# Patient Record
Sex: FEMALE | Employment: OTHER | URBAN - METROPOLITAN AREA
[De-identification: names, ages, dates, MRNs, and addresses within clinical notes are randomized per-mention and may not be internally consistent; named-entity substitution may affect disease eponyms.]

---

## 2017-05-01 ENCOUNTER — OFFICE VISIT (OUTPATIENT)
Dept: UROLOGY | Age: 82
End: 2017-05-01
Payer: MEDICARE

## 2017-05-01 VITALS
TEMPERATURE: 98.1 F | SYSTOLIC BLOOD PRESSURE: 140 MMHG | HEART RATE: 67 BPM | BODY MASS INDEX: 23.54 KG/M2 | HEIGHT: 67 IN | DIASTOLIC BLOOD PRESSURE: 82 MMHG | WEIGHT: 150 LBS

## 2017-05-01 DIAGNOSIS — R31.9 HEMATURIA: Primary | ICD-10-CM

## 2017-05-01 DIAGNOSIS — R35.1 NOCTURIA: ICD-10-CM

## 2017-05-01 PROCEDURE — 1123F ACP DISCUSS/DSCN MKR DOCD: CPT | Performed by: UROLOGY

## 2017-05-01 PROCEDURE — 1090F PRES/ABSN URINE INCON ASSESS: CPT | Performed by: UROLOGY

## 2017-05-01 PROCEDURE — G8420 CALC BMI NORM PARAMETERS: HCPCS | Performed by: UROLOGY

## 2017-05-01 PROCEDURE — G8427 DOCREV CUR MEDS BY ELIG CLIN: HCPCS | Performed by: UROLOGY

## 2017-05-01 PROCEDURE — 1036F TOBACCO NON-USER: CPT | Performed by: UROLOGY

## 2017-05-01 PROCEDURE — G8400 PT W/DXA NO RESULTS DOC: HCPCS | Performed by: UROLOGY

## 2017-05-01 PROCEDURE — 4040F PNEUMOC VAC/ADMIN/RCVD: CPT | Performed by: UROLOGY

## 2017-05-01 PROCEDURE — 99214 OFFICE O/P EST MOD 30 MIN: CPT | Performed by: UROLOGY

## 2017-05-01 ASSESSMENT — ENCOUNTER SYMPTOMS
EYE REDNESS: 0
WHEEZING: 0
COUGH: 0
SHORTNESS OF BREATH: 0
COLOR CHANGE: 0
EYE PAIN: 0
BACK PAIN: 0

## 2017-05-10 ENCOUNTER — PROCEDURE VISIT (OUTPATIENT)
Dept: UROLOGY | Age: 82
End: 2017-05-10
Payer: MEDICARE

## 2017-05-10 VITALS
SYSTOLIC BLOOD PRESSURE: 140 MMHG | HEART RATE: 73 BPM | BODY MASS INDEX: 23.53 KG/M2 | WEIGHT: 149.91 LBS | HEIGHT: 67 IN | RESPIRATION RATE: 16 BRPM | DIASTOLIC BLOOD PRESSURE: 85 MMHG

## 2017-05-10 DIAGNOSIS — R31.9 HEMATURIA: Primary | ICD-10-CM

## 2017-05-10 PROCEDURE — 1036F TOBACCO NON-USER: CPT | Performed by: UROLOGY

## 2017-05-10 PROCEDURE — 52000 CYSTOURETHROSCOPY: CPT | Performed by: UROLOGY

## 2018-02-07 ENCOUNTER — HOSPITAL ENCOUNTER (OUTPATIENT)
Age: 83
Setting detail: SPECIMEN
Discharge: HOME | End: 2018-02-07
Payer: MEDICARE

## 2018-02-07 ENCOUNTER — OFFICE VISIT (OUTPATIENT)
Dept: FAMILY MEDICINE CLINIC | Age: 83
End: 2018-02-07
Payer: MEDICARE

## 2018-02-07 VITALS
DIASTOLIC BLOOD PRESSURE: 80 MMHG | BODY MASS INDEX: 21.69 KG/M2 | TEMPERATURE: 98 F | WEIGHT: 138.2 LBS | SYSTOLIC BLOOD PRESSURE: 122 MMHG | HEIGHT: 67 IN | HEART RATE: 76 BPM | OXYGEN SATURATION: 98 %

## 2018-02-07 DIAGNOSIS — I10 HYPERTENSION, UNSPECIFIED TYPE: Primary | ICD-10-CM

## 2018-02-07 DIAGNOSIS — E03.9 HYPOTHYROIDISM, UNSPECIFIED TYPE: ICD-10-CM

## 2018-02-07 DIAGNOSIS — Z90.710 H/O TOTAL HYSTERECTOMY: ICD-10-CM

## 2018-02-07 DIAGNOSIS — Z13.820 SCREENING FOR OSTEOPOROSIS: ICD-10-CM

## 2018-02-07 DIAGNOSIS — Z91.89 AT RISK FOR OSTEOPOROSIS: ICD-10-CM

## 2018-02-07 DIAGNOSIS — I10 HYPERTENSION, UNSPECIFIED TYPE: ICD-10-CM

## 2018-02-07 DIAGNOSIS — E78.5 HYPERLIPIDEMIA, UNSPECIFIED HYPERLIPIDEMIA TYPE: ICD-10-CM

## 2018-02-07 DIAGNOSIS — Z91.81 RISK FOR FALLS: ICD-10-CM

## 2018-02-07 LAB
ALBUMIN SERPL-MCNC: 4 G/DL (ref 3.5–5.2)
ALBUMIN/GLOBULIN RATIO: 1.4 (ref 1–2.5)
ALP BLD-CCNC: 85 U/L (ref 35–104)
ALT SERPL-CCNC: 7 U/L (ref 5–33)
ANION GAP SERPL CALCULATED.3IONS-SCNC: 12 MMOL/L (ref 9–17)
AST SERPL-CCNC: 13 U/L
BILIRUB SERPL-MCNC: 0.75 MG/DL (ref 0.3–1.2)
BUN BLDV-MCNC: 14 MG/DL (ref 8–23)
BUN/CREAT BLD: ABNORMAL (ref 9–20)
CALCIUM SERPL-MCNC: 9 MG/DL (ref 8.6–10.4)
CHLORIDE BLD-SCNC: 99 MMOL/L (ref 98–107)
CHOLESTEROL/HDL RATIO: 2.5
CHOLESTEROL: 133 MG/DL
CO2: 28 MMOL/L (ref 20–31)
CREAT SERPL-MCNC: 0.96 MG/DL (ref 0.5–0.9)
GFR AFRICAN AMERICAN: >60 ML/MIN
GFR NON-AFRICAN AMERICAN: 56 ML/MIN
GFR SERPL CREATININE-BSD FRML MDRD: ABNORMAL ML/MIN/{1.73_M2}
GFR SERPL CREATININE-BSD FRML MDRD: ABNORMAL ML/MIN/{1.73_M2}
GLUCOSE BLD-MCNC: 115 MG/DL (ref 70–99)
HCT VFR BLD CALC: 40.5 % (ref 36.3–47.1)
HDLC SERPL-MCNC: 53 MG/DL
HEMOGLOBIN: 13 G/DL (ref 11.9–15.1)
LDL CHOLESTEROL: 58 MG/DL (ref 0–130)
MCH RBC QN AUTO: 29.3 PG (ref 25.2–33.5)
MCHC RBC AUTO-ENTMCNC: 32.1 G/DL (ref 28.4–34.8)
MCV RBC AUTO: 91.4 FL (ref 82.6–102.9)
NRBC AUTOMATED: 0 PER 100 WBC
PDW BLD-RTO: 13 % (ref 11.8–14.4)
PLATELET # BLD: 153 K/UL (ref 138–453)
PMV BLD AUTO: 11 FL (ref 8.1–13.5)
POTASSIUM SERPL-SCNC: 4 MMOL/L (ref 3.7–5.3)
RBC # BLD: 4.43 M/UL (ref 3.95–5.11)
SODIUM BLD-SCNC: 139 MMOL/L (ref 135–144)
TOTAL PROTEIN: 6.8 G/DL (ref 6.4–8.3)
TRIGL SERPL-MCNC: 112 MG/DL
TSH SERPL DL<=0.05 MIU/L-ACNC: 3.23 MIU/L (ref 0.3–5)
VLDLC SERPL CALC-MCNC: NORMAL MG/DL (ref 1–30)
WBC # BLD: 7.5 K/UL (ref 3.5–11.3)

## 2018-02-07 PROCEDURE — G8420 CALC BMI NORM PARAMETERS: HCPCS | Performed by: NURSE PRACTITIONER

## 2018-02-07 PROCEDURE — G8400 PT W/DXA NO RESULTS DOC: HCPCS | Performed by: NURSE PRACTITIONER

## 2018-02-07 PROCEDURE — 4040F PNEUMOC VAC/ADMIN/RCVD: CPT | Performed by: NURSE PRACTITIONER

## 2018-02-07 PROCEDURE — 1036F TOBACCO NON-USER: CPT | Performed by: NURSE PRACTITIONER

## 2018-02-07 PROCEDURE — 1090F PRES/ABSN URINE INCON ASSESS: CPT | Performed by: NURSE PRACTITIONER

## 2018-02-07 PROCEDURE — 99203 OFFICE O/P NEW LOW 30 MIN: CPT | Performed by: NURSE PRACTITIONER

## 2018-02-07 PROCEDURE — 1123F ACP DISCUSS/DSCN MKR DOCD: CPT | Performed by: NURSE PRACTITIONER

## 2018-02-07 PROCEDURE — G8427 DOCREV CUR MEDS BY ELIG CLIN: HCPCS | Performed by: NURSE PRACTITIONER

## 2018-02-07 PROCEDURE — G8484 FLU IMMUNIZE NO ADMIN: HCPCS | Performed by: NURSE PRACTITIONER

## 2018-02-07 ASSESSMENT — ENCOUNTER SYMPTOMS
COUGH: 0
RESPIRATORY NEGATIVE: 1
EYES NEGATIVE: 1
WHEEZING: 0
ABDOMINAL PAIN: 0
ALLERGIC/IMMUNOLOGIC NEGATIVE: 1
SHORTNESS OF BREATH: 0
CONSTIPATION: 1
DIARRHEA: 0

## 2018-02-07 ASSESSMENT — PATIENT HEALTH QUESTIONNAIRE - PHQ9
2. FEELING DOWN, DEPRESSED OR HOPELESS: 0
SUM OF ALL RESPONSES TO PHQ QUESTIONS 1-9: 0
1. LITTLE INTEREST OR PLEASURE IN DOING THINGS: 0
SUM OF ALL RESPONSES TO PHQ9 QUESTIONS 1 & 2: 0

## 2018-02-07 NOTE — PROGRESS NOTES
Cardiovascular: Negative. Negative for chest pain and palpitations. Gastrointestinal: Positive for constipation. Negative for abdominal pain and diarrhea. Endocrine: Negative. Negative for cold intolerance and heat intolerance. Genitourinary: Negative. Negative for difficulty urinating, frequency and urgency. Musculoskeletal: Positive for gait problem. Negative for arthralgias. Skin: Negative. Negative for pallor and rash. Allergic/Immunologic: Negative. Neurological: Negative for weakness, numbness and headaches. Hematological: Negative. Psychiatric/Behavioral: Negative. Negative for sleep disturbance. The patient is not nervous/anxious. Objective:     /80 (Site: Left Arm, Position: Sitting, Cuff Size: Small Adult)   Pulse 76   Temp 98 °F (36.7 °C) (Oral)   Ht 5' 7\" (1.702 m)   Wt 138 lb 3.2 oz (62.7 kg)   SpO2 98%   BMI 21.65 kg/m²   Physical Exam   Constitutional: She is oriented to person, place, and time. Vital signs are normal. She appears well-developed and well-nourished. HENT:   Head: Normocephalic and atraumatic. One lower tooth noted to lower left   Eyes: Conjunctivae are normal. Right eye exhibits no discharge. Left eye exhibits no discharge. Neck: Normal range of motion. Neck supple. No thyromegaly present. Cardiovascular: Normal rate, regular rhythm and normal heart sounds. Exam reveals no gallop and no friction rub. No murmur heard. Pulmonary/Chest: Effort normal and breath sounds normal. No respiratory distress. She has no wheezes. She has no rales. Abdominal: Soft. Bowel sounds are normal. She exhibits no distension. There is no tenderness. Musculoskeletal: Normal range of motion. She exhibits no edema. She has difficulty getting up from seated position. Walks with slow gait, stops to rest.   Neurological: She is alert and oriented to person, place, and time. Coordination normal.   Skin: Skin is warm and dry. No rash noted.  No

## 2018-02-08 PROBLEM — N18.30 CKD (CHRONIC KIDNEY DISEASE) STAGE 3, GFR 30-59 ML/MIN (HCC): Status: ACTIVE | Noted: 2018-02-08

## 2018-02-08 PROBLEM — Z91.81 RISK FOR FALLS: Status: ACTIVE | Noted: 2018-02-08

## 2018-03-06 NOTE — TELEPHONE ENCOUNTER
NEW PATIENT with us 02/07/2018      Next Visit Date:  Future Appointments  Date Time Provider Roberto Drummond   5/7/2018 1:00 PM Prince Víctor  [address] Maintenance   Topic Date Due    Shingles Vaccine (1 of 2 - 2 Dose Series) 07/03/1986    DEXA (modify frequency per FRAX score)  02/07/2019 (Originally 7/3/2001)    DTaP/Tdap/Td vaccine (1 - Tdap) 02/07/2019 (Originally 7/3/1954)    Flu vaccine (1) 02/07/2019 (Originally 9/1/2017)    Pneumococcal low/med risk (1 of 2 - PCV13) 02/07/2019 (Originally 7/3/2001)    TSH testing  02/07/2019    Potassium monitoring  02/07/2019    Creatinine monitoring  02/07/2019       No results found for: LABA1C          ( goal A1C is < 7)   No results found for: LABMICR  LDL Cholesterol (mg/dL)   Date Value   02/07/2018 58       (goal LDL is <100)   AST (U/L)   Date Value   02/07/2018 13     ALT (U/L)   Date Value   02/07/2018 7     BUN (mg/dL)   Date Value   02/07/2018 14     BP Readings from Last 3 Encounters:   02/07/18 122/80   08/31/17 (!) 148/92   05/10/17 (!) 140/85          (goal 120/80)    All Future Testing planned in [facility]  Lab Frequency Next Occurrence   Basic Metabolic Panel Once 93/90/6861   CBC Auto Differential Once 04/06/2017   Magnesium Once 04/06/2017   Phosphorus Once 04/06/2017   PROTEIN,TOT,RAND UR WITH TP/CRE RATIO Once 69/81/3599   Basic Metabolic Panel Once 15/46/2039   Magnesium Once 08/31/2017   Phosphorus Once 08/31/2017   CBC Auto Differential Once 08/31/2017   Protein,Total,Rand Ur with TP/Cre Ratio Once 08/31/2017   DEXA Bone Density Axial Skeleton Once 03/09/2018               Patient Active Problem List:     HTN (hypertension)     Dependent edema     SOB (shortness of breath)     Hypertension     Kidney stone     Cancer (HCC)     Hypothyroidism     Depression     Hyperlipidemia     Basal cell carcinoma     Melanoma ([facility] [address]     Anxiety     Hypokalemia     Gout     H/O total hysterectomy     CKD (chronic kidney

## 2018-03-08 RX ORDER — LEVOTHYROXINE SODIUM 0.1 MG/1
100 TABLET ORAL DAILY
Qty: 30 TABLET | Refills: 3 | Status: SHIPPED | OUTPATIENT
Start: 2018-03-08 | End: 2018-07-26 | Stop reason: SDUPTHER

## 2018-03-08 RX ORDER — BUSPIRONE HYDROCHLORIDE 15 MG/1
15 TABLET ORAL DAILY
Qty: 30 TABLET | Refills: 3 | Status: SHIPPED | OUTPATIENT
Start: 2018-03-08 | End: 2018-07-09 | Stop reason: SDUPTHER

## 2018-04-16 RX ORDER — LISINOPRIL 20 MG/1
20 TABLET ORAL DAILY
Qty: 90 TABLET | Refills: 1 | Status: SHIPPED | OUTPATIENT
Start: 2018-04-16 | End: 2018-10-13 | Stop reason: SDUPTHER

## 2018-04-20 RX ORDER — PAROXETINE HYDROCHLORIDE 40 MG/1
40 TABLET, FILM COATED ORAL EVERY MORNING
Qty: 90 TABLET | Refills: 1 | Status: SHIPPED | OUTPATIENT
Start: 2018-04-20 | End: 2018-10-15 | Stop reason: SDUPTHER

## 2018-04-20 RX ORDER — CARVEDILOL 12.5 MG/1
12.5 TABLET ORAL 2 TIMES DAILY
Qty: 180 TABLET | Refills: 1 | Status: SHIPPED | OUTPATIENT
Start: 2018-04-20 | End: 2018-10-15 | Stop reason: SDUPTHER

## 2018-05-07 ENCOUNTER — OFFICE VISIT (OUTPATIENT)
Dept: FAMILY MEDICINE CLINIC | Age: 83
End: 2018-05-07
Payer: MEDICARE

## 2018-05-07 VITALS
OXYGEN SATURATION: 98 % | WEIGHT: 129 LBS | BODY MASS INDEX: 20.2 KG/M2 | HEART RATE: 79 BPM | DIASTOLIC BLOOD PRESSURE: 84 MMHG | TEMPERATURE: 97.7 F | SYSTOLIC BLOOD PRESSURE: 128 MMHG

## 2018-05-07 DIAGNOSIS — E03.9 HYPOTHYROIDISM, UNSPECIFIED TYPE: ICD-10-CM

## 2018-05-07 DIAGNOSIS — I10 ESSENTIAL HYPERTENSION: Primary | ICD-10-CM

## 2018-05-07 PROCEDURE — 1090F PRES/ABSN URINE INCON ASSESS: CPT | Performed by: NURSE PRACTITIONER

## 2018-05-07 PROCEDURE — G8420 CALC BMI NORM PARAMETERS: HCPCS | Performed by: NURSE PRACTITIONER

## 2018-05-07 PROCEDURE — 4040F PNEUMOC VAC/ADMIN/RCVD: CPT | Performed by: NURSE PRACTITIONER

## 2018-05-07 PROCEDURE — 99213 OFFICE O/P EST LOW 20 MIN: CPT | Performed by: NURSE PRACTITIONER

## 2018-05-07 PROCEDURE — G8427 DOCREV CUR MEDS BY ELIG CLIN: HCPCS | Performed by: NURSE PRACTITIONER

## 2018-05-07 PROCEDURE — 1123F ACP DISCUSS/DSCN MKR DOCD: CPT | Performed by: NURSE PRACTITIONER

## 2018-05-07 PROCEDURE — 1036F TOBACCO NON-USER: CPT | Performed by: NURSE PRACTITIONER

## 2018-05-07 PROCEDURE — G8400 PT W/DXA NO RESULTS DOC: HCPCS | Performed by: NURSE PRACTITIONER

## 2018-05-07 ASSESSMENT — ENCOUNTER SYMPTOMS
EYES NEGATIVE: 1
WHEEZING: 0
ABDOMINAL PAIN: 0
COUGH: 0
ALLERGIC/IMMUNOLOGIC NEGATIVE: 1
SHORTNESS OF BREATH: 0
RESPIRATORY NEGATIVE: 1

## 2018-07-09 RX ORDER — BUSPIRONE HYDROCHLORIDE 15 MG/1
TABLET ORAL
Qty: 30 TABLET | Refills: 5 | Status: SHIPPED | OUTPATIENT
Start: 2018-07-09 | End: 2018-10-23 | Stop reason: SDUPTHER

## 2018-07-26 RX ORDER — LEVOTHYROXINE SODIUM 0.1 MG/1
TABLET ORAL
Qty: 90 TABLET | Refills: 1 | Status: SHIPPED | OUTPATIENT
Start: 2018-07-26 | End: 2019-01-21 | Stop reason: SDUPTHER

## 2018-07-26 NOTE — TELEPHONE ENCOUNTER
Last seen: 5/7/18  Next appt: 11/7/18 2/7/2018  3:56 PM - Lucas, Abimael Incoming Lab Results From [facility]     Component Results     Component Value Ref Range & Units Status Collected Lab   TSH 3.23  0.30 - 5.00 mIU/L Final 02/07/2018 11:16  Pierce Sun         Next Visit Date:  Future Appointments  Date Time Provider Roberto Hoda   11/7/2018 9:30 AM [facility]okul [facility]       Health Maintenance   Topic Date Due    DEXA (modify frequency per FRAX score)  02/07/2019 (Originally 7/3/2001)    DTaP/Tdap/Td vaccine (1 - Tdap) 02/07/2019 (Originally 7/3/1954)    Flu vaccine (1) 02/07/2019 (Originally 9/1/2018)    Pneumococcal low/med risk (1 of 2 - PCV13) 02/07/2019 (Originally 7/3/2001)    Shingles Vaccine (1 of 2 - 2 Dose Series) 05/07/2019 (Originally 7/3/1986)    TSH testing  02/07/2019    Potassium monitoring  02/07/2019    Creatinine monitoring  02/07/2019       No results found for: LABA1C          ( goal A1C is < 7)   No results found for: LABMICR  LDL Cholesterol (mg/dL)   Date Value   02/07/2018 58       (goal LDL is <100)   AST (U/L)   Date Value   02/07/2018 13     ALT (U/L)   Date Value   02/07/2018 7     BUN (mg/dL)   Date Value   02/07/2018 14     BP Readings from Last 3 Encounters:   05/07/18 128/84   02/07/18 122/80   08/31/17 (!) 148/92          (goal 120/80)    All Future Testing planned in [facility]  Lab Frequency Next Occurrence   Basic Metabolic Panel Once 95/19/1473   Magnesium Once 08/31/2017   Phosphorus Once 08/31/2017   CBC Auto Differential Once 08/31/2017   Protein,Total,Rand Ur with TP/Cre Ratio Once 08/31/2017   DEXA Bone Density Axial Skeleton Once 07/29/2018               Patient Active Problem List:     HTN (hypertension)     Dependent edema     SOB (shortness of breath)     Hypertension     Kidney stone     Cancer (HCC)     Hypothyroidism     Depression     Hyperlipidemia     Basal cell carcinoma     Melanoma ([facility] [address]     Anxiety Hypokalemia     Gout     H/O total hysterectomy     CKD (chronic kidney disease) stage 3, GFR 30-59 ml/min     Risk for falls

## 2018-10-15 RX ORDER — CARVEDILOL 12.5 MG/1
12.5 TABLET ORAL 2 TIMES DAILY
Qty: 180 TABLET | Refills: 1 | Status: SHIPPED | OUTPATIENT
Start: 2018-10-15 | End: 2019-03-24 | Stop reason: SDUPTHER

## 2018-10-15 RX ORDER — PAROXETINE HYDROCHLORIDE 40 MG/1
40 TABLET, FILM COATED ORAL EVERY MORNING
Qty: 90 TABLET | Refills: 1 | Status: SHIPPED | OUTPATIENT
Start: 2018-10-15 | End: 2019-03-29 | Stop reason: SDUPTHER

## 2018-10-15 RX ORDER — LISINOPRIL 20 MG/1
TABLET ORAL
Qty: 90 TABLET | Refills: 1 | Status: SHIPPED | OUTPATIENT
Start: 2018-10-15 | End: 2019-05-20 | Stop reason: SDUPTHER

## 2018-10-15 NOTE — TELEPHONE ENCOUNTER
Filled 4/16/18 #90 with 1 rF    Next Visit Date:  Future Appointments  Date Time Provider Roberto Hoda   11/7/2018 9:30 AM [facility]       [facility] Maintenance   Topic Date Due    DEXA (modify frequency per FRAX score)  02/07/2019 (Originally 7/3/2001)    DTaP/Tdap/Td vaccine (1 - Tdap) 02/07/2019 (Originally 7/3/1954)    Flu vaccine (1) 02/07/2019 (Originally 9/1/2018)    Pneumococcal low/med risk (1 of 2 - PCV13) 02/07/2019 (Originally 7/3/2001)    Shingles Vaccine (1 of 2 - 2 Dose Series) 05/07/2019 (Originally 7/3/1986)    TSH testing  02/07/2019    Potassium monitoring  02/07/2019    Creatinine monitoring  02/07/2019       No results found for: LABA1C          ( goal A1C is < 7)   No results found for: LABMICR  LDL Cholesterol (mg/dL)   Date Value   02/07/2018 58       (goal LDL is <100)   AST (U/L)   Date Value   02/07/2018 13     ALT (U/L)   Date Value   02/07/2018 7     BUN (mg/dL)   Date Value   02/07/2018 14     BP Readings from Last 3 Encounters:   05/07/18 128/84   02/07/18 122/80   08/31/17 (!) 148/92          (goal 120/80)    All Future Testing planned in [facility]  Lab Frequency Next Occurrence               Patient Active Problem List:     HTN (hypertension)     Dependent edema     SOB (shortness of breath)     Hypertension     Kidney stone     Cancer (HCC)     Hypothyroidism     Depression     Hyperlipidemia     Basal cell carcinoma     Melanoma (HCC)     Anxiety     Hypokalemia     Gout     H/O total hysterectomy     CKD (chronic kidney disease) stage 3, GFR 30-59 ml/min (HCC)     Risk for falls

## 2018-10-23 RX ORDER — BUSPIRONE HYDROCHLORIDE 15 MG/1
15 TABLET ORAL DAILY
Qty: 90 TABLET | Refills: 1 | Status: SHIPPED | OUTPATIENT
Start: 2018-10-23 | End: 2019-01-24 | Stop reason: SDUPTHER

## 2018-10-23 NOTE — TELEPHONE ENCOUNTER
Filled 7/9/18 #30 with 5 RF to local pharmacy. Rec'd request from [facility] for 90 days.     Next Visit Date:  Future Appointments  Date Time Provider Roberto Drummond   11/7/2018 9:30 AM NORMA Reed - CNP [facility]       Health Maintenance   Topic Date Due    DEXA (modify frequency per FRAX score)  02/07/2019 (Originally 7/3/2001)    DTaP/Tdap/Td vaccine (1 - Tdap) 02/07/2019 (Originally 7/3/1954)    Flu vaccine (1) 02/07/2019 (Originally 9/1/2018)    Pneumococcal low/med risk (1 of 2 - PCV13) 02/07/2019 (Originally 7/3/2001)    Shingles Vaccine (1 of 2 - 2 Dose Series) 05/07/2019 (Originally 7/3/1986)    TSH testing  02/07/2019    Potassium monitoring  02/07/2019    Creatinine monitoring  02/07/2019       No results found for: LABA1C          ( goal A1C is < 7)   No results found for: LABMICR  LDL Cholesterol (mg/dL)   Date Value   02/07/2018 58       (goal LDL is <100)   AST (U/L)   Date Value   02/07/2018 13     ALT (U/L)   Date Value   02/07/2018 7     BUN (mg/dL)   Date Value   02/07/2018 14     BP Readings from Last 3 Encounters:   05/07/18 128/84   02/07/18 122/80   08/31/17 (!) 148/92          (goal 120/80)    All Future Testing planned in [facility]  Lab Frequency Next Occurrence               Patient Active Problem List:     HTN (hypertension)     Dependent edema     SOB (shortness of breath)     Hypertension     Kidney stone     Cancer (HCC)     Hypothyroidism     Depression     Hyperlipidemia     Basal cell carcinoma     Melanoma (HCC)     Anxiety     Hypokalemia     Gout     H/O total hysterectomy     CKD (chronic kidney disease) stage 3, GFR 30-59 ml/min (HCC)     Risk for falls

## 2019-01-23 ENCOUNTER — TELEPHONE (OUTPATIENT)
Dept: FAMILY MEDICINE CLINIC | Age: 84
End: 2019-01-23

## 2019-01-24 RX ORDER — BUSPIRONE HYDROCHLORIDE 15 MG/1
15 TABLET ORAL DAILY
Qty: 90 TABLET | Refills: 1 | Status: SHIPPED | OUTPATIENT
Start: 2019-01-24 | End: 2019-06-26 | Stop reason: SDUPTHER

## 2019-02-04 RX ORDER — ATORVASTATIN CALCIUM 10 MG/1
10 TABLET, FILM COATED ORAL DAILY
Qty: 90 TABLET | Refills: 1 | Status: SHIPPED | OUTPATIENT
Start: 2019-02-04 | End: 2019-06-26 | Stop reason: SDUPTHER

## 2019-03-25 RX ORDER — CARVEDILOL 12.5 MG/1
TABLET ORAL
Qty: 60 TABLET | Refills: 0 | Status: SHIPPED | OUTPATIENT
Start: 2019-03-25 | End: 2019-05-28 | Stop reason: SDUPTHER

## 2019-03-29 RX ORDER — PAROXETINE HYDROCHLORIDE 40 MG/1
TABLET, FILM COATED ORAL
Qty: 30 TABLET | Refills: 0 | Status: SHIPPED | OUTPATIENT
Start: 2019-03-29 | End: 2019-05-28 | Stop reason: SDUPTHER

## 2019-05-20 ENCOUNTER — TELEPHONE (OUTPATIENT)
Dept: FAMILY MEDICINE CLINIC | Age: 84
End: 2019-05-20

## 2019-05-20 RX ORDER — LISINOPRIL 20 MG/1
TABLET ORAL
Qty: 14 TABLET | Refills: 0 | Status: SHIPPED | OUTPATIENT
Start: 2019-05-20 | End: 2019-05-21 | Stop reason: SDUPTHER

## 2019-05-21 ENCOUNTER — TELEPHONE (OUTPATIENT)
Dept: FAMILY MEDICINE CLINIC | Age: 84
End: 2019-05-21

## 2019-05-21 DIAGNOSIS — I10 ESSENTIAL HYPERTENSION: Primary | ICD-10-CM

## 2019-05-21 RX ORDER — LISINOPRIL 20 MG/1
TABLET ORAL
Qty: 90 TABLET | Refills: 1 | Status: SHIPPED | OUTPATIENT
Start: 2019-05-21 | End: 2019-11-17 | Stop reason: SDUPTHER

## 2019-05-28 DIAGNOSIS — I10 ESSENTIAL HYPERTENSION: ICD-10-CM

## 2019-05-28 DIAGNOSIS — F32.A DEPRESSION, UNSPECIFIED DEPRESSION TYPE: Primary | ICD-10-CM

## 2019-05-28 RX ORDER — PAROXETINE HYDROCHLORIDE 40 MG/1
TABLET, FILM COATED ORAL
Qty: 30 TABLET | Refills: 0 | Status: SHIPPED | OUTPATIENT
Start: 2019-05-28 | End: 2019-05-31 | Stop reason: SDUPTHER

## 2019-05-28 RX ORDER — CARVEDILOL 12.5 MG/1
TABLET ORAL
Qty: 60 TABLET | Refills: 0 | Status: SHIPPED | OUTPATIENT
Start: 2019-05-28 | End: 2019-05-31 | Stop reason: SDUPTHER

## 2019-05-31 DIAGNOSIS — F32.A DEPRESSION, UNSPECIFIED DEPRESSION TYPE: ICD-10-CM

## 2019-05-31 DIAGNOSIS — I10 ESSENTIAL HYPERTENSION: ICD-10-CM

## 2019-05-31 NOTE — TELEPHONE ENCOUNTER
Med sent to wrong pharmacy. Pt agrees to keep express script order. However she needs a temp supply until it arrives.      Last visit 5/7/18  Next Visit Date:  Future Appointments   Date Time Provider Roberto Hoda   6/26/2019 11:10 AM NORMA Sofia - CNP [facility] [address] Maintenance   Topic Date Due    DTaP/Tdap/Td vaccine (1 - Tdap) 07/03/1955    Shingles Vaccine (1 of 2) 07/03/1986    DEXA (modify frequency per FRAX score)  07/03/2001    Pneumococcal 65+ years Vaccine (1 of 2 - PCV13) 07/03/2001    TSH testing  02/07/2019    Potassium monitoring  02/07/2019    Creatinine monitoring  02/07/2019    Flu vaccine (Season Ended) 09/01/2019       No results found for: LABA1C          ( goal A1C is < 7)   No results found for: LABMICR  LDL Cholesterol (mg/dL)   Date Value   02/07/2018 58       (goal LDL is <100)   AST (U/L)   Date Value   02/07/2018 13     ALT (U/L)   Date Value   02/07/2018 7     BUN (mg/dL)   Date Value   02/07/2018 14     BP Readings from Last 3 Encounters:   05/07/18 128/84   02/07/18 122/80   08/31/17 (!) 148/92          (goal 120/80)    All Future Testing planned in [facility]  Lab Frequency Next Occurrence               Patient Active Problem List:     HTN (hypertension)     Dependent edema     SOB (shortness of breath)     Hypertension     Kidney stone     Cancer (HCC)     Hypothyroidism     Depression     Hyperlipidemia     Basal cell carcinoma     Melanoma (HCC)     Anxiety     Hypokalemia     Gout     H/O total hysterectomy     CKD (chronic kidney disease) stage 3, GFR 30-59 ml/min (HCC)     Risk for falls

## 2019-06-03 RX ORDER — CARVEDILOL 12.5 MG/1
TABLET ORAL
Qty: 28 TABLET | Refills: 0 | Status: SHIPPED | OUTPATIENT
Start: 2019-06-03 | End: 2019-06-14 | Stop reason: SDUPTHER

## 2019-06-03 RX ORDER — PAROXETINE HYDROCHLORIDE 40 MG/1
TABLET, FILM COATED ORAL
Qty: 14 TABLET | Refills: 0 | Status: SHIPPED | OUTPATIENT
Start: 2019-06-03 | End: 2019-06-17

## 2019-06-14 DIAGNOSIS — I10 ESSENTIAL HYPERTENSION: ICD-10-CM

## 2019-06-14 DIAGNOSIS — F32.A DEPRESSION, UNSPECIFIED DEPRESSION TYPE: ICD-10-CM

## 2019-06-14 NOTE — TELEPHONE ENCOUNTER
Last visit: 5/7/18  Last Med refill: 6/3/19 14 day  Does patient have enough medication for 72 hours: Yes    Next Visit Date:  Future Appointments   Date Time Provider Roberto Drummond   6/26/2019 11:10 AM Louanne Mcardle, APRN - CNP [facility] [address] Maintenance   Topic Date Due    DTaP/Tdap/Td vaccine (1 - Tdap) 07/03/1955    Shingles Vaccine (1 of 2) 07/03/1986    DEXA (modify frequency per FRAX score)  07/03/2001    Pneumococcal 65+ years Vaccine (1 of 2 - PCV13) 07/03/2001    TSH testing  02/07/2019    Potassium monitoring  02/07/2019    Creatinine monitoring  02/07/2019    Flu vaccine (Season Ended) 09/01/2019       No results found for: LABA1C          ( goal A1C is < 7)   No results found for: LABMICR  LDL Cholesterol (mg/dL)   Date Value   02/07/2018 58       (goal LDL is <100)   AST (U/L)   Date Value   02/07/2018 13     ALT (U/L)   Date Value   02/07/2018 7     BUN (mg/dL)   Date Value   02/07/2018 14     BP Readings from Last 3 Encounters:   05/07/18 128/84   02/07/18 122/80   08/31/17 (!) 148/92          (goal 120/80)    All Future Testing planned in [facility]  Lab Frequency Next Occurrence               Patient Active Problem List:     HTN (hypertension)     Dependent edema     SOB (shortness of breath)     Hypertension     Kidney stone     Cancer (HCC)     Hypothyroidism     Depression     Hyperlipidemia     Basal cell carcinoma     Melanoma (HCC)     Anxiety     Hypokalemia     Gout     H/O total hysterectomy     CKD (chronic kidney disease) stage 3, GFR 30-59 ml/min (HCC)     Risk for falls

## 2019-06-17 RX ORDER — CARVEDILOL 12.5 MG/1
TABLET ORAL
Qty: 60 TABLET | Refills: 0 | Status: SHIPPED | OUTPATIENT
Start: 2019-06-17 | End: 2019-06-26 | Stop reason: SDUPTHER

## 2019-06-17 RX ORDER — PAROXETINE HYDROCHLORIDE 40 MG/1
TABLET, FILM COATED ORAL
Qty: 30 TABLET | Refills: 0 | Status: SHIPPED | OUTPATIENT
Start: 2019-06-17 | End: 2019-06-26 | Stop reason: SDUPTHER

## 2019-06-26 ENCOUNTER — OFFICE VISIT (OUTPATIENT)
Dept: FAMILY MEDICINE CLINIC | Age: 84
End: 2019-06-26
Payer: MEDICARE

## 2019-06-26 VITALS
OXYGEN SATURATION: 98 % | WEIGHT: 125 LBS | SYSTOLIC BLOOD PRESSURE: 120 MMHG | HEIGHT: 66 IN | BODY MASS INDEX: 20.09 KG/M2 | HEART RATE: 74 BPM | DIASTOLIC BLOOD PRESSURE: 66 MMHG

## 2019-06-26 DIAGNOSIS — F32.A DEPRESSION, UNSPECIFIED DEPRESSION TYPE: ICD-10-CM

## 2019-06-26 DIAGNOSIS — M21.619 BUNION OF GREAT TOE: ICD-10-CM

## 2019-06-26 DIAGNOSIS — E78.5 HYPERLIPIDEMIA, UNSPECIFIED HYPERLIPIDEMIA TYPE: ICD-10-CM

## 2019-06-26 DIAGNOSIS — M54.31 SCIATICA OF RIGHT SIDE: ICD-10-CM

## 2019-06-26 DIAGNOSIS — I10 ESSENTIAL HYPERTENSION: Primary | ICD-10-CM

## 2019-06-26 DIAGNOSIS — Q84.6 NAIL ANOMALY: ICD-10-CM

## 2019-06-26 DIAGNOSIS — Z91.81 AT HIGH RISK FOR FALLS: ICD-10-CM

## 2019-06-26 DIAGNOSIS — R60.9 DEPENDENT EDEMA: ICD-10-CM

## 2019-06-26 DIAGNOSIS — Z13.0 SCREENING FOR DEFICIENCY ANEMIA: ICD-10-CM

## 2019-06-26 DIAGNOSIS — E03.9 HYPOTHYROIDISM, UNSPECIFIED TYPE: ICD-10-CM

## 2019-06-26 PROCEDURE — 4040F PNEUMOC VAC/ADMIN/RCVD: CPT | Performed by: NURSE PRACTITIONER

## 2019-06-26 PROCEDURE — 1036F TOBACCO NON-USER: CPT | Performed by: NURSE PRACTITIONER

## 2019-06-26 PROCEDURE — G8400 PT W/DXA NO RESULTS DOC: HCPCS | Performed by: NURSE PRACTITIONER

## 2019-06-26 PROCEDURE — 99215 OFFICE O/P EST HI 40 MIN: CPT | Performed by: NURSE PRACTITIONER

## 2019-06-26 PROCEDURE — 1090F PRES/ABSN URINE INCON ASSESS: CPT | Performed by: NURSE PRACTITIONER

## 2019-06-26 PROCEDURE — 1123F ACP DISCUSS/DSCN MKR DOCD: CPT | Performed by: NURSE PRACTITIONER

## 2019-06-26 PROCEDURE — G8427 DOCREV CUR MEDS BY ELIG CLIN: HCPCS | Performed by: NURSE PRACTITIONER

## 2019-06-26 PROCEDURE — G8420 CALC BMI NORM PARAMETERS: HCPCS | Performed by: NURSE PRACTITIONER

## 2019-06-26 RX ORDER — ATORVASTATIN CALCIUM 10 MG/1
10 TABLET, FILM COATED ORAL DAILY
Qty: 90 TABLET | Refills: 1 | Status: SHIPPED | OUTPATIENT
Start: 2019-06-26 | End: 2019-12-23 | Stop reason: SDUPTHER

## 2019-06-26 RX ORDER — BUSPIRONE HYDROCHLORIDE 15 MG/1
15 TABLET ORAL DAILY
Qty: 90 TABLET | Refills: 1 | Status: SHIPPED | OUTPATIENT
Start: 2019-06-26 | End: 2019-12-23

## 2019-06-26 RX ORDER — CARVEDILOL 12.5 MG/1
12.5 TABLET ORAL 2 TIMES DAILY
Qty: 180 TABLET | Refills: 1 | Status: SHIPPED | OUTPATIENT
Start: 2019-06-26 | End: 2019-12-23

## 2019-06-26 RX ORDER — PAROXETINE HYDROCHLORIDE 40 MG/1
40 TABLET, FILM COATED ORAL EVERY MORNING
Qty: 90 TABLET | Refills: 1 | Status: SHIPPED | OUTPATIENT
Start: 2019-06-26 | End: 2019-12-23

## 2019-06-26 RX ORDER — LEVOTHYROXINE SODIUM 0.1 MG/1
100 TABLET ORAL DAILY
Qty: 90 TABLET | Refills: 1 | Status: SHIPPED | OUTPATIENT
Start: 2019-06-26 | End: 2019-12-23

## 2019-06-26 RX ORDER — METHYLPREDNISOLONE 4 MG/1
TABLET ORAL
Qty: 1 KIT | Refills: 0 | Status: SHIPPED | OUTPATIENT
Start: 2019-06-26 | End: 2019-07-02

## 2019-06-26 RX ORDER — FUROSEMIDE 20 MG/1
10 TABLET ORAL DAILY
Qty: 15 TABLET | Refills: 0 | Status: SHIPPED | OUTPATIENT
Start: 2019-06-26 | End: 2020-03-03 | Stop reason: SDUPTHER

## 2019-06-26 ASSESSMENT — ENCOUNTER SYMPTOMS
COUGH: 0
SHORTNESS OF BREATH: 0
ALLERGIC/IMMUNOLOGIC NEGATIVE: 1
BACK PAIN: 1
EYES NEGATIVE: 1
WHEEZING: 0
RESPIRATORY NEGATIVE: 1
ABDOMINAL PAIN: 0

## 2019-06-26 NOTE — PROGRESS NOTES
[address] [id number]  Dept: [phone]  Dept Fax: [phone]      Oh Jasso is a 81 y.o. female who presents today for hermedical conditions/complaints as noted below. Oh Jasso is c/o of Medication Refill            HPI:      HPI  Adalgisa Pineda is here today for medication refills. She is doing fair. She is getting around. She is having pain in her left buttock that is radiating down the left leg. This is creating some issue with moving and getting up and down. She is using a walker. She has not had any falls. She is not able to cook like she once did due to standing for a length of time is hard for her. HTN- BP is well controlled today. Compliant with taking medications. Denies chest pain, dyspnea,  or HA. She does have some swelling in her lower legs. HLD-she is taking her statin. No issue. Hypothyroid-feels that she has adequate energy. Taking her medication every am.     Toenails are overgrown and thick. She is not able to cut them. She is having trouble getting her feet into shoes. She would like to get some help with this.      No results found for: LABA1C          ( goal A1Cis < 7)   No results found for: LABMICR  LDL Cholesterol (mg/dL)   Date Value   02/07/2018 58       (goal LDL is <100)   AST (U/L)   Date Value   02/07/2018 13     ALT (U/L)   Date Value   02/07/2018 7     BUN (mg/dL)   Date Value   02/07/2018 14     BP Readings from Last 3 Encounters:   06/26/19 120/66   05/07/18 128/84   02/07/18 122/80          (goal 120/80)    Past Medical History:   Diagnosis Date    Anxiety     Arthritis     Basal cell carcinoma     Cancer (HCC)     skin    CHF (congestive heart failure) (HCC)     Depression     Gout     Hyperlipidemia     Hypertension     Hypokalemia     Hypothyroidism     Kidney stone     Melanoma [facility])       Past Surgical History:   Procedure Laterality Date    HYSTERECTOMY      breath and wheezing. Cardiovascular: Positive for leg swelling. Negative for chest pain and palpitations. Gastrointestinal: Negative for abdominal pain. Endocrine: Negative. Negative for cold intolerance and heat intolerance. Genitourinary: Negative. Negative for difficulty urinating, frequency and urgency. Musculoskeletal: Positive for arthralgias, back pain and gait problem (Using walker). Skin: Negative. Negative for pallor and rash. Allergic/Immunologic: Negative. Neurological: Negative for weakness, numbness and headaches. Hematological: Negative. Psychiatric/Behavioral: Negative. Negative for sleep disturbance. The patient is not nervous/anxious. Objective:     /66 (Site: Left Upper Arm, Position: Sitting, Cuff Size: Medium Adult)   Pulse 74   Ht 5' 6\" (1.676 m)   Wt 125 lb (56.7 kg)   SpO2 98%   BMI 20.18 kg/m²   Physical Exam   Constitutional: She is oriented to person, place, and time. Vital signs are normal. She appears well-developed and well-nourished. HENT:   Head: Normocephalic and atraumatic. One lower tooth noted to lower left   Eyes: Conjunctivae are normal. Right eye exhibits no discharge. Left eye exhibits no discharge. Neck: Normal range of motion. Neck supple. No thyromegaly present. Cardiovascular: Normal rate, regular rhythm and normal heart sounds. No murmur heard. Pulmonary/Chest: Effort normal and breath sounds normal. No respiratory distress. She has no rales. Abdominal: Soft. Bowel sounds are normal. She exhibits no distension. There is no tenderness. Musculoskeletal: Normal range of motion. She exhibits edema (1+ to lower legs). Neurological: She is alert and oriented to person, place, and time. Coordination normal.   Skin: Skin is warm and dry. No rash noted. No erythema. Scattered less than 1 cm erythematic crusted lesions noted, scattered over arms. Gr toe nails are overgrown, curling and thickened.     Psychiatric: She has a normal mood and affect. Her behavior is normal. Judgment and thought content normal.   Vitals reviewed. Assessment:      1. Essential hypertension    2. Hypothyroidism, unspecified type    3. Dependent edema    4. Screening for deficiency anemia    5. Bunion of great toe    6. Nail anomaly    7. Depression, unspecified depression type    8. Hyperlipidemia, unspecified hyperlipidemia type    9. At high risk for falls                     Plan:      Orders Placed This Encounter   Procedures    TSH     Standing Status:   Future     Standing Expiration Date:   6/26/2020    Comprehensive Metabolic Panel     Standing Status:   Future     Standing Expiration Date:   6/26/2020    CBC Auto Differential     Standing Status:   Future     Standing Expiration Date:   6/25/2020    Lipid Panel     Standing Status:   Future     Standing Expiration Date:   6/26/2020     Order Specific Question:   Is Patient Fasting?/# of Hours     Answer:   Chaz Brady DPM, Podiatry, [place]     Referral Priority:   Routine     Referral Type:   Eval and Treat     Referral Reason:   Specialty Services Required     Referred to Provider:   Kinjal Cuadra DPM     Requested Specialty:   Podiatry     Number of Visits Requested:   1     1. Essential hypertension  well controlled, continue plan of care    - Comprehensive Metabolic Panel; Future  - carvedilol (COREG) 12.5 MG tablet; Take 1 tablet by mouth 2 times daily  Dispense: 180 tablet; Refill: 1    2. Hypothyroidism, unspecified type    - TSH; Future  - levothyroxine (SYNTHROID) 100 MCG tablet; Take 1 tablet by mouth Daily  Dispense: 90 tablet; Refill: 1    3. Dependent edema  Advised to take for several days, re evaluate swelling and if good, set to the side to use on another occasion.   - furosemide (LASIX) 20 MG tablet; Take 0.5 tablets by mouth daily  Dispense: 15 tablet; Refill: 0    4.  Screening for deficiency anemia    - CBC Auto Differential; Future    5. Bunion of great toe  6. Nail anomaly    - AFL - Chaz El DPM, Podiatry, [place]    7. Depression, unspecified depression type    - PARoxetine (PAXIL) 40 MG tablet; Take 1 tablet by mouth every morning  Dispense: 90 tablet; Refill: 1    8. Hyperlipidemia, unspecified hyperlipidemia type    - atorvastatin (LIPITOR) 10 MG tablet; Take 1 tablet by mouth daily  Dispense: 90 tablet; Refill: 1  - Lipid Panel; Future    9. At high risk for falls      10. Sciatica of right side    - methylPREDNISolone (MEDROL, EDWARD,) 4 MG tablet; Take as directed  Dispense: 1 kit; Refill: 0      No follow-ups on file. Patient given educational materials - see patientinstructions. Discussed use, benefit, and side effects of prescribed medications. All patient questions answered. Pt voiced understanding. Reviewed health maintenance. Instructed to continue current medications, diet and exercise. Patient agreedwith treatment plan. Follow up as directed. Electronically signed by [address] [place] [address] NORMA Glynn CNP on 6/26/2019  On the basis of positive falls risk screening, assessment and plan is as follows: home safety tips provided and continued use of walker.

## 2019-06-26 NOTE — PROGRESS NOTES
Pt comes in for med refill. States that she has been having pain in right upper leg for a few months. Visit Information    Have you changed or started any medications since your last visit including any over-the-counter medicines, vitamins, or herbal medicines? no   Have you stopped taking any of your medications? Is so, why? -  no  Are you having any side effects from any of your medications? - no    Have you seen any other physician or provider since your last visit?  no   Have you had any other diagnostic tests since your last visit?  no   Have you been seen in the emergency room and/or had an admission in a hospital since we last saw you?  no   Have you had your routine dental cleaning in the past 6 months?  no     Do you have an active MyChart account? If no, what is the barrier?   Yes    Patient Care Team:  NORMA Buchanan CNP as PCP - General (Certified Nurse Practitioner)  NORMA Buchanan CNP as PCP - [facility] Provider  Carmen Sams MD as Consulting Physician (Nephrology)  Maria C Hanna MD as Consulting Physician (Dermatology)  Elina Mazariegos MD as Surgeon (Plastic Surgery)    Medical History Review  Past Medical, Family, and Social History reviewed and does contribute to the patient presenting condition    Health Maintenance   Topic Date Due    DTaP/Tdap/Td vaccine (1 - Tdap) 07[birth year]03/1955    Shingles Vaccine (1 of 2) 07/03/1986    DEXA (modify frequency per FRAX score)  07/03/2001    Pneumococcal 65+ years Vaccine (1 of 2 - PCV13) 07/03/2001    Annual Wellness Visit (AWV)  12/29/2016    TSH testing  02/07/2019    Potassium monitoring  02/07/2019    Creatinine monitoring  02/07/2019    Flu vaccine (Season Ended) 09/01/2019

## 2019-07-02 ENCOUNTER — HOSPITAL ENCOUNTER (OUTPATIENT)
Age: 84
Setting detail: SPECIMEN
Discharge: HOME | End: 2019-07-02
Payer: MEDICARE

## 2019-07-02 DIAGNOSIS — Z13.0 SCREENING FOR DEFICIENCY ANEMIA: ICD-10-CM

## 2019-07-02 DIAGNOSIS — E03.9 HYPOTHYROIDISM, UNSPECIFIED TYPE: ICD-10-CM

## 2019-07-02 DIAGNOSIS — E78.5 HYPERLIPIDEMIA, UNSPECIFIED HYPERLIPIDEMIA TYPE: ICD-10-CM

## 2019-07-02 DIAGNOSIS — I10 ESSENTIAL HYPERTENSION: ICD-10-CM

## 2019-07-02 LAB
ABSOLUTE EOS #: 0.09 K/UL (ref 0–0.44)
ABSOLUTE IMMATURE GRANULOCYTE: 0.05 K/UL (ref 0–0.3)
ABSOLUTE LYMPH #: 1.75 K/UL (ref 1.1–3.7)
ABSOLUTE MONO #: 0.89 K/UL (ref 0.1–1.2)
ALBUMIN SERPL-MCNC: 4.4 G/DL (ref 3.5–5.2)
ALBUMIN/GLOBULIN RATIO: 1.5 (ref 1–2.5)
ALP BLD-CCNC: 76 U/L (ref 35–104)
ALT SERPL-CCNC: 11 U/L (ref 5–33)
ANION GAP SERPL CALCULATED.3IONS-SCNC: 21 MMOL/L (ref 9–17)
AST SERPL-CCNC: 14 U/L
BASOPHILS # BLD: 1 % (ref 0–2)
BASOPHILS ABSOLUTE: 0.08 K/UL (ref 0–0.2)
BILIRUB SERPL-MCNC: 0.72 MG/DL (ref 0.3–1.2)
BUN BLDV-MCNC: 17 MG/DL (ref 8–23)
BUN/CREAT BLD: ABNORMAL (ref 9–20)
CALCIUM SERPL-MCNC: 9.5 MG/DL (ref 8.6–10.4)
CHLORIDE BLD-SCNC: 104 MMOL/L (ref 98–107)
CHOLESTEROL/HDL RATIO: 2.2
CHOLESTEROL: 141 MG/DL
CO2: 25 MMOL/L (ref 20–31)
CREAT SERPL-MCNC: 0.78 MG/DL (ref 0.5–0.9)
DIFFERENTIAL TYPE: ABNORMAL
EOSINOPHILS RELATIVE PERCENT: 1 % (ref 1–4)
GFR AFRICAN AMERICAN: >60 ML/MIN
GFR NON-AFRICAN AMERICAN: >60 ML/MIN
GFR SERPL CREATININE-BSD FRML MDRD: ABNORMAL ML/MIN/{1.73_M2}
GFR SERPL CREATININE-BSD FRML MDRD: ABNORMAL ML/MIN/{1.73_M2}
GLUCOSE BLD-MCNC: 89 MG/DL (ref 70–99)
HCT VFR BLD CALC: 43.4 % (ref 36.3–47.1)
HDLC SERPL-MCNC: 64 MG/DL
HEMOGLOBIN: 13.4 G/DL (ref 11.9–15.1)
IMMATURE GRANULOCYTES: 1 %
LDL CHOLESTEROL: 58 MG/DL (ref 0–130)
LYMPHOCYTES # BLD: 19 % (ref 24–43)
MCH RBC QN AUTO: 30 PG (ref 25.2–33.5)
MCHC RBC AUTO-ENTMCNC: 30.9 G/DL (ref 28.4–34.8)
MCV RBC AUTO: 97.1 FL (ref 82.6–102.9)
MONOCYTES # BLD: 9 % (ref 3–12)
NRBC AUTOMATED: 0 PER 100 WBC
PDW BLD-RTO: 13.2 % (ref 11.8–14.4)
PLATELET # BLD: 236 K/UL (ref 138–453)
PLATELET ESTIMATE: ABNORMAL
PMV BLD AUTO: 11.2 FL (ref 8.1–13.5)
POTASSIUM SERPL-SCNC: 4 MMOL/L (ref 3.7–5.3)
RBC # BLD: 4.47 M/UL (ref 3.95–5.11)
RBC # BLD: ABNORMAL 10*6/UL
SEG NEUTROPHILS: 69 % (ref 36–65)
SEGMENTED NEUTROPHILS ABSOLUTE COUNT: 6.6 K/UL (ref 1.5–8.1)
SODIUM BLD-SCNC: 150 MMOL/L (ref 135–144)
TOTAL PROTEIN: 7.4 G/DL (ref 6.4–8.3)
TRIGL SERPL-MCNC: 96 MG/DL
TSH SERPL DL<=0.05 MIU/L-ACNC: 1.2 MIU/L (ref 0.3–5)
VLDLC SERPL CALC-MCNC: NORMAL MG/DL (ref 1–30)
WBC # BLD: 9.5 K/UL (ref 3.5–11.3)
WBC # BLD: ABNORMAL 10*3/UL

## 2019-09-25 ENCOUNTER — OFFICE VISIT (OUTPATIENT)
Dept: FAMILY MEDICINE CLINIC | Age: 84
End: 2019-09-25
Payer: MEDICARE

## 2019-09-25 VITALS
DIASTOLIC BLOOD PRESSURE: 86 MMHG | BODY MASS INDEX: 20.72 KG/M2 | OXYGEN SATURATION: 98 % | HEIGHT: 67 IN | WEIGHT: 132 LBS | SYSTOLIC BLOOD PRESSURE: 136 MMHG | HEART RATE: 95 BPM

## 2019-09-25 DIAGNOSIS — Z28.21 INFLUENZA VACCINATION DECLINED: ICD-10-CM

## 2019-09-25 DIAGNOSIS — M54.31 SCIATIC PAIN, RIGHT: ICD-10-CM

## 2019-09-25 DIAGNOSIS — Z00.00 ROUTINE GENERAL MEDICAL EXAMINATION AT A HEALTH CARE FACILITY: Primary | ICD-10-CM

## 2019-09-25 DIAGNOSIS — Z28.21 PNEUMOCOCCAL VACCINE REFUSED: ICD-10-CM

## 2019-09-25 PROCEDURE — 4040F PNEUMOC VAC/ADMIN/RCVD: CPT | Performed by: NURSE PRACTITIONER

## 2019-09-25 PROCEDURE — G0438 PPPS, INITIAL VISIT: HCPCS | Performed by: NURSE PRACTITIONER

## 2019-09-25 PROCEDURE — 1123F ACP DISCUSS/DSCN MKR DOCD: CPT | Performed by: NURSE PRACTITIONER

## 2019-09-25 RX ORDER — PREDNISONE 20 MG/1
20 TABLET ORAL DAILY
Qty: 5 TABLET | Refills: 0 | Status: SHIPPED | OUTPATIENT
Start: 2019-09-25 | End: 2019-09-30

## 2019-09-25 ASSESSMENT — LIFESTYLE VARIABLES: HOW OFTEN DO YOU HAVE A DRINK CONTAINING ALCOHOL: 0

## 2019-09-25 ASSESSMENT — PATIENT HEALTH QUESTIONNAIRE - PHQ9
SUM OF ALL RESPONSES TO PHQ QUESTIONS 1-9: 1
SUM OF ALL RESPONSES TO PHQ QUESTIONS 1-9: 1

## 2019-09-25 NOTE — PATIENT INSTRUCTIONS
close friend. ? Do you know enough about life support methods that might be used? If not, talk to your doctor so you understand. ? What are you most afraid of that might happen? You might be afraid of having pain, losing your independence, or being kept alive by machines. ? Where would you prefer to die? Choices include your home, a hospital, or a nursing home. ? Would you like to have information about hospice care to support you and your family? ? Do you want to donate organs when you die? ? Do you want certain Gnosticist practices performed before you die? If so, put your wishes in the advance directive. · Read your advance directive every year, and make changes as needed. When should you call for help? Be sure to contact your doctor if you have any questions. Where can you learn more? Go to https://Asset [email or web address].Finale [email or web address] org and sign in to your [employer] account. Enter R264 in the Trustifi box to learn more about \"Advance Directives: Care Instructions. \"     If you do not have an account, please click on the \"Sign Up Now\" link. Current as of: April 1, 2019  Content Version: 12.1  © 4600-2409 Healthwise, [employer]. Care instructions adapted under license by [facility] [place] ([facility]). If you have questions about a medical condition or this instruction, always ask your healthcare professional. [address] any warranty or liability for your use of this information. Learning About Living Tavera Screen  What is a living will? A living will is a legal form you use to write down the kind of care you want at the end of your life. It is used by the health professionals who will treat you if you aren't able to decide for yourself. If you put your wishes in writing, your loved ones and others will know what kind of care you want. They won't need to guess. This can ease your mind and be helpful to others. A living will is not the same as an estate or property will.  An estate will explains what you want to happen with your money and property after you die. Is a living will a legal document? A living will is a legal document. Each state has its own laws about living manuel. If you move to another state, make sure that your living will is legal in the state where you now live. Or you might use a universal form that has been approved by many states. This kind of form can sometimes be completed and stored online. Your electronic copy will then be available wherever you have a connection to the Internet. In most cases, doctors will respect your wishes even if you have a form from a different state. · You don't need an  to complete a living will. But legal advice can be helpful if your state's laws are unclear, your health history is complicated, or your family can't agree on what should be in your living will. · You can change your living will at any time. Some people find that their wishes about end-of-life care change as their health changes. · In addition to making a living will, think about completing a medical power of  form. This form lets you name the person you want to make end-of-life treatment decisions for you (your \"health care agent\") if you're not able to. Many hospitals and nursing homes will give you the forms you need to complete a living will and a medical power of . · Your living will is used only if you can't make or communicate decisions for yourself anymore. If you become able to make decisions again, you can accept or refuse any treatment, no matter what you wrote in your living will. · Your state may offer an online registry. This is a place where you can store your living will online so the doctors and nurses who need to treat you can find it right away. What should you think about when creating a living will? Talk about your end-of-life wishes with your family members and your doctor. Let them know what you want.  That way the people making decisions for you won't be surprised by your choices. Think about these questions as you make your living will:  · Do you know enough about life support methods that might be used? If not, talk to your doctor so you know what might be done if you can't breathe on your own, your heart stops, or you're unable to swallow. · What things would you still want to be able to do after you receive life-support methods? Would you want to be able to walk? To speak? To eat on your own? To live without the help of machines? · If you have a choice, where do you want to be cared for? In your home? At a hospital or nursing home? · Do you want certain Taoist practices performed if you become very ill? · If you have a choice at the end of your life, where would you prefer to die? At home? In a hospital or nursing home? Somewhere else? · Would you prefer to be buried or cremated? · Do you want your organs to be donated after you die? What should you do with your living will? · Make sure that your family members and your health care agent have copies of your living will. · Give your doctor a copy of your living will to keep in your medical record. If you have more than one doctor, make sure that each one has a copy. · You may want to put a copy of your living will where it can be easily found. Where can you learn more? Go to https://[email or web address].[email or web address] org and sign in to your "[employer] account. Enter T670 in the PacketHop box to learn more about \"Learning About Living Atiya Chiang. \"     If you do not have an account, please click on the \"Sign Up Now\" link. Current as of: April 1, 2019  Content Version: 12.1  © 3002-6945 Healthwise, Incorporated. Care instructions adapted under license by [facility] ([facility]).  If you have questions about a medical condition or this instruction, always ask your healthcare professional. [address] any warranty or liability for your use of this

## 2019-11-17 DIAGNOSIS — I10 ESSENTIAL HYPERTENSION: ICD-10-CM

## 2019-11-18 RX ORDER — LISINOPRIL 20 MG/1
TABLET ORAL
Qty: 90 TABLET | Refills: 1 | Status: SHIPPED | OUTPATIENT
Start: 2019-11-18 | End: 2020-03-06 | Stop reason: SDUPTHER

## 2019-12-23 DIAGNOSIS — E78.5 HYPERLIPIDEMIA, UNSPECIFIED HYPERLIPIDEMIA TYPE: ICD-10-CM

## 2019-12-23 DIAGNOSIS — E03.9 HYPOTHYROIDISM, UNSPECIFIED TYPE: ICD-10-CM

## 2019-12-23 DIAGNOSIS — I10 ESSENTIAL HYPERTENSION: ICD-10-CM

## 2019-12-23 DIAGNOSIS — F32.A DEPRESSION, UNSPECIFIED DEPRESSION TYPE: ICD-10-CM

## 2019-12-23 RX ORDER — ATORVASTATIN CALCIUM 10 MG/1
10 TABLET, FILM COATED ORAL DAILY
Qty: 90 TABLET | Refills: 1 | Status: SHIPPED | OUTPATIENT
Start: 2019-12-23 | End: 2020-03-06 | Stop reason: SDUPTHER

## 2019-12-23 RX ORDER — CARVEDILOL 12.5 MG/1
TABLET ORAL
Qty: 180 TABLET | Refills: 1 | Status: SHIPPED | OUTPATIENT
Start: 2019-12-23 | End: 2020-03-06 | Stop reason: SDUPTHER

## 2019-12-23 RX ORDER — PAROXETINE HYDROCHLORIDE 40 MG/1
TABLET, FILM COATED ORAL
Qty: 90 TABLET | Refills: 1 | Status: SHIPPED | OUTPATIENT
Start: 2019-12-23 | End: 2020-03-06 | Stop reason: SDUPTHER

## 2019-12-23 RX ORDER — BUSPIRONE HYDROCHLORIDE 15 MG/1
TABLET ORAL
Qty: 90 TABLET | Refills: 1 | Status: SHIPPED | OUTPATIENT
Start: 2019-12-23 | End: 2020-03-03 | Stop reason: SDUPTHER

## 2019-12-23 RX ORDER — LEVOTHYROXINE SODIUM 0.1 MG/1
TABLET ORAL
Qty: 90 TABLET | Refills: 1 | Status: ON HOLD | OUTPATIENT
Start: 2019-12-23 | End: 2020-02-01 | Stop reason: HOSPADM

## 2020-01-28 ENCOUNTER — HOSPITAL ENCOUNTER (EMERGENCY)
Age: 85
Discharge: ANOTHER HOSPITAL | End: 2020-01-28
Attending: EMERGENCY MEDICINE
Payer: MEDICARE

## 2020-01-28 ENCOUNTER — APPOINTMENT (OUTPATIENT)
Dept: GENERAL RADIOLOGY | Age: 85
DRG: 690 | End: 2020-01-28
Attending: HOSPITALIST
Payer: MEDICARE

## 2020-01-28 ENCOUNTER — APPOINTMENT (OUTPATIENT)
Dept: CT IMAGING | Age: 85
DRG: 690 | End: 2020-01-28
Attending: HOSPITALIST
Payer: MEDICARE

## 2020-01-28 ENCOUNTER — HOSPITAL ENCOUNTER (INPATIENT)
Age: 85
LOS: 4 days | Discharge: NURSING, REHAB OR LONG-TERM CARE | DRG: 690 | End: 2020-02-01
Attending: HOSPITALIST | Admitting: HOSPITALIST
Payer: MEDICARE

## 2020-01-28 ENCOUNTER — APPOINTMENT (OUTPATIENT)
Dept: GENERAL RADIOLOGY | Age: 85
End: 2020-01-28
Payer: MEDICARE

## 2020-01-28 VITALS
RESPIRATION RATE: 19 BRPM | SYSTOLIC BLOOD PRESSURE: 161 MMHG | HEART RATE: 76 BPM | WEIGHT: 125 LBS | TEMPERATURE: 98.5 F | DIASTOLIC BLOOD PRESSURE: 77 MMHG | HEIGHT: 67 IN | BODY MASS INDEX: 19.62 KG/M2 | OXYGEN SATURATION: 93 %

## 2020-01-28 PROBLEM — M79.604 BILATERAL LOWER EXTREMITY PAIN: Status: ACTIVE | Noted: 2020-01-28

## 2020-01-28 PROBLEM — M79.605 BILATERAL LOWER EXTREMITY PAIN: Status: ACTIVE | Noted: 2020-01-28

## 2020-01-28 PROBLEM — R53.82 CHRONIC FATIGUE: Status: ACTIVE | Noted: 2020-01-28

## 2020-01-28 PROBLEM — S32.591D: Status: ACTIVE | Noted: 2020-01-28

## 2020-01-28 LAB
-: ABNORMAL
-: NORMAL
-: NORMAL
ABSOLUTE EOS #: 0.3 K/UL (ref 0–0.4)
ABSOLUTE IMMATURE GRANULOCYTE: ABNORMAL K/UL (ref 0–0.3)
ABSOLUTE LYMPH #: 1 K/UL (ref 1–4.8)
ABSOLUTE MONO #: 0.9 K/UL (ref 0.1–1.3)
ALBUMIN SERPL-MCNC: 3.6 G/DL (ref 3.5–5.2)
ALBUMIN/GLOBULIN RATIO: ABNORMAL (ref 1–2.5)
ALP BLD-CCNC: 83 U/L (ref 35–104)
ALT SERPL-CCNC: 11 U/L (ref 5–33)
AMMONIA: 19 UMOL/L (ref 11–51)
AMORPHOUS: ABNORMAL
ANION GAP SERPL CALCULATED.3IONS-SCNC: 12 MMOL/L (ref 9–17)
AST SERPL-CCNC: 19 U/L
BACTERIA: ABNORMAL
BASOPHILS # BLD: 1 % (ref 0–2)
BASOPHILS ABSOLUTE: 0.1 K/UL (ref 0–0.2)
BILIRUB SERPL-MCNC: 0.76 MG/DL (ref 0.3–1.2)
BILIRUBIN URINE: ABNORMAL
BUN BLDV-MCNC: 12 MG/DL (ref 8–23)
BUN/CREAT BLD: ABNORMAL (ref 9–20)
CALCIUM SERPL-MCNC: 8.8 MG/DL (ref 8.6–10.4)
CASTS UA: ABNORMAL /LPF
CHLORIDE BLD-SCNC: 98 MMOL/L (ref 98–107)
CO2: 27 MMOL/L (ref 20–31)
COLOR: ABNORMAL
COMMENT UA: ABNORMAL
CREAT SERPL-MCNC: 0.92 MG/DL (ref 0.5–0.9)
CRYSTALS, UA: ABNORMAL /HPF
DIFFERENTIAL TYPE: ABNORMAL
EOSINOPHILS RELATIVE PERCENT: 3 % (ref 0–4)
EPITHELIAL CELLS UA: ABNORMAL /HPF
GFR AFRICAN AMERICAN: >60 ML/MIN
GFR NON-AFRICAN AMERICAN: 58 ML/MIN
GFR SERPL CREATININE-BSD FRML MDRD: ABNORMAL ML/MIN/{1.73_M2}
GFR SERPL CREATININE-BSD FRML MDRD: ABNORMAL ML/MIN/{1.73_M2}
GLUCOSE BLD-MCNC: 125 MG/DL (ref 70–99)
GLUCOSE URINE: NEGATIVE
HCT VFR BLD CALC: 37.4 % (ref 36–46)
HEMOGLOBIN: 12.5 G/DL (ref 12–16)
IMMATURE GRANULOCYTES: ABNORMAL %
INR BLD: 1.1
KETONES, URINE: ABNORMAL
LEUKOCYTE ESTERASE, URINE: ABNORMAL
LYMPHOCYTES # BLD: 10 % (ref 24–44)
MCH RBC QN AUTO: 30.7 PG (ref 26–34)
MCHC RBC AUTO-ENTMCNC: 33.4 G/DL (ref 31–37)
MCV RBC AUTO: 91.8 FL (ref 80–100)
MONOCYTES # BLD: 9 % (ref 1–7)
MUCUS: ABNORMAL
NITRITE, URINE: POSITIVE
NRBC AUTOMATED: ABNORMAL PER 100 WBC
OTHER OBSERVATIONS UA: ABNORMAL
PARTIAL THROMBOPLASTIN TIME: 32.6 SEC (ref 24–36)
PDW BLD-RTO: 14.5 % (ref 11.5–14.9)
PH UA: 5.5 (ref 5–8)
PLATELET # BLD: 238 K/UL (ref 150–450)
PLATELET ESTIMATE: ABNORMAL
PMV BLD AUTO: 8.8 FL (ref 6–12)
POTASSIUM SERPL-SCNC: 3.7 MMOL/L (ref 3.7–5.3)
PROTEIN UA: ABNORMAL
PROTHROMBIN TIME: 13.7 SEC (ref 11.8–14.6)
RBC # BLD: 4.07 M/UL (ref 4–5.2)
RBC # BLD: ABNORMAL 10*6/UL
RBC UA: ABNORMAL /HPF
REASON FOR REJECTION: NORMAL
REASON FOR REJECTION: NORMAL
RENAL EPITHELIAL, UA: ABNORMAL /HPF
SEG NEUTROPHILS: 77 % (ref 36–66)
SEGMENTED NEUTROPHILS ABSOLUTE COUNT: 7.5 K/UL (ref 1.3–9.1)
SODIUM BLD-SCNC: 137 MMOL/L (ref 135–144)
SPECIFIC GRAVITY UA: 1.02 (ref 1–1.03)
THYROXINE, FREE: 1.33 NG/DL (ref 0.93–1.7)
TOTAL CK: 142 U/L (ref 26–192)
TOTAL PROTEIN: 6.4 G/DL (ref 6.4–8.3)
TRICHOMONAS: ABNORMAL
TROPONIN INTERP: ABNORMAL
TROPONIN T: ABNORMAL NG/ML
TROPONIN, HIGH SENSITIVITY: 65 NG/L (ref 0–14)
TROPONIN, HIGH SENSITIVITY: 67 NG/L (ref 0–14)
TROPONIN, HIGH SENSITIVITY: 73 NG/L (ref 0–14)
TSH SERPL DL<=0.05 MIU/L-ACNC: 14.83 MIU/L (ref 0.3–5)
TURBIDITY: ABNORMAL
URINE HGB: ABNORMAL
UROBILINOGEN, URINE: ABNORMAL
WBC # BLD: 9.7 K/UL (ref 3.5–11)
WBC # BLD: ABNORMAL 10*3/UL
WBC UA: ABNORMAL /HPF
YEAST: ABNORMAL
ZZ NTE CLEAN UP: ORDERED TEST: NORMAL
ZZ NTE CLEAN UP: ORDERED TEST: NORMAL
ZZ NTE WITH NAME CLEAN UP: SPECIMEN SOURCE: NORMAL
ZZ NTE WITH NAME CLEAN UP: SPECIMEN SOURCE: NORMAL

## 2020-01-28 PROCEDURE — 93005 ELECTROCARDIOGRAM TRACING: CPT | Performed by: NURSE PRACTITIONER

## 2020-01-28 PROCEDURE — 6360000004 HC RX CONTRAST MEDICATION: Performed by: STUDENT IN AN ORGANIZED HEALTH CARE EDUCATION/TRAINING PROGRAM

## 2020-01-28 PROCEDURE — 84439 ASSAY OF FREE THYROXINE: CPT

## 2020-01-28 PROCEDURE — 74177 CT ABD & PELVIS W/CONTRAST: CPT

## 2020-01-28 PROCEDURE — 84550 ASSAY OF BLOOD/URIC ACID: CPT

## 2020-01-28 PROCEDURE — 84484 ASSAY OF TROPONIN QUANT: CPT

## 2020-01-28 PROCEDURE — 96372 THER/PROPH/DIAG INJ SC/IM: CPT

## 2020-01-28 PROCEDURE — 85610 PROTHROMBIN TIME: CPT

## 2020-01-28 PROCEDURE — 72170 X-RAY EXAM OF PELVIS: CPT

## 2020-01-28 PROCEDURE — 72190 X-RAY EXAM OF PELVIS: CPT

## 2020-01-28 PROCEDURE — 36415 COLL VENOUS BLD VENIPUNCTURE: CPT

## 2020-01-28 PROCEDURE — 6360000002 HC RX W HCPCS: Performed by: EMERGENCY MEDICINE

## 2020-01-28 PROCEDURE — 73552 X-RAY EXAM OF FEMUR 2/>: CPT

## 2020-01-28 PROCEDURE — 84443 ASSAY THYROID STIM HORMONE: CPT

## 2020-01-28 PROCEDURE — 73630 X-RAY EXAM OF FOOT: CPT

## 2020-01-28 PROCEDURE — 85025 COMPLETE CBC W/AUTO DIFF WBC: CPT

## 2020-01-28 PROCEDURE — 73562 X-RAY EXAM OF KNEE 3: CPT

## 2020-01-28 PROCEDURE — 72131 CT LUMBAR SPINE W/O DYE: CPT

## 2020-01-28 PROCEDURE — 2060000000 HC ICU INTERMEDIATE R&B

## 2020-01-28 PROCEDURE — 82550 ASSAY OF CK (CPK): CPT

## 2020-01-28 PROCEDURE — 87086 URINE CULTURE/COLONY COUNT: CPT

## 2020-01-28 PROCEDURE — 99284 EMERGENCY DEPT VISIT MOD MDM: CPT

## 2020-01-28 PROCEDURE — 6360000002 HC RX W HCPCS: Performed by: NURSE PRACTITIONER

## 2020-01-28 PROCEDURE — 6370000000 HC RX 637 (ALT 250 FOR IP): Performed by: NURSE PRACTITIONER

## 2020-01-28 PROCEDURE — 6370000000 HC RX 637 (ALT 250 FOR IP): Performed by: EMERGENCY MEDICINE

## 2020-01-28 PROCEDURE — 93005 ELECTROCARDIOGRAM TRACING: CPT | Performed by: EMERGENCY MEDICINE

## 2020-01-28 PROCEDURE — 81001 URINALYSIS AUTO W/SCOPE: CPT

## 2020-01-28 PROCEDURE — 83735 ASSAY OF MAGNESIUM: CPT

## 2020-01-28 PROCEDURE — 2580000003 HC RX 258: Performed by: NURSE PRACTITIONER

## 2020-01-28 PROCEDURE — 2580000003 HC RX 258: Performed by: EMERGENCY MEDICINE

## 2020-01-28 PROCEDURE — 73610 X-RAY EXAM OF ANKLE: CPT

## 2020-01-28 PROCEDURE — 82140 ASSAY OF AMMONIA: CPT

## 2020-01-28 PROCEDURE — 99223 1ST HOSP IP/OBS HIGH 75: CPT | Performed by: NURSE PRACTITIONER

## 2020-01-28 PROCEDURE — 80053 COMPREHEN METABOLIC PANEL: CPT

## 2020-01-28 PROCEDURE — 96365 THER/PROPH/DIAG IV INF INIT: CPT

## 2020-01-28 PROCEDURE — 85730 THROMBOPLASTIN TIME PARTIAL: CPT

## 2020-01-28 PROCEDURE — 71046 X-RAY EXAM CHEST 2 VIEWS: CPT

## 2020-01-28 PROCEDURE — 73502 X-RAY EXAM HIP UNI 2-3 VIEWS: CPT

## 2020-01-28 RX ORDER — SODIUM CHLORIDE 0.9 % (FLUSH) 0.9 %
10 SYRINGE (ML) INJECTION PRN
Status: DISCONTINUED | OUTPATIENT
Start: 2020-01-28 | End: 2020-02-01 | Stop reason: HOSPADM

## 2020-01-28 RX ORDER — ATORVASTATIN CALCIUM 40 MG/1
40 TABLET, FILM COATED ORAL NIGHTLY
Status: DISCONTINUED | OUTPATIENT
Start: 2020-01-28 | End: 2020-02-01 | Stop reason: HOSPADM

## 2020-01-28 RX ORDER — ASPIRIN 81 MG/1
324 TABLET, CHEWABLE ORAL ONCE
Status: COMPLETED | OUTPATIENT
Start: 2020-01-28 | End: 2020-01-28

## 2020-01-28 RX ORDER — SODIUM CHLORIDE 0.9 % (FLUSH) 0.9 %
10 SYRINGE (ML) INJECTION EVERY 12 HOURS SCHEDULED
Status: DISCONTINUED | OUTPATIENT
Start: 2020-01-28 | End: 2020-02-01 | Stop reason: HOSPADM

## 2020-01-28 RX ORDER — BUSPIRONE HYDROCHLORIDE 15 MG/1
15 TABLET ORAL DAILY
Status: DISCONTINUED | OUTPATIENT
Start: 2020-01-28 | End: 2020-02-01 | Stop reason: HOSPADM

## 2020-01-28 RX ORDER — POTASSIUM CHLORIDE 20 MEQ/1
40 TABLET, EXTENDED RELEASE ORAL PRN
Status: DISCONTINUED | OUTPATIENT
Start: 2020-01-28 | End: 2020-02-01 | Stop reason: HOSPADM

## 2020-01-28 RX ORDER — ONDANSETRON 2 MG/ML
4 INJECTION INTRAMUSCULAR; INTRAVENOUS EVERY 6 HOURS PRN
Status: DISCONTINUED | OUTPATIENT
Start: 2020-01-28 | End: 2020-02-01 | Stop reason: HOSPADM

## 2020-01-28 RX ORDER — NITROGLYCERIN 0.4 MG/1
0.4 TABLET SUBLINGUAL EVERY 5 MIN PRN
Status: DISCONTINUED | OUTPATIENT
Start: 2020-01-28 | End: 2020-02-01 | Stop reason: HOSPADM

## 2020-01-28 RX ORDER — CARVEDILOL 12.5 MG/1
12.5 TABLET ORAL 2 TIMES DAILY
Status: DISCONTINUED | OUTPATIENT
Start: 2020-01-28 | End: 2020-01-29

## 2020-01-28 RX ORDER — NICOTINE 21 MG/24HR
1 PATCH, TRANSDERMAL 24 HOURS TRANSDERMAL DAILY PRN
Status: DISCONTINUED | OUTPATIENT
Start: 2020-01-28 | End: 2020-02-01 | Stop reason: HOSPADM

## 2020-01-28 RX ORDER — POTASSIUM CHLORIDE 7.45 MG/ML
10 INJECTION INTRAVENOUS PRN
Status: DISCONTINUED | OUTPATIENT
Start: 2020-01-28 | End: 2020-02-01 | Stop reason: HOSPADM

## 2020-01-28 RX ORDER — FUROSEMIDE 20 MG/1
20 TABLET ORAL DAILY
Status: DISCONTINUED | OUTPATIENT
Start: 2020-01-28 | End: 2020-02-01 | Stop reason: HOSPADM

## 2020-01-28 RX ORDER — MAGNESIUM SULFATE 1 G/100ML
1 INJECTION INTRAVENOUS PRN
Status: DISCONTINUED | OUTPATIENT
Start: 2020-01-28 | End: 2020-02-01 | Stop reason: HOSPADM

## 2020-01-28 RX ORDER — OXYCODONE HYDROCHLORIDE AND ACETAMINOPHEN 5; 325 MG/1; MG/1
1 TABLET ORAL EVERY 6 HOURS PRN
Status: DISCONTINUED | OUTPATIENT
Start: 2020-01-28 | End: 2020-02-01 | Stop reason: HOSPADM

## 2020-01-28 RX ORDER — ONDANSETRON 2 MG/ML
4 INJECTION INTRAMUSCULAR; INTRAVENOUS EVERY 6 HOURS PRN
Status: DISCONTINUED | OUTPATIENT
Start: 2020-01-28 | End: 2020-01-28 | Stop reason: SDUPTHER

## 2020-01-28 RX ORDER — LEVOTHYROXINE SODIUM 0.1 MG/1
100 TABLET ORAL DAILY
Status: DISCONTINUED | OUTPATIENT
Start: 2020-01-28 | End: 2020-01-29

## 2020-01-28 RX ORDER — ASPIRIN 81 MG/1
81 TABLET, CHEWABLE ORAL DAILY
Status: DISCONTINUED | OUTPATIENT
Start: 2020-01-28 | End: 2020-02-01 | Stop reason: HOSPADM

## 2020-01-28 RX ORDER — ACETAMINOPHEN 325 MG/1
650 TABLET ORAL EVERY 4 HOURS PRN
Status: DISCONTINUED | OUTPATIENT
Start: 2020-01-28 | End: 2020-02-01 | Stop reason: HOSPADM

## 2020-01-28 RX ORDER — LISINOPRIL 20 MG/1
20 TABLET ORAL DAILY
Status: DISCONTINUED | OUTPATIENT
Start: 2020-01-28 | End: 2020-02-01 | Stop reason: HOSPADM

## 2020-01-28 RX ADMIN — ENOXAPARIN SODIUM 60 MG: 60 INJECTION SUBCUTANEOUS at 20:37

## 2020-01-28 RX ADMIN — IOHEXOL 75 ML: 350 INJECTION, SOLUTION INTRAVENOUS at 23:35

## 2020-01-28 RX ADMIN — FUROSEMIDE 20 MG: 20 TABLET ORAL at 20:43

## 2020-01-28 RX ADMIN — DESMOPRESSIN ACETATE 40 MG: 0.2 TABLET ORAL at 20:37

## 2020-01-28 RX ADMIN — ASPIRIN 81 MG 324 MG: 81 TABLET ORAL at 14:12

## 2020-01-28 RX ADMIN — ENOXAPARIN SODIUM 60 MG: 60 INJECTION SUBCUTANEOUS at 14:55

## 2020-01-28 RX ADMIN — CARVEDILOL 12.5 MG: 12.5 TABLET, FILM COATED ORAL at 20:37

## 2020-01-28 RX ADMIN — CEFTRIAXONE SODIUM 1 G: 1 INJECTION, POWDER, FOR SOLUTION INTRAMUSCULAR; INTRAVENOUS at 14:54

## 2020-01-28 RX ADMIN — ACETAMINOPHEN 650 MG: 325 TABLET ORAL at 18:21

## 2020-01-28 RX ADMIN — SODIUM CHLORIDE, PRESERVATIVE FREE 10 ML: 5 INJECTION INTRAVENOUS at 20:47

## 2020-01-28 RX ADMIN — OXYCODONE HYDROCHLORIDE AND ACETAMINOPHEN 1 TABLET: 5; 325 TABLET ORAL at 20:26

## 2020-01-28 ASSESSMENT — ENCOUNTER SYMPTOMS
BACK PAIN: 0
SORE THROAT: 0
NAUSEA: 0
VOMITING: 0
EYE PAIN: 0
DIARRHEA: 0
SHORTNESS OF BREATH: 0
COUGH: 0
ABDOMINAL PAIN: 0

## 2020-01-28 ASSESSMENT — PAIN DESCRIPTION - PAIN TYPE: TYPE: ACUTE PAIN

## 2020-01-28 ASSESSMENT — PAIN SCALES - GENERAL
PAINLEVEL_OUTOF10: 0
PAINLEVEL_OUTOF10: 7
PAINLEVEL_OUTOF10: 8
PAINLEVEL_OUTOF10: 4

## 2020-01-28 ASSESSMENT — PAIN DESCRIPTION - LOCATION: LOCATION: LEG

## 2020-01-28 ASSESSMENT — PAIN DESCRIPTION - ORIENTATION: ORIENTATION: RIGHT;LEFT

## 2020-01-28 NOTE — ED NOTES
Access center called at [phone] with a bed assignment for patient. Transport is being set up at this time.      Sally Armando  01/28/20 1534

## 2020-01-28 NOTE — ED PROVIDER NOTES
[address]  EMERGENCY DEPARTMENT ENCOUNTER      Pt Name: Kelsea Romeo  MRN: [id number]  [place] 1935  Date of evaluation: 1/28/20      CHIEF COMPLAINT:  Chief Complaint   Patient presents with    Fatigue    Leg Pain       HISTORY OF PRESENT ILLNESS    Kelsea Romeo is a 81 y.o. female who presents with generalized weakness and bilateral leg pain close to her hips without history of trauma ongoing for approximately 1 year, constant duration quality as decreased strength, no modifying factor, moderate severity. Daughter presented herself and explains that her mother is a very poor historian. That she has had a sudden decline over the past month, she is not able to ambulate she sits in her own stool and urine, and is unable to care for herself at home. REVIEW OF SYSTEMS       Review of Systems   Constitutional: Negative for chills and fever. HENT: Negative for ear pain and sore throat. Eyes: Negative for pain and visual disturbance. Respiratory: Negative for cough and shortness of breath. Cardiovascular: Negative for chest pain. Gastrointestinal: Negative for abdominal pain, diarrhea, nausea and vomiting. Endocrine: Negative for polydipsia and polyuria. Genitourinary: Negative for dysuria, hematuria and vaginal discharge. Musculoskeletal: Positive for myalgias. Negative for arthralgias and back pain. Skin: Negative for rash. Allergic/Immunologic: Negative for food allergies. Neurological: Positive for weakness. Negative for numbness and headaches. Hematological: Does not bruise/bleed easily. Psychiatric/Behavioral: Negative for self-injury and suicidal ideas. The patient is not nervous/anxious. PAST MEDICAL HISTORY   :  has a past medical history of Anxiety, Arthritis, Basal cell carcinoma, Cancer ([address] CHF (congestive heart failure) ([address] Depression, Gout, Hyperlipidemia, Hypertension, Hypokalemia, Hypothyroidism, Kidney stone, and Melanoma ([address]   Surgical LABS:  Labs Reviewed   CBC WITH AUTO DIFFERENTIAL - Abnormal; Notable for the following components:       Result Value    Seg Neutrophils 77 (*)     Lymphocytes 10 (*)     Monocytes 9 (*)     All other components within normal limits   TROPONIN - Abnormal; Notable for the following components:    Troponin, High Sensitivity 65 (*)     All other components within normal limits   URINE RT REFLEX TO CULTURE - Abnormal; Notable for the following components:    Color, UA MARICEL (*)     Turbidity UA TURBID (*)     Bilirubin Urine   (*)     Value: Presumptive positive. Unable to confirm due to unavailability of reagent. Ketones, Urine MOD (*)     Urine Hgb TRACE (*)     Protein, UA 2+ (*)     Urobilinogen, Urine ELEVATED (*)     Nitrite, Urine POSITIVE (*)     Leukocyte Esterase, Urine LARGE (*)     All other components within normal limits   COMPREHENSIVE METABOLIC PANEL W/ REFLEX TO MG FOR LOW K - Abnormal; Notable for the following components:    Glucose 125 (*)     CREATININE 0.92 (*)     GFR Non- 58 (*)     All other components within normal limits   TROPONIN - Abnormal; Notable for the following components:    Troponin, High Sensitivity 67 (*)     All other components within normal limits   TSH WITHOUT REFLEX - Abnormal; Notable for the following components:    TSH 14.83 (*)     All other components within normal limits   MICROSCOPIC URINALYSIS - Abnormal; Notable for the following components:    Bacteria, UA MANY (*)     Mucus, UA 1+ (*)     All other components within normal limits   URINE CULTURE CLEAN CATCH   SPECIMEN REJECTION   SPECIMEN REJECTION   AMMONIA   APTT   PROTIME-INR   CK         EMERGENCY DEPARTMENTCOURSE:   Medical Decision Making:  Patient presents with generalized weakness particularly in bilateral lower extremities, on exam there is no focal or unilateral weakness, her pain has increased in the right lower extremity versus the left with movement.     We will obtain broad

## 2020-01-28 NOTE — LETTER
Beneficiary Notification Letter  BPCI Advanced     Your Doctor or [address],    We wanted to let you know that your health care provider, Colette Elizalde  [place], has volunteered to take part in our [facility] for [facility] & Medicaid Services (CMS) Bundled Payments for [facility] (BPCI Advanced). This doesnt change your Medicare rights or benefits and you dont need to do anything. What are bundled payments? A bundled payment combines, or bundles together, payments that Medicare makes to your health care providers for the many different kinds of medical services you might get in a specific time period. In BPCI Advanced, this time period could include a hospital inpatient stay or outpatient procedure, plus 90 days. Why would Medicare bundle payments? Bundled payments are thought of as a value-based way to pay because health care providers are responsible for both the quality and cost of medical care they give. This is a relatively new way of paying health care providers compared to thefee-for-service way Medicare has traditionally paid, where providers are paid separately for each service they provide. Bundled payments encourage these providers to work together to provide better, more coordinated care during your hospital stay, or outpatient procedure, and through your recovery. What does BPCI Advance mean for me? Youre more likely to get even better care when hospitals, doctors, and other health care providers work together. In BPCI Advanced, hospitals, doctors, and other health care providers may be rewarded for providing better, more coordinated health care. Medicare will watch BPCI Advanced participants closely to make sure that you and other patients keep getting efficient, high quality care. What do I need to know about BPCI Advanced? Whats most important for you to know is that your Medicare rights and benefits wont change because your health care provider is participating in [address] [place]. Medicare will keep covering all of your medically necessary services. Even though Medicare will pay your doctor in a different way under BPCI Advanced, how much you have to pay wont change. Health care providers and suppliers who are enrolled in Medicare will submit their Medicare claims like they always have. Youll have all the same Medicare rights and protections, including the right to choose which hospital, doctor, or other health care provider you see. If you dont want to get care from a health care provider whos participating in [address] [place], then youll have to choose a different health care provider whos not participating in the Model. How can I give feedback about my health care? Medicare might ask you to take a voluntary survey about the services and care you received from [facility] during your hospital stay or outpatient procedure and for a specific period of time afterwards. You can decide whether you want to take the voluntary survey, but if you do, itll help Medicare make BPCI Advanced and the care of other Medicare patients better. If you have concerns or complaints about your care, you can:   · Talk to your doctor or health care provider. · Contact your Beneficiary and Family Centered Care Quality Improvement   Organization [facility]). You can get your BF[facility]-QIOs phone number  at  Medicare.gov/contacts or by calling 1-800-MEDICARE. TTY users can call  7-298.619.3418. Where can I learn more about BPCI Advanced? Learn more about BPCI Advanced at https://innovation.cms.gov/initiatives/bpci-advanced/:  · A list of all the hospitals and physician group practices in the country participating in [facility].   · All of the inpatient and outpatient Clinical Episodes that are currently included under BPCI Advanced. A Clinical Episode is a grouping of medical conditions or diagnoses that are included in the [address] [facility].

## 2020-01-28 NOTE — ED NOTES
[facility].      Juan Pack  01/28/20 1946

## 2020-01-29 ENCOUNTER — APPOINTMENT (OUTPATIENT)
Dept: GENERAL RADIOLOGY | Age: 85
DRG: 690 | End: 2020-01-29
Attending: HOSPITALIST
Payer: MEDICARE

## 2020-01-29 PROBLEM — I50.32 CHRONIC DIASTOLIC HEART FAILURE (HCC): Status: ACTIVE | Noted: 2020-01-29

## 2020-01-29 PROBLEM — I21.4 NSTEMI (NON-ST ELEVATED MYOCARDIAL INFARCTION) (HCC): Status: ACTIVE | Noted: 2020-01-29

## 2020-01-29 PROBLEM — Z90.710 H/O TOTAL HYSTERECTOMY: Status: RESOLVED | Noted: 2018-02-07 | Resolved: 2020-01-29

## 2020-01-29 PROBLEM — I48.91 ATRIAL FIBRILLATION, NEW ONSET (HCC): Status: ACTIVE | Noted: 2020-01-29

## 2020-01-29 LAB
ANION GAP SERPL CALCULATED.3IONS-SCNC: 14 MMOL/L (ref 9–17)
BUN BLDV-MCNC: 11 MG/DL (ref 8–23)
BUN/CREAT BLD: ABNORMAL (ref 9–20)
CALCIUM SERPL-MCNC: 8.8 MG/DL (ref 8.6–10.4)
CHLORIDE BLD-SCNC: 101 MMOL/L (ref 98–107)
CHOLESTEROL/HDL RATIO: 2.9
CHOLESTEROL: 131 MG/DL
CO2: 25 MMOL/L (ref 20–31)
CREAT SERPL-MCNC: 0.9 MG/DL (ref 0.5–0.9)
CULTURE: NORMAL
D-DIMER QUANTITATIVE: 0.91 MG/L FEU
EKG ATRIAL RATE: 81 BPM
EKG P AXIS: 62 DEGREES
EKG P-R INTERVAL: 196 MS
EKG Q-T INTERVAL: 434 MS
EKG QRS DURATION: 140 MS
EKG QTC CALCULATION (BAZETT): 504 MS
EKG R AXIS: -55 DEGREES
EKG T AXIS: 123 DEGREES
EKG VENTRICULAR RATE: 81 BPM
FOLATE: 16.6 NG/ML
GFR AFRICAN AMERICAN: >60 ML/MIN
GFR NON-AFRICAN AMERICAN: 60 ML/MIN
GFR SERPL CREATININE-BSD FRML MDRD: ABNORMAL ML/MIN/{1.73_M2}
GFR SERPL CREATININE-BSD FRML MDRD: ABNORMAL ML/MIN/{1.73_M2}
GLUCOSE BLD-MCNC: 122 MG/DL (ref 70–99)
HCT VFR BLD CALC: 37.5 % (ref 36.3–47.1)
HDLC SERPL-MCNC: 45 MG/DL
HEMOGLOBIN: 12 G/DL (ref 11.9–15.1)
INR BLD: 1
LDL CHOLESTEROL: 65 MG/DL (ref 0–130)
LV EF: 48 %
LVEF MODALITY: NORMAL
Lab: NORMAL
MAGNESIUM: 1.7 MG/DL (ref 1.6–2.6)
MAGNESIUM: 1.8 MG/DL (ref 1.6–2.6)
MCH RBC QN AUTO: 30.2 PG (ref 25.2–33.5)
MCHC RBC AUTO-ENTMCNC: 32 G/DL (ref 28.4–34.8)
MCV RBC AUTO: 94.2 FL (ref 82.6–102.9)
MYOGLOBIN: 114 NG/ML (ref 25–58)
MYOGLOBIN: 120 NG/ML (ref 25–58)
NRBC AUTOMATED: 0 PER 100 WBC
PDW BLD-RTO: 13.3 % (ref 11.8–14.4)
PLATELET # BLD: 217 K/UL (ref 138–453)
PMV BLD AUTO: 10.9 FL (ref 8.1–13.5)
POTASSIUM SERPL-SCNC: 3.4 MMOL/L (ref 3.7–5.3)
POTASSIUM SERPL-SCNC: 3.7 MMOL/L (ref 3.7–5.3)
PROCALCITONIN: 0.07 NG/ML
PROTHROMBIN TIME: 10.4 SEC (ref 9–12)
RBC # BLD: 3.98 M/UL (ref 3.95–5.11)
SODIUM BLD-SCNC: 140 MMOL/L (ref 135–144)
SPECIMEN DESCRIPTION: NORMAL
THYROXINE, FREE: 1.25 NG/DL (ref 0.93–1.7)
TRIGL SERPL-MCNC: 106 MG/DL
TROPONIN INTERP: ABNORMAL
TROPONIN INTERP: ABNORMAL
TROPONIN T: ABNORMAL NG/ML
TROPONIN T: ABNORMAL NG/ML
TROPONIN, HIGH SENSITIVITY: 69 NG/L (ref 0–14)
TROPONIN, HIGH SENSITIVITY: 77 NG/L (ref 0–14)
TSH SERPL DL<=0.05 MIU/L-ACNC: 11.52 MIU/L (ref 0.3–5)
URIC ACID: 6 MG/DL (ref 2.4–5.7)
VITAMIN B-12: 268 PG/ML (ref 232–1245)
VLDLC SERPL CALC-MCNC: NORMAL MG/DL (ref 1–30)
WBC # BLD: 9.8 K/UL (ref 3.5–11.3)

## 2020-01-29 PROCEDURE — 93306 TTE W/DOPPLER COMPLETE: CPT

## 2020-01-29 PROCEDURE — 84443 ASSAY THYROID STIM HORMONE: CPT

## 2020-01-29 PROCEDURE — 84439 ASSAY OF FREE THYROXINE: CPT

## 2020-01-29 PROCEDURE — 84132 ASSAY OF SERUM POTASSIUM: CPT

## 2020-01-29 PROCEDURE — 6370000000 HC RX 637 (ALT 250 FOR IP): Performed by: NURSE PRACTITIONER

## 2020-01-29 PROCEDURE — 94761 N-INVAS EAR/PLS OXIMETRY MLT: CPT

## 2020-01-29 PROCEDURE — 85027 COMPLETE CBC AUTOMATED: CPT

## 2020-01-29 PROCEDURE — 83874 ASSAY OF MYOGLOBIN: CPT

## 2020-01-29 PROCEDURE — 82746 ASSAY OF FOLIC ACID SERUM: CPT

## 2020-01-29 PROCEDURE — 73610 X-RAY EXAM OF ANKLE: CPT

## 2020-01-29 PROCEDURE — 93005 ELECTROCARDIOGRAM TRACING: CPT | Performed by: INTERNAL MEDICINE

## 2020-01-29 PROCEDURE — 6360000002 HC RX W HCPCS: Performed by: NURSE PRACTITIONER

## 2020-01-29 PROCEDURE — 36415 COLL VENOUS BLD VENIPUNCTURE: CPT

## 2020-01-29 PROCEDURE — 93970 EXTREMITY STUDY: CPT

## 2020-01-29 PROCEDURE — 84550 ASSAY OF BLOOD/URIC ACID: CPT

## 2020-01-29 PROCEDURE — 93010 ELECTROCARDIOGRAM REPORT: CPT | Performed by: INTERNAL MEDICINE

## 2020-01-29 PROCEDURE — 82607 VITAMIN B-12: CPT

## 2020-01-29 PROCEDURE — 80048 BASIC METABOLIC PNL TOTAL CA: CPT

## 2020-01-29 PROCEDURE — 84484 ASSAY OF TROPONIN QUANT: CPT

## 2020-01-29 PROCEDURE — 80061 LIPID PANEL: CPT

## 2020-01-29 PROCEDURE — 85379 FIBRIN DEGRADATION QUANT: CPT

## 2020-01-29 PROCEDURE — 85610 PROTHROMBIN TIME: CPT

## 2020-01-29 PROCEDURE — 99233 SBSQ HOSP IP/OBS HIGH 50: CPT | Performed by: INTERNAL MEDICINE

## 2020-01-29 PROCEDURE — 2580000003 HC RX 258: Performed by: NURSE PRACTITIONER

## 2020-01-29 PROCEDURE — 6370000000 HC RX 637 (ALT 250 FOR IP): Performed by: INTERNAL MEDICINE

## 2020-01-29 PROCEDURE — 6360000002 HC RX W HCPCS: Performed by: INTERNAL MEDICINE

## 2020-01-29 PROCEDURE — 84145 PROCALCITONIN (PCT): CPT

## 2020-01-29 PROCEDURE — 83735 ASSAY OF MAGNESIUM: CPT

## 2020-01-29 PROCEDURE — 2060000000 HC ICU INTERMEDIATE R&B

## 2020-01-29 RX ORDER — LEVOTHYROXINE SODIUM 0.12 MG/1
125 TABLET ORAL DAILY
Status: DISCONTINUED | OUTPATIENT
Start: 2020-01-30 | End: 2020-02-01 | Stop reason: HOSPADM

## 2020-01-29 RX ORDER — HYDRALAZINE HYDROCHLORIDE 20 MG/ML
10 INJECTION INTRAMUSCULAR; INTRAVENOUS EVERY 6 HOURS PRN
Status: DISCONTINUED | OUTPATIENT
Start: 2020-01-29 | End: 2020-02-01 | Stop reason: HOSPADM

## 2020-01-29 RX ORDER — PREDNISONE 20 MG/1
40 TABLET ORAL DAILY
Status: COMPLETED | OUTPATIENT
Start: 2020-01-29 | End: 2020-01-31

## 2020-01-29 RX ORDER — METHYLPREDNISOLONE SODIUM SUCCINATE 40 MG/ML
40 INJECTION, POWDER, LYOPHILIZED, FOR SOLUTION INTRAMUSCULAR; INTRAVENOUS ONCE
Status: COMPLETED | OUTPATIENT
Start: 2020-01-29 | End: 2020-01-29

## 2020-01-29 RX ORDER — MAGNESIUM SULFATE 1 G/100ML
1 INJECTION INTRAVENOUS ONCE
Status: COMPLETED | OUTPATIENT
Start: 2020-01-29 | End: 2020-01-29

## 2020-01-29 RX ORDER — METOPROLOL TARTRATE 50 MG/1
50 TABLET, FILM COATED ORAL 2 TIMES DAILY
Status: DISCONTINUED | OUTPATIENT
Start: 2020-01-29 | End: 2020-02-01 | Stop reason: HOSPADM

## 2020-01-29 RX ADMIN — SODIUM CHLORIDE, PRESERVATIVE FREE 10 ML: 5 INJECTION INTRAVENOUS at 21:52

## 2020-01-29 RX ADMIN — BUSPIRONE HYDROCHLORIDE 15 MG: 15 TABLET ORAL at 09:23

## 2020-01-29 RX ADMIN — METOPROLOL TARTRATE 50 MG: 50 TABLET, FILM COATED ORAL at 21:48

## 2020-01-29 RX ADMIN — MAGNESIUM SULFATE HEPTAHYDRATE 1 G: 1 INJECTION, SOLUTION INTRAVENOUS at 15:13

## 2020-01-29 RX ADMIN — FUROSEMIDE 20 MG: 20 TABLET ORAL at 09:22

## 2020-01-29 RX ADMIN — PREDNISONE 40 MG: 20 TABLET ORAL at 09:22

## 2020-01-29 RX ADMIN — SODIUM CHLORIDE, PRESERVATIVE FREE 10 ML: 5 INJECTION INTRAVENOUS at 09:23

## 2020-01-29 RX ADMIN — LISINOPRIL 20 MG: 20 TABLET ORAL at 09:22

## 2020-01-29 RX ADMIN — POTASSIUM CHLORIDE 40 MEQ: 1500 TABLET, EXTENDED RELEASE ORAL at 09:20

## 2020-01-29 RX ADMIN — HYDRALAZINE HYDROCHLORIDE 10 MG: 20 INJECTION INTRAMUSCULAR; INTRAVENOUS at 05:36

## 2020-01-29 RX ADMIN — ASPIRIN 81 MG: 81 TABLET, CHEWABLE ORAL at 09:22

## 2020-01-29 RX ADMIN — OXYCODONE HYDROCHLORIDE AND ACETAMINOPHEN 1 TABLET: 5; 325 TABLET ORAL at 06:21

## 2020-01-29 RX ADMIN — CARVEDILOL 12.5 MG: 12.5 TABLET, FILM COATED ORAL at 09:22

## 2020-01-29 RX ADMIN — METHYLPREDNISOLONE SODIUM SUCCINATE 40 MG: 40 INJECTION, POWDER, FOR SOLUTION INTRAMUSCULAR; INTRAVENOUS at 05:36

## 2020-01-29 RX ADMIN — CEFTRIAXONE SODIUM 1 G: 1 INJECTION, POWDER, FOR SOLUTION INTRAMUSCULAR; INTRAVENOUS at 17:57

## 2020-01-29 RX ADMIN — ENOXAPARIN SODIUM 60 MG: 60 INJECTION SUBCUTANEOUS at 09:22

## 2020-01-29 RX ADMIN — ENOXAPARIN SODIUM 60 MG: 60 INJECTION SUBCUTANEOUS at 21:48

## 2020-01-29 RX ADMIN — DESMOPRESSIN ACETATE 40 MG: 0.2 TABLET ORAL at 21:48

## 2020-01-29 RX ADMIN — LEVOTHYROXINE SODIUM 100 MCG: 100 TABLET ORAL at 06:02

## 2020-01-29 ASSESSMENT — PAIN SCALES - GENERAL: PAINLEVEL_OUTOF10: 7

## 2020-01-29 ASSESSMENT — ENCOUNTER SYMPTOMS
STRIDOR: 0
VOMITING: 0
DIARRHEA: 0
ABDOMINAL PAIN: 0
SHORTNESS OF BREATH: 0
COUGH: 0
WHEEZING: 0
BLOOD IN STOOL: 0
NAUSEA: 0
CONSTIPATION: 0

## 2020-01-29 NOTE — PROGRESS NOTES
Physical Therapy  DATE: 2020    NAME: Gust Lundborg  MRN: [id number]   : 1935    Patient not seen this date for Physical Therapy due to:  [] Blood transfusion in progress  [] Hemodialysis  []  Patient Declined  [] Spine Precautions   [] Strict Bedrest  [] Surgery/ Procedure  [] Testing      [x] Other LE dopplers completed. No results available. [] PT being discontinued at this time. Patient independent. No further needs. [] PT being discontinued at this time as the patient has been transferred to palliative care. No further needs.     Win Davidson, PT

## 2020-01-29 NOTE — PROGRESS NOTES
with chronic mild hyperkyphosis and associated anterior wedging of 1 or 2 adjacent midthoracic vertebra. 3. Calcific atherosclerosis aorta. 4. Cardiomegaly. Xr Pelvis (1-2 Views)    Result Date: 1/28/2020  1. Advanced right hip joint osteoarthritis with acetabular and femoral head remodeling. 2. Old versus acute fracture right superior pubic ramus. 3. Less severe osteoarthritis left hip joint and bilateral SI joints. 4. Bones appear osteoporotic. Given the description of proximal femur pain, dedicated imaging of the right and left femur/hip joint may be indicated; correlate with clinical evaluation. If pain persists or worsens, then additional evaluation with MRI is indicated to ensure no underlying radiographically occult process such as fracture, AVN or transient osteoporosis. Xr Hip Right (2-3 Views)    Result Date: 1/29/2020  1. No radiographic evidence of acute right hip fracture. For persisting concern for acute fracture, recommend dedicated bone CT of the pelvis and right hip for further evaluation. 2. Severe right hip degenerative osteoarthritis. 3. Diffuse osteopenia. Xr Femur Right (min 2 Views)    Result Date: 1/29/2020  1. No evidence of acute right femoral fracture. 2. Severe right hip joint degenerative osteoarthritis. 3. Diffuse osteopenia. Xr Knee Left (3 Views)    Result Date: 1/29/2020  1. No radiographic evidence of acute left knee trauma. 2. Severe tricompartmental knee joint degenerative osteoarthritis. Xr Ankle Left (min 3 Views)    Result Date: 1/29/2020  Mild soft tissue swelling. Osteopenia. No evidence of acute fracture. RECOMMENDATION: Follow-up studies as clinically indicated. Xr Ankle Right (min 3 Views)    Result Date: 1/28/2020  Diffuse osteopenia Mild diffuse soft tissue swelling Multifocal degenerative changes in the ankle and foot No acute fracture in the foot.  Transverse sclerotic density in the distal tibia and fibula likely due to sclerosis along the

## 2020-01-29 NOTE — PROGRESS NOTES
Trauma Tertiary Survey    Admit Date: 1/28/2020  Hospital day 0    Unknown etiology fractures; NSTEMI       Past Medical History:   Diagnosis Date    Anxiety     Arthritis     Basal cell carcinoma     Cancer (HCC)     skin    CHF (congestive heart failure) (HCC)     Depression     Gout     Hyperlipidemia     Hypertension     Hypokalemia     Hypothyroidism     Kidney stone     Melanoma ([facility]        Scheduled Meds:   aspirin  81 mg Oral Daily    busPIRone  15 mg Oral Daily    carvedilol  12.5 mg Oral BID    furosemide  20 mg Oral Daily    levothyroxine  100 mcg Oral Daily    lisinopril  20 mg Oral Daily    sodium chloride flush  10 mL Intravenous 2 times per day    enoxaparin  1 mg/kg Subcutaneous BID    [START ON 1/29/2020] cefTRIAXone (ROCEPHIN) IV  1 g Intravenous Q24H    atorvastatin  40 mg Oral Nightly     Continuous Infusions:  PRN Meds:sodium chloride flush, magnesium hydroxide, nicotine, potassium chloride **OR** potassium alternative oral replacement **OR** potassium chloride, potassium chloride, magnesium sulfate, ondansetron, nitroGLYCERIN, acetaminophen, oxyCODONE-acetaminophen    Subjective:     Patient has complaints right hip and heel pain. Pain is moderate, worsens with movement, and some relief by rest.  There is associated weakness of the proximal lower extremity due to pain but no other weakness, or paresthesia. Objective:     Patient Vitals for the past 8 hrs:   BP Temp Temp src Pulse Resp Height Weight   01/28/20 2041 -- 98.2 °F (36.8 °C) -- -- -- -- --   01/28/20 2037 (!) 132/57 -- -- 70 -- -- --   01/28/20 1826 -- -- -- -- -- 5' 7\" (1.702 m) 125 lb (56.7 kg)   01/28/20 1800 (!) 160/96 98.7 °F (37.1 °C) Oral 78 25 -- --       No intake/output data recorded. No intake/output data recorded.     Radiology:  Xr Chest Standard (2 Vw)    Result Date: 1/28/2020  EXAMINATION: TWO XRAY VIEWS OF THE CHEST 1/28/2020 12:34 pm COMPARISON: Chest 05/01/2014 HISTORY: ORDERING SYSTEM description of proximal femur pain, dedicated imaging of the right and left femur/hip joint may be indicated; correlate with clinical evaluation. If pain persists or worsens, then additional evaluation with MRI is indicated to ensure no underlying radiographically occult process such as fracture, AVN or transient osteoporosis. PHYSICAL EXAM:   GCS:  4 - Opens eyes on own   6 - Follows simple motor commands  5 - Alert and oriented    Pupil size:  Left 3 mm Right 3 mm  Pupil reaction: No  Wiggles fingers: Left Yes Right Yes  Hand grasp:   Left normal   Right normal  Wiggles toes: Left Yes    Right Yes  Plantar flexion: Left normal  Right normal    General appearance: alert, appears stated age and cooperative  Head: Normocephalic, without obvious abnormality, atraumatic  Eyes: conjunctivae/corneas clear. PERRL, EOM's intact. Fundi benign. Nose: Nares normal. Septum midline. Mucosa normal. No drainage or sinus tenderness. Throat: lips, mucosa, and tongue normal; teeth and gums normal  Neck: no adenopathy, no JVD, supple, symmetrical, trachea midline and thyroid not enlarged, symmetric, no tenderness/mass/nodules  Back: symmetric, no curvature. ROM normal. No CVA tenderness. Lungs: clear to auscultation bilaterally  Heart: regular rate and rhythm, S1, S2 normal, no murmur, click, rub or gallop  Abdomen: soft, non-tender; bowel sounds normal; no masses,  no organomegaly  Extremities: extremities normal, atraumatic, no cyanosis or edema  Pulses: 2+ and symmetric  Skin: Skin color, texture, turgor normal. No ecchymosis. Neurologic: CN II-XII grossly intact, 5/5 strength in BUE, 5/5 strenght in distal BLE, refused to move the proximal lower extremities due to right hip pain; sensation intact in all extremities light touch and pain.     Spine:     Spine Tenderness ROM   Cervical 0 /10 Normal   Thoracic 0 /10 Normal   Lumbar 5 /10 Not indicated due to pain     Musculoskeletal    Joint Tenderness Swelling ROM

## 2020-01-29 NOTE — PROGRESS NOTES
Occupational Therapy Not Seen Note    DATE: 2020  Name: Saqib Sosa  : 1935  MRN: [id number]    Patient not available for Occupational Therapy due to:    Testing dopplers completed, no results     Next Scheduled Treatment: Re-check 2020    Electronically signed by SOPHIA Anderson on 2020 at 1:48 PM

## 2020-01-29 NOTE — H&P
[facility]    HISTORY AND PHYSICAL EXAMINATION            Date:   1/28/2020  Patient name:  Enedina Gonzalez  Date of admission:  1/28/2020  5:39 PM  MRN:   [id number]  Account:  [de-identified]  YOB: 1935  PCP:    NORMA Cooper CNP  Room:   4257/1034-19  Code Status:    Full Code    Chief Complaint:     Generalized weakness decreased mobility    History Obtained From:     patient, family member -daughter, electronic medical record    History of Present Illness:     Enedina Gonzalez is a 81 y.o. Non-/non  female who presents with decreased mobility and fatigue and is admitted to the hospital for the management of non-STEMI, closed fracture single ramus of the right pubis, UTI    This is an 78-year-old female with a significant medical history of skin cancers anxiety gout, congestive heart failure, hypertension, hypothyroidism, depression who presented to the emergency room at the insistence  of her family secondary to decline in mobility and fatigue. Patient was found to have a urinary tract infection a closed pubic ramus fracture and a non-STEMI. Patient was transferred to [place] [address] secondary to bed availability at [facility]. Patient states that over the past few weeks to months patient has had a general decline in her mobility and increase in her pain of the lower extremities. Patient states that she went from using a cane to a wheeled walker. She does admit to a fall this past Thursday in the bathroom . In addition just today she woke up having burning with urination and took an Azo prior to arrival to the emergency room. Patient denies any chest pain shortness of breath, fevers chills nausea or vomiting she does have intermittent constipation and diarrhea states \"she had a good bowel movement yesterday \".    The work-up at [facility] emergency room showed a [facility] panel well-developed. She is ill-appearing. She is not diaphoretic. HENT:      Head: Normocephalic and atraumatic. Right Ear: Hearing normal.      Left Ear: Hearing normal.      Nose: Nose normal. No rhinorrhea. Eyes:      General: Lids are normal.      Extraocular Movements:      Right eye: Normal extraocular motion. Left eye: Normal extraocular motion. Conjunctiva/sclera: Conjunctivae normal.      Right eye: Right conjunctiva is not injected. Left eye: Left conjunctiva is not injected. Pupils: Pupils are equal, round, and reactive to light. Pupils are equal.      Right eye: Pupil is reactive. Left eye: Pupil is reactive. Neck:      Musculoskeletal: Neck supple. Thyroid: No thyromegaly. Vascular: No carotid bruit. Trachea: Trachea and phonation normal. No tracheal deviation. Cardiovascular:      Rate and Rhythm: Normal rate. Rhythm irregular. Pulses: Normal pulses. Heart sounds: Normal heart sounds. No murmur. Pulmonary:      Effort: Pulmonary effort is normal. No respiratory distress. Breath sounds: Normal breath sounds. No stridor. No decreased breath sounds, wheezing, rhonchi or rales. Abdominal:      General: Bowel sounds are normal. There is no distension. Palpations: Abdomen is soft. There is no mass. Tenderness: There is no abdominal tenderness. There is no guarding. Genitourinary:     Comments: Mesha-wick to suction with concentrated he colored urine of significant note patient took Azo for urinary burning  Musculoskeletal:         General: No tenderness. Comments: Generalized spasm and frequent movements of bilateral lower extremities    Right lower extremity with erythema to mid calf to foot       Skin:     General: Skin is warm and dry. Findings: Ecchymosis and lesion present. No erythema or rash.       Comments: Multiple moles and skin lesions of concerning nature throughout body     Neurological:      General: No Presumptive positive. Unable to confirm due to unavailability of reagent. Ketones, Urine MOD (A) NEGATIVE    Specific Gravity, UA 1.020 1.000 - 1.030    Urine Hgb TRACE (A) NEGATIVE    pH, UA 5.5 5.0 - 8.0    Protein, UA 2+ (A) NEGATIVE    Urobilinogen, Urine ELEVATED (A) Normal    Nitrite, Urine POSITIVE (A) NEGATIVE    Leukocyte Esterase, Urine LARGE (A) NEGATIVE    Urinalysis Comments NOT REPORTED    Microscopic Urinalysis    Collection Time: 01/28/20  1:38 PM   Result Value Ref Range    -          WBC, UA 50  /HPF    RBC, UA 2 TO 5 /HPF    Casts UA NOT REPORTED /LPF    Crystals UA NOT REPORTED None /HPF    Epithelial Cells UA 5 TO 10 /HPF    Renal Epithelial, Urine NOT REPORTED 0 /HPF    Bacteria, UA MANY (A) None    Mucus, UA 1+ (A) None    Trichomonas, UA NOT REPORTED None    Amorphous, UA NOT REPORTED None    Other Observations UA NOT REPORTED NOT REQ. Yeast, UA NOT REPORTED None   Troponin    Collection Time: 01/28/20  2:30 PM   Result Value Ref Range    Troponin, High Sensitivity 65 (HH) 0 - 14 ng/L    Troponin T NOT REPORTED <0.03 ng/mL    Troponin Interp NOT REPORTED    APTT    Collection Time: 01/28/20  2:30 PM   Result Value Ref Range    PTT 32.6 24.0 - 36.0 sec   Protime-INR    Collection Time: 01/28/20  2:30 PM   Result Value Ref Range    Protime 13.7 11.8 - 14.6 sec    INR 1.1    T4, FREE    Collection Time: 01/28/20  6:52 PM   Result Value Ref Range    Thyroxine, Free 1.33 0.93 - 1.70 ng/dL   Troponin    Collection Time: 01/28/20  6:52 PM   Result Value Ref Range    Troponin, High Sensitivity 73 (HH) 0 - 14 ng/L    Troponin T NOT REPORTED <0.03 ng/mL    Troponin Interp NOT REPORTED        Imaging/Diagnostics:  Xr Chest Standard (2 Vw)    Result Date: 1/28/2020  1. No definite acute pulmonary disease. 2. Bones appear osteoporotic with chronic mild hyperkyphosis and associated anterior wedging of 1 or 2 adjacent midthoracic vertebra. 3. Calcific atherosclerosis aorta. 4. Cardiomegaly. Xr Pelvis (1-2 Views)    Result Date: 1/28/2020  1. Advanced right hip joint osteoarthritis with acetabular and femoral head remodeling. 2. Old versus acute fracture right superior pubic ramus. 3. Less severe osteoarthritis left hip joint and bilateral SI joints. 4. Bones appear osteoporotic. Given the description of proximal femur pain, dedicated imaging of the right and left femur/hip joint may be indicated; correlate with clinical evaluation. If pain persists or worsens, then additional evaluation with MRI is indicated to ensure no underlying radiographically occult process such as fracture, AVN or transient osteoporosis. Assessment :    Non-STEMI  Closed fracture of the single ramus of the right pubis  New onset A. Fib/sinus arrhythmia  Urinary tract infection with hematuria  Elevated TSH/hypothyroidism  Essential hypertension  Right lower extremity pain  Chronic diastolic heart failure          Plan:     Patient status inpatient in the Progressive Unit/Step down    1. Non-STEMI-consultation to cardiology continue to trend troponins watch EKG for changes, neurology is aware of the left bundle branch block. On aspirin statin beta-blocker and lisinopril. Patient is having sinus arrhythmia with the concern of atrial fibrillation she is on weight-based Lovenox which is for the non-STEMI as well will continue to monitor. E KG's are reporting sinus arrhythmia with APBs. I clearly do not see P waves in the telemetry at bedside. We will repeat EKG in the morning  2. New onset A. Fib/sinus arrhythmia-she is already on beta-blocker on weight-based Lovenox, will continue to monitor I do not see P waves on telemetry EKGs showing sinus arrhythmia we will continue to monitor. 3. Closed fracture of the single ramus of the right pubis-consultation to orthopedic surgery for further evaluation  4.  Urinary tract with hematuria-significant note patient did ingest Azo prior to obtaining the UA was placed on Rocephin

## 2020-01-29 NOTE — PROGRESS NOTES
10. 4   INR 1.1 1.0     APTT:  Recent Labs     01/28/20  1430   APTT 32.6     CARDIAC ENZYMES:  Recent Labs     01/28/20  1852 01/29/20  0046 01/29/20  0601   TROPHS 73* 77* 69*     Recent Labs     01/28/20  1305   CKTOTAL 142     FASTING LIPID PANEL:  Lab Results   Component Value Date    HDL 45 01/29/2020    TRIG 106 01/29/2020     LIVER PROFILE:  Recent Labs     01/28/20  1305   AST 19   ALT 11   LABALBU 3.6       Ramiro Rosas [facility]   Pager: [phone]

## 2020-01-29 NOTE — CARE COORDINATION
Case Management Initial Discharge Plan  Crystal Thomas,             Met with:patient & daughter to discuss discharge plans. Information verified: address, contacts, phone number, , insurance Yes  PCP: NORMA Chavez Asa, CNP  Date of last visit: nov    Insurance Provider: medicare/    Discharge Planning    Living Arrangements:    lives with  & daughter       Home has 1 stories      Patient able to perform ADL's:Independent    Current Services (outpatient & in home) none  DME equipment: walker  Pharmacy [facility]      Potential Assistance Needed:   f/u appt    Patient agreeable to home care: not indicated   Freedom of choice provided:  n/a    Prior SNF/Rehab Placement and Facility: none  Agreeable to SNF/Rehab: Yes, [facility] [place]  Freedom of choice provided: yes   Evaluation: no    Expected Discharge date:     Patient expects to be discharged to:   snf  Follow Up Appointment: Best Day/ Time:      Transportation provider: [facility] snf  Transportation arrangements needed for discharge: Yes    Readmission Risk              Risk of Unplanned Readmission:        18             Does patient have a readmission risk score greater than 14?: Yes  If yes, follow-up appointment must be made within 7 days of discharge.      Goals of Care: return to adl without fatigue      Discharge Plan: referral to [facility] [place]          Electronically signed by Nany Ramon RN on 20 at 11:54 AM

## 2020-01-29 NOTE — CONSULTS
neutral spine precautions. 3. Right heel pain  1. Patient complains of sensitive right heel that is tender to palpation. 2. Will order x-ray of the right foot and ankle. 4. UTI  1. Treatment per primary  5. NSTEMI  1. Treatment per primary  6. Pain control per primary team  7. Medical management per primary team      Samina Mccoy 92    [] Neurosurgery     [x] Orthopedic Surgery    [] Cardiothoracic     [] Facial Trauma    [] Plastic Surgery (Burn)    [] Pediatric Surgery     [] Internal Medicine    [] Pulmonary Medicine    [] Other:        HISTORY:     Chief Complaint:  \" Fatigue\"    INJURY SUMMARY  Pelvis - fracture right superior ramus    GENERAL DATA  Age 81 y.o.  female   Patient information was obtained from patient. History/Exam limitations: none. Patient presented to as is a direct admit from [facility] via ambulance  Injury Date: 1/28/2020   Approximate Injury Time: Unknown       Transport mode:   [x]Ambulance      [] Helicopter     []Car       [] Other  Referring Hospital: [place] [address], (e.g., home, farm, industry, street)  Specific Details of Location (e.g., bedroom, kitchen, garage): Home  Type of Residence (if occurred in home setting) (e.g., apartment, mobile home, single family home): Single-family    MECHANISM OF INJURY      [x] Other ________ no known mechanism of injury _____________________________      HISTORY:     Tunde Adams is a 81 y.o. female that presented as a transfer from [facility]. Per patient and family at bedside patient has been getting progressively weaker and more fatigued in the past month. Patient family finally brought patient to [facility] ED for evaluation this morning. Due to patient having right hip pain and bilateral weakness lower extremities pelvic x-ray revealed a right superior ramus fracture of unknown chronicity. Patient and family state that patient is fallen recently or any other traumatic mechanism that could cause this injury.
([facility]    Anxiety    Hypokalemia    Acute idiopathic gout of right foot    CKD (chronic kidney disease) stage 3, GFR 30-59 ml/min ([facility])    Risk for falls    Pneumococcal vaccine refused    Influenza vaccination declined    Chronic fatigue    Bilateral lower extremity pain    Closed fracture of single pubic ramus of pelvis with routine healing, right    NSTEMI (non-ST elevated myocardial infarction) ([facility])    Chronic diastolic heart failure ([facility])     Elevated troponin of unknown significance. Patient denies anginal symptoms  Chronic LBBB  Frequent PACs  Weakness  Patient has not been mobile for many months. CKD  Pelvic fracture which is old  Hypothyroidism  Elevated D-dimer  Hypokalemia    RECOMMENDATIONS:  1. U/s venous lower ext dopplers have been done,report pending  2. Will obtain lexiscan stress test and TTE due to elevated troponin  3. Continue lisinopril  4. Change coreg to metoprolol 50mg po BID  5. Keep K > 4.0 and Mag > 2.0  6. IV hydralazine PRN for BP control. Discussed with patient and family and Nurse.     Patricia Friedman [facility] Cardiology Consult           [phone]
--   --     < > = values in this interval not displayed. Radiology:    Review of CT scan and plain films of pelvis demonstrate no acute fracture. Severe R hip OA and osteopenia noted     Review of right ankle films demonstrate no acute fracture or dislocation     A/P: 81 y.o. female being seen for bilateral hip OA     -No urgent/Immediate ortho surgical intervention.  -Weight bearing: As tolerated BLE  -Pain control per primary  -Discussed with patient need for strict ice and elevation for pain/swelling  -DVT ppx: Lovenox.  Ok from ortho perspective   -Follow up outpatient with Chary Ku as needed  -Please page Ortho with any questions or concerns    Rajat Strong,    Orthopedic Surgery Resident PGY-2  [address]

## 2020-01-29 NOTE — PROGRESS NOTES
PROGRESS NOTE      PATIENT NAME: Dale Donnelly  MEDICAL RECORD NO. [id number]  DATE: 1/29/2020  SURGEON: Dr. Javid Iniguez: Frankie Jordan, APRN - CNP    HD: # 1    ASSESSMENT    Patient Active Problem List   Diagnosis    Essential hypertension    Cancer ([facility]    Hypothyroidism    Depression    Hyperlipidemia    Basal cell carcinoma    Melanoma ([facility]    Anxiety    Hypokalemia    Acute idiopathic gout of right foot    CKD (chronic kidney disease) stage 3, GFR 30-59 ml/min ([facility])    Risk for falls    Pneumococcal vaccine refused    Influenza vaccination declined    Chronic fatigue    Bilateral lower extremity pain    Closed fracture of single pubic ramus of pelvis with routine healing, right    NSTEMI (non-ST elevated myocardial infarction) ([facility]    Chronic diastolic heart failure ([facility])       MEDICAL DECISION MAKING AND PLAN    1. Possible pelvic fractures of unknown mechanism  1. Ortho recs -weightbearing as tolerated bilateral lower extremities. No surgical intervention at this time  2. Left ankle pain  1. We will obtain x-ray to rule out fracture  3. NSTEMI  1. Medical management and pain management per primary team and cardiology   4. Based on no known mechanism of traumatic injury and no obvious acute traumatic abnormalities, trauma surgery will sign off. Thank you for this interesting evaluation. Please page trauma surgery with any other questions or concerns you may have. Agbabs Malik is is unchanged since yesterday. Patient had complaints of bilateral leg pain. This morning she states that she is having worsening of pain in her left ankle and has noticed some swelling this morning. Patient denies any fall or other traumatic mechanism of injury.      Denies any vision changes, headache, numbness, weakness, tingling, loss of sensation, bowel or bladder incontinence, chest pain, shortness of breath, abdominal pain, nausea, vomiting, diarrhea, fever or and left femur/hip joint may be indicated; correlate with clinical evaluation. If pain persists or worsens, then additional evaluation with MRI is indicated to ensure no underlying radiographically occult process such as fracture, AVN or transient osteoporosis. Xr Hip Right (2-3 Views)    Result Date: 1/29/2020  EXAMINATION: TWO XRAY VIEWS OF THE RIGHT HIP 1/29/2020 12:14 am COMPARISON: Pelvis 1-2 views January 28, 2020. HISTORY: ORDERING SYSTEM PROVIDED HISTORY: Concern for fracture TECHNOLOGIST PROVIDED HISTORY: AP and cross-table lateral of the hip please, thank you Concern for fracture FINDINGS: Severe right hip degenerative changes are seen with marked joint space narrowing, articular sclerosis and irregularity and severe osteophyte formation. No evidence of acute fracture or dislocation is identified. Diffuse osteopenia is seen. Overlying soft tissues are unremarkable. 1. No radiographic evidence of acute right hip fracture. For persisting concern for acute fracture, recommend dedicated bone CT of the pelvis and right hip for further evaluation. 2. Severe right hip degenerative osteoarthritis. 3. Diffuse osteopenia. Xr Femur Right (min 2 Views)    Result Date: 1/29/2020  EXAMINATION: 4 XRAY VIEWS OF THE RIGHT FEMUR 1/29/2020 12:14 am COMPARISON: None. HISTORY: ORDERING SYSTEM PROVIDED HISTORY: Concern for fracture TECHNOLOGIST PROVIDED HISTORY: Concern for fracture FINDINGS: Severe right hip joint degenerative changes are seen with articular irregularity and sclerosis with moderate osteophytosis present. Diffuse osteopenia is seen. No evidence of acute fracture, dislocation is noted. Surrounding soft tissues are unremarkable. 1. No evidence of acute right femoral fracture. 2. Severe right hip joint degenerative osteoarthritis. 3. Diffuse osteopenia. Xr Knee Left (3 Views)    Result Date: 1/29/2020  EXAMINATION: THREE XRAY VIEWS OF THE LEFT KNEE 1/28/2020 11:42 pm COMPARISON: None. fibula likely due to sclerosis along the old physis or sequela of healed fracture. Sequela of stress fracture less likely. However, on the AP foot image, there is a tiny hairline lucency projecting over the distal tibia not definitively seen on the ankle images. A subtle hairline nondisplaced fracture would be difficult to exclude. If pain persists, consider MRI. Xr Foot Right (min 3 Views)    Result Date: 1/28/2020  EXAMINATION: THREE XRAY VIEWS OF THE RIGHT FOOT; THREE XRAY VIEWS OF THE RIGHT ANKLE 1/28/2020 8:19 pm COMPARISON: None. HISTORY: ORDERING SYSTEM PROVIDED HISTORY: calcaneous ttp TECHNOLOGIST PROVIDED HISTORY: calcaneous ttp Reason for Exam: c/o pain and swelling to rt ankle and foot; ORDERING SYSTEM PROVIDED HISTORY: mild ttp during PROM of ankle and mildy tender to palpation TECHNOLOGIST PROVIDED HISTORY: mild ttp during PROM of ankle and mildy tender to palpation Reason for Exam: c/o pain and swelling to rt ankle and foot FINDINGS: Right ankle: 3 images were provided. Osteopenia is noted. Transverse sclerotic density in the distal tibia is noted. Similar findings to a lesser degree are noted in the distal fibula, seen best on the oblique image. Degenerative changes in the hindfoot are noted. Calcaneal spurring is noted. There is no acute displaced fracture. Vascular calcifications are noted. Right foot: Osteopenia is noted. There is no gross malalignment. Multifocal degenerative changes are noted in the tarsal bones. Multifocal interphalangeal joint degenerative changes are noted. There is no acute fracture. Calcaneal spurring is noted     Diffuse osteopenia Mild diffuse soft tissue swelling Multifocal degenerative changes in the ankle and foot No acute fracture in the foot. Transverse sclerotic density in the distal tibia and fibula likely due to sclerosis along the old physis or sequela of healed fracture. Sequela of stress fracture less likely.   However, on the AP foot image, association with posterior disc osteophyte complex which indents the ventral thecal sac narrowing the midline AP thecal sac diameter to 9 mm consistent with mild central canal stenosis. Disc osteophyte complex encroaches upon and causes severe right and mild left neural foraminal stenosis. Moderate bilateral facet hypertrophic degenerative changes are present. L5/S1: Severe disc height loss is noted at this level in association with posterior disc osteophyte complex which indents the ventral thecal sac narrowing the midline AP thecal sac diameter to 10 mm consistent with borderline central canal stenosis. Disc osteophyte complex encroaches upon and causes moderate left and mild right neural foraminal stenosis. Moderate bilateral facet hypertrophic degenerative changes are present. She is rate SOFT TISSUES/RETROPERITONEUM: No paraspinal mass is seen. 1. No evidence of acute lumbar spine trauma. 2. L3/L4 borderline, L4/L5 mild, L5/S1 borderline central canal stenosis secondary to encroachment by posterior disc osteophyte complex. 3. L1/L2 and L2/L3 mild spondylosis without associated central canal compromise/stenosis. 4. L3/L4 moderate right and mild left, L4/L5 severe right and mild left, L5/S1 moderate left and mild right neural foraminal stenosis secondary to encroachment by disc osteophyte complex. 5. Multilevel mild-to-moderate facet degenerative arthropathy, at levels as discussed above. 6. L1 on L2 retrolisthesis measuring 3 mm. Ct Abdomen Pelvis W Iv Contrast    Result Date: 1/28/2020  EXAMINATION: CT OF THE ABDOMEN AND PELVIS WITH CONTRAST 1/28/2020 11:21 pm TECHNIQUE: CT of the abdomen and pelvis was performed with the administration of intravenous contrast. Multiplanar reformatted images are provided for review. Dose modulation, iterative reconstruction, and/or weight based adjustment of the mA/kV was utilized to reduce the radiation dose to as low as reasonably achievable. COMPARISON: None. narrowing and osteophytosis is noted with articular sclerosis and irregularity present. Bilateral sacroiliac joint mild diffuse sclerosis is present. Inferior lumbar spine multilevel moderate to severe spondylosis is seen. Diffuse osteopenia is noted. No evidence of fracture or dislocation is identified. Surrounding soft tissues are unremarkable. Stable AP pelvis in comparison with yesterday's examination.          Jefferson Garcia  1/29/20, 7:07 AM

## 2020-01-30 ENCOUNTER — APPOINTMENT (OUTPATIENT)
Dept: NUCLEAR MEDICINE | Age: 85
DRG: 690 | End: 2020-01-30
Attending: HOSPITALIST
Payer: MEDICARE

## 2020-01-30 LAB
ABSOLUTE EOS #: <0.03 K/UL (ref 0–0.44)
ABSOLUTE IMMATURE GRANULOCYTE: 0.05 K/UL (ref 0–0.3)
ABSOLUTE LYMPH #: 1.01 K/UL (ref 1.1–3.7)
ABSOLUTE MONO #: 1.12 K/UL (ref 0.1–1.2)
ANION GAP SERPL CALCULATED.3IONS-SCNC: 15 MMOL/L (ref 9–17)
BASOPHILS # BLD: 0 % (ref 0–2)
BASOPHILS ABSOLUTE: 0.03 K/UL (ref 0–0.2)
BUN BLDV-MCNC: 17 MG/DL (ref 8–23)
BUN/CREAT BLD: ABNORMAL (ref 9–20)
CALCIUM SERPL-MCNC: 9.3 MG/DL (ref 8.6–10.4)
CHLORIDE BLD-SCNC: 101 MMOL/L (ref 98–107)
CO2: 25 MMOL/L (ref 20–31)
CREAT SERPL-MCNC: 1.03 MG/DL (ref 0.5–0.9)
DIFFERENTIAL TYPE: ABNORMAL
EKG ATRIAL RATE: 82 BPM
EKG ATRIAL RATE: 99 BPM
EKG P AXIS: 66 DEGREES
EKG P-R INTERVAL: 196 MS
EKG P-R INTERVAL: 248 MS
EKG Q-T INTERVAL: 420 MS
EKG Q-T INTERVAL: 436 MS
EKG QRS DURATION: 140 MS
EKG QRS DURATION: 142 MS
EKG QTC CALCULATION (BAZETT): 509 MS
EKG QTC CALCULATION (BAZETT): 539 MS
EKG R AXIS: -45 DEGREES
EKG R AXIS: -65 DEGREES
EKG T AXIS: 114 DEGREES
EKG T AXIS: 115 DEGREES
EKG VENTRICULAR RATE: 82 BPM
EKG VENTRICULAR RATE: 99 BPM
EOSINOPHILS RELATIVE PERCENT: 0 % (ref 1–4)
GFR AFRICAN AMERICAN: >60 ML/MIN
GFR NON-AFRICAN AMERICAN: 51 ML/MIN
GFR SERPL CREATININE-BSD FRML MDRD: ABNORMAL ML/MIN/{1.73_M2}
GFR SERPL CREATININE-BSD FRML MDRD: ABNORMAL ML/MIN/{1.73_M2}
GLUCOSE BLD-MCNC: 125 MG/DL (ref 70–99)
HCT VFR BLD CALC: 35.8 % (ref 36.3–47.1)
HEMOGLOBIN: 11.5 G/DL (ref 11.9–15.1)
IMMATURE GRANULOCYTES: 0 %
LV EF: 40 %
LVEF MODALITY: NORMAL
LYMPHOCYTES # BLD: 9 % (ref 24–43)
MAGNESIUM: 2.1 MG/DL (ref 1.6–2.6)
MCH RBC QN AUTO: 30.2 PG (ref 25.2–33.5)
MCHC RBC AUTO-ENTMCNC: 32.1 G/DL (ref 28.4–34.8)
MCV RBC AUTO: 94 FL (ref 82.6–102.9)
MONOCYTES # BLD: 10 % (ref 3–12)
NRBC AUTOMATED: 0 PER 100 WBC
PDW BLD-RTO: 13.8 % (ref 11.8–14.4)
PLATELET # BLD: 221 K/UL (ref 138–453)
PLATELET ESTIMATE: ABNORMAL
PMV BLD AUTO: 11.3 FL (ref 8.1–13.5)
POTASSIUM SERPL-SCNC: 3.7 MMOL/L (ref 3.7–5.3)
RBC # BLD: 3.81 M/UL (ref 3.95–5.11)
RBC # BLD: ABNORMAL 10*6/UL
SEG NEUTROPHILS: 81 % (ref 36–65)
SEGMENTED NEUTROPHILS ABSOLUTE COUNT: 9.2 K/UL (ref 1.5–8.1)
SODIUM BLD-SCNC: 141 MMOL/L (ref 135–144)
WBC # BLD: 11.4 K/UL (ref 3.5–11.3)
WBC # BLD: ABNORMAL 10*3/UL

## 2020-01-30 PROCEDURE — 36415 COLL VENOUS BLD VENIPUNCTURE: CPT

## 2020-01-30 PROCEDURE — 78452 HT MUSCLE IMAGE SPECT MULT: CPT

## 2020-01-30 PROCEDURE — 85025 COMPLETE CBC W/AUTO DIFF WBC: CPT

## 2020-01-30 PROCEDURE — 2580000003 HC RX 258: Performed by: INTERNAL MEDICINE

## 2020-01-30 PROCEDURE — 6360000002 HC RX W HCPCS: Performed by: INTERNAL MEDICINE

## 2020-01-30 PROCEDURE — 2580000003 HC RX 258: Performed by: NURSE PRACTITIONER

## 2020-01-30 PROCEDURE — 2060000000 HC ICU INTERMEDIATE R&B

## 2020-01-30 PROCEDURE — 6370000000 HC RX 637 (ALT 250 FOR IP): Performed by: NURSE PRACTITIONER

## 2020-01-30 PROCEDURE — 93017 CV STRESS TEST TRACING ONLY: CPT

## 2020-01-30 PROCEDURE — 83735 ASSAY OF MAGNESIUM: CPT

## 2020-01-30 PROCEDURE — 6360000002 HC RX W HCPCS: Performed by: NURSE PRACTITIONER

## 2020-01-30 PROCEDURE — 6370000000 HC RX 637 (ALT 250 FOR IP): Performed by: INTERNAL MEDICINE

## 2020-01-30 PROCEDURE — A9500 TC99M SESTAMIBI: HCPCS | Performed by: INTERNAL MEDICINE

## 2020-01-30 PROCEDURE — 93010 ELECTROCARDIOGRAM REPORT: CPT | Performed by: INTERNAL MEDICINE

## 2020-01-30 PROCEDURE — 80048 BASIC METABOLIC PNL TOTAL CA: CPT

## 2020-01-30 PROCEDURE — 6370000000 HC RX 637 (ALT 250 FOR IP): Performed by: PHYSICIAN ASSISTANT

## 2020-01-30 PROCEDURE — 3430000000 HC RX DIAGNOSTIC RADIOPHARMACEUTICAL: Performed by: INTERNAL MEDICINE

## 2020-01-30 PROCEDURE — 99232 SBSQ HOSP IP/OBS MODERATE 35: CPT | Performed by: PHYSICIAN ASSISTANT

## 2020-01-30 RX ORDER — SODIUM CHLORIDE 9 MG/ML
500 INJECTION, SOLUTION INTRAVENOUS CONTINUOUS PRN
Status: DISCONTINUED | OUTPATIENT
Start: 2020-01-30 | End: 2020-01-30 | Stop reason: ALTCHOICE

## 2020-01-30 RX ORDER — ALBUTEROL SULFATE 90 UG/1
2 AEROSOL, METERED RESPIRATORY (INHALATION) PRN
Status: DISCONTINUED | OUTPATIENT
Start: 2020-01-30 | End: 2020-01-30 | Stop reason: ALTCHOICE

## 2020-01-30 RX ORDER — SODIUM CHLORIDE 0.9 % (FLUSH) 0.9 %
10 SYRINGE (ML) INJECTION PRN
Status: DISCONTINUED | OUTPATIENT
Start: 2020-01-30 | End: 2020-02-01 | Stop reason: HOSPADM

## 2020-01-30 RX ORDER — ATROPINE SULFATE 0.1 MG/ML
0.5 INJECTION INTRAVENOUS EVERY 5 MIN PRN
Status: DISCONTINUED | OUTPATIENT
Start: 2020-01-30 | End: 2020-01-30 | Stop reason: ALTCHOICE

## 2020-01-30 RX ORDER — METOPROLOL TARTRATE 5 MG/5ML
5 INJECTION INTRAVENOUS EVERY 5 MIN PRN
Status: DISCONTINUED | OUTPATIENT
Start: 2020-01-30 | End: 2020-01-30 | Stop reason: ALTCHOICE

## 2020-01-30 RX ORDER — NITROGLYCERIN 0.4 MG/1
0.4 TABLET SUBLINGUAL EVERY 5 MIN PRN
Status: DISCONTINUED | OUTPATIENT
Start: 2020-01-30 | End: 2020-01-30 | Stop reason: ALTCHOICE

## 2020-01-30 RX ORDER — SODIUM CHLORIDE 0.9 % (FLUSH) 0.9 %
10 SYRINGE (ML) INJECTION PRN
Status: DISCONTINUED | OUTPATIENT
Start: 2020-01-30 | End: 2020-01-30 | Stop reason: ALTCHOICE

## 2020-01-30 RX ORDER — AMINOPHYLLINE DIHYDRATE 25 MG/ML
50 INJECTION, SOLUTION INTRAVENOUS PRN
Status: DISCONTINUED | OUTPATIENT
Start: 2020-01-30 | End: 2020-01-30 | Stop reason: ALTCHOICE

## 2020-01-30 RX ORDER — LANOLIN ALCOHOL/MO/W.PET/CERES
CREAM (GRAM) TOPICAL 2 TIMES DAILY
Status: DISCONTINUED | OUTPATIENT
Start: 2020-01-30 | End: 2020-02-01 | Stop reason: HOSPADM

## 2020-01-30 RX ADMIN — REGADENOSON 0.4 MG: 0.08 INJECTION, SOLUTION INTRAVENOUS at 10:30

## 2020-01-30 RX ADMIN — CEFTRIAXONE SODIUM 1 G: 1 INJECTION, POWDER, FOR SOLUTION INTRAMUSCULAR; INTRAVENOUS at 15:49

## 2020-01-30 RX ADMIN — ENOXAPARIN SODIUM 60 MG: 60 INJECTION SUBCUTANEOUS at 08:42

## 2020-01-30 RX ADMIN — BUSPIRONE HYDROCHLORIDE 15 MG: 15 TABLET ORAL at 12:21

## 2020-01-30 RX ADMIN — FUROSEMIDE 20 MG: 20 TABLET ORAL at 12:21

## 2020-01-30 RX ADMIN — Medication 10 ML: at 10:23

## 2020-01-30 RX ADMIN — ASPIRIN 81 MG: 81 TABLET, CHEWABLE ORAL at 12:21

## 2020-01-30 RX ADMIN — TETRAKIS(2-METHOXYISOBUTYLISOCYANIDE)COPPER(I) TETRAFLUOROBORATE 39 MILLICURIE: 1 INJECTION, POWDER, LYOPHILIZED, FOR SOLUTION INTRAVENOUS at 10:30

## 2020-01-30 RX ADMIN — PREDNISONE 40 MG: 20 TABLET ORAL at 12:21

## 2020-01-30 RX ADMIN — LISINOPRIL 20 MG: 20 TABLET ORAL at 12:21

## 2020-01-30 RX ADMIN — DESMOPRESSIN ACETATE 40 MG: 0.2 TABLET ORAL at 21:38

## 2020-01-30 RX ADMIN — OXYCODONE HYDROCHLORIDE AND ACETAMINOPHEN 1 TABLET: 5; 325 TABLET ORAL at 08:42

## 2020-01-30 RX ADMIN — SODIUM CHLORIDE, PRESERVATIVE FREE 10 ML: 5 INJECTION INTRAVENOUS at 08:41

## 2020-01-30 RX ADMIN — Medication: at 17:48

## 2020-01-30 RX ADMIN — TETRAKIS(2-METHOXYISOBUTYLISOCYANIDE)COPPER(I) TETRAFLUOROBORATE 14.9 MILLICURIE: 1 INJECTION, POWDER, LYOPHILIZED, FOR SOLUTION INTRAVENOUS at 08:03

## 2020-01-30 RX ADMIN — Medication: at 21:38

## 2020-01-30 RX ADMIN — METOPROLOL TARTRATE 50 MG: 50 TABLET, FILM COATED ORAL at 21:38

## 2020-01-30 RX ADMIN — SODIUM CHLORIDE, PRESERVATIVE FREE 10 ML: 5 INJECTION INTRAVENOUS at 21:39

## 2020-01-30 RX ADMIN — SODIUM CHLORIDE, PRESERVATIVE FREE 10 ML: 5 INJECTION INTRAVENOUS at 10:30

## 2020-01-30 RX ADMIN — SODIUM CHLORIDE, PRESERVATIVE FREE 10 ML: 5 INJECTION INTRAVENOUS at 08:03

## 2020-01-30 RX ADMIN — METOPROLOL TARTRATE 50 MG: 50 TABLET, FILM COATED ORAL at 12:21

## 2020-01-30 RX ADMIN — LEVOTHYROXINE SODIUM 125 MCG: 125 TABLET ORAL at 12:22

## 2020-01-30 RX ADMIN — ENOXAPARIN SODIUM 60 MG: 60 INJECTION SUBCUTANEOUS at 21:38

## 2020-01-30 ASSESSMENT — PAIN SCALES - GENERAL
PAINLEVEL_OUTOF10: 6
PAINLEVEL_OUTOF10: 0

## 2020-01-30 NOTE — FLOWSHEET NOTE
Assessment: Patient is an 81 y.o. female who arrived to the hospital due to \"fatigue and weakness. \" Patient's daughter was present in room, when  visited. Patient shared that she is beginning to feel better today. Patient spoke about the good family support she has. Patient's spouse and her eight children remain in contact with patient and have been present to her throughout her hospitalization. Patient expressed feelings of hopefulness over discharge tomorrow. Intervention:  visited patient per initial rounding visits.  introduced herself to patient and family in room.  provided an active listening presence as patient shared about her symptoms and her family support. Per patient, she used to attend [facility], however, she no longer attends. Outcome: Patient and family were receptive of 's visit and support. Plan: Chaplains can make follow-up visit, per request. Aniket Vance can be reached 24/7 via [employer]. Earlyne November 01/29/20 5186   Encounter Summary   Services provided to: Patient and family together   Referral/Consult From: [place]; [facility]   Place of [address] [place] [address]  (None)   Contact [employer] No   Continue Visiting   (1/29/2020)   Complexity of Encounter Low   Length of Encounter 15 minutes   Spiritual Assessment Completed Yes   Routine   Type Initial   Spiritual/Pentecostalism   Type Spiritual support   Assessment Calm; Approachable; Hopeful;Coping   Intervention Active listening;Explored feelings, thoughts, concerns;Explored coping resources;Nurtured hope;Sustaining presence/ Ministry of presence; Discussed belief system/[employer] practices/[place];Discussed illness/injury and it's impact   Outcome Comfort;Expressed gratitude;Receptive

## 2020-01-30 NOTE — PROGRESS NOTES
Physical Therapy  DATE: 2020    NAME: Radha Samuels  MRN: [id number]   : 1935    Patient not seen this date for Physical Therapy due to:  [] Blood transfusion in progress  [] Hemodialysis  []  Patient Declined  [] Spine Precautions   [] Strict Bedrest  [] Surgery/ Procedure  [x] Testing: Stress test. PT will check back this PM as time allows or 20. [] Other        [] PT being discontinued at this time. Patient independent. No further needs. [] PT being discontinued at this time as the patient has been transferred to palliative care. No further needs.     Marilyn Kerns, PT

## 2020-01-30 NOTE — PROGRESS NOTES
mcg Oral Daily    metoprolol tartrate  50 mg Oral BID    aspirin  81 mg Oral Daily    busPIRone  15 mg Oral Daily    furosemide  20 mg Oral Daily    lisinopril  20 mg Oral Daily    sodium chloride flush  10 mL Intravenous 2 times per day    enoxaparin  1 mg/kg Subcutaneous BID    cefTRIAXone (ROCEPHIN) IV  1 g Intravenous Q24H    atorvastatin  40 mg Oral Nightly     Continuous Infusions:   PRN Meds: sodium chloride flush, sodium chloride flush, hydrALAZINE, sodium chloride flush, magnesium hydroxide, nicotine, potassium chloride **OR** potassium alternative oral replacement **OR** potassium chloride, potassium chloride, magnesium sulfate, ondansetron, nitroGLYCERIN, acetaminophen, oxyCODONE-acetaminophen    Data:     Past Medical History:   has a past medical history of Anxiety, Arthritis, Basal cell carcinoma, Cancer ([facility] [address] CHF (congestive heart failure) ([facility] Depression, Gout, H/O total hysterectomy, Hyperlipidemia, Hypertension, Hypokalemia, Hypothyroidism, Kidney stone, Kidney stone, and Melanoma ([facility] Social History:   reports that she quit smoking about 59 years ago. She has never used smokeless tobacco. She reports that she does not drink alcohol or use drugs. Family History:   Family History   Problem Relation Age of Onset    Heart Disease Father     Other Mother          in her sleep unknown cause       Vitals:  BP (!) 152/81   Pulse 75   Temp 98.2 °F (36.8 °C) (Oral)   Resp 19   Ht 5' 7\" (1.702 m)   Wt 131 lb 3.2 oz (59.5 kg)   SpO2 93%   BMI 20.55 kg/m²   Temp (24hrs), Av.2 °F (36.8 °C), Min:98.1 °F (36.7 °C), Max:98.2 °F (36.8 °C)    No results for input(s): POCGLU in the last 72 hours. I/O (24Hr):     Intake/Output Summary (Last 24 hours) at 2020 1324  Last data filed at 2020 0742  Gross per 24 hour   Intake 300 ml   Output --   Net 300 ml       Labs:  Hematology:  Recent Labs     20  1224 20  1430 20  0046 20  0601 20  0559 WBC 9.7  --   --  9.8 11.4*   RBC 4.07  --   --  3.98 3.81*   HGB 12.5  --   --  12.0 11.5*   HCT 37.4  --   --  37.5 35.8*   MCV 91.8  --   --  94.2 94.0   MCH 30.7  --   --  30.2 30.2   MCHC 33.4  --   --  32.0 32.1   RDW 14.5  --   --  13.3 13.8     --   --  217 221   MPV 8.8  --   --  10.9 11.3   INR  --  1.1  --  1.0  --    DDIMER  --   --  0.91  --   --      Chemistry:  Recent Labs     01/28/20  1305  01/28/20  1852 01/29/20  0046 01/29/20  0601 01/30/20  0559     --   --   --  140 141   K 3.7  --   --  3.7 3.4* 3.7   CL 98  --   --   --  101 101   CO2 27  --   --   --  25 25   GLUCOSE 125*  --   --   --  122* 125*   BUN 12  --   --   --  11 17   CREATININE 0.92*  --   --   --  0.90 1.03*   MG  --   --   --  1.8 1.7 2.1   ANIONGAP 12  --   --   --  14 15   LABGLOM 58*  --   --   --  60* 51*   GFRAA >60  --   --   --  >60 >60   CALCIUM 8.8  --   --   --  8.8 9.3   TROPHS 67*   < > 73* 77* 69*  --    CKTOTAL 142  --   --   --   --   --    MYOGLOBIN  --   --   --  114* 120*  --     < > = values in this interval not displayed. Recent Labs     01/28/20  1305 01/29/20  0046 01/29/20  0601   PROT 6.4  --   --    LABALBU 3.6  --   --    TSH 14.83*  --  11.52*   AST 19  --   --    ALT 11  --   --    ALKPHOS 83  --   --    BILITOT 0.76  --   --    AMMONIA 19  --   --    CHOL  --   --  131   HDL  --   --  45   LDLCHOLESTEROL  --   --  65   CHOLHDLRATIO  --   --  2.9   TRIG  --   --  106   VLDL  --   --  NOT REPORTED   URICACID  --  6.0*  --      ABG:No results found for: POCPH, PHART, PH, POCPCO2, DYS1FRW, PCO2, POCPO2, PO2ART, PO2, POCHCO3, IVU7EKC, HCO3, NBEA, PBEA, BEART, BE, THGBART, THB, LPT6FWZ, QLFL3KTY, Q9ONYWEV, O2SAT, FIO2  Lab Results   Component Value Date/Time    SPECIAL NOT REPORTED 01/28/2020 02:04 PM     Lab Results   Component Value Date/Time    CULTURE NO SIGNIFICANT GROWTH 01/28/2020 02:04 PM       Radiology:  Xr Chest Standard (2 Vw)    Result Date: 1/28/2020  1.  No definite acute pulmonary disease. 2. Bones appear osteoporotic with chronic mild hyperkyphosis and associated anterior wedging of 1 or 2 adjacent midthoracic vertebra. 3. Calcific atherosclerosis aorta. 4. Cardiomegaly. Xr Pelvis (1-2 Views)    Result Date: 1/28/2020  1. Advanced right hip joint osteoarthritis with acetabular and femoral head remodeling. 2. Old versus acute fracture right superior pubic ramus. 3. Less severe osteoarthritis left hip joint and bilateral SI joints. 4. Bones appear osteoporotic. Given the description of proximal femur pain, dedicated imaging of the right and left femur/hip joint may be indicated; correlate with clinical evaluation. If pain persists or worsens, then additional evaluation with MRI is indicated to ensure no underlying radiographically occult process such as fracture, AVN or transient osteoporosis. Xr Hip Right (2-3 Views)    Result Date: 1/29/2020  1. No radiographic evidence of acute right hip fracture. For persisting concern for acute fracture, recommend dedicated bone CT of the pelvis and right hip for further evaluation. 2. Severe right hip degenerative osteoarthritis. 3. Diffuse osteopenia. Xr Femur Right (min 2 Views)    Result Date: 1/29/2020  1. No evidence of acute right femoral fracture. 2. Severe right hip joint degenerative osteoarthritis. 3. Diffuse osteopenia. Xr Knee Left (3 Views)    Result Date: 1/29/2020  1. No radiographic evidence of acute left knee trauma. 2. Severe tricompartmental knee joint degenerative osteoarthritis. Xr Ankle Left (min 3 Views)    Result Date: 1/29/2020  Mild soft tissue swelling. Osteopenia. No evidence of acute fracture. RECOMMENDATION: Follow-up studies as clinically indicated. Xr Ankle Right (min 3 Views)    Result Date: 1/28/2020  Diffuse osteopenia Mild diffuse soft tissue swelling Multifocal degenerative changes in the ankle and foot No acute fracture in the foot.  Transverse sclerotic density in the distal tibia and fibula likely due to sclerosis along the old physis or sequela of healed fracture. Sequela of stress fracture less likely. However, on the AP foot image, there is a tiny hairline lucency projecting over the distal tibia not definitively seen on the ankle images. A subtle hairline nondisplaced fracture would be difficult to exclude. If pain persists, consider MRI. Xr Foot Right (min 3 Views)    Result Date: 1/28/2020  Diffuse osteopenia Mild diffuse soft tissue swelling Multifocal degenerative changes in the ankle and foot No acute fracture in the foot. Transverse sclerotic density in the distal tibia and fibula likely due to sclerosis along the old physis or sequela of healed fracture. Sequela of stress fracture less likely. However, on the AP foot image, there is a tiny hairline lucency projecting over the distal tibia not definitively seen on the ankle images. A subtle hairline nondisplaced fracture would be difficult to exclude. If pain persists, consider MRI. Ct Lumbar Spine Wo Contrast    Result Date: 1/29/2020  1. No evidence of acute lumbar spine trauma. 2. L3/L4 borderline, L4/L5 mild, L5/S1 borderline central canal stenosis secondary to encroachment by posterior disc osteophyte complex. 3. L1/L2 and L2/L3 mild spondylosis without associated central canal compromise/stenosis. 4. L3/L4 moderate right and mild left, L4/L5 severe right and mild left, L5/S1 moderate left and mild right neural foraminal stenosis secondary to encroachment by disc osteophyte complex. 5. Multilevel mild-to-moderate facet degenerative arthropathy, at levels as discussed above. 6. L1 on L2 retrolisthesis measuring 3 mm. Ct Abdomen Pelvis W Iv Contrast    Result Date: 1/28/2020  Severe right hip and moderate left hip degenerative osteoarthritis. No evidence of an acute or chronic pubic ramus fracture. Irregular thickening of the wall of the urinary bladder. Correlate for signs of acute cystitis.  There is presacral edema more prominent on the left of uncertain etiology. The adjacent sacrum/coccyx appears intact. Clinical correlation and follow-up recommended. Xr Pelvis (min 3 Views)    Result Date: 1/29/2020  Stable AP pelvis in comparison with yesterday's examination. Physical Examination:        General appearance:  alert, cooperative and mild distress secondary to pain  Mental Status:  oriented to person, place and time and normal affect  Lungs:  clear to auscultation bilaterally, normal effort  Heart:  regular rate and rhythm, 2/6 systolic ejection murmur  Abdomen:  soft, nontender, nondistended, normal bowel sounds, no masses, hepatomegaly, splenomegaly  Extremities: Lower extremity erythema bilaterally. Skin:  Extensive excoriations overlying patient's face with overlying scabbing. There is a obed sized, raised, pearly lesion consistent with basal cell carcinoma on the patients scalp    Assessment:        Hospital Problems           Last Modified POA    * (Principal) NSTEMI (non-ST elevated myocardial infarction) ([facility] [address] 1/29/2020 Yes    Essential hypertension 1/29/2020 Yes    Hypothyroidism 1/29/2020 Yes    Acute idiopathic gout of right foot 1/29/2020 Yes    CKD (chronic kidney disease) stage 3, GFR 30-59 ml/min ([address] 1/29/2020 Yes    Bilateral lower extremity pain 1/29/2020 Yes    Closed fracture of single pubic ramus of pelvis with routine healing, right 1/29/2020 Yes    Chronic diastolic heart failure ([address] 1/29/2020 Yes                Plan:        1. NSTEMI. Cardiology has been consulted, stress test done today and results are pending. Continue to monitor on telemetry. PRN nitro for chest pain. 2. Concern for new onset atrial fibrillation. Continue beta-blocker. Currently on weight-based Lovenox. Cardiology on board. Keep potassium more than 4 and magnesium more than 2.  3. Close fracture of the single ramus of the right pubis. Orthopedic surgery on board. Evaluated the patient.   No

## 2020-01-30 NOTE — PROGRESS NOTES
moderate MR, mild to moderate TR, RVSP is 48 mmHg, trivial PCE.     ECHO 1/30/20  Summary  Technically difficult study. Left ventricle is normal in size with systolic function is difficult to  evaluate due to irregular heart beat. Abnormal septal wall motion, ? due to Bundle [employer]. Calculated ejection fraction by 3D is 48%. Thickening of mitral valve leaflets without stenosis. Mild mitral regurgitation. Mild tricuspid regurgitation. Estimated right ventricular systolic pressure is 39 mmHg. Objective:   Vitals: BP (!) 152/81   Pulse 75   Temp 98.2 °F (36.8 °C) (Oral)   Resp 19   Ht 5' 7\" (1.702 m)   Wt 131 lb 3.2 oz (59.5 kg)   SpO2 93%   BMI 20.55 kg/m²   General appearance: alert and cooperative with exam  HEENT: Head: Normocephalic, no lesions, without obvious abnormality. Neck: no JVD, trachea midline, no adenopathy  Lungs: Clear to auscultation  Heart: Regular rate and rhythm, s1/s2 auscultated, no murmurs, PACs   Abdomen: soft, non-tender, bowel sounds active  Extremities: no edema  Neurologic: not done        Assessment / Acute Cardiac Problems:   Elevated troponin of unknown significance. Patient denies anginal symptoms  Chronic LBBB  Frequent PACs  Weakness  Patient has not been mobile for many months.   CKD  Pelvic fracture which is old  Hypothyroidism  Elevated D-dimer  Hypokalemia    Patient Active Problem List:     Essential hypertension     Cancer ([facility] [address]     Hypothyroidism     Depression     Hyperlipidemia     Basal cell carcinoma     Melanoma ([facility] [address]     Anxiety     Hypokalemia     Acute idiopathic gout of right foot     CKD (chronic kidney disease) stage 3, GFR 30-59 ml/min ([facility])     Risk for falls     Pneumococcal vaccine refused     Influenza vaccination declined     Chronic fatigue     Bilateral lower extremity pain     Closed fracture of single pubic ramus of pelvis with routine healing, right     NSTEMI (non-ST elevated myocardial infarction) ([facility])     Chronic diastolic heart failure ([facility] [address]      Plan of Treatment:   1. Minimally elevated, flat  trops. ECHO reviewed. Awaiting stress test results. Continue ASA, statin, BB and ACE. 2. PACS. Continue lopressor 50mg BID   3. HTN. Improved yesterday.   Continue ACE/BB  4. Keep K> 4 and Mag > 2     Electronically signed by NORMA Wang CNP on 1/30/2020 at 10:08 AM  [id number]  [phone]

## 2020-01-31 ENCOUNTER — APPOINTMENT (OUTPATIENT)
Dept: CARDIAC CATH/INVASIVE PROCEDURES | Age: 85
DRG: 690 | End: 2020-01-31
Attending: HOSPITALIST
Payer: MEDICARE

## 2020-01-31 LAB
ABSOLUTE EOS #: <0.03 K/UL (ref 0–0.44)
ABSOLUTE IMMATURE GRANULOCYTE: 0.18 K/UL (ref 0–0.3)
ABSOLUTE LYMPH #: 1.93 K/UL (ref 1.1–3.7)
ABSOLUTE MONO #: 0.96 K/UL (ref 0.1–1.2)
ANION GAP SERPL CALCULATED.3IONS-SCNC: 15 MMOL/L (ref 9–17)
BASOPHILS # BLD: 0 % (ref 0–2)
BASOPHILS ABSOLUTE: 0.05 K/UL (ref 0–0.2)
BUN BLDV-MCNC: 18 MG/DL (ref 8–23)
BUN/CREAT BLD: ABNORMAL (ref 9–20)
CALCIUM SERPL-MCNC: 9.5 MG/DL (ref 8.6–10.4)
CHLORIDE BLD-SCNC: 100 MMOL/L (ref 98–107)
CO2: 25 MMOL/L (ref 20–31)
CREAT SERPL-MCNC: 0.84 MG/DL (ref 0.5–0.9)
DIFFERENTIAL TYPE: ABNORMAL
EOSINOPHILS RELATIVE PERCENT: 0 % (ref 1–4)
GFR AFRICAN AMERICAN: >60 ML/MIN
GFR NON-AFRICAN AMERICAN: >60 ML/MIN
GFR SERPL CREATININE-BSD FRML MDRD: ABNORMAL ML/MIN/{1.73_M2}
GFR SERPL CREATININE-BSD FRML MDRD: ABNORMAL ML/MIN/{1.73_M2}
GLUCOSE BLD-MCNC: 104 MG/DL (ref 70–99)
HCT VFR BLD CALC: 40.3 % (ref 36.3–47.1)
HEMOGLOBIN: 12.9 G/DL (ref 11.9–15.1)
IMMATURE GRANULOCYTES: 2 %
LYMPHOCYTES # BLD: 16 % (ref 24–43)
MAGNESIUM: 2 MG/DL (ref 1.6–2.6)
MCH RBC QN AUTO: 30 PG (ref 25.2–33.5)
MCHC RBC AUTO-ENTMCNC: 32 G/DL (ref 28.4–34.8)
MCV RBC AUTO: 93.7 FL (ref 82.6–102.9)
MONOCYTES # BLD: 8 % (ref 3–12)
NRBC AUTOMATED: 0 PER 100 WBC
PDW BLD-RTO: 13.5 % (ref 11.8–14.4)
PLATELET # BLD: 248 K/UL (ref 138–453)
PLATELET ESTIMATE: ABNORMAL
PMV BLD AUTO: 10.9 FL (ref 8.1–13.5)
POTASSIUM SERPL-SCNC: 4 MMOL/L (ref 3.7–5.3)
RBC # BLD: 4.3 M/UL (ref 3.95–5.11)
RBC # BLD: ABNORMAL 10*6/UL
SEG NEUTROPHILS: 74 % (ref 36–65)
SEGMENTED NEUTROPHILS ABSOLUTE COUNT: 9.09 K/UL (ref 1.5–8.1)
SODIUM BLD-SCNC: 140 MMOL/L (ref 135–144)
WBC # BLD: 12.2 K/UL (ref 3.5–11.3)
WBC # BLD: ABNORMAL 10*3/UL

## 2020-01-31 PROCEDURE — 97530 THERAPEUTIC ACTIVITIES: CPT

## 2020-01-31 PROCEDURE — 97162 PT EVAL MOD COMPLEX 30 MIN: CPT

## 2020-01-31 PROCEDURE — 97166 OT EVAL MOD COMPLEX 45 MIN: CPT

## 2020-01-31 PROCEDURE — C1894 INTRO/SHEATH, NON-LASER: HCPCS

## 2020-01-31 PROCEDURE — 2580000003 HC RX 258: Performed by: NURSE PRACTITIONER

## 2020-01-31 PROCEDURE — 6370000000 HC RX 637 (ALT 250 FOR IP): Performed by: STUDENT IN AN ORGANIZED HEALTH CARE EDUCATION/TRAINING PROGRAM

## 2020-01-31 PROCEDURE — C1725 CATH, TRANSLUMIN NON-LASER: HCPCS

## 2020-01-31 PROCEDURE — B2151ZZ FLUOROSCOPY OF LEFT HEART USING LOW OSMOLAR CONTRAST: ICD-10-PCS | Performed by: INTERNAL MEDICINE

## 2020-01-31 PROCEDURE — 6370000000 HC RX 637 (ALT 250 FOR IP): Performed by: INTERNAL MEDICINE

## 2020-01-31 PROCEDURE — 2060000000 HC ICU INTERMEDIATE R&B

## 2020-01-31 PROCEDURE — 6360000002 HC RX W HCPCS: Performed by: STUDENT IN AN ORGANIZED HEALTH CARE EDUCATION/TRAINING PROGRAM

## 2020-01-31 PROCEDURE — 2500000003 HC RX 250 WO HCPCS

## 2020-01-31 PROCEDURE — 4A023N7 MEASUREMENT OF CARDIAC SAMPLING AND PRESSURE, LEFT HEART, PERCUTANEOUS APPROACH: ICD-10-PCS | Performed by: INTERNAL MEDICINE

## 2020-01-31 PROCEDURE — 93458 L HRT ARTERY/VENTRICLE ANGIO: CPT | Performed by: INTERNAL MEDICINE

## 2020-01-31 PROCEDURE — 6360000002 HC RX W HCPCS: Performed by: NURSE PRACTITIONER

## 2020-01-31 PROCEDURE — B2111ZZ FLUOROSCOPY OF MULTIPLE CORONARY ARTERIES USING LOW OSMOLAR CONTRAST: ICD-10-PCS | Performed by: INTERNAL MEDICINE

## 2020-01-31 PROCEDURE — 6360000002 HC RX W HCPCS

## 2020-01-31 PROCEDURE — 6360000004 HC RX CONTRAST MEDICATION

## 2020-01-31 PROCEDURE — 83735 ASSAY OF MAGNESIUM: CPT

## 2020-01-31 PROCEDURE — C1769 GUIDE WIRE: HCPCS

## 2020-01-31 PROCEDURE — 6370000000 HC RX 637 (ALT 250 FOR IP): Performed by: NURSE PRACTITIONER

## 2020-01-31 PROCEDURE — 97535 SELF CARE MNGMENT TRAINING: CPT

## 2020-01-31 PROCEDURE — 36415 COLL VENOUS BLD VENIPUNCTURE: CPT

## 2020-01-31 PROCEDURE — 80048 BASIC METABOLIC PNL TOTAL CA: CPT

## 2020-01-31 PROCEDURE — 99232 SBSQ HOSP IP/OBS MODERATE 35: CPT | Performed by: HOSPITALIST

## 2020-01-31 PROCEDURE — 85025 COMPLETE CBC W/AUTO DIFF WBC: CPT

## 2020-01-31 PROCEDURE — C1887 CATHETER, GUIDING: HCPCS

## 2020-01-31 PROCEDURE — 2709999900 HC NON-CHARGEABLE SUPPLY

## 2020-01-31 RX ORDER — ACETAMINOPHEN 325 MG/1
650 TABLET ORAL EVERY 4 HOURS PRN
Status: DISCONTINUED | OUTPATIENT
Start: 2020-01-31 | End: 2020-02-01 | Stop reason: HOSPADM

## 2020-01-31 RX ORDER — SODIUM CHLORIDE 0.9 % (FLUSH) 0.9 %
10 SYRINGE (ML) INJECTION EVERY 12 HOURS SCHEDULED
Status: DISCONTINUED | OUTPATIENT
Start: 2020-01-31 | End: 2020-02-01 | Stop reason: HOSPADM

## 2020-01-31 RX ORDER — SODIUM CHLORIDE 0.9 % (FLUSH) 0.9 %
10 SYRINGE (ML) INJECTION PRN
Status: DISCONTINUED | OUTPATIENT
Start: 2020-01-31 | End: 2020-02-01 | Stop reason: HOSPADM

## 2020-01-31 RX ADMIN — BUSPIRONE HYDROCHLORIDE 15 MG: 15 TABLET ORAL at 08:16

## 2020-01-31 RX ADMIN — Medication: at 08:20

## 2020-01-31 RX ADMIN — FUROSEMIDE 20 MG: 20 TABLET ORAL at 08:16

## 2020-01-31 RX ADMIN — OXYCODONE HYDROCHLORIDE AND ACETAMINOPHEN 1 TABLET: 5; 325 TABLET ORAL at 23:31

## 2020-01-31 RX ADMIN — LEVOTHYROXINE SODIUM 125 MCG: 125 TABLET ORAL at 05:01

## 2020-01-31 RX ADMIN — CEFTRIAXONE SODIUM 1 G: 1 INJECTION, POWDER, FOR SOLUTION INTRAMUSCULAR; INTRAVENOUS at 14:54

## 2020-01-31 RX ADMIN — PREDNISONE 40 MG: 20 TABLET ORAL at 08:15

## 2020-01-31 RX ADMIN — OXYCODONE HYDROCHLORIDE AND ACETAMINOPHEN 1 TABLET: 5; 325 TABLET ORAL at 08:16

## 2020-01-31 RX ADMIN — LISINOPRIL 20 MG: 20 TABLET ORAL at 08:16

## 2020-01-31 RX ADMIN — ACETAMINOPHEN 650 MG: 325 TABLET ORAL at 05:09

## 2020-01-31 RX ADMIN — ASPIRIN 81 MG: 81 TABLET, CHEWABLE ORAL at 08:16

## 2020-01-31 RX ADMIN — ENOXAPARIN SODIUM 60 MG: 60 INJECTION SUBCUTANEOUS at 08:16

## 2020-01-31 RX ADMIN — SODIUM CHLORIDE, PRESERVATIVE FREE 10 ML: 5 INJECTION INTRAVENOUS at 08:15

## 2020-01-31 RX ADMIN — ENOXAPARIN SODIUM 60 MG: 60 INJECTION SUBCUTANEOUS at 21:33

## 2020-01-31 RX ADMIN — HYDRALAZINE HYDROCHLORIDE 10 MG: 20 INJECTION INTRAMUSCULAR; INTRAVENOUS at 23:03

## 2020-01-31 RX ADMIN — METOPROLOL TARTRATE 50 MG: 50 TABLET, FILM COATED ORAL at 21:32

## 2020-01-31 RX ADMIN — HYDRALAZINE HYDROCHLORIDE 10 MG: 20 INJECTION INTRAMUSCULAR; INTRAVENOUS at 04:33

## 2020-01-31 RX ADMIN — Medication: at 21:33

## 2020-01-31 RX ADMIN — DESMOPRESSIN ACETATE 40 MG: 0.2 TABLET ORAL at 21:33

## 2020-01-31 RX ADMIN — METOPROLOL TARTRATE 50 MG: 50 TABLET, FILM COATED ORAL at 08:15

## 2020-01-31 ASSESSMENT — PAIN DESCRIPTION - PROGRESSION
CLINICAL_PROGRESSION: GRADUALLY IMPROVING
CLINICAL_PROGRESSION: GRADUALLY WORSENING
CLINICAL_PROGRESSION: GRADUALLY IMPROVING
CLINICAL_PROGRESSION: GRADUALLY WORSENING
CLINICAL_PROGRESSION: GRADUALLY IMPROVING

## 2020-01-31 ASSESSMENT — PAIN DESCRIPTION - DESCRIPTORS
DESCRIPTORS: ACHING;CONSTANT
DESCRIPTORS: CRAMPING;CONSTANT

## 2020-01-31 ASSESSMENT — PAIN SCALES - GENERAL
PAINLEVEL_OUTOF10: 6
PAINLEVEL_OUTOF10: 0
PAINLEVEL_OUTOF10: 0
PAINLEVEL_OUTOF10: 6
PAINLEVEL_OUTOF10: 2
PAINLEVEL_OUTOF10: 8
PAINLEVEL_OUTOF10: 0
PAINLEVEL_OUTOF10: 4
PAINLEVEL_OUTOF10: 2

## 2020-01-31 ASSESSMENT — PAIN DESCRIPTION - FREQUENCY
FREQUENCY: CONTINUOUS
FREQUENCY: CONTINUOUS

## 2020-01-31 ASSESSMENT — PAIN DESCRIPTION - PAIN TYPE
TYPE: ACUTE PAIN
TYPE: CHRONIC PAIN

## 2020-01-31 ASSESSMENT — PAIN DESCRIPTION - LOCATION
LOCATION: HIP;LEG
LOCATION: HIP

## 2020-01-31 ASSESSMENT — PAIN DESCRIPTION - ORIENTATION
ORIENTATION: RIGHT
ORIENTATION: RIGHT;LEFT
ORIENTATION: RIGHT
ORIENTATION: RIGHT

## 2020-01-31 ASSESSMENT — PAIN - FUNCTIONAL ASSESSMENT: PAIN_FUNCTIONAL_ASSESSMENT: PREVENTS OR INTERFERES SOME ACTIVE ACTIVITIES AND ADLS

## 2020-01-31 ASSESSMENT — PAIN DESCRIPTION - ONSET
ONSET: ON-GOING
ONSET: PROGRESSIVE

## 2020-01-31 NOTE — PROGRESS NOTES
Occupational Therapy   Occupational Therapy Initial Assessment  Date: 2020   Patient Name: Sandra Munoz  MRN: [id number]     : 1935    Copied H&P:  This is an 81-year-old female with a significant medical history of skin cancers anxiety gout, congestive heart failure, hypertension, hypothyroidism, depression who presented to the emergency room at the insistence  of her family secondary to decline in mobility and fatigue. Patient was found to have a urinary tract infection a closed pubic ramus fracture and a non-STEMI. Patient was transferred to [place] [address] secondary to bed availability at [facility].       Patient states that over the past few weeks to months patient has had a general decline in her mobility and increase in her pain of the lower extremities. Patient states that she went from using a cane to a wheeled walker. She does admit to a fall this past Thursday in the bathroom . Date of Service: 2020    Discharge Recommendations:    Further therapy recommended at discharge. OT Equipment Recommendations  Other: CTA pending progress     Assessment   Performance deficits / Impairments: Decreased functional mobility ; Decreased ADL status; Decreased endurance;Decreased safe awareness  Assessment: Pt would benefit from continued acute care and post acute care OT to address maximal deficits in ADL/ functional activities, endurance and functional transfers/ mobility following admission. Pt required 2 person assistance for safety during session. Prognosis: Good  Decision Making: Medium Complexity  OT Education: OT Role;Plan of Care;Transfer Training  REQUIRES OT FOLLOW UP: Yes  Activity Tolerance  Activity Tolerance: Patient Tolerated treatment well  Safety Devices  Safety Devices in place: Yes  Type of devices: Left in chair;Call light within reach;Nurse notified;Gait belt;Patient at risk for falls; Chair alarm in place  Restraints  Initially in place: No Patient Diagnosis(es): There were no encounter diagnoses. has a past medical history of Anxiety, Arthritis, Basal cell carcinoma, Cancer ([facility] [address] CHF (congestive heart failure) ([facility] [address] Depression, Gout, H/O total hysterectomy, Hyperlipidemia, Hypertension, Hypokalemia, Hypothyroidism, Kidney stone, Kidney stone, and Melanoma ([facility] [address] has a past surgical history that includes Tubal ligation;  Hysterectomy; and Hysterectomy, vaginal.      Restrictions  Restrictions/Precautions  Restrictions/Precautions: Weight Bearing, Up as Tolerated, Fall Risk, Cardiac  Required Braces or Orthoses?: No  Lower Extremity Weight Bearing Restrictions  Right Lower Extremity Weight Bearing: Weight Bearing As Tolerated  Left Lower Extremity Weight Bearing: Weight Bearing As Tolerated    Subjective   General  Patient assessed for rehabilitation services?: Yes  Family / Caregiver Present: Yes(daughters )  Patient Currently in Pain: Yes  Pain Assessment  Pain Assessment: 0-10  Pain Level: 2  Pain Type: Acute pain  Pain Location: Hip  Pain Orientation: Right  Vital Signs  Patient Currently in Pain: Yes     Social/Functional History  Social/Functional History  Lives With: Spouse(Daughter Katiuska Cerda)  Type of Home: House  Home Layout: One level  Home Access: Ramped entrance  Bathroom Shower/Tub: Walk-in shower  Bathroom Toilet: Standard  Home Equipment: 4 wheeled walker, Rolling walker, [address] Help From: Family  ADL Assistance: Needs assistance  Homemaking Assistance: Needs assistance  Ambulation Assistance: Needs assistance  Transfer Assistance: Needs assistance  Active : No  Patient's  Info: Family  Occupation: Retired    Objective   Vision: Within Functional Limits  Hearing: Within functional limits    Orientation  Overall Orientation Status: Within Functional Limits  Observation/Palpation  Posture: Fair  Observation: kyphotic   Balance  Sitting Balance: Contact guard assistance  Standing Balance: Minimal assistance  Standing

## 2020-01-31 NOTE — OP NOTE
[facility]        Date:   1/31/2020  Patient name:  Padilla Santos  Date of admission:  1/28/2020  5:39 PM  MRN:   [id number]  YOB: 1935    CARDIAC CATHETERIZATION    Operators:  Primary:   Dr Raimundo Gaviria    Procedure performed:       [x] Left Heart Catheterization. [] Graft Angiography. [x] Left Ventriculography. [] Right Heart Catheterization. [x] Coronary Angiography. [] Aortic Valve Studies. [] PCI:      [] Other:       Pre Procedure Conscious Sedation Data:    ASA Class:    [] I [x] II [] III [] IV    Mallampati Class:  [] I [x] II [] III [] IV      Indication:  [] STEMI      [x] + Stress test  [] ACS      [] + EKG Changes  [] Non Q MI       [] Significant Risk Factors  [] Recurrent Angina             [] Diabetes Mellitus    [] New LBBB      [] Uncontrolled HTN. [] CHF / Low EF changes     [] Abnormal CTA / Ca Score  [] Other:     Procedure:  Access:  [] Femoral artery  [x] Radial  artery       [x] Right   [] Left    Procedure: After informed consent was obtained with explanation of the risks and benefits, patient was brought to the cath lab. The access area was prepped and draped in sterile fashion. 1% lidocaine was used for local block. The artery was cannulated with 6  Fr sheath with brisk arterial blood return. The side port was frequently flushed and aspirated with normal saline. Findings:     LMCA: Normal 0% stenosis. LAD: Normal 0% stenosis. LCx: Normal 0% stenosis. RCA: Normal 0% stenosis.     Coronary Tree      Dominance: Right           The LV gram was performed in the SANTOS 30 position. LVEF:  55%. LV Wall Motion: Normal          Conclusions:     Normal coronary arteries   Normal LV systolic function    Recommendations:   Medical therapy as needed.   Risk factor modification.       History and Risk Factors    [x] Hypertension     [] Family history of CAD  [] Hyperlipidemia     [] Cerebrovascular Disease   [] Prior MI       [] Peripheral Vascular disease

## 2020-01-31 NOTE — PROGRESS NOTES
or vomiting she does have intermittent constipation and diarrhea states \"she had a good bowel movement yesterday \".    The work-up at [facility] emergency room showed a Bolick panel which was essentially normal outside of hyperglycemia of 125 , however cardiac enzymes with a high sensitive troponin of 65 with a repeat of 73.  Of significance her TSH was noted to be 14.83, with a free T3 of 1.33.  Hemogram without significant leukocytosis or anemias.  Her urinalysis showed positive nitrates large leukoesterase elevated uro-bilirubin and raquel turbid color but she did take Azo prior to arrival.  WBCs ,000, many bacteria.  The EKG showed a sinus arrhythmia with APBs and an complete left bundle branch block.    Imaging showed there was a possible pubic rami fracture with an unclear chronic logical timeframe. \"    Review of Systems:     Constitutional: Positive for generalized weakness and fatigue. Denies fever chills. Respiratory:  negative for cough, dyspnea on exertion, hemoptysis, shortness of breath, wheezing  Cardiovascular:  negative for chest pain, chest pressure/discomfort, lower extremity edema, palpitations  Gastrointestinal:  negative for abdominal pain, constipation, diarrhea, nausea, vomiting  Neurological:  negative for dizziness, headache    Medications: Allergies:     Allergies   Allergen Reactions    Bactrim [Sulfamethoxazole-Trimethoprim]      DIZZINESS       Current Meds:   Scheduled Meds:    Hydrocerin   Topical BID    levothyroxine  125 mcg Oral Daily    metoprolol tartrate  50 mg Oral BID    aspirin  81 mg Oral Daily    busPIRone  15 mg Oral Daily    furosemide  20 mg Oral Daily    lisinopril  20 mg Oral Daily    sodium chloride flush  10 mL Intravenous 2 times per day    enoxaparin  1 mg/kg Subcutaneous BID    cefTRIAXone (ROCEPHIN) IV  1 g Intravenous Q24H    atorvastatin  40 mg Oral Nightly     Continuous Infusions:   PRN Meds: sodium chloride flush, sodium 01/29/20  0601 01/30/20  0559 01/31/20  0607   NA  --   --   --  140 141 140   K  --    < > 3.7 3.4* 3.7 4.0   CL  --   --   --  101 101 100   CO2  --   --   --  25 25 25   GLUCOSE  --   --   --  122* 125* 104*   BUN  --   --   --  11 17 18   CREATININE  --   --   --  0.90 1.03* 0.84   MG  --    < > 1.8 1.7 2.1 2.0   ANIONGAP  --   --   --  14 15 15   LABGLOM  --   --   --  60* 51* >60   GFRAA  --   --   --  >60 >60 >60   CALCIUM  --   --   --  8.8 9.3 9.5   TROPHS 73*  --  77* 69*  --   --    MYOGLOBIN  --   --  114* 120*  --   --     < > = values in this interval not displayed. Recent Labs     01/29/20  0046 01/29/20 0601   TSH  --  11.52*   CHOL  --  131   HDL  --  45   LDLCHOLESTEROL  --  65   CHOLHDLRATIO  --  2.9   TRIG  --  106   VLDL  --  NOT REPORTED   URICACID 6.0*  --      ABG:No results found for: POCPH, PHART, PH, POCPCO2, YAD9TCU, PCO2, POCPO2, PO2ART, PO2, POCHCO3, COA7XYH, HCO3, NBEA, PBEA, BEART, BE, THGBART, THB, KHM6KPA, ZECS3ZML, X6AXWLOR, O2SAT, FIO2  Lab Results   Component Value Date/Time    SPECIAL NOT REPORTED 01/28/2020 02:04 PM     Lab Results   Component Value Date/Time    CULTURE NO SIGNIFICANT GROWTH 01/28/2020 02:04 PM       Radiology:  Xr Chest Standard (2 Vw)    Result Date: 1/28/2020  1. No definite acute pulmonary disease. 2. Bones appear osteoporotic with chronic mild hyperkyphosis and associated anterior wedging of 1 or 2 adjacent midthoracic vertebra. 3. Calcific atherosclerosis aorta. 4. Cardiomegaly. Xr Pelvis (1-2 Views)    Result Date: 1/28/2020  1. Advanced right hip joint osteoarthritis with acetabular and femoral head remodeling. 2. Old versus acute fracture right superior pubic ramus. 3. Less severe osteoarthritis left hip joint and bilateral SI joints. 4. Bones appear osteoporotic. Given the description of proximal femur pain, dedicated imaging of the right and left femur/hip joint may be indicated; correlate with clinical evaluation.   If pain persists or worsens, then additional evaluation with MRI is indicated to ensure no underlying radiographically occult process such as fracture, AVN or transient osteoporosis. Xr Hip Right (2-3 Views)    Result Date: 1/29/2020  1. No radiographic evidence of acute right hip fracture. For persisting concern for acute fracture, recommend dedicated bone CT of the pelvis and right hip for further evaluation. 2. Severe right hip degenerative osteoarthritis. 3. Diffuse osteopenia. Xr Femur Right (min 2 Views)    Result Date: 1/29/2020  1. No evidence of acute right femoral fracture. 2. Severe right hip joint degenerative osteoarthritis. 3. Diffuse osteopenia. Xr Knee Left (3 Views)    Result Date: 1/29/2020  1. No radiographic evidence of acute left knee trauma. 2. Severe tricompartmental knee joint degenerative osteoarthritis. Xr Ankle Left (min 3 Views)    Result Date: 1/29/2020  Mild soft tissue swelling. Osteopenia. No evidence of acute fracture. RECOMMENDATION: Follow-up studies as clinically indicated. Xr Ankle Right (min 3 Views)    Result Date: 1/28/2020  Diffuse osteopenia Mild diffuse soft tissue swelling Multifocal degenerative changes in the ankle and foot No acute fracture in the foot. Transverse sclerotic density in the distal tibia and fibula likely due to sclerosis along the old physis or sequela of healed fracture. Sequela of stress fracture less likely. However, on the AP foot image, there is a tiny hairline lucency projecting over the distal tibia not definitively seen on the ankle images. A subtle hairline nondisplaced fracture would be difficult to exclude. If pain persists, consider MRI. Xr Foot Right (min 3 Views)    Result Date: 1/28/2020  Diffuse osteopenia Mild diffuse soft tissue swelling Multifocal degenerative changes in the ankle and foot No acute fracture in the foot.  Transverse sclerotic density in the distal tibia and fibula likely due to sclerosis along the old physis or Monitor blood pressure and adjust medications as needed. Blood pressure control is improved. 7. Bilateral lower extremity pain as well as erythema. DVU scan unrevealing. 8. Chronic diastolic heart failure. Continue Lasix. 9. GI prophylaxis  10. DVT prophylaxis  11. Physical therapy and Occupational Therapy  12. Eventual likely discharge to acute rehab versus skilled nursing facility. 13. Will need expedited dermatology referral at the time of discharge for further evaluation of facial lesions that appear to be actinic keratoses with possible transition to squamous cell carcinoma. Scalp lesion appears to be consistent with basal cell carcinoma.      Jolie Dawn DO  1/31/2020  1:27 PM

## 2020-01-31 NOTE — PROGRESS NOTES
Devices  Type of devices: Left in chair, Call light within reach, Chair alarm in place, Nurse notified, Gait belt, Patient at risk for falls    AM-PAC Score     AM-PAC Inpatient Mobility without Stair Climbing Raw Score : 8 (01/31/20 1052)  AM-PAC Inpatient without Stair Climbing T-Scale Score : 30.65 (01/31/20 1052)  Mobility Inpatient CMS 0-100% Score: 80.91 (01/31/20 1052)  Mobility Inpatient without Stair CMS G-Code Modifier : CM (01/31/20 1052)       Goals  Short term goals  Time Frame for Short term goals: 14 visits  Short term goal 1: Pt to perform bed mobility SBA  Short term goal 2: Pt to transfer SBA  Short term goal 3: Pt to ambulate with rolling walker 100 ft CGA  Short term goal 4: Pt to tolerate 30 minutes of activity to improve strength and endurance. Patient Goals   Patient goals : Get stronger and walk again.        Therapy Time   Individual Concurrent Group Co-treatment   Time In 3865         Time Out 1028         Minutes 29         Timed Code Treatment Minutes: [address]  [place], [place]

## 2020-01-31 NOTE — PROGRESS NOTES
Patient admitted, consent signed and questions answered. Patient ready for procedure. Call light to reach with side rails up 2 of 2. Family at bedside with patient. History and physical completed.

## 2020-01-31 NOTE — PROGRESS NOTES
[facility]   Progress Note                   Date:   1/31/2020  Patient name: Marissa Ospina  Date of admission:  1/28/2020  5:39 PM  MRN:   [id number]  YOB: 1935  PCP: NORMA Martinez CNP    Reason for Admission: NSTEMI (non-ST elevated myocardial infarction) ([facility] [address] [I21.4]    Subjective:       Clinical Changes / Abnormalities: Patient seen and examined in bed in room with daughter present. Patient denies chest pain or shortness of breath and is able to lay flat in bed. Positive stress test was discussed with patient and daughter with option of heart cath discussed. Patient and daughter are agreeable to the cath and they have had discussion as a family to proceed with heart cath if was needed. SB 58 on monitor      Medications:   Scheduled Meds:   Hydrocerin   Topical BID    levothyroxine  125 mcg Oral Daily    metoprolol tartrate  50 mg Oral BID    aspirin  81 mg Oral Daily    busPIRone  15 mg Oral Daily    furosemide  20 mg Oral Daily    lisinopril  20 mg Oral Daily    sodium chloride flush  10 mL Intravenous 2 times per day    enoxaparin  1 mg/kg Subcutaneous BID    cefTRIAXone (ROCEPHIN) IV  1 g Intravenous Q24H    atorvastatin  40 mg Oral Nightly     Continuous Infusions:    CBC:   Recent Labs     01/29/20  0601 01/30/20  0559 01/31/20  0607   WBC 9.8 11.4* 12.2*   HGB 12.0 11.5* 12.9    221 248     BMP:    Recent Labs     01/29/20  0601 01/30/20  0559 01/31/20  0607    141 140   K 3.4* 3.7 4.0    101 100   CO2 25 25 25   BUN 11 17 18   CREATININE 0.90 1.03* 0.84   GLUCOSE 122* 125* 104*     Hepatic:   Recent Labs     01/28/20  1305   AST 19   ALT 11   BILITOT 0.76   ALKPHOS 83     Troponin:   Recent Labs     01/28/20  1852 01/29/20  0046 01/29/20  0601   TROPHS 73* 77* 69*     BNP: No results for input(s): BNP in the last 72 hours.   Lipids:   Recent Labs     01/29/20  0601   CHOL 131   HDL 45     INR:   Recent Labs     01/28/20  1430 Hypokalemia     Acute idiopathic gout of right foot     CKD (chronic kidney disease) stage 3, GFR 30-59 ml/min ([facility])     Risk for falls     Pneumococcal vaccine refused     Influenza vaccination declined     Chronic fatigue     Bilateral lower extremity pain     Closed fracture of single pubic ramus of pelvis with routine healing, right     NSTEMI (non-ST elevated myocardial infarction) ([facility])     Chronic diastolic heart failure ([facility] [address]      Plan of Treatment:   1. Cardiac catheterization option reviewed with patient and family due to minimally elevated, flat  trops and positive stress test results. I have discussed risks (including but not limited to vascular injury, infection, hematoma, contrast induced kidney dysfunction, CVA and MI), benefits, alternatives in detail. All questions answered. Patient and daughter states the patient and family are agreeable to proceed with heart cath. NPO since 8 am.  Had a few bites of egg prior to that time.     Electronically signed by NORMA Evangelista NP on 1/31/2020 at 9:23 [facility].  [phone]

## 2020-02-01 VITALS
RESPIRATION RATE: 22 BRPM | BODY MASS INDEX: 19.07 KG/M2 | SYSTOLIC BLOOD PRESSURE: 106 MMHG | WEIGHT: 121.47 LBS | DIASTOLIC BLOOD PRESSURE: 62 MMHG | HEIGHT: 67 IN | HEART RATE: 69 BPM | OXYGEN SATURATION: 97 % | TEMPERATURE: 98 F

## 2020-02-01 PROBLEM — N30.00 ACUTE CYSTITIS WITHOUT HEMATURIA: Status: ACTIVE | Noted: 2020-02-01

## 2020-02-01 PROBLEM — R79.89 ELEVATED TROPONIN: Status: ACTIVE | Noted: 2020-02-01

## 2020-02-01 PROBLEM — R77.8 ELEVATED TROPONIN: Status: ACTIVE | Noted: 2020-02-01

## 2020-02-01 LAB
ABSOLUTE EOS #: 0.03 K/UL (ref 0–0.44)
ABSOLUTE IMMATURE GRANULOCYTE: 0.14 K/UL (ref 0–0.3)
ABSOLUTE LYMPH #: 2.28 K/UL (ref 1.1–3.7)
ABSOLUTE MONO #: 1.46 K/UL (ref 0.1–1.2)
ANION GAP SERPL CALCULATED.3IONS-SCNC: 14 MMOL/L (ref 9–17)
BASOPHILS # BLD: 1 % (ref 0–2)
BASOPHILS ABSOLUTE: 0.08 K/UL (ref 0–0.2)
BUN BLDV-MCNC: 18 MG/DL (ref 8–23)
BUN/CREAT BLD: ABNORMAL (ref 9–20)
CALCIUM SERPL-MCNC: 8.8 MG/DL (ref 8.6–10.4)
CHLORIDE BLD-SCNC: 98 MMOL/L (ref 98–107)
CO2: 23 MMOL/L (ref 20–31)
CREAT SERPL-MCNC: 0.84 MG/DL (ref 0.5–0.9)
DIFFERENTIAL TYPE: ABNORMAL
EOSINOPHILS RELATIVE PERCENT: 0 % (ref 1–4)
GFR AFRICAN AMERICAN: >60 ML/MIN
GFR NON-AFRICAN AMERICAN: >60 ML/MIN
GFR SERPL CREATININE-BSD FRML MDRD: ABNORMAL ML/MIN/{1.73_M2}
GFR SERPL CREATININE-BSD FRML MDRD: ABNORMAL ML/MIN/{1.73_M2}
GLUCOSE BLD-MCNC: 161 MG/DL (ref 70–99)
HCT VFR BLD CALC: 35.6 % (ref 36.3–47.1)
HEMOGLOBIN: 11.1 G/DL (ref 11.9–15.1)
IMMATURE GRANULOCYTES: 1 %
LYMPHOCYTES # BLD: 14 % (ref 24–43)
MAGNESIUM: 2 MG/DL (ref 1.6–2.6)
MCH RBC QN AUTO: 30.3 PG (ref 25.2–33.5)
MCHC RBC AUTO-ENTMCNC: 31.2 G/DL (ref 28.4–34.8)
MCV RBC AUTO: 97.3 FL (ref 82.6–102.9)
MONOCYTES # BLD: 9 % (ref 3–12)
NRBC AUTOMATED: 0 PER 100 WBC
PDW BLD-RTO: 13.5 % (ref 11.8–14.4)
PLATELET # BLD: 264 K/UL (ref 138–453)
PLATELET ESTIMATE: ABNORMAL
PMV BLD AUTO: 10.8 FL (ref 8.1–13.5)
POTASSIUM SERPL-SCNC: 4 MMOL/L (ref 3.7–5.3)
RBC # BLD: 3.66 M/UL (ref 3.95–5.11)
RBC # BLD: ABNORMAL 10*6/UL
SEG NEUTROPHILS: 75 % (ref 36–65)
SEGMENTED NEUTROPHILS ABSOLUTE COUNT: 12.35 K/UL (ref 1.5–8.1)
SODIUM BLD-SCNC: 135 MMOL/L (ref 135–144)
WBC # BLD: 16.3 K/UL (ref 3.5–11.3)
WBC # BLD: ABNORMAL 10*3/UL

## 2020-02-01 PROCEDURE — 83735 ASSAY OF MAGNESIUM: CPT

## 2020-02-01 PROCEDURE — 99239 HOSP IP/OBS DSCHRG MGMT >30: CPT | Performed by: HOSPITALIST

## 2020-02-01 PROCEDURE — 80048 BASIC METABOLIC PNL TOTAL CA: CPT

## 2020-02-01 PROCEDURE — 6360000002 HC RX W HCPCS: Performed by: NURSE PRACTITIONER

## 2020-02-01 PROCEDURE — 6370000000 HC RX 637 (ALT 250 FOR IP): Performed by: STUDENT IN AN ORGANIZED HEALTH CARE EDUCATION/TRAINING PROGRAM

## 2020-02-01 PROCEDURE — 36415 COLL VENOUS BLD VENIPUNCTURE: CPT

## 2020-02-01 PROCEDURE — 2580000003 HC RX 258: Performed by: STUDENT IN AN ORGANIZED HEALTH CARE EDUCATION/TRAINING PROGRAM

## 2020-02-01 PROCEDURE — 6370000000 HC RX 637 (ALT 250 FOR IP): Performed by: HOSPITALIST

## 2020-02-01 PROCEDURE — 6360000002 HC RX W HCPCS: Performed by: STUDENT IN AN ORGANIZED HEALTH CARE EDUCATION/TRAINING PROGRAM

## 2020-02-01 PROCEDURE — 85025 COMPLETE CBC W/AUTO DIFF WBC: CPT

## 2020-02-01 RX ORDER — CEPHALEXIN 500 MG/1
500 CAPSULE ORAL 4 TIMES DAILY
Qty: 8 CAPSULE | Refills: 0 | Status: SHIPPED | OUTPATIENT
Start: 2020-02-01 | End: 2020-02-03

## 2020-02-01 RX ORDER — MORPHINE SULFATE 2 MG/ML
2 INJECTION, SOLUTION INTRAMUSCULAR; INTRAVENOUS ONCE
Status: COMPLETED | OUTPATIENT
Start: 2020-02-01 | End: 2020-02-01

## 2020-02-01 RX ORDER — NAPROXEN 375 MG/1
375 TABLET ORAL 2 TIMES DAILY WITH MEALS
Status: DISCONTINUED | OUTPATIENT
Start: 2020-02-01 | End: 2020-02-01 | Stop reason: HOSPADM

## 2020-02-01 RX ORDER — NAPROXEN 375 MG/1
375 TABLET ORAL 2 TIMES DAILY WITH MEALS
Qty: 60 TABLET | Refills: 3 | Status: SHIPPED | OUTPATIENT
Start: 2020-02-01 | End: 2020-03-06

## 2020-02-01 RX ORDER — LEVOTHYROXINE SODIUM 0.12 MG/1
125 TABLET ORAL DAILY
Qty: 30 TABLET | Refills: 3 | Status: SHIPPED | OUTPATIENT
Start: 2020-02-02 | End: 2020-03-12

## 2020-02-01 RX ADMIN — FUROSEMIDE 20 MG: 20 TABLET ORAL at 08:36

## 2020-02-01 RX ADMIN — METOPROLOL TARTRATE 50 MG: 50 TABLET, FILM COATED ORAL at 08:36

## 2020-02-01 RX ADMIN — Medication: at 08:36

## 2020-02-01 RX ADMIN — MORPHINE SULFATE 2 MG: 2 INJECTION, SOLUTION INTRAMUSCULAR; INTRAVENOUS at 05:02

## 2020-02-01 RX ADMIN — OXYCODONE HYDROCHLORIDE AND ACETAMINOPHEN 1 TABLET: 5; 325 TABLET ORAL at 06:24

## 2020-02-01 RX ADMIN — LEVOTHYROXINE SODIUM 125 MCG: 125 TABLET ORAL at 06:25

## 2020-02-01 RX ADMIN — ENOXAPARIN SODIUM 60 MG: 60 INJECTION SUBCUTANEOUS at 08:36

## 2020-02-01 RX ADMIN — Medication 10 ML: at 08:37

## 2020-02-01 RX ADMIN — BUSPIRONE HYDROCHLORIDE 15 MG: 15 TABLET ORAL at 08:36

## 2020-02-01 RX ADMIN — ASPIRIN 81 MG: 81 TABLET, CHEWABLE ORAL at 08:37

## 2020-02-01 RX ADMIN — OXYCODONE HYDROCHLORIDE AND ACETAMINOPHEN 1 TABLET: 5; 325 TABLET ORAL at 13:00

## 2020-02-01 RX ADMIN — NAPROXEN 375 MG: 375 TABLET ORAL at 16:47

## 2020-02-01 RX ADMIN — LISINOPRIL 20 MG: 20 TABLET ORAL at 08:37

## 2020-02-01 ASSESSMENT — PAIN DESCRIPTION - ORIENTATION
ORIENTATION: RIGHT;LEFT
ORIENTATION: RIGHT;LEFT

## 2020-02-01 ASSESSMENT — PAIN DESCRIPTION - ONSET
ONSET: PROGRESSIVE
ONSET: PROGRESSIVE

## 2020-02-01 ASSESSMENT — PAIN SCALES - GENERAL
PAINLEVEL_OUTOF10: 10
PAINLEVEL_OUTOF10: 7
PAINLEVEL_OUTOF10: 8
PAINLEVEL_OUTOF10: 7
PAINLEVEL_OUTOF10: 0

## 2020-02-01 ASSESSMENT — PAIN DESCRIPTION - FREQUENCY
FREQUENCY: CONTINUOUS
FREQUENCY: CONTINUOUS

## 2020-02-01 ASSESSMENT — PAIN DESCRIPTION - DESCRIPTORS
DESCRIPTORS: CRAMPING;CONSTANT
DESCRIPTORS: CRAMPING;CONSTANT

## 2020-02-01 ASSESSMENT — PAIN DESCRIPTION - PAIN TYPE
TYPE: CHRONIC PAIN
TYPE: CHRONIC PAIN

## 2020-02-01 ASSESSMENT — PAIN DESCRIPTION - PROGRESSION: CLINICAL_PROGRESSION: GRADUALLY WORSENING

## 2020-02-01 ASSESSMENT — PAIN DESCRIPTION - LOCATION
LOCATION: HIP
LOCATION: HIP

## 2020-02-01 NOTE — DISCHARGE INSTR - COC
Continuity of Care Form    Patient Name: Kelly Peña   :  1935  MRN:  [id number]    Admit date:  2020  Discharge date:  2020    Code Status Order: Full Code   Advance Directives:   Advance Care Flowsheet Documentation     Date/Time Healthcare Directive Type of Healthcare Directive Copy in [address] [facility] [address] Agent's Name Healthcare Agent's Phone Number    20 1800  No, patient does not have an advance directive for healthcare treatment  --  --  --  --  --          Admitting Physician:  Paul Gregorio DO  PCP: NORMA Carlos - CNP    Discharging Nurse: Sudhakar Singer Unit/Room#: 3022/3022-01  Discharging Unit Phone Number: [phone]    Emergency Contact:   Extended Emergency Contact Information  Primary Emergency Contact: Jian Stokes( [address] Phone: [phone]  Relation: Child   needed? No  Secondary Emergency Contact: [address] [place] Phone: [phone]  Relation: Child    Past Surgical History:  Past Surgical History:   Procedure Laterality Date    HYSTERECTOMY      HYSTERECTOMY, VAGINAL      TUBAL LIGATION         Immunization History: There is no immunization history on file for this patient.     Active Problems:  Patient Active Problem List   Diagnosis Code    Essential hypertension I10    Cancer ([facility] [address] C80.1    Hypothyroidism E03.9    Depression F32.9    Hyperlipidemia E78.5    Basal cell carcinoma C44.91    Melanoma ([facility] [address] C43.9    Anxiety F41.9    Hypokalemia E87.6    Acute idiopathic gout of right foot M10.071    CKD (chronic kidney disease) stage 3, GFR 30-59 ml/min ([facility] N18.3    Risk for falls Z91.81    Pneumococcal vaccine refused Z28.21    Influenza vaccination declined Z28.21    Chronic fatigue R53.82    Bilateral lower extremity pain M79.604, M79.605    Closed fracture of single pubic ramus of pelvis with routine healing, right S32.591D    NSTEMI (non-ST elevated myocardial a DME order):  wheelchair and walker  Other Treatments: None    Patient's personal belongings (please select all that are sent with patient):  Glasses    RN SIGNATURE:  Electronically signed by Aurelia Anderson RN on 2/1/20 at 2:58 PM    CASE MANAGEMENT/SOCIAL WORK SECTION    Inpatient Status Date: ***    Readmission Risk Assessment Score:  Readmission Risk              Risk of Unplanned Readmission:        20           Discharging to Facility/ Agency   · Name: [facility]  Address:  [address] [place] [address]         Phone: [phone]       Fax: [phone]        ·   · Phone:  · Fax:    Dialysis Facility (if applicable)   · Name:  · Address:  · Dialysis Schedule:  · Phone:  · Fax:    / signature: Electronically signed by Anthony Varela RN on 2/1/20 at 2:51 PM    PHYSICIAN SECTION    Prognosis: Fair    Condition at Discharge: Stable    Rehab Potential (if transferring to Rehab): Fair    Recommended Labs or Other Treatments After Discharge: Follow up with dermatology for possible scalp basal cell carcinoma. Physician Certification: I certify the above information and transfer of Jesse Gardner  is necessary for the continuing treatment of the diagnosis listed and that she requires [facility] for greater 30 days.      Update Admission H&P: No change in H&P    PHYSICIAN SIGNATURE:  Electronically signed by Tylor Galindo DO on 2/1/20 at 3:17 PM

## 2020-02-01 NOTE — DISCHARGE SUMMARY
[facility]    Discharge Summary     Patient ID: Pawel Feliciano  :  1935   MRN: [id number]     ACCOUNT:  [de-identified]   Patient's PCP: NORMA Temple CNP  Admit Date: 2020   Discharge Date: 2020     Length of Stay: 4  Code Status:  Full Code  Admitting Physician: Tejinder Staley DO  Discharge Physician: Tejinder Staley DO     Active Discharge Diagnoses:     Hospital Problem Lists:  Principal Problem:    Acute cystitis without hematuria  Active Problems:    Essential hypertension    Hypothyroidism    Basal cell carcinoma    Acute idiopathic gout of right foot    CKD (chronic kidney disease) stage 3, GFR 30-59 ml/min (HCC)    Bilateral lower extremity pain    Closed fracture of single pubic ramus of pelvis with routine healing, right    Chronic diastolic heart failure (HCC)    Elevated troponin  Resolved Problems:    * No resolved hospital problems. *      Admission Condition:  poor     Discharged Condition: fair    Hospital Stay:     Hospital Course:  Pawel Feliciano is a 81 y.o. female who was admitted for the management of   Acute cystitis without hematuria , presented to ER with No chief complaint on file. Patient initially came to emergency room for generalized weakness. Patient was found to have urinary tract infection and mildly elevated troponin. Patient had full cardiac work-up for elevated troponin and patient had negative cath during this admission. Patient was continue with Rocephin. On EKG patient did have sinus rhythm with frequent PACs. Patient medications were adjusted by cardiology. There was a question of pelvic fracture but patient was able to bear weight and tolerate physical therapy. Hypothyroidism was also noted and patient's Synthroid dose is increased. On admission patient was found to have suspicious lesion on scalp for basal cell carcinoma.   Patient will need dermatology follow-up as soon as possible as outpatient. Patient remained stable throughout the hospitalization. No complications noted. Significant therapeutic interventions: Cardiac cath -0% stenosis in all coronary arteries.     Significant Diagnostic Studies:   Labs / Micro:  CBC:   Lab Results   Component Value Date    WBC 16.3 02/01/2020    RBC 3.66 02/01/2020    HGB 11.1 02/01/2020    HCT 35.6 02/01/2020    MCV 97.3 02/01/2020    MCH 30.3 02/01/2020    MCHC 31.2 02/01/2020    RDW 13.5 02/01/2020     02/01/2020     BMP:    Lab Results   Component Value Date    GLUCOSE 161 02/01/2020     02/01/2020    K 4.0 02/01/2020    CL 98 02/01/2020    CO2 23 02/01/2020    ANIONGAP 14 02/01/2020    BUN 18 02/01/2020    CREATININE 0.84 02/01/2020    BUNCRER NOT REPORTED 02/01/2020    CALCIUM 8.8 02/01/2020    LABGLOM >60 02/01/2020    GFRAA >60 02/01/2020    GFR      02/01/2020    GFR NOT REPORTED 02/01/2020     HFP:    Lab Results   Component Value Date    PROT 6.4 01/28/2020     CMP:    Lab Results   Component Value Date    GLUCOSE 161 02/01/2020     02/01/2020    K 4.0 02/01/2020    CL 98 02/01/2020    CO2 23 02/01/2020    BUN 18 02/01/2020    CREATININE 0.84 02/01/2020    ANIONGAP 14 02/01/2020    ALKPHOS 83 01/28/2020    ALT 11 01/28/2020    AST 19 01/28/2020    BILITOT 0.76 01/28/2020    LABALBU 3.6 01/28/2020    ALBUMIN NOT REPORTED 01/28/2020    LABGLOM >60 02/01/2020    GFRAA >60 02/01/2020    GFR      02/01/2020    GFR NOT REPORTED 02/01/2020    PROT 6.4 01/28/2020    CALCIUM 8.8 02/01/2020     PT/INR:    Lab Results   Component Value Date    PROTIME 10.4 01/29/2020    INR 1.0 01/29/2020     PTT:   Lab Results   Component Value Date    APTT 32.6 01/28/2020     FLP:    Lab Results   Component Value Date    CHOL 131 01/29/2020    TRIG 106 01/29/2020    HDL 45 01/29/2020     U/A:    Lab Results   Component Value Date    COLORU MARICEL 01/28/2020    TURBIDITY TURBID 01/28/2020    SPECGRAV 1.020 01/28/2020    HGBUR TRACE 01/28/2020 PHUR 5.5 01/28/2020    PROTEINU 2+ 01/28/2020    GLUCOSEU NEGATIVE 01/28/2020    KETUA MOD 01/28/2020    BILIRUBINUR  01/28/2020     Presumptive positive. Unable to confirm due to unavailability of reagent. UROBILINOGEN ELEVATED 01/28/2020    NITRU POSITIVE 01/28/2020    LEUKOCYTESUR LARGE 01/28/2020     TSH:    Lab Results   Component Value Date    TSH 11.52 01/29/2020        Radiology:  Xr Chest Standard (2 Vw)    Result Date: 1/28/2020  1. No definite acute pulmonary disease. 2. Bones appear osteoporotic with chronic mild hyperkyphosis and associated anterior wedging of 1 or 2 adjacent midthoracic vertebra. 3. Calcific atherosclerosis aorta. 4. Cardiomegaly. Xr Pelvis (1-2 Views)    Result Date: 1/28/2020  1. Advanced right hip joint osteoarthritis with acetabular and femoral head remodeling. 2. Old versus acute fracture right superior pubic ramus. 3. Less severe osteoarthritis left hip joint and bilateral SI joints. 4. Bones appear osteoporotic. Given the description of proximal femur pain, dedicated imaging of the right and left femur/hip joint may be indicated; correlate with clinical evaluation. If pain persists or worsens, then additional evaluation with MRI is indicated to ensure no underlying radiographically occult process such as fracture, AVN or transient osteoporosis. Xr Hip Right (2-3 Views)    Result Date: 1/29/2020  1. No radiographic evidence of acute right hip fracture. For persisting concern for acute fracture, recommend dedicated bone CT of the pelvis and right hip for further evaluation. 2. Severe right hip degenerative osteoarthritis. 3. Diffuse osteopenia. Xr Femur Right (min 2 Views)    Result Date: 1/29/2020  1. No evidence of acute right femoral fracture. 2. Severe right hip joint degenerative osteoarthritis. 3. Diffuse osteopenia. Xr Knee Left (3 Views)    Result Date: 1/29/2020  1. No radiographic evidence of acute left knee trauma.  2. Severe tricompartmental knee encroachment by disc osteophyte complex. 5. Multilevel mild-to-moderate facet degenerative arthropathy, at levels as discussed above. 6. L1 on L2 retrolisthesis measuring 3 mm. Ct Abdomen Pelvis W Iv Contrast    Result Date: 1/28/2020  Severe right hip and moderate left hip degenerative osteoarthritis. No evidence of an acute or chronic pubic ramus fracture. Irregular thickening of the wall of the urinary bladder. Correlate for signs of acute cystitis. There is presacral edema more prominent on the left of uncertain etiology. The adjacent sacrum/coccyx appears intact. Clinical correlation and follow-up recommended. Xr Pelvis (min 3 Views)    Result Date: 1/29/2020  Stable AP pelvis in comparison with yesterday's examination. Nm Cardiac Stress Test Nuclear Imaging    Result Date: 1/30/2020  Apical anterior wall ischemia and apical infarct. Global hypokinesis. LVEF 40%. Risk stratification: Intermediate risk. The findings were sent to the Radiology Results [address] at 2:49 pm on 1/30/2020to be communicated to a licensed caregiver. Consultations:    Consults:     Final Specialist Recommendations/Findings:   IP CONSULT TO TRAUMA SURGERY  IP CONSULT TO CARDIOLOGY  IP CONSULT TO ORTHOPEDIC SURGERY      The patient was seen and examined on day of discharge and this discharge summary is in conjunction with any daily progress note from day of discharge. Discharge plan:     Disposition: Skilled nursing facility    Physician Follow Up:     Princess Severino MD  [address] [place] [address]  [phone]    Call  Call to schedule an appointment for follow up within a week. NORMA Heck - MARSHA  [address] [place] [address]  [phone]    Call  Call to be seen for a follow up appointment in 1 week. Requiring Further Evaluation/Follow Up POST HOSPITALIZATION/Incidental Findings: Follow-up with dermatology for possible basal cell carcinoma on scalp.   Follow-up with

## 2020-02-01 NOTE — PROGRESS NOTES
Pt. Assisted back to bed with another nurse. Pt. Having increased amounts of pain to her bilateral hips. Percocet not due at this time yet. Writer messaged and spoke with [employer], NP and received a one time dose of 2mg IV Morphine.

## 2020-02-01 NOTE — PROGRESS NOTES
Pt. Having lots of pain in her bilateral hips and upper thighs described as a constant stiffness and cramping sensation. Pt. Was medicated for pain previously with little relief. Pt. Requesting to sit more upright so writer and another helper assisted pt. To a standing position and pivoted her into the chair. Pt. Tolerated very well. She stated she felt much better after that. Chair alarm activated, call light within reach, no further needs requested at this time. Will continue to monitor.

## 2020-02-01 NOTE — PROGRESS NOTES
Patient being transferred to [address] [place] [address] this evening. Report call to [facility] [phone]. All questions answered, all personal belongings packed up and given to family.  and daughter waiting for her transport.

## 2020-02-08 ENCOUNTER — HOSPITAL ENCOUNTER (OUTPATIENT)
Age: 85
Setting detail: SPECIMEN
Discharge: HOME | End: 2020-02-08
Payer: MEDICARE

## 2020-02-08 LAB
-: ABNORMAL
AMORPHOUS: ABNORMAL
BACTERIA: ABNORMAL
BILIRUBIN URINE: NEGATIVE
CASTS UA: ABNORMAL /LPF (ref 0–2)
CASTS UA: ABNORMAL /LPF (ref 0–2)
COLOR: YELLOW
COMMENT UA: ABNORMAL
CRYSTALS, UA: ABNORMAL /HPF
CRYSTALS, UA: ABNORMAL /HPF
EPITHELIAL CELLS UA: ABNORMAL /HPF (ref 0–5)
GLUCOSE URINE: NEGATIVE
KETONES, URINE: NEGATIVE
LEUKOCYTE ESTERASE, URINE: ABNORMAL
MUCUS: ABNORMAL
NITRITE, URINE: NEGATIVE
OTHER OBSERVATIONS UA: ABNORMAL
PH UA: 5 (ref 5–8)
PROTEIN UA: NEGATIVE
RBC UA: ABNORMAL /HPF (ref 0–2)
RENAL EPITHELIAL, UA: ABNORMAL /HPF
SPECIFIC GRAVITY UA: 1.02 (ref 1–1.03)
TRICHOMONAS: ABNORMAL
TURBIDITY: ABNORMAL
URINE HGB: ABNORMAL
UROBILINOGEN, URINE: NORMAL
WBC UA: ABNORMAL /HPF (ref 0–5)
YEAST: ABNORMAL

## 2020-02-08 PROCEDURE — 81001 URINALYSIS AUTO W/SCOPE: CPT

## 2020-02-08 PROCEDURE — 87086 URINE CULTURE/COLONY COUNT: CPT

## 2020-02-09 LAB
CULTURE: NORMAL
Lab: NORMAL
SPECIMEN DESCRIPTION: NORMAL

## 2020-02-12 ENCOUNTER — HOSPITAL ENCOUNTER (OUTPATIENT)
Age: 85
Setting detail: SPECIMEN
Discharge: HOME | End: 2020-02-12
Payer: MEDICARE

## 2020-02-12 LAB
ANION GAP SERPL CALCULATED.3IONS-SCNC: 17 MMOL/L (ref 9–17)
BUN BLDV-MCNC: 23 MG/DL (ref 8–23)
BUN/CREAT BLD: ABNORMAL (ref 9–20)
CALCIUM SERPL-MCNC: 9 MG/DL (ref 8.6–10.4)
CHLORIDE BLD-SCNC: 100 MMOL/L (ref 98–107)
CO2: 25 MMOL/L (ref 20–31)
CREAT SERPL-MCNC: 1.12 MG/DL (ref 0.5–0.9)
GFR AFRICAN AMERICAN: 56 ML/MIN
GFR NON-AFRICAN AMERICAN: 46 ML/MIN
GFR SERPL CREATININE-BSD FRML MDRD: ABNORMAL ML/MIN/{1.73_M2}
GFR SERPL CREATININE-BSD FRML MDRD: ABNORMAL ML/MIN/{1.73_M2}
GLUCOSE BLD-MCNC: 131 MG/DL (ref 70–99)
POTASSIUM SERPL-SCNC: 3.5 MMOL/L (ref 3.7–5.3)
SODIUM BLD-SCNC: 142 MMOL/L (ref 135–144)

## 2020-02-12 PROCEDURE — P9603 ONE-WAY ALLOW PRORATED MILES: HCPCS

## 2020-02-12 PROCEDURE — 80048 BASIC METABOLIC PNL TOTAL CA: CPT

## 2020-02-12 PROCEDURE — 36415 COLL VENOUS BLD VENIPUNCTURE: CPT

## 2020-02-25 ENCOUNTER — TELEPHONE (OUTPATIENT)
Dept: FAMILY MEDICINE CLINIC | Age: 85
End: 2020-02-25

## 2020-02-25 NOTE — TELEPHONE ENCOUNTER
Raymon Colmenares with [facility] called to inform patient has been discharged from [facility]. They will be starting patient with PT/OT/Social work. Pt refused nursing. Advised nurse that patient needs an appt for homecare, last seen 9/2019.  Pt is scheduled

## 2020-02-27 ENCOUNTER — TELEPHONE (OUTPATIENT)
Dept: FAMILY MEDICINE CLINIC | Age: 85
End: 2020-02-27

## 2020-02-28 ENCOUNTER — TELEPHONE (OUTPATIENT)
Dept: FAMILY MEDICINE CLINIC | Age: 85
End: 2020-02-28

## 2020-03-02 ENCOUNTER — TELEPHONE (OUTPATIENT)
Dept: FAMILY MEDICINE CLINIC | Age: 85
End: 2020-03-02

## 2020-03-02 PROBLEM — R79.89 ELEVATED TROPONIN: Status: RESOLVED | Noted: 2020-02-01 | Resolved: 2020-03-02

## 2020-03-02 PROBLEM — R77.8 ELEVATED TROPONIN: Status: RESOLVED | Noted: 2020-02-01 | Resolved: 2020-03-02

## 2020-03-02 NOTE — TELEPHONE ENCOUNTER
Kristen from [facility] called requesting refill of lasix. States that she is taking 20 mg daily since she has been in nursing home.  Would like to have it sent in to [facility], appt on 3/6/20

## 2020-03-03 RX ORDER — FUROSEMIDE 20 MG/1
20 TABLET ORAL DAILY
Qty: 30 TABLET | Refills: 2 | Status: SHIPPED | OUTPATIENT
Start: 2020-03-03 | End: 2020-06-09 | Stop reason: SDUPTHER

## 2020-03-03 RX ORDER — FUROSEMIDE 20 MG/1
20 TABLET ORAL DAILY
Qty: 45 TABLET | Refills: 1 | Status: CANCELLED | OUTPATIENT
Start: 2020-03-03

## 2020-03-03 RX ORDER — BUSPIRONE HYDROCHLORIDE 15 MG/1
TABLET ORAL
Qty: 90 TABLET | Refills: 1 | Status: SHIPPED | OUTPATIENT
Start: 2020-03-03 | End: 2020-08-07

## 2020-03-06 ENCOUNTER — HOSPITAL ENCOUNTER (OUTPATIENT)
Age: 85
Setting detail: SPECIMEN
Discharge: HOME | End: 2020-03-06
Payer: MEDICARE

## 2020-03-06 ENCOUNTER — OFFICE VISIT (OUTPATIENT)
Dept: FAMILY MEDICINE CLINIC | Age: 85
End: 2020-03-06
Payer: MEDICARE

## 2020-03-06 VITALS
OXYGEN SATURATION: 100 % | HEIGHT: 67 IN | WEIGHT: 102 LBS | HEART RATE: 52 BPM | DIASTOLIC BLOOD PRESSURE: 75 MMHG | SYSTOLIC BLOOD PRESSURE: 115 MMHG | BODY MASS INDEX: 16.01 KG/M2

## 2020-03-06 PROBLEM — I50.22 CHRONIC SYSTOLIC HEART FAILURE (HCC): Status: ACTIVE | Noted: 2020-03-06

## 2020-03-06 LAB
ABSOLUTE EOS #: 0.25 K/UL (ref 0–0.44)
ABSOLUTE IMMATURE GRANULOCYTE: 0.05 K/UL (ref 0–0.3)
ABSOLUTE LYMPH #: 1.11 K/UL (ref 1.1–3.7)
ABSOLUTE MONO #: 0.78 K/UL (ref 0.1–1.2)
ALBUMIN SERPL-MCNC: 3.1 G/DL (ref 3.5–5.2)
ALBUMIN/GLOBULIN RATIO: 1 (ref 1–2.5)
ALP BLD-CCNC: 105 U/L (ref 35–104)
ALT SERPL-CCNC: 5 U/L (ref 5–33)
ANION GAP SERPL CALCULATED.3IONS-SCNC: 14 MMOL/L (ref 9–17)
AST SERPL-CCNC: 9 U/L
BASOPHILS # BLD: 1 % (ref 0–2)
BASOPHILS ABSOLUTE: 0.11 K/UL (ref 0–0.2)
BILIRUB SERPL-MCNC: 0.4 MG/DL (ref 0.3–1.2)
BUN BLDV-MCNC: 13 MG/DL (ref 8–23)
BUN/CREAT BLD: ABNORMAL (ref 9–20)
CALCIUM SERPL-MCNC: 8.9 MG/DL (ref 8.6–10.4)
CHLORIDE BLD-SCNC: 104 MMOL/L (ref 98–107)
CO2: 24 MMOL/L (ref 20–31)
CREAT SERPL-MCNC: 1.08 MG/DL (ref 0.5–0.9)
DIFFERENTIAL TYPE: ABNORMAL
EOSINOPHILS RELATIVE PERCENT: 3 % (ref 1–4)
FERRITIN: 129 UG/L (ref 13–150)
FOLATE: 12.1 NG/ML
GFR AFRICAN AMERICAN: 59 ML/MIN
GFR NON-AFRICAN AMERICAN: 48 ML/MIN
GFR SERPL CREATININE-BSD FRML MDRD: ABNORMAL ML/MIN/{1.73_M2}
GFR SERPL CREATININE-BSD FRML MDRD: ABNORMAL ML/MIN/{1.73_M2}
GLUCOSE BLD-MCNC: 113 MG/DL (ref 70–99)
HCT VFR BLD CALC: 31 % (ref 36.3–47.1)
HEMOGLOBIN: 9.7 G/DL (ref 11.9–15.1)
IMMATURE GRANULOCYTES: 1 %
IRON SATURATION: 24 % (ref 20–55)
IRON: 41 UG/DL (ref 37–145)
LYMPHOCYTES # BLD: 12 % (ref 24–43)
MCH RBC QN AUTO: 30.3 PG (ref 25.2–33.5)
MCHC RBC AUTO-ENTMCNC: 31.3 G/DL (ref 28.4–34.8)
MCV RBC AUTO: 96.9 FL (ref 82.6–102.9)
MONOCYTES # BLD: 8 % (ref 3–12)
NRBC AUTOMATED: 0 PER 100 WBC
PDW BLD-RTO: 15.1 % (ref 11.8–14.4)
PLATELET # BLD: 366 K/UL (ref 138–453)
PLATELET ESTIMATE: ABNORMAL
PMV BLD AUTO: 10.2 FL (ref 8.1–13.5)
POTASSIUM SERPL-SCNC: 4 MMOL/L (ref 3.7–5.3)
PREALBUMIN: 9.9 MG/DL (ref 20–40)
RBC # BLD: 3.2 M/UL (ref 3.95–5.11)
RBC # BLD: ABNORMAL 10*6/UL
SEG NEUTROPHILS: 75 % (ref 36–65)
SEGMENTED NEUTROPHILS ABSOLUTE COUNT: 7.13 K/UL (ref 1.5–8.1)
SODIUM BLD-SCNC: 142 MMOL/L (ref 135–144)
THYROXINE, FREE: 2.34 NG/DL (ref 0.93–1.7)
TOTAL IRON BINDING CAPACITY: 169 UG/DL (ref 250–450)
TOTAL PROTEIN: 6.1 G/DL (ref 6.4–8.3)
TSH SERPL DL<=0.05 MIU/L-ACNC: 0.08 MIU/L (ref 0.3–5)
UNSATURATED IRON BINDING CAPACITY: 128 UG/DL (ref 112–347)
URIC ACID: 9.1 MG/DL (ref 2.4–5.7)
VITAMIN B-12: 376 PG/ML (ref 232–1245)
WBC # BLD: 9.4 K/UL (ref 3.5–11.3)
WBC # BLD: ABNORMAL 10*3/UL

## 2020-03-06 PROCEDURE — 1036F TOBACCO NON-USER: CPT | Performed by: INTERNAL MEDICINE

## 2020-03-06 PROCEDURE — 1090F PRES/ABSN URINE INCON ASSESS: CPT | Performed by: INTERNAL MEDICINE

## 2020-03-06 PROCEDURE — 4040F PNEUMOC VAC/ADMIN/RCVD: CPT | Performed by: INTERNAL MEDICINE

## 2020-03-06 PROCEDURE — 1111F DSCHRG MED/CURRENT MED MERGE: CPT | Performed by: INTERNAL MEDICINE

## 2020-03-06 PROCEDURE — G8400 PT W/DXA NO RESULTS DOC: HCPCS | Performed by: INTERNAL MEDICINE

## 2020-03-06 PROCEDURE — G8428 CUR MEDS NOT DOCUMENT: HCPCS | Performed by: INTERNAL MEDICINE

## 2020-03-06 PROCEDURE — 1123F ACP DISCUSS/DSCN MKR DOCD: CPT | Performed by: INTERNAL MEDICINE

## 2020-03-06 PROCEDURE — G8419 CALC BMI OUT NRM PARAM NOF/U: HCPCS | Performed by: INTERNAL MEDICINE

## 2020-03-06 PROCEDURE — 99214 OFFICE O/P EST MOD 30 MIN: CPT | Performed by: INTERNAL MEDICINE

## 2020-03-06 PROCEDURE — G8484 FLU IMMUNIZE NO ADMIN: HCPCS | Performed by: INTERNAL MEDICINE

## 2020-03-06 RX ORDER — ATORVASTATIN CALCIUM 10 MG/1
10 TABLET, FILM COATED ORAL DAILY
Qty: 90 TABLET | Refills: 1 | Status: SHIPPED | OUTPATIENT
Start: 2020-03-06 | End: 2020-09-02

## 2020-03-06 RX ORDER — LISINOPRIL 20 MG/1
20 TABLET ORAL DAILY
Qty: 90 TABLET | Refills: 1 | Status: SHIPPED | OUTPATIENT
Start: 2020-03-06 | End: 2020-09-28

## 2020-03-06 RX ORDER — CARVEDILOL 12.5 MG/1
12.5 TABLET ORAL 2 TIMES DAILY WITH MEALS
Qty: 180 TABLET | Refills: 1 | Status: SHIPPED | OUTPATIENT
Start: 2020-03-06 | End: 2020-09-02

## 2020-03-06 RX ORDER — PAROXETINE HYDROCHLORIDE 40 MG/1
40 TABLET, FILM COATED ORAL EVERY MORNING
Qty: 90 TABLET | Refills: 1 | Status: SHIPPED | OUTPATIENT
Start: 2020-03-06 | End: 2020-09-02

## 2020-03-06 NOTE — PROGRESS NOTES
Patient presents for a hospital follow up sore on butt that his been there for 1 month.  Is unable to move her right leg x 3 months     Pharmacy verified

## 2020-03-06 NOTE — PROGRESS NOTES
tablet  Take 1 tablet by mouth daily             levothyroxine (SYNTHROID) 125 MCG tablet  Take 1 tablet by mouth Daily             lisinopril (PRINIVIL;ZESTRIL) 20 MG tablet  TAKE 1 TABLET DAILY             naproxen (NAPROSYN) 375 MG tablet  Take 1 tablet by mouth 2 times daily (with meals)             PARoxetine (PAXIL) 40 MG tablet  TAKE 1 TABLET EVERY MORNING                   Medications marked \"taking\" at this time  Outpatient Medications Marked as Taking for the 3/6/20 encounter (Office Visit) with Celina Ruiz MD   Medication Sig Dispense Refill    busPIRone (BUSPAR) 15 MG tablet TAKE 1 TABLET DAILY 90 tablet 1    furosemide (LASIX) 20 MG tablet Take 1 tablet by mouth daily 30 tablet 2    levothyroxine (SYNTHROID) 125 MCG tablet Take 1 tablet by mouth Daily 30 tablet 3    carvedilol (COREG) 12.5 MG tablet TAKE 1 TABLET TWICE A  tablet 1    atorvastatin (LIPITOR) 10 MG tablet Take 1 tablet by mouth daily 90 tablet 1    lisinopril (PRINIVIL;ZESTRIL) 20 MG tablet TAKE 1 TABLET DAILY 90 tablet 1    aspirin 81 MG tablet Take 81 mg by mouth daily          Medications patient taking as of now reconciled against medications ordered at time of hospital discharge: Yes    Chief Complaint   Patient presents with    Follow-Up from Hospital       History of Present illness - Follow up of Hospital diagnosis(es): per discharge summary -   \"Patient initially came to emergency room for generalized weakness. Patient was found to have urinary tract infection and mildly elevated troponin. Patient had full cardiac work-up for elevated troponin and patient had negative cath during this admission. Patient was continue with Rocephin. On EKG patient did have sinus rhythm with frequent PACs. Patient medications were adjusted by cardiology. There was a question of pelvic fracture but patient was able to bear weight and tolerate physical therapy.   Hypothyroidism was also noted and patient's Synthroid dose is increased. On admission patient was found to have suspicious lesion on scalp for basal cell carcinoma. Patient will need dermatology follow-up as soon as possible as outpatient. Patient remained stable throughout the hospitalization. No complications noted. \"  She was discharged to a SNF for further care. Inpatient course: Discharge summary reviewed- see chart. Interval history/Current status: came home from [facility] a week ago. She has a bedsore that family states that it was there during her hospital stay and has not been treated since then. She has a lot of pain with sitting down. The family has multiple questions about her medications. She currently complains of fatigue as well. A comprehensive review of systems was negative except for what was noted in the HPI. Vitals:    03/06/20 1115   BP: 115/75   Site: Left Upper Arm   Position: Sitting   Cuff Size: Medium Adult   Pulse: 52   SpO2: 100%   Weight: 102 lb (46.3 kg)   Height: 5' 7.01\" (1.702 m)     Body mass index is 15.97 kg/m². Wt Readings from Last 3 Encounters:   03/06/20 102 lb (46.3 kg)   02/01/20 121 lb 7.6 oz (55.1 kg)   01/28/20 125 lb (56.7 kg)     BP Readings from Last 3 Encounters:   03/06/20 115/75   02/01/20 106/62   01/28/20 (!) 161/77        Physical Exam:  General Appearance: alert and oriented to person, place and time, well developed and well- nourished, in no acute distress. Using rollator walker. Skin: warm and dry.  4.5x3cm stage II pressure ulcer at top of gluteal cleft   Head: normocephalic and atraumatic  Eyes: pupils equal, round, and reactive to light, extraocular eye movements intact, conjunctivae normal  ENT: tympanic membrane, external ear and ear canal normal bilaterally, nose without deformity, nasal mucosa and turbinates normal without polyps  Neck: supple and non-tender without mass, no thyromegaly or thyroid nodules, no cervical lymphadenopathy  Pulmonary/Chest: clear to auscultation bilaterally- no wheezes,

## 2020-03-08 LAB
ESTIMATED AVERAGE GLUCOSE: 97 MG/DL
HBA1C MFR BLD: 5 % (ref 4–6)

## 2020-03-09 ENCOUNTER — TELEPHONE (OUTPATIENT)
Dept: FAMILY MEDICINE CLINIC | Age: 85
End: 2020-03-09

## 2020-03-09 NOTE — TELEPHONE ENCOUNTER
Bob Colmenares from [address] [place] [address] called stating that they are discharging her due to compliance that she is not doing any of the exercises or walking with out them there and has no motivation to do this on her own. They will not be able to reach the goal that was set with her. Home care will be continued.

## 2020-03-10 ENCOUNTER — HOSPITAL ENCOUNTER (OUTPATIENT)
Dept: WOUND CARE | Age: 85
Discharge: HOME | End: 2020-03-10
Payer: MEDICARE

## 2020-03-10 VITALS
TEMPERATURE: 98.9 F | RESPIRATION RATE: 16 BRPM | HEIGHT: 67 IN | WEIGHT: 102 LBS | BODY MASS INDEX: 16.01 KG/M2 | HEART RATE: 66 BPM | DIASTOLIC BLOOD PRESSURE: 60 MMHG | SYSTOLIC BLOOD PRESSURE: 110 MMHG

## 2020-03-10 PROBLEM — R26.89 DECREASED MOBILITY: Status: ACTIVE | Noted: 2020-03-10

## 2020-03-10 PROBLEM — L89.152 PRESSURE INJURY OF COCCYGEAL REGION, STAGE 2 (HCC): Status: ACTIVE | Noted: 2020-03-10

## 2020-03-10 PROCEDURE — 99214 OFFICE O/P EST MOD 30 MIN: CPT

## 2020-03-10 PROCEDURE — 99213 OFFICE O/P EST LOW 20 MIN: CPT | Performed by: NURSE PRACTITIONER

## 2020-03-10 PROCEDURE — 11042 DBRDMT SUBQ TIS 1ST 20SQCM/<: CPT | Performed by: NURSE PRACTITIONER

## 2020-03-10 PROCEDURE — 11042 DBRDMT SUBQ TIS 1ST 20SQCM/<: CPT

## 2020-03-10 RX ORDER — LIDOCAINE HYDROCHLORIDE 40 MG/ML
SOLUTION TOPICAL ONCE
Status: DISCONTINUED | OUTPATIENT
Start: 2020-03-10 | End: 2020-03-11 | Stop reason: HOSPADM

## 2020-03-10 ASSESSMENT — PAIN DESCRIPTION - DESCRIPTORS: DESCRIPTORS: BURNING

## 2020-03-10 ASSESSMENT — PAIN DESCRIPTION - LOCATION: LOCATION: COCCYX

## 2020-03-10 ASSESSMENT — PAIN SCALES - GENERAL: PAINLEVEL_OUTOF10: 2

## 2020-03-10 ASSESSMENT — PAIN DESCRIPTION - FREQUENCY: FREQUENCY: INTERMITTENT

## 2020-03-10 NOTE — PROGRESS NOTES
standard drinks    Drug use: No       ALLERGIES    Allergies   Allergen Reactions    Bactrim [Sulfamethoxazole-Trimethoprim]      DIZZINESS       MEDICATIONS    Current Outpatient Medications on File Prior to Encounter   Medication Sig Dispense Refill    carvedilol (COREG) 12.5 MG tablet Take 1 tablet by mouth 2 times daily (with meals) 180 tablet 1    PARoxetine (PAXIL) 40 MG tablet Take 1 tablet by mouth every morning 90 tablet 1    atorvastatin (LIPITOR) 10 MG tablet Take 1 tablet by mouth daily 90 tablet 1    lisinopril (PRINIVIL;ZESTRIL) 20 MG tablet Take 1 tablet by mouth daily 90 tablet 1    busPIRone (BUSPAR) 15 MG tablet TAKE 1 TABLET DAILY 90 tablet 1    furosemide (LASIX) 20 MG tablet Take 1 tablet by mouth daily 30 tablet 2    levothyroxine (SYNTHROID) 125 MCG tablet Take 1 tablet by mouth Daily 30 tablet 3    aspirin 81 MG tablet Take 81 mg by mouth daily       No current facility-administered medications on file prior to encounter. REVIEW OF SYSTEMS    Pertinent items are noted in HPI.     Objective:      /60   Pulse 66   Temp 98.9 °F (37.2 °C) (Tympanic)   Resp 16   Ht 5' 7\" (1.702 m)   Wt 102 lb (46.3 kg)   BMI 15.98 kg/m²     Wt Readings from Last 3 Encounters:   03/10/20 102 lb (46.3 kg)   03/06/20 102 lb (46.3 kg)   02/01/20 121 lb 7.6 oz (55.1 kg)       PHYSICAL EXAM    General Appearance: alert and oriented to person, place and time, well developed and well- nourished, in no acute distress  Skin: warm and dry, no rash or erythema, stage 2 pressure ulcer   Head: normocephalic and atraumatic  Eyes: pupils equal, round, and conjunctivae normal  Pulmonary/Chest: clear to auscultation bilaterally- no wheezes, rales or rhonchi, normal air movement, no respiratory distress  Cardiovascular: normal rate, regular rhythm, normal S1 and S2, no murmurs  Abdomen: soft, non-tender, non-distended, normal bowel sounds  Extremities: no cyanosis, clubbing or edema  Musculoskeletal: no Margins Attached edges 3/10/2020  1:56 PM   Mesha-wound Assessment Clean;Red 3/10/2020  1:56 PM   Red%Wound Bed 100 3/10/2020  1:56 PM   Number of days: 0          Percent of Wound(s)/Ulcer(s) Debrided: 100%    Total Surface Area Debrided:  15 sq cm       Diabetic/Pressure/Non Pressure Ulcers only:  Ulcer: Pressure ulcer, Stage 2      Estimated Blood Loss:  Minimal    Hemostasis Achieved:  by pressure    Procedural Pain:  0  / 10     Post Procedural Pain:  0 / 10     Response to treatment:  Well tolerated by patient. Plan:     Treatment Note please see attached Discharge Instructions    Written patient dismissal instructions given to patient and signed by patient or POA. Discharge Instructions         [id number]      Visit  Discharge Instructions / Physician Orders  DATE: 03/10/2020     Home Care: [facility] (established patient)     SUPPLIES ORDERED THRU: Home care (please order supplies)     Wound Location:  Coccyx     Cleanse with: Liquid antibacterial soap and water, rinse well      Dressing Orders:  Primary: Paty to wound, Secondary: cover with mepilex border or secure with ABD and mefix tape     Frequency:  Change every 3 days (per physician order to promote wound healing)     Additional Orders: Increase protein to diet (meat, cheese, eggs, fish, peanut butter, nuts and beans)  Multivitamin daily     HYPERBARIC TREATMENT-                TREATMENT #     [facility]                                                                                                   ROOM TYPE   [] CHAIR     [] BED   [] EITHER        TIME [] 45 MIN     [] 60 MIN     (Please note your next appointment above and if you are unable to keep, kindly give a 24 hour notice.  Thank you.)     If you experience any of the following, please call the [facility] during business hours:  [phone]     * Increase in Pain  * Temperature over 101  * Increase in drainage from your wound  * Drainage with a foul odor  * Bleeding  * Increase in swelling  * Need for compression bandage changes due to slippage, breakthrough drainage. If you need medical attention outside of the business hours of the [address] [place] [address] Road please contact your PCP or go to the nearest emergency room. The information contained in the After Visit Summary has been reviewed with me, the patient and/or responsible adult, by my health care provider(s). I had the opportunity to ask questions regarding this information.  I have elected to receive;      []After Visit Summary  [x]Comprehensive Discharge Instruction      Patient signature______________________________________Date:________  Electronically signed by Sonido Solis RN on 3/10/2020 at 2:23 PM  Electronically signed by NORMA De La Fuente CNP on 3/10/2020 at 2:28 PM              Electronically signed by NORMA De La Fuente CNP on 3/10/2020 at 2:29 PM

## 2020-03-10 NOTE — PLAN OF CARE
Problem: Pain:  Goal: Pain level will decrease  Description: Pain level will decrease  Outcome: Ongoing  Goal: Control of acute pain  Description: Control of acute pain  Outcome: Ongoing  Goal: Control of chronic pain  Description: Control of chronic pain  Outcome: Ongoing     Problem: Wound:  Goal: Will show signs of wound healing; wound closure and no evidence of infection  Description: Will show signs of wound healing; wound closure and no evidence of infection  Outcome: Ongoing     Problem: Pressure Ulcer:  Goal: Signs of wound healing will improve  Description: Signs of wound healing will improve  Outcome: Ongoing  Goal: Absence of new pressure ulcer  Description: Absence of new pressure ulcer  Outcome: Ongoing  Goal: Will show no infection signs and symptoms  Description: Will show no infection signs and symptoms  Outcome: Ongoing     Problem: Falls - Risk of:  Goal: Will remain free from falls  Description: Will remain free from falls  Outcome: Ongoing

## 2020-03-12 RX ORDER — LEVOTHYROXINE SODIUM 0.1 MG/1
100 TABLET ORAL DAILY
Qty: 30 TABLET | Refills: 1 | Status: SHIPPED | OUTPATIENT
Start: 2020-03-12 | End: 2020-06-22

## 2020-03-17 ENCOUNTER — HOSPITAL ENCOUNTER (OUTPATIENT)
Dept: WOUND CARE | Age: 85
Discharge: HOME | End: 2020-03-17
Payer: MEDICARE

## 2020-03-17 VITALS
RESPIRATION RATE: 18 BRPM | WEIGHT: 102 LBS | TEMPERATURE: 98.3 F | HEART RATE: 60 BPM | DIASTOLIC BLOOD PRESSURE: 70 MMHG | BODY MASS INDEX: 16.01 KG/M2 | SYSTOLIC BLOOD PRESSURE: 110 MMHG | HEIGHT: 67 IN

## 2020-03-17 PROCEDURE — 6370000000 HC RX 637 (ALT 250 FOR IP): Performed by: NURSE PRACTITIONER

## 2020-03-17 PROCEDURE — 11042 DBRDMT SUBQ TIS 1ST 20SQCM/<: CPT | Performed by: NURSE PRACTITIONER

## 2020-03-17 PROCEDURE — 11042 DBRDMT SUBQ TIS 1ST 20SQCM/<: CPT

## 2020-03-17 RX ORDER — LIDOCAINE HYDROCHLORIDE 40 MG/ML
SOLUTION TOPICAL ONCE
Status: COMPLETED | OUTPATIENT
Start: 2020-03-17 | End: 2020-03-17

## 2020-03-17 RX ADMIN — LIDOCAINE HYDROCHLORIDE: 40 SOLUTION TOPICAL at 14:45

## 2020-03-17 ASSESSMENT — PAIN DESCRIPTION - FREQUENCY: FREQUENCY: INTERMITTENT

## 2020-03-17 ASSESSMENT — PAIN DESCRIPTION - DESCRIPTORS: DESCRIPTORS: ACHING

## 2020-03-17 ASSESSMENT — PAIN DESCRIPTION - ONSET: ONSET: ON-GOING

## 2020-03-17 ASSESSMENT — PAIN SCALES - GENERAL: PAINLEVEL_OUTOF10: 2

## 2020-03-17 NOTE — PROGRESS NOTES
Encounter   Medication Sig Dispense Refill    levothyroxine (SYNTHROID) 100 MCG tablet Take 1 tablet by mouth Daily 30 tablet 1    carvedilol (COREG) 12.5 MG tablet Take 1 tablet by mouth 2 times daily (with meals) 180 tablet 1    PARoxetine (PAXIL) 40 MG tablet Take 1 tablet by mouth every morning 90 tablet 1    atorvastatin (LIPITOR) 10 MG tablet Take 1 tablet by mouth daily 90 tablet 1    lisinopril (PRINIVIL;ZESTRIL) 20 MG tablet Take 1 tablet by mouth daily 90 tablet 1    busPIRone (BUSPAR) 15 MG tablet TAKE 1 TABLET DAILY 90 tablet 1    furosemide (LASIX) 20 MG tablet Take 1 tablet by mouth daily 30 tablet 2    aspirin 81 MG tablet Take 81 mg by mouth daily       No current facility-administered medications on file prior to encounter. REVIEW OF SYSTEMS    Pertinent items are noted in HPI.     Objective:      /70   Pulse 60   Temp 98.3 °F (36.8 °C) (Tympanic)   Resp 18   Ht 5' 7\" (1.702 m)   Wt 102 lb (46.3 kg)   BMI 15.98 kg/m²     Wt Readings from Last 3 Encounters:   03/17/20 102 lb (46.3 kg)   03/10/20 102 lb (46.3 kg)   03/06/20 102 lb (46.3 kg)       PHYSICAL EXAM    General Appearance: alert and oriented to person, place and time, well-developed and well-nourished, in no acute distress  Skin: warm and dry, no rash or erythema, stage 2 coccyx pressure ulcer   Head: normocephalic and atraumatic  Eyes: pupils equal, round, and conjunctivae normal  Pulmonary/Chest: normal air movement, no respiratory distress  Extremities: no cyanosis and no clubbing  Musculoskeletal: no joint swelling, deformity or tenderness  Neurologic: coordination normal and speech normal      Assessment:     Problem List Items Addressed This Visit     Pressure injury of coccygeal region, stage 2 ([address] - Primary    Decreased mobility           Procedure Note  Indications:  Based on my examination of this patient's wound(s)/ulcer(s) today, debridement is required to promote healing and evaluate the wound base.    Performed by: NORMA Mckeon CNP    Consent obtained:  Yes    Time out taken:  Yes    Pain Control: Anesthetic  Anesthetic: 4% Lidocaine Liquid Topical       Debridement:Excisional Debridement    Using curette the wound(s)/ulcer(s) was/were sharply debrided down through and including the removal of subcutaneous tissue.         Devitalized Tissue Debrided:  fibrin, biofilm and slough    Pre Debridement Measurements:  Are located in the Wound/Ulcer Documentation Flow Sheet    Wound/Ulcer #: 1    Post Debridement Measurements:  Wound/Ulcer Descriptions are Pre Debridement except measurements:    Wound 03/10/20 Coccyx (Active)   Wound Image   3/10/2020  1:56 PM   Wound Pressure Stage  1 3/17/2020  2:39 PM   Dressing Changed Changed/New 3/17/2020  2:39 PM   Wound Cleansed Rinsed/Irrigated with saline 3/17/2020  2:39 PM   Wound Length (cm) 4 cm 3/17/2020  2:39 PM   Wound Width (cm) 1 cm 3/17/2020  2:39 PM   Wound Depth (cm) 0.1 cm 3/17/2020  2:39 PM   Wound Surface Area (cm^2) 4 cm^2 3/17/2020  2:39 PM   Change in Wound Size % (l*w) 73.33 3/17/2020  2:39 PM   Wound Volume (cm^3) 0.4 cm^3 3/17/2020  2:39 PM   Wound Healing % 73 3/17/2020  2:39 PM   Post-Procedure Length (cm) 5 cm 3/10/2020  1:56 PM   Post-Procedure Width (cm) 3 cm 3/10/2020  1:56 PM   Post-Procedure Depth (cm) 0.1 cm 3/10/2020  1:56 PM   Post-Procedure Surface Area (cm^2) 15 cm^2 3/10/2020  1:56 PM   Post-Procedure Volume (cm^3) 1.5 cm^3 3/10/2020  1:56 PM   Wound Assessment Pink;Red 3/17/2020  2:39 PM   Drainage Amount Moderate 3/17/2020  2:39 PM   Drainage Description Serosanguinous 3/17/2020  2:39 PM   Odor None 3/10/2020  1:56 PM   Margins Attached edges 3/10/2020  1:56 PM   Mesha-wound Assessment Pink;Red 3/17/2020  2:39 PM   Sartell%Wound Bed 50 3/17/2020  2:39 PM   Red%Wound Bed 100 3/10/2020  1:56 PM   Yellow%Wound Bed 50 3/17/2020  2:39 PM   Number of days: 7          Percent of Wound(s)/Ulcer(s) Debrided: 100%    Total Surface Area W[facility] Centers please contact your PCP or go to the nearest emergency room. The information contained in the After Visit Summary has been reviewed with me, the patient and/or responsible adult, by my health care provider(s). I had the opportunity to ask questions regarding this information. I have elected to receive;      []? After Visit Summary  [x]? Comprehensive Discharge Instruction        Patient signature______________________________________Date:________  Electronically signed by Leena Singh RN on 3/17/2020 at 3:24 PM  Electronically signed by NORMA Lang CNP on 3/17/2020 at 3:27 PM          Electronically signed by NORMA Lang CNP on 3/17/2020 at 3:32 PM

## 2020-03-25 ENCOUNTER — TELEPHONE (OUTPATIENT)
Dept: ORTHOPEDIC SURGERY | Age: 85
End: 2020-03-25

## 2020-03-26 ENCOUNTER — TELEPHONE (OUTPATIENT)
Dept: WOUND CARE | Age: 85
End: 2020-03-26

## 2020-03-26 NOTE — TELEPHONE ENCOUNTER
Patient's [facility] wound care appointment for 3-31-20 canceled and rescheduled for 4-6-20 due to the covid19 emergency situation.

## 2020-04-03 ENCOUNTER — TELEPHONE (OUTPATIENT)
Dept: WOUND CARE | Age: 85
End: 2020-04-03

## 2020-04-03 NOTE — TELEPHONE ENCOUNTER
Patient's [facility] wound care canceled for 4-6-20 due to the covid19 emergency. Patient will be monitored from week to week with a phone call to assess their wound and patient can come in for an appointment or do a virtual visit if needed.

## 2020-04-06 ENCOUNTER — HOSPITAL ENCOUNTER (OUTPATIENT)
Dept: WOUND CARE | Age: 85
Discharge: HOME | End: 2020-04-06

## 2020-05-28 ENCOUNTER — FOLLOWUP TELEPHONE ENCOUNTER (OUTPATIENT)
Dept: WOUND CARE | Age: 85
End: 2020-05-28

## 2020-05-28 ENCOUNTER — TELEPHONE (OUTPATIENT)
Dept: WOUND CARE | Age: 85
End: 2020-05-28

## 2020-06-08 NOTE — TELEPHONE ENCOUNTER
Last visit: 03/06/2020   With Dr Kaylie Hdz Later ON 05/05/2020  Last Med refill: 05/06/2020  Does patient have enough medication for 72 hours: Yes    Next Visit Date:  Future Appointments   Date Time Provider Roberto Drummond   6/10/2020 11:00 AM Basil Serrano MD [id number]   9/30/2020 12:30 PM NORMA Garcia - CNP [facility] [address] Maintenance   Topic Date Due    DEXA (modify frequency per FRAX score)  07/03/1991    DTaP/Tdap/Td vaccine (1 - Tdap) 09/25/2020 (Originally 7/3/1954)    Flu vaccine (Season Ended) 09/25/2020 (Originally 9/1/2020)    Shingles Vaccine (1 of 2) 09/25/2020 (Originally 7/3/1986)    Pneumococcal 65+ years Vaccine (1 of 1 - PPSV23) 09/25/2020 (Originally 7/3/2001)    Annual Wellness Visit (AWV)  09/25/2020    Lipid screen  01/29/2021    TSH testing  03/06/2021    Potassium monitoring  03/06/2021    Creatinine monitoring  03/06/2021    Hepatitis A vaccine  Aged Out    Hepatitis B vaccine  Aged Out    Hib vaccine  Aged Out    Meningococcal (ACWY) vaccine  Aged Out       Hemoglobin A1C (%)   Date Value   03/06/2020 5.0             ( goal A1C is < 7)   No results found for: LABMICR  LDL Cholesterol (mg/dL)   Date Value   01/29/2020 65   07/02/2019 58       (goal LDL is <100)   AST (U/L)   Date Value   03/06/2020 9     ALT (U/L)   Date Value   03/06/2020 5     BUN (mg/dL)   Date Value   03/06/2020 13     BP Readings from Last 3 Encounters:   03/17/20 110/70   03/10/20 110/60   03/06/20 115/75          (goal 120/80)    All Future Testing planned in [facility]  Lab Frequency Next Occurrence   TSH With Reflex Ft4 Once 06/20/2020               Patient Active Problem List:     Essential hypertension     Cancer ([facility] [address]     Hypothyroidism     Depression     Hyperlipidemia     Basal cell carcinoma     Melanoma (HCC)     Anxiety     Hypokalemia     Acute idiopathic gout of right foot     CKD (chronic kidney disease) stage 3, GFR 30-59 ml/min (HCC)     Risk for

## 2020-06-09 RX ORDER — FUROSEMIDE 20 MG/1
TABLET ORAL
Qty: 90 TABLET | Refills: 1 | Status: SHIPPED | OUTPATIENT
Start: 2020-06-09 | End: 2020-11-18

## 2020-06-10 ENCOUNTER — OFFICE VISIT (OUTPATIENT)
Dept: ORTHOPEDIC SURGERY | Age: 85
End: 2020-06-10
Payer: MEDICARE

## 2020-06-10 PROCEDURE — G8419 CALC BMI OUT NRM PARAM NOF/U: HCPCS | Performed by: ORTHOPAEDIC SURGERY

## 2020-06-10 PROCEDURE — 4040F PNEUMOC VAC/ADMIN/RCVD: CPT | Performed by: ORTHOPAEDIC SURGERY

## 2020-06-10 PROCEDURE — 1036F TOBACCO NON-USER: CPT | Performed by: ORTHOPAEDIC SURGERY

## 2020-06-10 PROCEDURE — 1090F PRES/ABSN URINE INCON ASSESS: CPT | Performed by: ORTHOPAEDIC SURGERY

## 2020-06-10 PROCEDURE — 99203 OFFICE O/P NEW LOW 30 MIN: CPT | Performed by: ORTHOPAEDIC SURGERY

## 2020-06-10 PROCEDURE — 1123F ACP DISCUSS/DSCN MKR DOCD: CPT | Performed by: ORTHOPAEDIC SURGERY

## 2020-06-10 PROCEDURE — G8427 DOCREV CUR MEDS BY ELIG CLIN: HCPCS | Performed by: ORTHOPAEDIC SURGERY

## 2020-06-10 PROCEDURE — G8400 PT W/DXA NO RESULTS DOC: HCPCS | Performed by: ORTHOPAEDIC SURGERY

## 2020-06-10 NOTE — PROGRESS NOTES
oriented to person, place, and time. Normal strenght. No sensory deficit. Skin: Skin is warm and dry  Psychiatric: Behavior is normal. Thought content normal.  Nursing note and vitals reviewed. Labs and Imaging:     XR taken today:  No results found. Orders Placed This Encounter   Procedures    IR ARTHR/ASP/INJ MAJOR JT/BURSA RIGHT WO US     Standing Status:   Future     Standing Expiration Date:   6/10/2021     Scheduling Instructions:      INTRA ARTICULAR INJECTION PREFERRED PROTOCOL           FOR  __right hip________   INJECTION:                  4 cc Xylocaine, 1% plain      4 cc Marcaine, 0.5%      1 cc Depomedrol 80 mgm/cc OR              Celestone 6 mgm/cc       Assessment and Plan:  1. Right hip pain    2. Arthritis of right hip        This is a 81 y.o. female who presents to the clinic today for evaluation of right hip pain. Discussed that we set her up for a IR injection of the right hip as she has severe arthritis however does not want to have surgery at this time. Follow up PRN.     Past History:    Current Outpatient Medications:     furosemide (LASIX) 20 MG tablet, take 1 tablet by mouth once daily, Disp: 90 tablet, Rfl: 1    levothyroxine (SYNTHROID) 100 MCG tablet, Take 1 tablet by mouth Daily, Disp: 30 tablet, Rfl: 1    carvedilol (COREG) 12.5 MG tablet, Take 1 tablet by mouth 2 times daily (with meals), Disp: 180 tablet, Rfl: 1    PARoxetine (PAXIL) 40 MG tablet, Take 1 tablet by mouth every morning, Disp: 90 tablet, Rfl: 1    atorvastatin (LIPITOR) 10 MG tablet, Take 1 tablet by mouth daily, Disp: 90 tablet, Rfl: 1    lisinopril (PRINIVIL;ZESTRIL) 20 MG tablet, Take 1 tablet by mouth daily, Disp: 90 tablet, Rfl: 1    busPIRone (BUSPAR) 15 MG tablet, TAKE 1 TABLET DAILY, Disp: 90 tablet, Rfl: 1    aspirin 81 MG tablet, Take 81 mg by mouth daily, Disp: , Rfl:   Allergies   Allergen Reactions    Bactrim [Sulfamethoxazole-Trimethoprim]      DIZZINESS     Social History

## 2020-06-15 ENCOUNTER — TELEPHONE (OUTPATIENT)
Dept: INTERVENTIONAL RADIOLOGY/VASCULAR | Age: 85
End: 2020-06-15

## 2020-06-22 RX ORDER — LEVOTHYROXINE SODIUM 0.1 MG/1
TABLET ORAL
Qty: 90 TABLET | Refills: 1 | Status: SHIPPED | OUTPATIENT
Start: 2020-06-22 | End: 2021-03-01 | Stop reason: SDUPTHER

## 2020-06-24 RX ORDER — BUPIVACAINE HYDROCHLORIDE 5 MG/ML
4 INJECTION, SOLUTION EPIDURAL; INTRACAUDAL ONCE
Status: CANCELLED | OUTPATIENT
Start: 2020-06-24 | End: 2020-06-24

## 2020-06-24 RX ORDER — METHYLPREDNISOLONE ACETATE 80 MG/ML
80 INJECTION, SUSPENSION INTRA-ARTICULAR; INTRALESIONAL; INTRAMUSCULAR; SOFT TISSUE ONCE
Status: CANCELLED | OUTPATIENT
Start: 2020-06-24 | End: 2020-06-24

## 2020-06-24 RX ORDER — LIDOCAINE HYDROCHLORIDE 10 MG/ML
4 INJECTION, SOLUTION EPIDURAL; INFILTRATION; INTRACAUDAL; PERINEURAL ONCE
Status: CANCELLED | OUTPATIENT
Start: 2020-06-24 | End: 2020-06-24

## 2020-06-25 ENCOUNTER — HOSPITAL ENCOUNTER (OUTPATIENT)
Dept: PREADMISSION TESTING | Age: 85
Setting detail: SPECIMEN
Discharge: HOME | End: 2020-06-29
Payer: MEDICARE

## 2020-06-25 PROCEDURE — U0004 COV-19 TEST NON-CDC HGH THRU: HCPCS

## 2020-06-26 LAB
SARS-COV-2, PCR: NOT DETECTED
SARS-COV-2, RAPID: NORMAL
SARS-COV-2: NORMAL
SOURCE: NORMAL

## 2020-06-27 ENCOUNTER — TELEPHONE (OUTPATIENT)
Dept: PRIMARY CARE CLINIC | Age: 85
End: 2020-06-27

## 2020-06-29 ENCOUNTER — HOSPITAL ENCOUNTER (OUTPATIENT)
Dept: INTERVENTIONAL RADIOLOGY/VASCULAR | Age: 85
Discharge: HOME | End: 2020-07-01
Payer: MEDICARE

## 2020-06-29 PROCEDURE — 77002 NEEDLE LOCALIZATION BY XRAY: CPT | Performed by: RADIOLOGY

## 2020-06-29 PROCEDURE — 6360000002 HC RX W HCPCS: Performed by: ORTHOPAEDIC SURGERY

## 2020-06-29 PROCEDURE — 2500000003 HC RX 250 WO HCPCS: Performed by: ORTHOPAEDIC SURGERY

## 2020-06-29 PROCEDURE — 20610 DRAIN/INJ JOINT/BURSA W/O US: CPT | Performed by: RADIOLOGY

## 2020-06-29 PROCEDURE — 2709999900 IR ARTHR/ASP/INJ MAJOR JT/BURSA RIGHT WO US

## 2020-06-29 PROCEDURE — 6360000004 HC RX CONTRAST MEDICATION: Performed by: ORTHOPAEDIC SURGERY

## 2020-06-29 RX ORDER — BUPIVACAINE HYDROCHLORIDE 5 MG/ML
4 INJECTION, SOLUTION EPIDURAL; INTRACAUDAL ONCE
Status: COMPLETED | OUTPATIENT
Start: 2020-06-29 | End: 2020-06-29

## 2020-06-29 RX ORDER — LIDOCAINE HYDROCHLORIDE 10 MG/ML
4 INJECTION, SOLUTION EPIDURAL; INFILTRATION; INTRACAUDAL; PERINEURAL ONCE
Status: COMPLETED | OUTPATIENT
Start: 2020-06-29 | End: 2020-06-29

## 2020-06-29 RX ORDER — ACETAMINOPHEN 325 MG/1
650 TABLET ORAL EVERY 4 HOURS PRN
Status: DISCONTINUED | OUTPATIENT
Start: 2020-06-29 | End: 2020-07-02 | Stop reason: HOSPADM

## 2020-06-29 RX ORDER — METHYLPREDNISOLONE ACETATE 80 MG/ML
80 INJECTION, SUSPENSION INTRA-ARTICULAR; INTRALESIONAL; INTRAMUSCULAR; SOFT TISSUE ONCE
Status: COMPLETED | OUTPATIENT
Start: 2020-06-29 | End: 2020-06-29

## 2020-06-29 RX ADMIN — METHYLPREDNISOLONE ACETATE 80 MG: 80 INJECTION, SUSPENSION INTRA-ARTICULAR; INTRALESIONAL; INTRAMUSCULAR; SOFT TISSUE at 08:56

## 2020-06-29 RX ADMIN — IOHEXOL 50 ML: 240 INJECTION, SOLUTION INTRATHECAL; INTRAVASCULAR; INTRAVENOUS; ORAL at 08:43

## 2020-06-29 RX ADMIN — BUPIVACAINE HYDROCHLORIDE 20 MG: 5 INJECTION, SOLUTION EPIDURAL; INTRACAUDAL; PERINEURAL at 08:56

## 2020-06-29 RX ADMIN — LIDOCAINE HYDROCHLORIDE 4 ML: 10 INJECTION, SOLUTION EPIDURAL; INFILTRATION; INTRACAUDAL; PERINEURAL at 08:55

## 2020-06-29 ASSESSMENT — PAIN SCALES - GENERAL: PAINLEVEL_OUTOF10: 5

## 2020-06-29 NOTE — PROGRESS NOTES
Arrives per wheelchair. Alert and oriented. Supine on table. Rt hip prepped draped and numbed. Injected as per MAR. Needle removed. Band aid applied. Tolerated well Discharged to home.  Verbalized understanding of discharge instructions

## 2020-06-29 NOTE — BRIEF OP NOTE
Brief Postoperative Note    Zaki Quiñones  YOB: 1935  [id number]    Pre-operative Diagnosis: Chronic rt hip pain    Post-operative Diagnosis: Same    Procedure: Inj under fluoro for pain    Medications Given: none    Anesthesia: Local    Surgeons/Assistants: Maurisio Khan MD    Estimated Blood Loss: minimal    Complications: none    Specimens: were not obtained    Findings: Combo short/long acting anesthetics and steroids inj into rt hip joint under fluoro w/out incident. Pt tolerated well.       Electronically signed by Maurisio Khan on 6/29/2020 at 9:13 AM

## 2020-08-01 ENCOUNTER — HOSPITAL ENCOUNTER (EMERGENCY)
Age: 85
Discharge: HOME | End: 2020-08-01
Attending: EMERGENCY MEDICINE
Payer: MEDICARE

## 2020-08-01 ENCOUNTER — APPOINTMENT (OUTPATIENT)
Dept: GENERAL RADIOLOGY | Age: 85
End: 2020-08-01
Payer: MEDICARE

## 2020-08-01 VITALS
TEMPERATURE: 97 F | HEART RATE: 95 BPM | HEIGHT: 67 IN | SYSTOLIC BLOOD PRESSURE: 121 MMHG | BODY MASS INDEX: 16.01 KG/M2 | OXYGEN SATURATION: 96 % | WEIGHT: 102 LBS | DIASTOLIC BLOOD PRESSURE: 66 MMHG | RESPIRATION RATE: 16 BRPM

## 2020-08-01 PROCEDURE — 93971 EXTREMITY STUDY: CPT

## 2020-08-01 PROCEDURE — 99284 EMERGENCY DEPT VISIT MOD MDM: CPT

## 2020-08-01 PROCEDURE — 73562 X-RAY EXAM OF KNEE 3: CPT

## 2020-08-01 PROCEDURE — 6370000000 HC RX 637 (ALT 250 FOR IP): Performed by: EMERGENCY MEDICINE

## 2020-08-01 RX ORDER — ACETAMINOPHEN 500 MG
1000 TABLET ORAL ONCE
Status: COMPLETED | OUTPATIENT
Start: 2020-08-01 | End: 2020-08-01

## 2020-08-01 RX ADMIN — ACETAMINOPHEN 1000 MG: 500 TABLET, FILM COATED ORAL at 10:21

## 2020-08-01 ASSESSMENT — ENCOUNTER SYMPTOMS
DIARRHEA: 0
ABDOMINAL PAIN: 0
TROUBLE SWALLOWING: 0
COLOR CHANGE: 0
CONSTIPATION: 0
BACK PAIN: 0
VOMITING: 0
COUGH: 0
BLOOD IN STOOL: 0
NAUSEA: 0
SORE THROAT: 0
SHORTNESS OF BREATH: 0

## 2020-08-01 ASSESSMENT — PAIN SCALES - GENERAL
PAINLEVEL_OUTOF10: 10
PAINLEVEL_OUTOF10: 10

## 2020-08-01 NOTE — ED PROVIDER NOTES
[address]  eMERGENCY dEPARTMENT eNCOUnter    Pt Name: Javi Candelaria  MRN: [id number]  YOB: 1935  Date of evaluation:8/1/20  PCP: NORMA Ramirez [id number]       Chief Complaint   Patient presents with    Leg Pain    Arm Pain       HISTORY OF PRESENT ILLNESS    Javi Candelaria is a 81 y.o. female who presents with a chief complaint of right leg pain. Patient states she has had pain in her right leg for the past several months. There is nothing new about it today. She states she came in because she \"could not take the pain anymore. \"  No falls or trauma. She has seen orthopedic surgery about this. Pain radiates from her thigh down to her calf. She is also experiencing pain specifically in the right knee with decreased range of motion. No fevers, chills other illnesses. No chest pain or difficulty breathing. Symptoms are chronic. Symptoms are moderate. Nothing else make symptoms better or worse. Patient has no other complaints at this time. REVIEW OF SYSTEMS       Review of Systems   Constitutional: Negative for chills, fatigue and fever. HENT: Negative for congestion, ear pain, sore throat and trouble swallowing. Eyes: Negative for visual disturbance. Respiratory: Negative for cough and shortness of breath. Cardiovascular: Negative for chest pain, palpitations and leg swelling. Gastrointestinal: Negative for abdominal pain, blood in stool, constipation, diarrhea, nausea and vomiting. Genitourinary: Negative for dysuria and flank pain. Musculoskeletal: Positive for arthralgias and myalgias. Negative for back pain and neck pain. Skin: Negative for color change, rash and wound. Neurological: Negative for dizziness, weakness, light-headedness, numbness and headaches. Psychiatric/Behavioral: Negative for confusion. All other systems reviewed and are negative. Negativein 10 essential Systems except as mentioned above and in the HPI.         PAST MEDICAL HISTORY     Past Medical History:   Diagnosis Date    Anxiety     Arthritis     Basal cell carcinoma     Cancer (HCC)     skin    CHF (congestive heart failure) (HCC)     Depression     Gout     H/O total hysterectomy 2018    Hyperlipidemia     Hypertension     Hypokalemia     Hypothyroidism     Kidney stone     Kidney stone     Melanoma [facility])          SURGICAL HISTORY      has a past surgical history that includes Tubal ligation; Hysterectomy; and Hysterectomy, vaginal.      CURRENT MEDICATIONS       Current Discharge Medication List      CONTINUE these medications which have NOT CHANGED    Details   levothyroxine (SYNTHROID) 100 MCG tablet TAKE 1 TABLET DAILY  Qty: 90 tablet, Refills: 1      furosemide (LASIX) 20 MG tablet take 1 tablet by mouth once daily  Qty: 90 tablet, Refills: 1    Associated Diagnoses: Dependent edema      carvedilol (COREG) 12.5 MG tablet Take 1 tablet by mouth 2 times daily (with meals)  Qty: 180 tablet, Refills: 1    Associated Diagnoses: Essential hypertension      PARoxetine (PAXIL) 40 MG tablet Take 1 tablet by mouth every morning  Qty: 90 tablet, Refills: 1    Associated Diagnoses: Depression, unspecified depression type      atorvastatin (LIPITOR) 10 MG tablet Take 1 tablet by mouth daily  Qty: 90 tablet, Refills: 1    Associated Diagnoses: Hyperlipidemia, unspecified hyperlipidemia type      lisinopril (PRINIVIL;ZESTRIL) 20 MG tablet Take 1 tablet by mouth daily  Qty: 90 tablet, Refills: 1    Associated Diagnoses: Essential hypertension      busPIRone (BUSPAR) 15 MG tablet TAKE 1 TABLET DAILY  Qty: 90 tablet, Refills: 1      aspirin 81 MG tablet Take 81 mg by mouth daily             ALLERGIES     is allergic to bactrim [sulfamethoxazole-trimethoprim]. FAMILY HISTORY     She indicated that her mother is . She indicated that her father is . family history includes Heart Disease in her father; Other in her mother.     SOCIAL HISTORY reports that she quit smoking about 60 years ago. She has never used smokeless tobacco. She reports that she does not drink alcohol or use drugs. PHYSICAL EXAM     INITIAL VITALS:  height is 5' 7\" (1.702 m) and weight is 102 lb (46.3 kg). Her temporal temperature is 97 °F (36.1 °C). Her blood pressure is 121/66 and her pulse is 95. Her respiration is 16 and oxygen saturation is 96%. Physical Exam  Vitals signs and nursing note reviewed. Constitutional:       General: She is not in acute distress. HENT:      Head: Normocephalic and atraumatic. Eyes:      Conjunctiva/sclera: Conjunctivae normal.      Pupils: Pupils are equal, round, and reactive to light. Neck:      Musculoskeletal: Neck supple. Cardiovascular:      Rate and Rhythm: Normal rate and regular rhythm. Heart sounds: Normal heart sounds. No murmur. No gallop. Pulmonary:      Effort: Pulmonary effort is normal. No respiratory distress. Breath sounds: Normal breath sounds. Abdominal:      General: Bowel sounds are normal. There is no distension. Palpations: Abdomen is soft. Tenderness: There is no abdominal tenderness. Musculoskeletal:      Right knee: She exhibits decreased range of motion and bony tenderness. Tenderness found. Right upper leg: She exhibits tenderness. Right lower leg: She exhibits tenderness. Lymphadenopathy:      Cervical: No cervical adenopathy. Skin:     General: Skin is warm and dry. Findings: No rash. Neurological:      Mental Status: She is alert and oriented to person, place, and time. Psychiatric:         Judgment: Judgment normal.           DIFFERENTIAL DIAGNOSIS/MDM:   81-year-old female presents with several months of right leg pain. She is afebrile, nontoxic, normal vital signs. No acute distress. On exam her right knee is very tender to palpation with decreased range of motion. There is no real swelling, erythema or warmth.   I do not suspect a septic [facility]  V[address]  [phone]    As needed, If symptoms worsen      DISCHARGE MEDICATIONS:  Current Discharge Medication List          (Please note that portions ofthis note were completed with a voice recognition program.  Efforts were made to edit the dictations but occasionally words are mis-transcribed.)    Wendy Durant DO  Attending Emergency Physician          Wendy Durant DO  08/01/20 [id number]

## 2020-08-01 NOTE — ED NOTES
Called Jan Thomson to come in for Dopplers on this patient @ [phone], phone went to voicemail and it stated that his mailbox was full. I called Jan Thomson again to come in @ [phone], and same thing.      Junious Night  08/01/20 1289

## 2020-08-01 NOTE — ED NOTES
Pt arrives to ED c/o worsening chronic right leg pain. Patient states that she has had pain in her right leg and knee for months. Patient states that she came in today \"because I just can't take it anymore. \" Patient lives at home with daughter who helps her get around.         Ilana Castillo RN  08/01/20 0202

## 2020-08-03 ENCOUNTER — TELEPHONE (OUTPATIENT)
Dept: FAMILY MEDICINE CLINIC | Age: 85
End: 2020-08-03

## 2020-08-03 NOTE — TELEPHONE ENCOUNTER
[facility] ([facility]) ED Follow up Call        FU appts/Provider:    No future appointments. VOICEMAIL DOCUMENTATION - ERASE IF NOT USED  Hi, this message is for  Ata Miller  This is Ange from [address] [place] [address] office. Just calling to see how you are doing after your recent visit to the Emergency Room. [address] [place] [address] wants to make sure you were able to fill any prescriptions and that you understand your discharge instructions. Please return our call if you need to make a follow up appointment with your provider or have any further needs. Our phone number is [phone]. Have a great day.

## 2020-08-07 RX ORDER — BUSPIRONE HYDROCHLORIDE 15 MG/1
TABLET ORAL
Qty: 90 TABLET | Refills: 1 | Status: SHIPPED | OUTPATIENT
Start: 2020-08-07 | End: 2021-03-01 | Stop reason: SDUPTHER

## 2020-08-07 NOTE — TELEPHONE ENCOUNTER
Last visit: 3/6/20  Last Med refill: 3/3/20  Does patient have enough medication for 72 hours: Yes--staying at [facility]    Next Visit Date:  Future Appointments   Date Time Provider Roberto Drummond   8/10/2020 11:15 AM KAY Barclay [id number]   8/11/2020  3:00 PM Cecille Lopez  [address] Maintenance   Topic Date Due    DEXA (modify frequency per FRAX score)  07/03/1991    DTaP/Tdap/Td vaccine (1 - Tdap) 09/25/2020 (Originally 7/3/1954)    Shingles Vaccine (1 of 2) 09/25/2020 (Originally 7/3/1986)    Pneumococcal 65+ years Vaccine (1 of 1 - PPSV23) 09/25/2020 (Originally 7/3/2001)    Flu vaccine (1) 09/01/2020    Annual Wellness Visit (AWV)  09/25/2020    Lipid screen  01/29/2021    TSH testing  03/06/2021    Potassium monitoring  03/06/2021    Creatinine monitoring  03/06/2021    Hepatitis A vaccine  Aged Out    Hepatitis B vaccine  Aged Out    Hib vaccine  Aged Out    Meningococcal (ACWY) vaccine  Aged Out       Hemoglobin A1C (%)   Date Value   03/06/2020 5.0             ( goal A1C is < 7)   No results found for: LABMICR  LDL Cholesterol (mg/dL)   Date Value   01/29/2020 65   07/02/2019 58       (goal LDL is <100)   AST (U/L)   Date Value   03/06/2020 9     ALT (U/L)   Date Value   03/06/2020 5     BUN (mg/dL)   Date Value   03/06/2020 13     BP Readings from Last 3 Encounters:   08/01/20 121/66   03/17/20 110/70   03/10/20 110/60          (goal 120/80)    All Future Testing planned in [facility]  Lab Frequency Next Occurrence   TSH With Reflex Ft4 Once 06/20/2020               Patient Active Problem List:     Essential hypertension     Cancer ([address]     Hypothyroidism     Depression     Hyperlipidemia     Basal cell carcinoma     Melanoma (HCC)     Anxiety     Hypokalemia     Acute idiopathic gout of right foot     CKD (chronic kidney disease) stage 3, GFR 30-59 ml/min (HCC)     Risk for falls     Pneumococcal vaccine refused     Influenza vaccination declined     Chronic fatigue     Bilateral lower extremity pain     Closed fracture of single pubic ramus of pelvis with routine healing, right     NSTEMI (non-ST elevated myocardial infarction) ([facility])     Chronic diastolic heart failure (HCC)     Acute cystitis without hematuria     Chronic systolic heart failure (HCC)     Pressure injury of coccygeal region, stage 2 (HCC)     Decreased mobility

## 2020-08-10 ENCOUNTER — OFFICE VISIT (OUTPATIENT)
Dept: ORTHOPEDIC SURGERY | Age: 85
End: 2020-08-10
Payer: MEDICARE

## 2020-08-10 PROCEDURE — 1090F PRES/ABSN URINE INCON ASSESS: CPT | Performed by: PHYSICIAN ASSISTANT

## 2020-08-10 PROCEDURE — 1123F ACP DISCUSS/DSCN MKR DOCD: CPT | Performed by: PHYSICIAN ASSISTANT

## 2020-08-10 PROCEDURE — G8400 PT W/DXA NO RESULTS DOC: HCPCS | Performed by: PHYSICIAN ASSISTANT

## 2020-08-10 PROCEDURE — 20610 DRAIN/INJ JOINT/BURSA W/O US: CPT | Performed by: PHYSICIAN ASSISTANT

## 2020-08-10 PROCEDURE — 1036F TOBACCO NON-USER: CPT | Performed by: PHYSICIAN ASSISTANT

## 2020-08-10 PROCEDURE — 99213 OFFICE O/P EST LOW 20 MIN: CPT | Performed by: PHYSICIAN ASSISTANT

## 2020-08-10 PROCEDURE — G8419 CALC BMI OUT NRM PARAM NOF/U: HCPCS | Performed by: PHYSICIAN ASSISTANT

## 2020-08-10 PROCEDURE — G8427 DOCREV CUR MEDS BY ELIG CLIN: HCPCS | Performed by: PHYSICIAN ASSISTANT

## 2020-08-10 PROCEDURE — 4040F PNEUMOC VAC/ADMIN/RCVD: CPT | Performed by: PHYSICIAN ASSISTANT

## 2020-08-10 RX ORDER — BETAMETHASONE SODIUM PHOSPHATE AND BETAMETHASONE ACETATE 3; 3 MG/ML; MG/ML
2 INJECTION, SUSPENSION INTRA-ARTICULAR; INTRALESIONAL; INTRAMUSCULAR; SOFT TISSUE ONCE
Status: COMPLETED | OUTPATIENT
Start: 2020-08-10 | End: 2020-08-10

## 2020-08-10 RX ORDER — CEFDINIR 300 MG/1
300 CAPSULE ORAL 2 TIMES DAILY
Qty: 20 CAPSULE | Refills: 0 | Status: SHIPPED | OUTPATIENT
Start: 2020-08-10 | End: 2020-08-20

## 2020-08-10 RX ORDER — LIDOCAINE HYDROCHLORIDE 10 MG/ML
4 INJECTION, SOLUTION EPIDURAL; INFILTRATION; INTRACAUDAL; PERINEURAL ONCE
Status: COMPLETED | OUTPATIENT
Start: 2020-08-10 | End: 2020-08-10

## 2020-08-10 RX ADMIN — LIDOCAINE HYDROCHLORIDE 4 ML: 10 INJECTION, SOLUTION EPIDURAL; INFILTRATION; INTRACAUDAL; PERINEURAL at 16:11

## 2020-08-10 RX ADMIN — BETAMETHASONE SODIUM PHOSPHATE AND BETAMETHASONE ACETATE 1.98 MG: 3; 3 INJECTION, SUSPENSION INTRA-ARTICULAR; INTRALESIONAL; INTRAMUSCULAR; SOFT TISSUE at 16:10

## 2020-08-10 ASSESSMENT — ENCOUNTER SYMPTOMS
EYES NEGATIVE: 1
COUGH: 0
RHINORRHEA: 0
NAUSEA: 0
VOMITING: 0
COLOR CHANGE: 0

## 2020-08-10 NOTE — PROGRESS NOTES
Patient ID: Debbie Pastor is a 81 y.o. female. Chief Complaint   Patient presents with    Knee Pain     right leg pain         HPI  Ms. Avery Wang is a 69-year-old female who presents for evaluation of right knee and right leg pain. Patient states she has had this pain for many years however the past 6 months this pain has gradually worsened. She did have a fall last week however her right knee pain has remained relatively the same despite that follow-up. Patient notes severe stiffness and great difficulty straightening this leg. She has difficulty standing and almost no ability to walk around due to the pain and stiffness in this leg. She also notes a rash to the right lower leg however she denies any fever or chills. Patient has a history of cellulitis on this right side which she states usually resolves with anti-biotics. She was evaluated in the ED on 8/1/2020 for this pain where she had x-rays of his right knee showing significant degenerative changes. She also had an Doppler of the right lower extremity which found no evidence of DVT. And she was instructed to follow-up with us. Patient saw Dr. Erlin Zazueta in June 2020 for evaluation of right hip pain underwent an intra-articular right hip injection which did provide mild relief of her right hip pain however she continues to have pain over this right thigh and right knee.     Past Medical History:   Diagnosis Date    Anxiety     Arthritis     Basal cell carcinoma     Cancer (HCC)     skin    CHF (congestive heart failure) (HCC)     Depression     Gout     H/O total hysterectomy 2/7/2018    Hyperlipidemia     Hypertension     Hypokalemia     Hypothyroidism     Kidney stone     Kidney stone     Melanoma [facility])      Past Surgical History:   Procedure Laterality Date    HYSTERECTOMY      HYSTERECTOMY, VAGINAL      TUBAL LIGATION       Family History   Problem Relation Age of Onset    Heart Disease Father    Ghazala Exon Other Mother  in her sleep unknown cause     Social History     Occupational History    Not on file   Tobacco Use    Smoking status: Former Smoker     Last attempt to quit: 1960     Years since quittin.3    Smokeless tobacco: Never Used   Substance and Sexual Activity    Alcohol use: No     Alcohol/week: 0.0 standard drinks    Drug use: No    Sexual activity: Never        Review of Systems   Constitutional: Negative for chills and fever. HENT: Negative for congestion and rhinorrhea. Eyes: Negative. Respiratory: Negative for cough. Cardiovascular: Negative for chest pain and leg swelling. Gastrointestinal: Negative for nausea and vomiting. Musculoskeletal: Positive for arthralgias (Right knee ) and myalgias (Right thigh). Skin: Negative for color change and rash. Neurological: Negative for dizziness and numbness. Physical Exam  Constitutional:       Appearance: She is well-developed. HENT:      Head: Normocephalic and atraumatic. Neck:      Musculoskeletal: Normal range of motion and neck supple. Cardiovascular:      Rate and Rhythm: Normal rate. Pulmonary:      Effort: Pulmonary effort is normal.   Abdominal:      Palpations: Abdomen is soft. Musculoskeletal:      Comments: right Knee   Swelling: Mild  Effusion: Trace  Tenderness: Medial joint line and Lateral joint line     Range of Motion:     Extension: Inability to extend past 70 degrees of flexion    Flexion: 120     McMurrays: Negative  Anterior Lachmann: Negative  Posterior Lachmann: Negative  Anterior Drawer: Negative  Posterior Drawer: Negative  Varus Stress Test: Negative  Valgus Stress Test: Negative  Pivot Shift: Negative       Skin:     General: Skin is warm and dry. Findings: No rash. Neurological:      Mental Status: She is alert and oriented to person, place, and time. Psychiatric:         Behavior: Behavior normal.         Assessment:     Encounter Diagnoses   Name Primary?     Localized osteoarthritis of right knee Yes    Cellulitis of right lower extremity     Arthritis of right hip      8/1/2020 XR right knee  FINDINGS:    Bones are demineralized.  No knee joint effusion.  No acute bony process. Mild tricompartmental degenerative changes with associated joint space    narrowing.              Impression    1. No acute bony process. 2. Mild tricompartmental degenerative changes with associated joint space    narrowing. These images are personally reviewed by me show evidence of moderate degenerative changes of these images were nonweightbearing however given to the lack of ability to extend, do not feel these are the best images to represent severity of DJD    Plan:     Ms. Kaykay Morris is a 61-year-old female who presents for evaluation of chronic right knee pain and stiffness. She has severe limitation of range of motion on examination as well as pain with any attempt at passive range of motion.   - Recommend celestone injection today which was given as outlined below  - Started on Omnicef 300 mg BID x10 days  - F/u in 2 weeks for re-evaluation of cellulitis and knee pain after injection given    Procedure: Right knee celestone injection  An informed verbal consent for the procedure was obtained and risks including, but not limited to: allergy to medications, injection, bleeding, stiffness of joint, recurrence of symptoms, loss of function, swelling, drainage, irrigation, need for surgery and pseudo-septic inflammation, were explained to the patient. Also, discussed was the potential for further injections, irrigation and debridement and surgery. Alternate means of treatment have also been discussed with the patient. Under sterile conditions the lateral joint line right knee is prepped with Betadine and alcohol. Using a 25-gauge needle 4 cc of lidocaine is injected superficially. Subsequently using a 21-gauge needle 2 cc of Celestone is injected into the right knee joint.   A bandage is applied to the injection site. Patient tolerated procedure well. No orders of the defined types were placed in this encounter. Narendra Armstrong, [id number]    Please note that this chart was generated using voicerecognition Dragon dictation software. Although every effort was made to ensurethe accuracy of this automated transcription, some errors in transcription may haveoccurred.

## 2020-08-11 ENCOUNTER — OFFICE VISIT (OUTPATIENT)
Dept: FAMILY MEDICINE CLINIC | Age: 85
End: 2020-08-11
Payer: MEDICARE

## 2020-08-11 VITALS
BODY MASS INDEX: 15.35 KG/M2 | TEMPERATURE: 97.8 F | SYSTOLIC BLOOD PRESSURE: 112 MMHG | HEART RATE: 94 BPM | WEIGHT: 98 LBS | DIASTOLIC BLOOD PRESSURE: 78 MMHG | OXYGEN SATURATION: 97 %

## 2020-08-11 PROBLEM — S32.591D: Status: RESOLVED | Noted: 2020-01-28 | Resolved: 2020-08-11

## 2020-08-11 PROBLEM — I21.4 NSTEMI (NON-ST ELEVATED MYOCARDIAL INFARCTION) (HCC): Status: RESOLVED | Noted: 2020-01-29 | Resolved: 2020-08-11

## 2020-08-11 PROBLEM — L89.152 PRESSURE INJURY OF COCCYGEAL REGION, STAGE 2 (HCC): Status: RESOLVED | Noted: 2020-03-10 | Resolved: 2020-08-11

## 2020-08-11 PROCEDURE — 1090F PRES/ABSN URINE INCON ASSESS: CPT | Performed by: INTERNAL MEDICINE

## 2020-08-11 PROCEDURE — 4040F PNEUMOC VAC/ADMIN/RCVD: CPT | Performed by: INTERNAL MEDICINE

## 2020-08-11 PROCEDURE — G8427 DOCREV CUR MEDS BY ELIG CLIN: HCPCS | Performed by: INTERNAL MEDICINE

## 2020-08-11 PROCEDURE — G8419 CALC BMI OUT NRM PARAM NOF/U: HCPCS | Performed by: INTERNAL MEDICINE

## 2020-08-11 PROCEDURE — 99214 OFFICE O/P EST MOD 30 MIN: CPT | Performed by: INTERNAL MEDICINE

## 2020-08-11 PROCEDURE — 0509F URINE INCON PLAN DOCD: CPT | Performed by: INTERNAL MEDICINE

## 2020-08-11 PROCEDURE — G8400 PT W/DXA NO RESULTS DOC: HCPCS | Performed by: INTERNAL MEDICINE

## 2020-08-11 PROCEDURE — 1036F TOBACCO NON-USER: CPT | Performed by: INTERNAL MEDICINE

## 2020-08-11 PROCEDURE — 1123F ACP DISCUSS/DSCN MKR DOCD: CPT | Performed by: INTERNAL MEDICINE

## 2020-08-11 RX ORDER — DONEPEZIL HYDROCHLORIDE 5 MG/1
5 TABLET, FILM COATED ORAL NIGHTLY
Qty: 30 TABLET | Refills: 5 | Status: SHIPPED | OUTPATIENT
Start: 2020-08-11 | End: 2021-01-21

## 2020-08-11 NOTE — PROGRESS NOTES
Pt is here today to EST from [place]. Her daughter Eduin Florence states she is having some memory, balance issues. She did see a Ortho on 8/10/2020 and given antibiotic and injections in her knee.

## 2020-08-11 NOTE — PROGRESS NOTES
Subjective:       Patient ID:     Car Angelo is a 81 y.o. female who presents for   Chief Complaint   Patient presents with   Tre Bryant New Doctor    Memory Loss    Insomnia       HPI:  Nursing note reviewed and discussed with patient. Here today to transfer care from Soniya Crane NP, would like to continue coming to [facility]. She is here with her daughter today who is primary caregiver and provides most of the history. Da Dey is losing her memory, she remembers Rachel Bolden things worth remembering\" - forgets meds, names, phone numbers, people. Remembers things from long ago. The sore on her bottom has healed well. She has foot deformities that make finding good shoes hard, so she is wearing house slippers to her appointment today. She has difficulty falling asleep at night even on days she does not nap in the afternoon. She also would like an order for urinary incontinence pads and diapers because she is needing four to six per day and they are really expensive for her. She has both stress incontinence as well as urge incontinence, almost never makes it to the bathroom. She denies any dysuria, hematuria, urinary frequency nausea, vomiting, fevers, chills. She ambulates with a walker, denies any falls. She is compliant with all her medicines for her heart failure. She is not taking any NSAIDs due to heart failure and kidney disease. She denies chest pain, palpitations, dizziness, peripheral edema, shortness of breath. She has chronic fatigue that has been unchanged for years now. Patient's medications, allergies, past medical, surgical, social and family histories were reviewed and updated as appropriate.     Past Medical History:   Diagnosis Date    Anxiety     Arthritis     Basal cell carcinoma     Cancer (HCC)     skin    CHF (congestive heart failure) (HCC)     Depression     Gout     H/O total hysterectomy 2/7/2018    Hyperlipidemia     Hypertension     Hypokalemia     Hypothyroidism     Kidney stone     Kidney stone     Melanoma [facility])      Past Surgical History:   Procedure Laterality Date    HYSTERECTOMY      HYSTERECTOMY, VAGINAL      TUBAL LIGATION         Social History     Tobacco Use    Smoking status: Former Smoker     Last attempt to quit: 1960     Years since quittin.3    Smokeless tobacco: Never Used   Substance Use Topics    Alcohol use: No     Alcohol/week: 0.0 standard drinks      Patient Active Problem List   Diagnosis    Essential hypertension    [facility]    Hypothyroidism    Depression    Hyperlipidemia    Basal cell carcinoma    [facility]anoma ([facility]    Anxiety    Hypokalemia    Acute idiopathic gout of right foot    CKD (chronic kidney disease) stage 3, GFR 30-59 ml/min (HCC)    Risk for falls    Pneumococcal vaccine refused    Influenza vaccination declined    Chronic fatigue    Bilateral lower extremity pain    Closed fracture of single pubic ramus of pelvis with routine healing, right    [facility]non-ST elevated [facility]    Chronic diastolic heart failure (HCC)    Acute cystitis without hematuria    Chronic systolic heart failure (HCC)    Pressure injury of coccygeal region, stage 2 ([facility]    Decreased mobility         Prior to Visit Medications    Medication Sig Taking?  Authorizing Provider   cefdinir (OMNICEF) 300 MG capsule Take 1 capsule by mouth 2 times daily for 10 days Yes KAY Barclay   busPIRone (BUSPAR) 15 MG tablet TAKE 1 TABLET DAILY Yes Cecille Lopez MD   levothyroxine (SYNTHROID) 100 MCG tablet TAKE 1 TABLET DAILY Yes Cecille Lopez MD   furosemide (LASIX) 20 MG tablet take 1 tablet by mouth once daily Yes Kari Waggoner APRN - CNP   carvedilol (COREG) 12.5 MG tablet Take 1 tablet by mouth 2 times daily (with meals) Yes Cecille Lopez MD   PARoxetine (PAXIL) 40 MG tablet Take 1 tablet by mouth every morning Yes Cecille Lopez MD   atorvastatin (LIPITOR) 10 MG tablet Take 1 tablet by mouth daily Yes Cecille Oakley MD   lisinopril (PRINIVIL;ZESTRIL) 20 MG tablet Take 1 tablet by mouth daily Yes Kye Shi MD   aspirin 81 MG tablet Take 81 mg by mouth daily Yes Historical Provider, MD     Review of Systems  Review of Systems   Constitutional: Negative for fatigue, fever and unexpected weight change. Respiratory: Negative for cough, choking, chest tightness, shortness of breath and wheezing. Cardiovascular: Negative for chest pain, palpitations and leg swelling. Gastrointestinal: Negative for abdominal pain, anal bleeding, blood in stool, constipation, diarrhea, nausea and vomiting. Endocrine: Negative. Musculoskeletal: Negative for joint swelling and myalgias. Skin: Negative. Neurological: Negative for dizziness. Psychiatric/Behavioral: Positive for sleep disturbance. All other systems reviewed and are negative. Objective:       Physical Exam:  /78 (Site: Left Upper Arm, Position: Sitting, Cuff Size: Small Adult)   Pulse 94   Temp 97.8 °F (36.6 °C) (Temporal)   Wt 98 lb (44.5 kg)   SpO2 97%   BMI 15.35 kg/m²   Physical Exam  Vitals signs and nursing note reviewed. Constitutional:       General: She is not in acute distress. Appearance: She is well-developed. She is not ill-appearing. Comments: Ambulating using walker    Eyes:      General: Lids are normal. Vision grossly intact. Cardiovascular:      Rate and Rhythm: Normal rate and regular rhythm. Heart sounds: Normal heart sounds, S1 normal and S2 normal. No murmur. No friction rub. No gallop. Pulmonary:      Effort: Pulmonary effort is normal. No respiratory distress. Breath sounds: Normal breath sounds. No wheezing. Abdominal:      General: Bowel sounds are normal.      Palpations: Abdomen is soft. There is no mass. Tenderness: There is no abdominal tenderness. There is no guarding. Musculoskeletal: Normal range of motion.       Left foot: Bunion 03/06/2020 0.0     Differential Type 03/06/2020 NOT REPORTED     Seg Neutrophils 03/06/2020 75*    Lymphocytes 03/06/2020 12*    Monocytes 03/06/2020 8     Eosinophils % 03/06/2020 3     Basophils 03/06/2020 1     Immature Granulocytes 03/06/2020 1*    Segs Absolute 03/06/2020 7.13     Absolute Lymph # 03/06/2020 1.11     Absolute Mono # 03/06/2020 0.78     Absolute Eos # 03/06/2020 0.25     Basophils Absolute 03/06/2020 0.11     Absolute Immature Granul* 03/06/2020 0.05     WBC Morphology 03/06/2020 NOT REPORTED     RBC Morphology 03/06/2020 ANISOCYTOSIS PRESENT     Platelet Estimate 97/30/2299 NOT REPORTED     Thyroxine, Free 03/06/2020 2.34[facility] Visit on 02/12/2020   Component Date Value    Glucose 02/12/2020 131*    BUN 02/12/2020 23     CREATININE 02/12/2020 1.12*    Bun/Cre Ratio 02/12/2020 NOT REPORTED     Calcium 02/12/2020 9.0     Sodium 02/12/2020 142     Potassium 02/12/2020 3.5*    Chloride 02/12/2020 100     CO2 02/12/2020 25     Anion Gap 02/12/2020 17     GFR Non- 02/12/2020 46*    GFR  02/12/2020 56*    GFR Comment 02/12/2020          GFR Staging 02/12/2020 NOT REPORTED           Assessment/Plan:      1. Essential hypertension  Continue current regimen. 2. Cancer [facility])  Follow-up oncology, status post treatment, remains in remission at this time. 3. Chronic systolic heart failure (HCC)  Continue current medications, follow-up cardiology as indicated. 4. Chronic diastolic heart failure [facility])  Follow-up cardiology, continue current regimen. 5. CKD (chronic kidney disease) stage 3, GFR 30-59 ml/min ([facility])  Avoid nephrotoxins and NSAIDs, continue current regimen. Blood pressure at goal.    6. Hypothyroidism, unspecified type  Continue Synthroid. 7. Moderate episode of recurrent major depressive disorder (HCC)  Continue current medications.     8. Hyperlipidemia, unspecified hyperlipidemia type  Continue current treatment. 9. Mixed stress and urge urinary incontinence  - Diapers & Supplies MISC; 1 each by Does not apply route 4 times daily  Dispense: 120 each; Refill: 5    10. Memory difficulties  - donepezil (ARICEPT) 5 MG tablet; Take 1 tablet by mouth nightly  Dispense: 30 tablet; Refill: 5    11. Sleeping difficulty  - doxyLAMINE succinate (GNP SLEEP AID) 25 MG tablet; Take 1 tablet by mouth nightly  Dispense: 30 tablet; Refill: 3    12.  Deformity of left foot  - [facility] DPM, Podiatry, [facility] Maintenance Due   Topic Date Due    DEXA (modify frequency per FRAX score)  07/03/1991       Electronically signed by Thelma Marino MD on 8/11/2020 at 3:23 PM

## 2020-08-14 ASSESSMENT — ENCOUNTER SYMPTOMS
BLOOD IN STOOL: 0
ABDOMINAL PAIN: 0
NAUSEA: 0
WHEEZING: 0
CHOKING: 0
CONSTIPATION: 0
COUGH: 0
SHORTNESS OF BREATH: 0
DIARRHEA: 0
VOMITING: 0
CHEST TIGHTNESS: 0
ANAL BLEEDING: 0

## 2020-08-14 ASSESSMENT — VISUAL ACUITY: OU: 1

## 2020-08-21 ENCOUNTER — TELEPHONE (OUTPATIENT)
Dept: FAMILY MEDICINE CLINIC | Age: 85
End: 2020-08-21

## 2020-08-21 NOTE — TELEPHONE ENCOUNTER
Zeferino from [facility] supply called in regards to order for diapers and supplies. Per Medicare there needs to be a DX and DX for cause and last office visit note with documentation of reason why needed.     N39.46 which is on script  Possibly cause code  R26.89 and N18.3    Phone #  [phone] ext: [phone]  Fax #    [phone]

## 2020-08-24 ENCOUNTER — TELEPHONE (OUTPATIENT)
Dept: FAMILY MEDICINE CLINIC | Age: 85
End: 2020-08-24

## 2020-08-24 NOTE — TELEPHONE ENCOUNTER
[facility] stated that the prescription that was sent over stated Diapers and Supplies. If pads or liners are also needed a new prescription specifically stating that would need to be sent over or the insurance company would not approve pads and/or liners.

## 2020-08-31 ENCOUNTER — OFFICE VISIT (OUTPATIENT)
Dept: PODIATRY | Age: 85
End: 2020-08-31
Payer: MEDICARE

## 2020-08-31 VITALS — WEIGHT: 98 LBS | HEIGHT: 67 IN | BODY MASS INDEX: 15.38 KG/M2

## 2020-08-31 PROCEDURE — G8419 CALC BMI OUT NRM PARAM NOF/U: HCPCS | Performed by: PODIATRIST

## 2020-08-31 PROCEDURE — 1123F ACP DISCUSS/DSCN MKR DOCD: CPT | Performed by: PODIATRIST

## 2020-08-31 PROCEDURE — G8400 PT W/DXA NO RESULTS DOC: HCPCS | Performed by: PODIATRIST

## 2020-08-31 PROCEDURE — 99203 OFFICE O/P NEW LOW 30 MIN: CPT | Performed by: PODIATRIST

## 2020-08-31 PROCEDURE — 1090F PRES/ABSN URINE INCON ASSESS: CPT | Performed by: PODIATRIST

## 2020-08-31 PROCEDURE — 4040F PNEUMOC VAC/ADMIN/RCVD: CPT | Performed by: PODIATRIST

## 2020-08-31 PROCEDURE — 1036F TOBACCO NON-USER: CPT | Performed by: PODIATRIST

## 2020-08-31 PROCEDURE — 11721 DEBRIDE NAIL 6 OR MORE: CPT | Performed by: PODIATRIST

## 2020-08-31 PROCEDURE — G8427 DOCREV CUR MEDS BY ELIG CLIN: HCPCS | Performed by: PODIATRIST

## 2020-08-31 ASSESSMENT — ENCOUNTER SYMPTOMS
SHORTNESS OF BREATH: 0
DIARRHEA: 0
BACK PAIN: 0
COLOR CHANGE: 0
NAUSEA: 0

## 2020-08-31 NOTE — PROGRESS NOTES
Serena Dang is a 81 y.o. female who presents to the office today with chief complaint of thick nails to both feet. Chief Complaint   Patient presents with    Nail Problem     b/l nail trim/ last seen Dr. Amber Jiang 8/11/20   Symptoms began greater than 5 year(s) ago. Patient denies injury to the feet. Patient states that the nails are painful with shoe gear and ambulation. Pain is rated 5 out of 10 at it's worst and is described as intermittent. Treatments prior to today's visit include: None. Allergies   Allergen Reactions    Bactrim [Sulfamethoxazole-Trimethoprim]      DIZZINESS       Past Medical History:   Diagnosis Date    Anxiety     Arthritis     Basal cell carcinoma     Cancer (HCC)     skin    CHF (congestive heart failure) (HCC)     Closed fracture of single pubic ramus of pelvis with routine healing, right 1/28/2020    Depression     Gout     H/O total hysterectomy 2/7/2018    Hyperlipidemia     Hypertension     Hypokalemia     Hypothyroidism     Kidney stone     Kidney stone     [facility] ([facility]     NSTEMI (non-ST elevated myocardial infarction) ([facility] 1/29/2020    Pressure injury of coccygeal region, stage 2 ([facility] 3/10/2020       Prior to Admission medications    Medication Sig Start Date End Date Taking?  Authorizing Provider   Diapers & Supplies MISC 1 each by Does not apply route 4 times daily 8/21/20  Yes Cecille Soares MD   doxyLAMINE succinate (GNP SLEEP AID) 25 MG tablet Take 1 tablet by mouth nightly 8/11/20  Yes Cecille Soares MD   busPIRone (BUSPAR) 15 MG tablet TAKE 1 TABLET DAILY 8/7/20  Yes Cecille Soares MD   levothyroxine (SYNTHROID) 100 MCG tablet TAKE 1 TABLET DAILY 6/22/20  Yes Cecille Soares MD   furosemide (LASIX) 20 MG tablet take 1 tablet by mouth once daily 6/9/20  Yes NORMA Tovar - CNP   carvedilol (COREG) 12.5 MG tablet Take 1 tablet by mouth 2 times daily (with meals) 3/6/20  Yes Cecille Soares MD   PARoxetine (PAXIL) 40 MG tablet Take 1 tablet by mouth every morning 3/6/20  Yes Cecille Regan MD   atorvastatin (LIPITOR) 10 MG tablet Take 1 tablet by mouth daily 3/6/20  Yes Cecille Regan MD   lisinopril (PRINIVIL;ZESTRIL) 20 MG tablet Take 1 tablet by mouth daily 3/6/20  Yes Masha Galindo MD   aspirin 81 MG tablet Take 81 mg by mouth daily   Yes Historical Provider, MD   donepezil (ARICEPT) 5 MG tablet Take 1 tablet by mouth nightly  Patient not taking: Reported on 2020   Masha Galindo MD       Past Surgical History:   Procedure Laterality Date    HYSTERECTOMY      HYSTERECTOMY, VAGINAL      TUBAL LIGATION         Family History   Problem Relation Age of Onset    Heart Disease Father     Other Mother          in her sleep unknown cause       Social History     Tobacco Use    Smoking status: Former Smoker     Last attempt to quit: 1960     Years since quittin.3    Smokeless tobacco: Never Used   Substance Use Topics    Alcohol use: No     Alcohol/week: 0.0 standard drinks       Review of Systems   Constitutional: Negative for activity change, appetite change, chills, diaphoresis, fatigue and fever. Respiratory: Negative for shortness of breath. Cardiovascular: Negative for leg swelling. Gastrointestinal: Negative for diarrhea and nausea. Endocrine: Negative for cold intolerance, heat intolerance and polyuria. Musculoskeletal: Positive for arthralgias and gait problem. Negative for back pain, joint swelling and myalgias. Skin: Negative for color change, pallor, rash and wound. Allergic/Immunologic: Negative for environmental allergies and food allergies. Neurological: Negative for dizziness, weakness, light-headedness and numbness. Hematological: Does not bruise/bleed easily. Psychiatric/Behavioral: Negative for behavioral problems, confusion and self-injury. The patient is not nervous/anxious. Vitals: There were no vitals filed for this visit.     General: AAO x 3 in NAD. Integument: There are no rashes, ulcers, or breaks in the skin noted to the bilateral lower extremities. There is no induration, subcutaneous nodules, or tightening of the skin noted to the bilateral.     Toenails 1-5 of the right foot do present with thickness, elongation, discoloration, brittleness, subungual debris. Toenails 1-5 of the left foot do present with thickness, elongation, discoloration, brittleness, subungual debris. There is pain with palpation and debridement of toenails 1-5 of the right foot and 1-5 of the left foot. Interdigital maceration absent to web spaces 1-4, Bilateral.     There are no preulcerative lesions noted to the right foot. There are no preulcerative lesions noted to the left foot. The skin to the bilateral feet is not thin and shiny. The skin to the bilateral feet is  warm, supple, and dry. Vascular: DP pulse of the right foot is not palpable. DP pulse of the left foot is not palpable. PT pulse of the right foot is not palpable. PT pulse of the left foot is not palpable. CFT is less than 3 secs to the digits of the right foot. CFT is less than 3 secs to the digits of the left foot. There is no edema noted to the bilateral foot or ankle. There is no hair growth noted to the digits of the bilateral feet. There are varicosities noted to the right foot/ankle. There are varicosities noted to the left foot/ankle. Erythema is absent to the bilateral feet. Neurological: Reflexes are present to the right plantar foot and to the Achilles tendon. Reflexes are present to the left plantar foot and to the Achilles tendon. Epicritic sensation is  intact to the right foot. Epicritic sensation is  intact to the left foot. Musculoskeletal:  Muscle strength is +5/5 to all four muscle groups of the right lower extremity and +5/5 to all four muscle groups of the left lower extremity.      There are no areas of subluxation, dislocation, or laxity noted to either lower extremity. Range of motion to the right ankle is  free of pain or grinding. Range of motion to the left ankle is  free of pain or grinding. Range of motion to the right subtalar joint is  free of pain or grinding. Range of motion to the left subtalar joint is  free of pain or grinding. No abnormalities, asymmetries, or misalignments are seen between the extremities. Weightbearing evaluation was not performed as the patient is nonweightbearing. The digits of the right foot are contracted. The digits of the left foot are contracted. There is prominence noted to the first metatarsal head with abduction of the hallux of the right foot. There is prominence noted to the first metatarsal head with abduction of the hallux of the left foot. Shoe examination was performed. Biomechanical Exam: Patient does not walk. Asessment: Patient is a 81 y.o. female with:    Diagnosis Orders   1. Onychomycosis of toenail  ID DEBRIDEMENT OF NAILS, 6 OR MORE   2. Pain of toes of both feet  ID DEBRIDEMENT OF NAILS, 6 OR MORE   3. Peripheral vascular disorder (HCC)  ID DEBRIDEMENT OF NAILS, 6 OR MORE       Plan:  1. Clinical evaluation of the patient. 2. Toenails 1-5 of the right foot and 1-5 of the left foot were debrided in length and thickness using a nail nipper and a . 3. Contact office with any questions/problems/concerns. Return in about 9 weeks (around 11/2/2020) for At risk foot care.    8/31/2020      Dorita Myers DPM

## 2020-09-02 RX ORDER — ATORVASTATIN CALCIUM 10 MG/1
TABLET, FILM COATED ORAL
Qty: 90 TABLET | Refills: 1 | Status: SHIPPED | OUTPATIENT
Start: 2020-09-02 | End: 2021-03-01 | Stop reason: SDUPTHER

## 2020-09-02 RX ORDER — CARVEDILOL 12.5 MG/1
TABLET ORAL
Qty: 180 TABLET | Refills: 1 | Status: SHIPPED | OUTPATIENT
Start: 2020-09-02 | End: 2021-03-01 | Stop reason: SDUPTHER

## 2020-09-02 RX ORDER — PAROXETINE HYDROCHLORIDE 40 MG/1
TABLET, FILM COATED ORAL
Qty: 90 TABLET | Refills: 1 | Status: SHIPPED | OUTPATIENT
Start: 2020-09-02 | End: 2021-03-01 | Stop reason: SDUPTHER

## 2020-09-02 NOTE — TELEPHONE ENCOUNTER
fatigue     Bilateral lower extremity pain     Chronic diastolic heart failure (HCC)     Acute cystitis without hematuria     Chronic systolic heart failure (HCC)     Decreased mobility

## 2020-09-03 ENCOUNTER — OFFICE VISIT (OUTPATIENT)
Dept: ORTHOPEDIC SURGERY | Age: 85
End: 2020-09-03
Payer: MEDICARE

## 2020-09-03 VITALS — BODY MASS INDEX: 15.38 KG/M2 | HEART RATE: 98 BPM | WEIGHT: 98 LBS | HEIGHT: 67 IN

## 2020-09-03 PROCEDURE — 20610 DRAIN/INJ JOINT/BURSA W/O US: CPT | Performed by: PHYSICIAN ASSISTANT

## 2020-09-03 PROCEDURE — G8427 DOCREV CUR MEDS BY ELIG CLIN: HCPCS | Performed by: PHYSICIAN ASSISTANT

## 2020-09-03 PROCEDURE — 1090F PRES/ABSN URINE INCON ASSESS: CPT | Performed by: PHYSICIAN ASSISTANT

## 2020-09-03 PROCEDURE — 4040F PNEUMOC VAC/ADMIN/RCVD: CPT | Performed by: PHYSICIAN ASSISTANT

## 2020-09-03 PROCEDURE — 1036F TOBACCO NON-USER: CPT | Performed by: PHYSICIAN ASSISTANT

## 2020-09-03 PROCEDURE — G8400 PT W/DXA NO RESULTS DOC: HCPCS | Performed by: PHYSICIAN ASSISTANT

## 2020-09-03 PROCEDURE — 99213 OFFICE O/P EST LOW 20 MIN: CPT | Performed by: PHYSICIAN ASSISTANT

## 2020-09-03 PROCEDURE — 1123F ACP DISCUSS/DSCN MKR DOCD: CPT | Performed by: PHYSICIAN ASSISTANT

## 2020-09-03 PROCEDURE — G8419 CALC BMI OUT NRM PARAM NOF/U: HCPCS | Performed by: PHYSICIAN ASSISTANT

## 2020-09-03 RX ORDER — LIDOCAINE HYDROCHLORIDE 10 MG/ML
4 INJECTION, SOLUTION INFILTRATION; PERINEURAL ONCE
Status: DISCONTINUED | OUTPATIENT
Start: 2020-09-03 | End: 2021-03-10

## 2020-09-03 RX ORDER — BETAMETHASONE SODIUM PHOSPHATE AND BETAMETHASONE ACETATE 3; 3 MG/ML; MG/ML
12 INJECTION, SUSPENSION INTRA-ARTICULAR; INTRALESIONAL; INTRAMUSCULAR; SOFT TISSUE ONCE
Status: COMPLETED | OUTPATIENT
Start: 2020-09-03 | End: 2020-09-03

## 2020-09-03 RX ORDER — BUPIVACAINE HYDROCHLORIDE 5 MG/ML
4 INJECTION, SOLUTION PERINEURAL ONCE
Status: COMPLETED | OUTPATIENT
Start: 2020-09-03 | End: 2020-09-03

## 2020-09-03 RX ADMIN — BETAMETHASONE SODIUM PHOSPHATE AND BETAMETHASONE ACETATE 12 MG: 3; 3 INJECTION, SUSPENSION INTRA-ARTICULAR; INTRALESIONAL; INTRAMUSCULAR; SOFT TISSUE at 11:38

## 2020-09-03 RX ADMIN — BUPIVACAINE HYDROCHLORIDE 20 MG: 5 INJECTION, SOLUTION PERINEURAL at 11:37

## 2020-09-03 ASSESSMENT — ENCOUNTER SYMPTOMS
EYES NEGATIVE: 1
NAUSEA: 0
VOMITING: 0
COUGH: 0
RHINORRHEA: 0
COLOR CHANGE: 0

## 2020-09-03 NOTE — PROGRESS NOTES
Patient ID: Nayla Rodriguez is a 81 y.o. female. Chief Complaint   Patient presents with    Leg Pain     f/u right leg. hip is worse than knee at this time. HPI  Ms. Shaun Sharp is an 52-year-old female returns today for reevaluation of cellulitis right lower extremity as well as right hip pain. Patient has completed Omnicef 300 mg x 10 days and states the rash has complete resolved at this point time. Her main complaint today is his continued right hip pain. She has end-stage DJD of this right hip and has underwent intra-articular right hip injections most recently on 2020. She states this did help relieve her pain significantly however it only lasted for a couple days. The majority of patient's pain today is actually over the lateral aspect of the right hip and goes down the lateral thigh. She has a great deal of difficulty laying on the side as well as range of motion at this right hip. Patient denies any recent injury, trauma or fall. She denies any hip or knee redness, swelling, warmth, fever or chills.       Past Medical History:   Diagnosis Date    Anxiety     Arthritis     Basal cell carcinoma     Cancer (HCC)     skin    CHF (congestive heart failure) (HCC)     Closed fracture of single pubic ramus of pelvis with routine healing, right 2020    Depression     Gout     H/O total hysterectomy 2018    Hyperlipidemia     Hypertension     Hypokalemia     Hypothyroidism     Kidney stone     Kidney stone     Melanoma ([address]     NSTEMI (non-ST elevated myocardial infarction) ([address] 2020    Pressure injury of coccygeal region, stage 2 ([address] 3/10/2020     Past Surgical History:   Procedure Laterality Date    HYSTERECTOMY      HYSTERECTOMY, VAGINAL      TUBAL LIGATION       Family History   Problem Relation Age of Onset    Heart Disease Father     Other Mother          in her sleep unknown cause     Social History     Occupational History    Not on file Tobacco Use    Smoking status: Former Smoker     Last attempt to quit: 1960     Years since quittin.3    Smokeless tobacco: Never Used   Substance and Sexual Activity    Alcohol use: No     Alcohol/week: 0.0 standard drinks    Drug use: No    Sexual activity: Never        Review of Systems   Constitutional: Negative for chills and fever. HENT: Negative for congestion and rhinorrhea. Eyes: Negative. Respiratory: Negative for cough. Cardiovascular: Negative for chest pain and leg swelling. Gastrointestinal: Negative for nausea and vomiting. Musculoskeletal: Positive for arthralgias (Right hip and right knee). Skin: Negative for color change and rash. Neurological: Negative for dizziness and numbness. Physical Exam  Constitutional:       Appearance: She is well-developed. HENT:      Head: Normocephalic and atraumatic. Neck:      Musculoskeletal: Normal range of motion and neck supple. Cardiovascular:      Rate and Rhythm: Normal rate. Pulmonary:      Effort: Pulmonary effort is normal.   Abdominal:      Palpations: Abdomen is soft. Musculoskeletal:      Comments: right Hip   Tenderness: Greater trochanter     Range of Motion:     Extension: -20    Flexion: 80    Internal Rotation: 5    External Rotation: 20    Abduction: 30    Adduction:  20     Muscle Strength     Abduction: 5/5    Adduction: 5/5    Flexion: 5/5       Gait: Antalgic  Kang Test: Positive  Roger Test: Positive     Skin:     General: Skin is warm and dry. Findings: No rash. Neurological:      Mental Status: She is alert and oriented to person, place, and time. Psychiatric:         Behavior: Behavior normal.         Assessment:     Encounter Diagnoses   Name Primary?     Right hip pain Yes    Trochanteric bursitis, right hip     Primary osteoarthritis of right hip          Plan:     RLE Cellulitis  -Patient compelted 10-day course of Omnicef 300 mg twice daily and had complete resolution of the rash of the right lower extremity. Right hip pain  -Patient does have known end-stage DJD of this right hip however the majority of her symptoms seem to be coming from the lateral hip today consistent with trochanteric bursitis. -Recommend a trochanteric bursal injection today which patient is given as outlined below  -Patient also given a referral for IR intraarticular hip injection should this bursal injection not provide adequate relief. She is educated that we must wait until 9/29 for this intra-articular injection as she had on June 29 of this year we must wait 3 months.  -Should both these injections fail to provide significant relief of patient's pain would recommend follow-up with Dr. Trina Grullon to discuss next steps in treatment (I.e. JOSELINE vs repeat injections)  -Patient and daughters are present in the room and agrees to plan at this current time. Procedure: right trochanteric bursal injection  An informed verbal consent for the procedure was obtained and risks including, but not limited to: allergy to medications, injection, bleeding, stiffness of joint, recurrence of symptoms, loss of function, swelling, drainage, irrigation, need for surgery and pseudo-septic inflammation, were explained to the patient. Also, discussed was the potential for further injections, irrigation and debridement and surgery. Alternate means of treatment have also been discussed with the patient.     Administrations This Visit     betamethasone acetate-betamethasone sodium phosphate (CELESTONE) injection 12 mg     Admin Date  09/03/2020  11:38 Action  Given Dose  12 mg Route  Other Site   Administered By  Jackeline Harvey MA    Ordering Provider:  KAY Rizzo    NDC:  [id number]    Lot#:  [id number]    :  [id number] [address]     Patient Supplied?:  No          bupivacaine (MARCAINE) 0.5 % injection 20 mg     Admin Date  09/03/2020  11:37 Action  Given Dose  20 mg Route  Intra-articular Site Administered By  Mitch Beauchamp MA    Ordering Provider:  KAY Salgado. [address]:  [id number]    Lot#:  [id number]    :  [facility]    Patient Supplied?:  No                  Under sterile conditions the right greater trochanter is prepped with Betadine and alcohol. Using a 25-gauge needle 4 cc of 0.5% Marcaine without epinephrine is injected superficially. Subsequently using a 21-gauge needle 2 cc of 6mg/ml Celestone is injected into the right trochanteric bursa. A bandage is applied to the injection site. Patient tolerated procedure well. No orders of the defined types were placed in this encounter. Kiara Salgado      Please note that this chart was generated using voicerecognition Dragon dictation software. Although every effort was made to ensurethe accuracy of this automated transcription, some errors in transcription may haveoccurred.

## 2020-09-10 ENCOUNTER — TELEPHONE (OUTPATIENT)
Dept: INTERVENTIONAL RADIOLOGY/VASCULAR | Age: 85
End: 2020-09-10

## 2020-09-15 RX ORDER — LIDOCAINE HYDROCHLORIDE 10 MG/ML
4 INJECTION, SOLUTION EPIDURAL; INFILTRATION; INTRACAUDAL; PERINEURAL ONCE
Status: CANCELLED | OUTPATIENT
Start: 2020-09-17

## 2020-09-15 RX ORDER — METHYLPREDNISOLONE ACETATE 80 MG/ML
80 INJECTION, SUSPENSION INTRA-ARTICULAR; INTRALESIONAL; INTRAMUSCULAR; SOFT TISSUE ONCE
Status: CANCELLED | OUTPATIENT
Start: 2020-09-17

## 2020-09-15 RX ORDER — BUPIVACAINE HYDROCHLORIDE 5 MG/ML
4 INJECTION, SOLUTION EPIDURAL; INTRACAUDAL ONCE
Status: CANCELLED | OUTPATIENT
Start: 2020-09-17

## 2020-09-23 ENCOUNTER — HOSPITAL ENCOUNTER (EMERGENCY)
Age: 85
Discharge: HOME | End: 2020-09-23
Attending: EMERGENCY MEDICINE
Payer: MEDICARE

## 2020-09-23 ENCOUNTER — APPOINTMENT (OUTPATIENT)
Dept: GENERAL RADIOLOGY | Age: 85
End: 2020-09-23
Payer: MEDICARE

## 2020-09-23 VITALS
RESPIRATION RATE: 20 BRPM | HEART RATE: 65 BPM | TEMPERATURE: 98.1 F | OXYGEN SATURATION: 94 % | WEIGHT: 125 LBS | HEIGHT: 67 IN | DIASTOLIC BLOOD PRESSURE: 91 MMHG | BODY MASS INDEX: 19.62 KG/M2 | SYSTOLIC BLOOD PRESSURE: 161 MMHG

## 2020-09-23 LAB
EKG ATRIAL RATE: 65 BPM
EKG P AXIS: 57 DEGREES
EKG P-R INTERVAL: 212 MS
EKG Q-T INTERVAL: 452 MS
EKG QRS DURATION: 148 MS
EKG QTC CALCULATION (BAZETT): 470 MS
EKG R AXIS: -58 DEGREES
EKG T AXIS: 112 DEGREES
EKG VENTRICULAR RATE: 65 BPM

## 2020-09-23 PROCEDURE — 93010 ELECTROCARDIOGRAM REPORT: CPT | Performed by: INTERNAL MEDICINE

## 2020-09-23 PROCEDURE — 73552 X-RAY EXAM OF FEMUR 2/>: CPT

## 2020-09-23 PROCEDURE — 99284 EMERGENCY DEPT VISIT MOD MDM: CPT

## 2020-09-23 PROCEDURE — 93005 ELECTROCARDIOGRAM TRACING: CPT | Performed by: EMERGENCY MEDICINE

## 2020-09-23 PROCEDURE — 73502 X-RAY EXAM HIP UNI 2-3 VIEWS: CPT

## 2020-09-23 PROCEDURE — 73030 X-RAY EXAM OF SHOULDER: CPT

## 2020-09-23 PROCEDURE — 6370000000 HC RX 637 (ALT 250 FOR IP): Performed by: STUDENT IN AN ORGANIZED HEALTH CARE EDUCATION/TRAINING PROGRAM

## 2020-09-23 RX ORDER — ACETAMINOPHEN 325 MG/1
650 TABLET ORAL ONCE
Status: COMPLETED | OUTPATIENT
Start: 2020-09-23 | End: 2020-09-23

## 2020-09-23 RX ORDER — LIDOCAINE 40 MG/G
CREAM TOPICAL ONCE
Status: COMPLETED | OUTPATIENT
Start: 2020-09-23 | End: 2020-09-23

## 2020-09-23 RX ADMIN — LIDOCAINE: 40 CREAM TOPICAL at 06:02

## 2020-09-23 RX ADMIN — ACETAMINOPHEN 650 MG: 325 TABLET ORAL at 07:44

## 2020-09-23 ASSESSMENT — PAIN SCALES - GENERAL
PAINLEVEL_OUTOF10: 4
PAINLEVEL_OUTOF10: 4

## 2020-09-23 ASSESSMENT — ENCOUNTER SYMPTOMS
BACK PAIN: 0
SHORTNESS OF BREATH: 0
NAUSEA: 0
ABDOMINAL PAIN: 0
VOMITING: 0

## 2020-09-23 ASSESSMENT — PAIN DESCRIPTION - FREQUENCY: FREQUENCY: CONTINUOUS

## 2020-09-23 ASSESSMENT — PAIN DESCRIPTION - LOCATION: LOCATION: ARM;HIP

## 2020-09-23 ASSESSMENT — PAIN DESCRIPTION - ORIENTATION: ORIENTATION: LEFT

## 2020-09-23 ASSESSMENT — PAIN DESCRIPTION - ONSET: ONSET: ON-GOING

## 2020-09-23 ASSESSMENT — PAIN DESCRIPTION - DESCRIPTORS: DESCRIPTORS: ACHING

## 2020-09-23 ASSESSMENT — PAIN DESCRIPTION - PAIN TYPE: TYPE: ACUTE PAIN

## 2020-09-23 NOTE — ED PROVIDER NOTES
[facility] ED  Emergency Department Encounter  Emergency Medicine Resident     Pt Name: Saurabh Montalvo  RJI:[id number]  [place] 1935  Date of evaluation: 9/23/20  PCP:  Tawanda Matias MD    [address] [place] [address]       Chief Complaint   Patient presents with   Maralyn Shear    Leg Pain     left    Arm Pain     left     HISTORY OF PRESENT ILLNESS  (Location/Symptom, Timing/Onset, Context/Setting, Quality, Duration, ModifyingFactors, Severity.)      Saurabh Montalvo is a 81 y.o. female with PMH of CHF, melanoma, hypertension, hyperlipidemia, and hypothyroidism who presents for evaluation of fall. Prior to arrival the patient was trying to adjust the foot rest of her recliner when she fell out of her chair landing on her left side. She denies hitting her head. No LOC. Not on anticoagulants. Currently complaining of left shoulder and left hip pain patient also notes that she has chronic right hip pain which she receives lidocaine injections for, states this is unchanged from baseline. No headache, neck pain, changes in vision, nausea/vomiting, back pain, or other injuries. PAST MEDICAL / SURGICAL / SOCIAL /FAMILY HISTORY      has a past medical history of Anxiety, Arthritis, Basal cell carcinoma, Cancer (HCC), CHF (congestive heart failure) ([address] Closed fracture of single pubic ramus of pelvis with routine healing, right, Depression, Gout, H/O total hysterectomy, Hyperlipidemia, Hypertension, Hypokalemia, Hypothyroidism, Kidney stone, Kidney stone, Melanoma ([address] NSTEMI (non-ST elevated myocardial infarction) ([address] and Pressure injury of coccygeal region, stage 2 ([address] No other pertinent PMH on review with patient/guardian. has a past surgical history that includes Tubal ligation; Hysterectomy; and Hysterectomy, vaginal.  No other pertinent PSH on review with patient/guardian.   Social History     Socioeconomic History    Marital status:      Spouse name: Not on file    Number of children: Not on file    Years of education: Not on file    Highest education level: Not on file   Occupational History    Not on file   Social Needs    Financial resource strain: Not on file    Food insecurity     Worry: Not on file     Inability: Not on file    Transportation needs     Medical: Not on file     Non-medical: Not on file   Tobacco Use    Smoking status: Former Smoker     Last attempt to quit: 1960     Years since quittin.4    Smokeless tobacco: Never Used   Substance and Sexual Activity    Alcohol use: No     Alcohol/week: 0.0 standard drinks    Drug use: No    Sexual activity: Never   Lifestyle    Physical activity     Days per week: Not on file     Minutes per session: Not on file    Stress: Not on file   Relationships    Social connections     Talks on phone: Not on file     Gets together: Not on file     Attends [employer] service: Not on file     Active member of club or organization: Not on file     Attends meetings of clubs or organizations: Not on file     Relationship status: Not on file    Intimate partner violence     Fear of current or ex partner: Not on file     Emotionally abused: Not on file     Physically abused: Not on file     Forced sexual activity: Not on file   Other Topics Concern    Not on file   Social History Narrative    Not on file       Family History   Problem Relation Age of Onset    Heart Disease Father    [employer] Other Mother          in her sleep unknown cause     No other pertinent FamHx on review with patient/guardian. Allergies:  Bactrim [sulfamethoxazole-trimethoprim]    Home Medications:  Prior to Admission medications    Medication Sig Start Date End Date Taking?  Authorizing Provider   atorvastatin (LIPITOR) 10 MG tablet TAKE 1 TABLET DAILY 20   Cecille Velez MD   carvedilol (COREG) 12.5 MG tablet TAKE 1 TABLET TWICE A DAY WITH MEALS 20   Cecille Velez MD   PARoxetine (PAXIL) 40 MG tablet TAKE 1 TABLET EVERY MORNING 9/2/20   Cecille Bates MD   Diapers & Supplies MISC 1 each by Does not apply route 4 times daily 8/21/20   Cecille Bates MD   donepezil (ARICEPT) 5 MG tablet Take 1 tablet by mouth nightly  Patient not taking: Reported on 8/31/2020 8/11/20   Cecille Bates MD   doxyLAMINE succinate (GNP SLEEP AID) 25 MG tablet Take 1 tablet by mouth nightly 8/11/20   Cecille Bates MD   busPIRone (BUSPAR) 15 MG tablet TAKE 1 TABLET DAILY 8/7/20   Cecille Bates MD   levothyroxine (SYNTHROID) 100 MCG tablet TAKE 1 TABLET DAILY 6/22/20   Cecille Bates MD   furosemide (LASIX) 20 MG tablet take 1 tablet by mouth once daily 6/9/20   NORMA Choe CNP   lisinopril (PRINIVIL;ZESTRIL) 20 MG tablet Take 1 tablet by mouth daily 3/6/20   Cecille Bates MD   aspirin 81 MG tablet Take 81 mg by mouth daily    Historical Provider, MD       REVIEW OF SYSTEMS    (2-9 systems for level 4, 10 ormore for level 5)      Review of Systems   Eyes: Negative for visual disturbance. Respiratory: Negative for shortness of breath. Cardiovascular: Negative for chest pain. Gastrointestinal: Negative for abdominal pain, nausea and vomiting. Musculoskeletal: Negative for back pain and neck pain. Positive for bilateral hip and left shoulder pain. Skin: Negative for wound. Allergic/Immunologic: Negative for immunocompromised state. Neurological: Negative for weakness, numbness and headaches. Hematological: Does not bruise/bleed easily. PHYSICAL EXAM   (up to 7 for level 4, 8 or more for level 5)      INITIAL VITALS:   BP (!) 161/91   Pulse 65   Temp 98.1 °F (36.7 °C) (Oral)   Resp 20   Ht 5' 7\" (1.702 m)   Wt 125 lb (56.7 kg)   SpO2 94%   BMI 19.58 kg/m²     Physical Exam  Constitutional:       General: She is not in acute distress. Comments: Frail. HENT:      Head: Normocephalic and atraumatic. Comments: No tenderness overt signs of trauma.   Multiple small sores on the patient's face, no surrounding erythema or warmth. Right Ear: Tympanic membrane, ear canal and external ear normal.      Left Ear: Tympanic membrane, ear canal and external ear normal.   Eyes:      General:         Right eye: No discharge. Left eye: No discharge. Neck:      Musculoskeletal: Neck supple. No muscular tenderness. Cardiovascular:      Rate and Rhythm: Normal rate and regular rhythm. Pulses: Normal pulses. Heart sounds: No murmur. Pulmonary:      Effort: Pulmonary effort is normal. No respiratory distress. Breath sounds: Normal breath sounds. No wheezing, rhonchi or rales. Abdominal:      Palpations: Abdomen is soft. Tenderness: There is no abdominal tenderness. Musculoskeletal:      Comments: Tenderness over left humeral head. Full ROM in left shoulder.  strength 5+. Station intact. Radial pulse 2+. Tenderness over the proximal left femur and left lateral iliac crest.  Pelvis stable. Normal pain with ROM of the left hip. Right hip is held in 30 degrees of flexion, and unable to straighten this leg (this is her baseline). No midline spinal tenderness. No deformity. No tenderness of right upper extremity. No chest wall tenderness. Skin:     Capillary Refill: Capillary refill takes less than 2 seconds. Neurological:      General: No focal deficit present. Mental Status: She is alert.        DIFFERENTIAL  DIAGNOSIS     PLAN (LABS / IMAGING / EKG):  Orders Placed This Encounter   Procedures    XR SHOULDER LEFT (MIN 2 VIEWS)    XR HIP LEFT (2-3 VIEWS)    XR FEMUR LEFT (MIN 2 VIEWS)    EKG 12 Lead     MEDICATIONS ORDERED:  Orders Placed This Encounter   Medications    lidocaine (LMX) 4 % cream    acetaminophen (TYLENOL) tablet 650 mg     DIAGNOSTIC RESULTS / EMERGENCY DEPARTMENT COURSE / MDM     LABS:  Results for orders placed or performed during the hospital encounter of 09/23/20   EKG 12 Lead   Result Value Ref Range    Ventricular Rate 65 BPM    Atrial Dat Will DO  PGY 1  Resident Physician Emergency Medicine  09/23/20 9:14 AM    (Please note that portions of this note were completed with a voice recognition program.Efforts were made to edit the dictations but occasionally words are mis-transcribed.)       Milena Sanchez DO  Resident  09/23/20 0527

## 2020-09-23 NOTE — ED TRIAGE NOTES
Pt to ER with left arm and leg pain s/p mechanical fall. Pt states that she was trying to sit in her recliner and lost her balance. Pt denies dizziness, -LOC, denies head and neck pain. Pt states that she has chronic right hip pain and receives injections for pain. Pt is alert and oriented x 4.  NAD

## 2020-09-23 NOTE — ED NOTES
Bed: 27  Expected date:   Expected time:   Means of arrival:   Comments:  2010 [facility] Curtis, RN  09/23/20 [id number]

## 2020-09-23 NOTE — ED PROVIDER NOTES
[facility] ED  Emergency Department  Faculty Attestation       I performed a history and physical examination of the patient and discussed management with the resident. I reviewed the residents note and agree with the documented findings including all diagnostic interpretations and plan of care. Any areas of disagreement are noted on the chart. I was personally present for the key portions of any procedures. I have documented in the chart those procedures where I was not present during the key portions. I have reviewed the emergency nurses triage note. I agree with the chief complaint, past medical history, past surgical history, allergies, medications, social and family history as documented unless otherwise noted below. Documentation of the HPI, Physical Exam and Medical Decision Making performed by sirenaibmartita is based on my personal performance of the HPI, PE and MDM. For Physician Assistant/ Nurse Practitioner cases/documentation I have personally evaluated this patient and have completed at least one if not all key elements of the E/M (history, physical exam, and MDM). Additional findings are as noted. Pertinent Comments     Primary Care Physician: HAWK Smith MD    ED Triage Vitals [09/23/20 0529]   BP Temp Temp Source Pulse Resp SpO2 Height Weight   (!) 161/91 98.1 °F (36.7 °C) Oral 65 20 94 % 5' 7\" (1.702 m) 125 lb (56.7 kg)        History/Physical: This is a 81 y.o. female who presents to the Emergency Department with complaint of left hip and left arm pain after sliding out of her recliner. She is right hip pain at baseline. On exam but appearing female no acute distress. She has no signs of head trauma. Midline cervical spine with no tenderness. She is tenderness over the humeral head region of the left arm, but normal range of motion of left arm. Just with tenderness over the lateral proximal left femur. Normal sensation.   Also right leg is flexed at baseline with some tenderness. Normal sensation     MDM/Plan: Slid out of recliner. Did not strike her head. Slid onto her left arm and leg. No obvius deformity but does have tendenress. Xray.     CRITICAL CARE: None     Palmira Rose MD  Attending Emergency Physician         Palmira Rose MD  09/23/20 0197

## 2020-09-28 RX ORDER — LISINOPRIL 20 MG/1
TABLET ORAL
Qty: 90 TABLET | Refills: 1 | Status: SHIPPED | OUTPATIENT
Start: 2020-09-28 | End: 2021-03-01 | Stop reason: SDUPTHER

## 2020-09-28 NOTE — TELEPHONE ENCOUNTER
Last visit: 8/11/20  Last Med refill: 3/6/20  Does patient have enough medication for 72 hours: Yes    Next Visit Date:  Future Appointments   Date Time Provider Roberto Drummond   11/2/2020  1:15 PM Sakina Nova  [address] Maintenance   Topic Date Due    DTaP/Tdap/Td vaccine (1 - Tdap) 07[birth year]03/1954    Shingles Vaccine (1 of 2) 07/03/1986    DEXA (modify frequency per FRAX score)  07/03/1991    Pneumococcal 65+ years Vaccine (1 of 1 - PPSV23) 07/03/2001    Annual Wellness Visit (AWV)  06/23/2019    Flu vaccine (1) 09/01/2020    Lipid screen  01/29/2021    TSH testing  03/06/2021    Potassium monitoring  03/06/2021    Creatinine monitoring  03/06/2021    Statin Therapy  09/02/2021    Hepatitis A vaccine  Aged Out    Hepatitis B vaccine  Aged Out    Hib vaccine  Aged Out    Meningococcal (ACWY) vaccine  Aged Out       Hemoglobin A1C (%)   Date Value   03/06/2020 5.0             ( goal A1C is < 7)   No results found for: LABMICR  LDL Cholesterol (mg/dL)   Date Value   01/29/2020 65   07/02/2019 58       (goal LDL is <100)   AST (U/L)   Date Value   03/06/2020 9     ALT (U/L)   Date Value   03/06/2020 5     BUN (mg/dL)   Date Value   03/06/2020 13     BP Readings from Last 3 Encounters:   09/23/20 (!) 161/91   08/11/20 112/78   08/01/20 121/66          (goal 120/80)    All Future Testing planned in [facility]  Lab Frequency Next Occurrence   TSH With Reflex Ft4 Once 06/20/2020               Patient Active Problem List:     Essential hypertension     Cancer (HCC)     Hypothyroidism     Depression     Hyperlipidemia     Basal cell carcinoma     Melanoma (HCC)     Anxiety     Hypokalemia     Acute idiopathic gout of right foot     CKD (chronic kidney disease) stage 3, GFR 30-59 ml/min (HCC)     Risk for falls     Pneumococcal vaccine refused     Influenza vaccination declined     Chronic fatigue     Bilateral lower extremity pain     Chronic diastolic heart failure ([facility] Acute cystitis without hematuria     Chronic systolic heart failure (HCC)     Decreased mobility

## 2020-09-29 ENCOUNTER — OFFICE VISIT (OUTPATIENT)
Dept: FAMILY MEDICINE CLINIC | Age: 85
End: 2020-09-29
Payer: MEDICARE

## 2020-09-29 VITALS
OXYGEN SATURATION: 97 % | DIASTOLIC BLOOD PRESSURE: 64 MMHG | SYSTOLIC BLOOD PRESSURE: 102 MMHG | HEART RATE: 90 BPM | TEMPERATURE: 98.1 F

## 2020-09-29 PROCEDURE — G8400 PT W/DXA NO RESULTS DOC: HCPCS | Performed by: INTERNAL MEDICINE

## 2020-09-29 PROCEDURE — G8420 CALC BMI NORM PARAMETERS: HCPCS | Performed by: INTERNAL MEDICINE

## 2020-09-29 PROCEDURE — 1123F ACP DISCUSS/DSCN MKR DOCD: CPT | Performed by: INTERNAL MEDICINE

## 2020-09-29 PROCEDURE — 1090F PRES/ABSN URINE INCON ASSESS: CPT | Performed by: INTERNAL MEDICINE

## 2020-09-29 PROCEDURE — 4040F PNEUMOC VAC/ADMIN/RCVD: CPT | Performed by: INTERNAL MEDICINE

## 2020-09-29 PROCEDURE — 1036F TOBACCO NON-USER: CPT | Performed by: INTERNAL MEDICINE

## 2020-09-29 PROCEDURE — G8427 DOCREV CUR MEDS BY ELIG CLIN: HCPCS | Performed by: INTERNAL MEDICINE

## 2020-09-29 PROCEDURE — 99213 OFFICE O/P EST LOW 20 MIN: CPT | Performed by: INTERNAL MEDICINE

## 2020-09-29 RX ORDER — NYSTATIN 100000 [USP'U]/G
POWDER TOPICAL
Qty: 60 G | Refills: 3 | Status: SHIPPED | OUTPATIENT
Start: 2020-09-29 | End: 2021-03-01 | Stop reason: SDUPTHER

## 2020-09-29 RX ORDER — CLOTRIMAZOLE 1 %
CREAM (GRAM) TOPICAL
Qty: 50 G | Refills: 1 | Status: SHIPPED | OUTPATIENT
Start: 2020-09-29 | End: 2020-10-06

## 2020-09-29 NOTE — PROGRESS NOTES
Pt is here today due to having fungus in the RT groin area. She also fell last week in her house. She is not taking the Aricept nor the sleep aid due to making her a little crazy.

## 2020-09-29 NOTE — PROGRESS NOTES
Subjective:       Patient ID:     Alexandra Powell is a 81 y.o. female who presents for   Chief Complaint   Patient presents with    Rash     fungus on RT groin area       HPI:  Nursing note reviewed and discussed with patient. Was supposed to get a right hip injection yesterday, went for the procedure and was told it could not be done due to erythematous rash in the right groin area. Was asked to come here for evaluation and treatment. She states she has does not have any itching or pain, was not aware there was a problem. Denies dysuria. She wears absorbent diapers due to urinary and fecal incontinence continuously. She is not having a good week-fell a week ago and was seen in the ER, has been unable to stand since then due to right hip pain. Her  is admitted to the hospital with delirium secondary to UTI and 1 of her daughters is also admitted to the hospital at this time. she is very stressed out by all this         Patient's medications, allergies, past medical, surgical, social and family histories were reviewed and updated as appropriate.     Past Medical History:   Diagnosis Date    Anxiety     Arthritis     Basal cell carcinoma     Cancer (HCC)     skin    CHF (congestive heart failure) (HCC)     Closed fracture of single pubic ramus of pelvis with routine healing, right 2020    Depression     Gout     H/O total hysterectomy 2018    Hyperlipidemia     Hypertension     Hypokalemia     Hypothyroidism     Kidney stone     Kidney stone     [facility]     NSTEMI (non-ST elevated myocardial infarction) ([facility] 2020    Pressure injury of coccygeal region, stage 2 ([facility] 3/10/2020     Past Surgical History:   Procedure Laterality Date    HYSTERECTOMY      HYSTERECTOMY, VAGINAL      TUBAL LIGATION         Social History     Tobacco Use    Smoking status: Former Smoker     Last attempt to quit: 1960     Years since quittin.4    Smokeless tobacco: Never Used Substance Use Topics    Alcohol use: No     Alcohol/week: 0.0 standard drinks      Patient Active Problem List   Diagnosis    Essential hypertension    Cancer ([facility]    Hypothyroidism    Depression    Hyperlipidemia    Basal cell carcinoma    Melanoma ([facility]    Anxiety    Hypokalemia    Acute idiopathic gout of right foot    CKD (chronic kidney disease) stage 3, GFR 30-59 ml/min (HCC)    Risk for falls    Pneumococcal vaccine refused    Influenza vaccination declined    Chronic fatigue    Bilateral lower extremity pain    Chronic diastolic heart failure (HCC)    Acute cystitis without hematuria    Chronic systolic heart failure (HCC)    Decreased mobility         Prior to Visit Medications    Medication Sig Taking? Authorizing Provider   lisinopril (PRINIVIL;ZESTRIL) 20 MG tablet TAKE 1 TABLET DAILY  Cecille Jay MD   atorvastatin (LIPITOR) 10 MG tablet TAKE 1 TABLET DAILY  Cecille Jay MD   carvedilol (COREG) 12.5 MG tablet TAKE 1 TABLET TWICE A DAY WITH MEALS  Cecille Jay MD   PARoxetine (PAXIL) 40 MG tablet TAKE 1 TABLET EVERY MORNING  Cecille Jay MD   Diapers & Supplies MISC 1 each by Does not apply route 4 times daily  Cecille Jay MD   donepezil (ARICEPT) 5 MG tablet Take 1 tablet by mouth nightly  Patient not taking: Reported on 8/31/2020  Cecille Jay MD   doxyLAMINE succinate (GNP SLEEP AID) 25 MG tablet Take 1 tablet by mouth nightly  Patient not taking: Reported on 9/29/2020  Cecille Jay MD   busPIRone (BUSPAR) 15 MG tablet TAKE 1 TABLET DAILY  Cecille Jay MD   levothyroxine (SYNTHROID) 100 MCG tablet TAKE 1 TABLET DAILY  Cecille Jay MD   furosemide (LASIX) 20 MG tablet take 1 tablet by mouth once daily  NORMA Tovar - CNP   aspirin 81 MG tablet Take 81 mg by mouth daily  Historical Provider, MD     Review of Systems  Review of Systems   Musculoskeletal: Positive for arthralgias (right hip pain from fall ).    All other systems reviewed and are negative. Objective:       Physical Exam:  /64 (Site: Left Upper Arm, Position: Sitting, Cuff Size: Small Adult)   Pulse 90   Temp 98.1 °F (36.7 °C) (Temporal)   SpO2 97%   Physical Exam  Vitals signs and nursing note reviewed. Constitutional:       Comments: Unsteady, two person assist to stand, unable to put weight on right leg at hip     Skin:     General: Skin is warm and dry. Findings: Rash present. Rash is macular. Comments: Erythematous macular rash in right groin with satellite lesions extending towards genitalia   Neurological:      Mental Status: She is alert. Data Review  not applicable       Assessment/Plan:      1. Tinea cruris  - clotrimazole (LOTRIMIN AF) 1 % cream; Apply topically 2 times daily. Dispense: 50 g; Refill: 1  - nystatin (MYCOSTATIN) 082180 UNIT/GM powder; Apply 3 times daily.   Dispense: 60 g; Refill: 3           Health Maintenance Due   Topic Date Due    DTaP/Tdap/Td vaccine (1 - Tdap) 07[birth year]03/1955    Shingles Vaccine (1 of 2) 07/03/1986    DEXA (modify frequency per FRAX score)  07/03/1991    Pneumococcal 65+ years Vaccine (1 of 1 - PPSV23) 07/03/2001    Annual Wellness Visit (AWV)  06/23/2019    Flu vaccine (1) 09/01/2020       Electronically signed by Kala Jaimes MD on 9/29/2020 at 3:30 PM

## 2020-11-11 ENCOUNTER — OFFICE VISIT (OUTPATIENT)
Dept: ORTHOPEDIC SURGERY | Age: 85
End: 2020-11-11
Payer: MEDICARE

## 2020-11-11 ENCOUNTER — HOSPITAL ENCOUNTER (OUTPATIENT)
Age: 85
Setting detail: SPECIMEN
Discharge: HOME | End: 2020-11-11
Payer: MEDICARE

## 2020-11-11 ENCOUNTER — OFFICE VISIT (OUTPATIENT)
Dept: FAMILY MEDICINE CLINIC | Age: 85
End: 2020-11-11
Payer: MEDICARE

## 2020-11-11 VITALS — HEIGHT: 67 IN | TEMPERATURE: 97.6 F | RESPIRATION RATE: 16 BRPM | WEIGHT: 120 LBS | BODY MASS INDEX: 18.83 KG/M2

## 2020-11-11 VITALS
HEART RATE: 79 BPM | SYSTOLIC BLOOD PRESSURE: 100 MMHG | TEMPERATURE: 97.2 F | OXYGEN SATURATION: 96 % | DIASTOLIC BLOOD PRESSURE: 72 MMHG

## 2020-11-11 LAB — TSH SERPL DL<=0.05 MIU/L-ACNC: 0.98 MIU/L (ref 0.3–5)

## 2020-11-11 PROCEDURE — 1036F TOBACCO NON-USER: CPT | Performed by: PHYSICIAN ASSISTANT

## 2020-11-11 PROCEDURE — 99213 OFFICE O/P EST LOW 20 MIN: CPT | Performed by: INTERNAL MEDICINE

## 2020-11-11 PROCEDURE — G8427 DOCREV CUR MEDS BY ELIG CLIN: HCPCS | Performed by: INTERNAL MEDICINE

## 2020-11-11 PROCEDURE — 1090F PRES/ABSN URINE INCON ASSESS: CPT | Performed by: INTERNAL MEDICINE

## 2020-11-11 PROCEDURE — 1090F PRES/ABSN URINE INCON ASSESS: CPT | Performed by: PHYSICIAN ASSISTANT

## 2020-11-11 PROCEDURE — G8484 FLU IMMUNIZE NO ADMIN: HCPCS | Performed by: INTERNAL MEDICINE

## 2020-11-11 PROCEDURE — 1123F ACP DISCUSS/DSCN MKR DOCD: CPT | Performed by: INTERNAL MEDICINE

## 2020-11-11 PROCEDURE — G8420 CALC BMI NORM PARAMETERS: HCPCS | Performed by: PHYSICIAN ASSISTANT

## 2020-11-11 PROCEDURE — 84443 ASSAY THYROID STIM HORMONE: CPT

## 2020-11-11 PROCEDURE — 99213 OFFICE O/P EST LOW 20 MIN: CPT | Performed by: PHYSICIAN ASSISTANT

## 2020-11-11 PROCEDURE — G8427 DOCREV CUR MEDS BY ELIG CLIN: HCPCS | Performed by: PHYSICIAN ASSISTANT

## 2020-11-11 PROCEDURE — G8400 PT W/DXA NO RESULTS DOC: HCPCS | Performed by: INTERNAL MEDICINE

## 2020-11-11 PROCEDURE — 1036F TOBACCO NON-USER: CPT | Performed by: INTERNAL MEDICINE

## 2020-11-11 PROCEDURE — 4040F PNEUMOC VAC/ADMIN/RCVD: CPT | Performed by: PHYSICIAN ASSISTANT

## 2020-11-11 PROCEDURE — G8420 CALC BMI NORM PARAMETERS: HCPCS | Performed by: INTERNAL MEDICINE

## 2020-11-11 PROCEDURE — 1123F ACP DISCUSS/DSCN MKR DOCD: CPT | Performed by: PHYSICIAN ASSISTANT

## 2020-11-11 PROCEDURE — G8400 PT W/DXA NO RESULTS DOC: HCPCS | Performed by: PHYSICIAN ASSISTANT

## 2020-11-11 PROCEDURE — G8484 FLU IMMUNIZE NO ADMIN: HCPCS | Performed by: PHYSICIAN ASSISTANT

## 2020-11-11 PROCEDURE — 36415 COLL VENOUS BLD VENIPUNCTURE: CPT

## 2020-11-11 PROCEDURE — 4040F PNEUMOC VAC/ADMIN/RCVD: CPT | Performed by: INTERNAL MEDICINE

## 2020-11-11 RX ORDER — FUROSEMIDE 20 MG/1
20 TABLET ORAL DAILY
Qty: 3 TABLET | Refills: 0 | Status: SHIPPED | OUTPATIENT
Start: 2020-11-11 | End: 2020-11-18 | Stop reason: ALTCHOICE

## 2020-11-11 RX ORDER — CEPHALEXIN 500 MG/1
500 CAPSULE ORAL 4 TIMES DAILY
Qty: 28 CAPSULE | Refills: 0 | Status: SHIPPED | OUTPATIENT
Start: 2020-11-11 | End: 2021-01-21

## 2020-11-11 ASSESSMENT — ENCOUNTER SYMPTOMS
BLOOD IN STOOL: 0
ORTHOPNEA: 0
CONSTIPATION: 0
VOMITING: 0
SHORTNESS OF BREATH: 0
COUGH: 0
NAUSEA: 0
BLURRED VISION: 0
CHEST TIGHTNESS: 0
WHEEZING: 0
ANAL BLEEDING: 0
DIARRHEA: 0
ABDOMINAL PAIN: 0
CHOKING: 0

## 2020-11-11 ASSESSMENT — VISUAL ACUITY: OU: 1

## 2020-11-11 NOTE — PROGRESS NOTES
Pt is here today due to bilateral edema. Dr Ruby Garcia recommended she see PCP. She was unable to get diapers.    She was given antibiotics due to having a sore on her foot

## 2020-11-11 NOTE — PROGRESS NOTES
 Cancer ([facility]    Hypothyroidism    Depression    Hyperlipidemia    Basal cell carcinoma    Melanoma (HCC)    Anxiety    Hypokalemia    Acute idiopathic gout of right foot    CKD (chronic kidney disease) stage 3, GFR 30-59 ml/min (HCC)    Risk for falls    Pneumococcal vaccine refused    Influenza vaccination declined    Chronic fatigue    Bilateral lower extremity pain    Chronic diastolic heart failure (HCC)    Acute cystitis without hematuria    Chronic systolic heart failure (HCC)    Decreased mobility         Prior to Visit Medications    Medication Sig Taking? Authorizing Provider   cephALEXin (KEFLEX) 500 MG capsule Take 1 capsule by mouth 4 times daily Yes KAY Ellis   nystatin (MYCOSTATIN) 294837 UNIT/GM powder Apply 3 times daily. Yes Murali Mc MD   lisinopril (PRINIVIL;ZESTRIL) 20 MG tablet TAKE 1 TABLET DAILY Yes Cecille Madden MD   atorvastatin (LIPITOR) 10 MG tablet TAKE 1 TABLET DAILY Yes Cecille Madden MD   carvedilol (COREG) 12.5 MG tablet TAKE 1 TABLET TWICE A DAY WITH MEALS Yes Cecille Madden MD   PARoxetine (PAXIL) 40 MG tablet TAKE 1 TABLET EVERY MORNING Yes Cecille Madden MD   busPIRone (BUSPAR) 15 MG tablet TAKE 1 TABLET DAILY Yes Cecille Madden MD   levothyroxine (SYNTHROID) 100 MCG tablet TAKE 1 TABLET DAILY Yes Cecille Madden MD   aspirin 81 MG tablet Take 81 mg by mouth daily Yes Historical Provider, MD   [address] 1 each by Does not apply route 4 times daily  Patient not taking: Reported on 11/11/2020  Cecille Madden MD   donepezil (ARICEPT) 5 MG tablet Take 1 tablet by mouth nightly  Patient not taking: Reported on 11/11/2020  Cecille Madden MD   furosemide (LASIX) 20 MG tablet take 1 tablet by mouth once daily  Kari Waggoner, APRN - CNP     Review of Systems  Review of Systems   Constitutional: Negative for fatigue, fever, malaise/fatigue and unexpected weight change. Eyes: Negative for blurred vision. Respiratory: Negative for cough, choking, chest tightness, shortness of breath and wheezing. Cardiovascular: Negative for chest pain, palpitations, orthopnea, leg swelling and PND. Gastrointestinal: Negative for abdominal pain, anal bleeding, blood in stool, constipation, diarrhea, nausea and vomiting. Endocrine: Negative. Musculoskeletal: Negative for joint swelling, myalgias and neck pain. Skin: Negative. Neurological: Negative for dizziness and headaches. Psychiatric/Behavioral: Negative for sleep disturbance. All other systems reviewed and are negative. Objective:       Physical Exam:  /72 (Site: Left Upper Arm, Position: Sitting, Cuff Size: Small Adult)   Pulse 79   Temp 97.2 °F (36.2 °C) (Temporal)   SpO2 96%   Physical Exam  Vitals signs and nursing note reviewed. Constitutional:       General: She is not in acute distress. Appearance: She is well-developed. She is not ill-appearing. Eyes:      General: Lids are normal. Vision grossly intact. Cardiovascular:      Rate and Rhythm: Normal rate and regular rhythm. Heart sounds: Normal heart sounds, S1 normal and S2 normal. No murmur. No friction rub. No gallop. Pulmonary:      Effort: Pulmonary effort is normal. No respiratory distress. Breath sounds: Normal breath sounds. No wheezing. Abdominal:      General: Bowel sounds are normal.      Palpations: Abdomen is soft. There is no mass. Tenderness: There is no abdominal tenderness. There is no guarding. Musculoskeletal: Normal range of motion. Right lower leg: Edema present. Left lower leg: Edema present. Skin:     General: Skin is warm and dry. Capillary Refill: Capillary refill takes less than 2 seconds. Findings: Rash present. Neurological:      General: No focal deficit present. Mental Status: She is alert and oriented to person, place, and time.          Data Review  Admission on 09/23/2020, Discharged on 09/23/2020   Component Date Value    Ventricular Rate 09/23/2020 65     Atrial Rate 09/23/2020 65     P-R Interval 09/23/2020 212     QRS Duration 09/23/2020 148     Q-T Interval 09/23/2020 452     QTc Calculation ([place]azett) 09/23/2020 470     P Axis 09/23/2020 57     R Axis 09/23/2020 -62     T Axis 09/23/2020 112           Assessment/Plan:      1. Cellulitis of right lower extremity    - furosemide (LASIX) 20 MG tablet; Take 1 tablet by mouth daily for 3 days  Dispense: 3 tablet;  Refill: 0           Health Maintenance Due   Topic Date Due    DTaP/Tdap/Td vaccine (1 - Tdap) 07/03/1955    Shingles Vaccine (1 of 2) 07/03/1986    DEXA (modify frequency per FRAX score)  07/03/1991    Annual Wellness Visit (AWV)  06/23/2019       Electronically signed by Charla Dumont MD on 11/11/2020 at [address] PM

## 2020-11-12 NOTE — PROGRESS NOTES
[facility], [address] [place] [address] [facility] [address].           Dept Phone: [phone]           Dept Fax:  [phone] [facility] [place]          Dept Phone: [phone]           Dept Fax:  [phone]      Chief Compliant:  Chief Complaint   Patient presents with    Foot Injury     Wound of dorsum of right foot x 2 weeks        History of Present Illness: This is a 81 y.o. female who presents to the clinic today for evaluation of right foot wound. Patient states this wound began approximately 2 weeks ago without any preceding injury or trauma. Over the last week she has noticed spreading redness to the dorsum of the foot as well as to the ankle. Of note patient has dealt with intermittent cellulitis of this right lower extremity over the last year on and off. Most recently she was seen by me back in September and was started on a 10-day course of Keflex which resolved the cellulitis without any complications. Patient does have significant edema to both bilateral lower extremities and is not currently on any diuretics or been worked up by her PCP for this chronic edema. Past History:    Current Outpatient Medications:     cephALEXin (KEFLEX) 500 MG capsule, Take 1 capsule by mouth 4 times daily, Disp: 28 capsule, Rfl: 0    nystatin (MYCOSTATIN) 909941 UNIT/GM powder, Apply 3 times daily. , Disp: 60 g, Rfl: 3    lisinopril (PRINIVIL;ZESTRIL) 20 MG tablet, TAKE 1 TABLET DAILY, Disp: 90 tablet, Rfl: 1    atorvastatin (LIPITOR) 10 MG tablet, TAKE 1 TABLET DAILY, Disp: 90 tablet, Rfl: 1    carvedilol (COREG) 12.5 MG tablet, TAKE 1 TABLET TWICE A DAY WITH MEALS, Disp: 180 tablet, Rfl: 1    PARoxetine (PAXIL) 40 MG tablet, TAKE 1 TABLET EVERY MORNING, Disp: 90 tablet, Rfl: 1    Diapers & Supplies MISC, 1 each by Does not apply route 4 times daily (Patient not taking: Reported on 2020), Disp: 120 each, Rfl: 5    donepezil (ARICEPT) 5 MG tablet, Take 1 tablet by mouth nightly (Patient not taking: Reported on 2020), Disp: 30 tablet, Rfl: 5    busPIRone (BUSPAR) 15 MG tablet, TAKE 1 TABLET DAILY, Disp: 90 tablet, Rfl: 1    levothyroxine (SYNTHROID) 100 MCG tablet, TAKE 1 TABLET DAILY, Disp: 90 tablet, Rfl: 1    furosemide (LASIX) 20 MG tablet, take 1 tablet by mouth once daily, Disp: 90 tablet, Rfl: 1    aspirin 81 MG tablet, Take 81 mg by mouth daily, Disp: , Rfl:     furosemide (LASIX) 20 MG tablet, Take 1 tablet by mouth daily for 3 days, Disp: 3 tablet, Rfl: 0  Allergies   Allergen Reactions    Bactrim [Sulfamethoxazole-Trimethoprim] Other (See Comments)     DIZZINESS     Social History     Socioeconomic History    Marital status:      Spouse name: Not on file    Number of children: Not on file    Years of education: Not on file    Highest education level: Not on file   Occupational History    Not on file   Social Needs    Financial resource strain: Not on file    Food insecurity     Worry: Not on file     Inability: Not on file    Transportation needs     Medical: Not on file     Non-medical: Not on file   Tobacco Use    Smoking status: Former Smoker     Last attempt to quit: 1960     Years since quittin.5    Smokeless tobacco: Never Used   Substance and Sexual Activity    Alcohol use: No     Alcohol/week: 0.0 standard drinks    Drug use: No    Sexual activity: Never   Lifestyle    Physical activity     Days per week: Not on file     Minutes per session: Not on file    Stress: Not on file   Relationships    Social connections     Talks on phone: Not on file     Gets together: Not on file     Attends Worship service: Not on file     Active member of club or organization: Not on file     Attends meetings of clubs or organizations: Not on file     Relationship status: Not on file    Intimate partner violence     Fear of current or ex partner: Not on file     Emotionally abused: Not on file     Physically abused: Not on file     Forced sexual activity: Not on file   Other Topics Concern    Not on file   Social History Narrative    Not on file     Past Medical History:   Diagnosis Date    Anxiety     Arthritis     Basal cell carcinoma     Cancer ([facility]     skin    CHF (congestive heart failure) ([facility] [facility]     Closed fracture of single pubic ramus of pelvis with routine healing, right 2020    Depression     Gout     H/O total hysterectomy 2018    Hyperlipidemia     Hypertension     Hypokalemia     Hypothyroidism     Kidney stone     Kidney stone     Melanoma ([facility] [address] [facility]     NSTEMI (non-ST elevated myocardial infarction) ([facility] 2020    Pressure injury of coccygeal region, stage 2 ([facility] 3/10/2020     Past Surgical History:   Procedure Laterality Date    HYSTERECTOMY      HYSTERECTOMY, VAGINAL      TUBAL LIGATION       Family History   Problem Relation Age of Onset    Heart Disease Father     Other Mother          in her sleep unknown cause        Review of Systems   Constitutional: Negative for fever, chills, sweats. Eyes: Negative for changes in vision, or pain. HENT: Negative for ear ache, epistaxis, or sore throat. Respiratory/Cardio: Negative for Chest pain, palpitations, SOB, or cough. Gastrointestinal: Negative for abdominal pain, N/V/D. Genitourinary: Negative for dysuria, frequency, urgency, or hematuria. Neurological: Negative for headache, numbness, or weakness. Integumentary: Negative for rash, itching, laceration, or abrasion. Musculoskeletal: Positive for Foot Injury (Wound of dorsum of right foot x 2 weeks)       Physical Exam:  Constitutional: Patient is oriented to person, place, and time. Patient appears well-developed and well nourished.    HENT: Negative otherwise noted  Head: Normocephalic and Atraumatic  Nose: Normal  Eyes: Conjunctivae and EOM are normal  Neck: Normal range of motion Neck supple. Respiratory/Cardio: Effort normal. No respiratory distress. 2+ pitting edema bilaterally right slightly greater than left  Musculoskeletal:    right Ankle/Foot        Tenderness: None   Swelling: Moderate   Skin Patient with approximately 1-1/2 cm ulcer to the dorsum of the foot over the fifth proximal metatarsal area. There is surrounding erythema extending to the lateral ankle and distal lower leg. No other evidence of abrasions or lacerations. Range of Motion:      Dorsiflexion: Normal     Plantar Flexion: Normal     Subtalar Inversion: Normal     Eversion: Normal       Muscle Strength:      Dorsiflexion:  5/5     Plantar Flexion:  5/5     Anterior Tibial:  5/5     Gastrosoleus:  5/5     Posterior Tibial:  5/5     Peroneal muscle:  5/5       Anterior Drawer:  Negative   Varus Tilt:  Negative     Neurological: Patient is alert and oriented to person, place, and time. Normal strenght. No sensory deficit. Skin: Skin is warm and dry  Psychiatric: Behavior is normal. Thought content normal.  Nursing note and vitals reviewed. Labs and Imaging:     XR taken today:  No results found. No orders of the defined types were placed in this encounter. Assessment and Plan:  1. Cellulitis of right lower extremity    2. Ulcer of right foot, limited to breakdown of skin ([address]    3. Primary osteoarthritis of right hip          PLAN:  This is a 81 y.o. female who presents to the clinic today for evaluation right foot ulcer concerning for possible lower extremity cellulitis. 1. Start Keflex 500 mg QID  2. Recommend follow up with PCP for lower extremity edema and recurrent cellulitis  3. F/u 1 week for re-evaluation. Please note that this chart was generated using voice recognition Dragon dictation software.   Although every effort was made to ensure the accuracy of this automated transcription, some errors in transcription may have occurred.

## 2020-11-18 ENCOUNTER — OFFICE VISIT (OUTPATIENT)
Dept: FAMILY MEDICINE CLINIC | Age: 85
End: 2020-11-18
Payer: MEDICARE

## 2020-11-18 VITALS
SYSTOLIC BLOOD PRESSURE: 138 MMHG | DIASTOLIC BLOOD PRESSURE: 80 MMHG | HEART RATE: 86 BPM | OXYGEN SATURATION: 98 % | TEMPERATURE: 98.3 F

## 2020-11-18 PROCEDURE — 99213 OFFICE O/P EST LOW 20 MIN: CPT | Performed by: INTERNAL MEDICINE

## 2020-11-18 PROCEDURE — G8400 PT W/DXA NO RESULTS DOC: HCPCS | Performed by: INTERNAL MEDICINE

## 2020-11-18 PROCEDURE — G8420 CALC BMI NORM PARAMETERS: HCPCS | Performed by: INTERNAL MEDICINE

## 2020-11-18 PROCEDURE — G8484 FLU IMMUNIZE NO ADMIN: HCPCS | Performed by: INTERNAL MEDICINE

## 2020-11-18 PROCEDURE — 4040F PNEUMOC VAC/ADMIN/RCVD: CPT | Performed by: INTERNAL MEDICINE

## 2020-11-18 PROCEDURE — 1090F PRES/ABSN URINE INCON ASSESS: CPT | Performed by: INTERNAL MEDICINE

## 2020-11-18 PROCEDURE — 1123F ACP DISCUSS/DSCN MKR DOCD: CPT | Performed by: INTERNAL MEDICINE

## 2020-11-18 PROCEDURE — G8427 DOCREV CUR MEDS BY ELIG CLIN: HCPCS | Performed by: INTERNAL MEDICINE

## 2020-11-18 PROCEDURE — 1036F TOBACCO NON-USER: CPT | Performed by: INTERNAL MEDICINE

## 2020-11-18 RX ORDER — FUROSEMIDE 20 MG/1
20 TABLET ORAL 2 TIMES DAILY
Qty: 180 TABLET | Refills: 1 | Status: SHIPPED | OUTPATIENT
Start: 2020-11-18

## 2020-11-18 NOTE — PROGRESS NOTES
Subjective:       Patient ID:     Darryl Brower is a 81 y.o. female who presents for No chief complaint on file. HPI:  Nursing note reviewed and discussed with patient. Here for follow-up of leg cellulitis  Has one day of antibiotics left, sores are closed up on her legs. Her leg edema got better on BID lasix, but came back as soon as she was done   Denies fevers, chills, dyspnea on exertion chest pain, palpitations, dizziness. Patient's medications, allergies, past medical, surgical, social and family histories were reviewed and updated as appropriate.     Past Medical History:   Diagnosis Date    Anxiety     Arthritis     Basal cell carcinoma     Cancer ([facility]     skin    CHF (congestive heart failure) ([facility])     Closed fracture of single pubic ramus of pelvis with routine healing, right 2020    Depression     Gout     H/O total hysterectomy 2018    Hyperlipidemia     Hypertension     Hypokalemia     Hypothyroidism     Kidney stone     Kidney stone     [facility]anoma ([facility]     NSTEMI (non-ST elevated myocardial infarction) ([facility] [address] [facility] 2020    Pressure injury of coccygeal region, stage 2 ([facility] [facility] 3/10/2020     Past Surgical History:   Procedure Laterality Date    HYSTERECTOMY      HYSTERECTOMY, VAGINAL      TUBAL LIGATION         Social History     Tobacco Use    Smoking status: Former Smoker     Last attempt to quit: 1960     Years since quittin.5    Smokeless tobacco: Never Used   Substance Use Topics    Alcohol use: No     Alcohol/week: 0.0 standard drinks      Patient Active Problem List   Diagnosis    Essential hypertension    Cancer ([facility] [address]    Hypothyroidism    Depression    Hyperlipidemia    Basal cell carcinoma    Melanoma ([facility] [facility]    Anxiety    Hypokalemia    Acute idiopathic gout of right foot    CKD (chronic kidney disease) stage 3, GFR 30-59 ml/min ([facility])    Risk for falls    Pneumococcal vaccine refused    Influenza vaccination declined    Chronic fatigue    Bilateral lower extremity pain    Chronic diastolic heart failure (HCC)    Acute cystitis without hematuria    Chronic systolic heart failure (HCC)    Decreased mobility         Prior to Visit Medications    Medication Sig Taking? Authorizing Provider   cephALEXin (KEFLEX) 500 MG capsule Take 1 capsule by mouth 4 times daily Yes KAY Dahl   nystatin (MYCOSTATIN) 032913 UNIT/GM powder Apply 3 times daily. Yes Jayshree Carolina MD   lisinopril (PRINIVIL;ZESTRIL) 20 MG tablet TAKE 1 TABLET DAILY Yes Cecille Baumann MD   atorvastatin (LIPITOR) 10 MG tablet TAKE 1 TABLET DAILY Yes Cecille Baumann MD   carvedilol (COREG) 12.5 MG tablet TAKE 1 TABLET TWICE A DAY WITH MEALS Yes Cecille Baumann MD   PARoxetine (PAXIL) 40 MG tablet TAKE 1 TABLET EVERY MORNING Yes Cecille Baumann MD   [address] 1 each by Does not apply route 4 times daily Yes Jayshree Carolina MD   donepezil (ARICEPT) 5 MG tablet Take 1 tablet by mouth nightly Yes Cecille Baumann MD   busPIRone (BUSPAR) 15 MG tablet TAKE 1 TABLET DAILY Yes Cecille Baumann MD   levothyroxine (SYNTHROID) 100 MCG tablet TAKE 1 TABLET DAILY Yes Cecille Baumann MD   furosemide (LASIX) 20 MG tablet take 1 tablet by mouth once daily Yes NORMA Cortez - CNP   aspirin 81 MG tablet Take 81 mg by mouth daily Yes Historical Provider, MD   diclofenac sodium (VOLTAREN) 1 % GEL apply 4 grams to affected area twice a day AS NEEDED FOR PAIN  KAY Dahl   furosemide (LASIX) 20 MG tablet Take 1 tablet by mouth daily for 3 days  Cecille Baumann MD     Review of Systems  Review of Systems   Cardiovascular: Positive for leg swelling. All other systems reviewed and are negative.          Objective:       Physical Exam:  /80 (Site: Left Upper Arm, Position: Sitting, Cuff Size: Small Adult)   Pulse 86   Temp 98.3 °F (36.8 °C) (Temporal)   SpO2 98%    Wt Readings from Last 3 Encounters:   11/11/20 120 lb (54.4 kg)   09/23/20 125 lb (56.7 kg)   09/03/20 98 lb (44.5 kg)       Physical Exam  Vitals signs and nursing note reviewed. Constitutional:       General: She is not in acute distress. Appearance: She is well-developed. She is not ill-appearing. Eyes:      General: Lids are normal. Vision grossly intact. Cardiovascular:      Rate and Rhythm: Normal rate and regular rhythm. Heart sounds: Normal heart sounds, S1 normal and S2 normal. No murmur. No friction rub. No gallop. Pulmonary:      Effort: Pulmonary effort is normal. No respiratory distress. Breath sounds: Normal breath sounds. No wheezing. Abdominal:      General: Bowel sounds are normal.      Palpations: Abdomen is soft. There is no mass. Tenderness: There is no abdominal tenderness. There is no guarding. Musculoskeletal: Normal range of motion. Right lower leg: Edema (3-4+ pitting) present. Left lower leg: Edema (3-4+ pitting) present. Skin:     General: Skin is warm and dry. Capillary Refill: Capillary refill takes less than 2 seconds. Neurological:      General: No focal deficit present. Mental Status: She is alert and oriented to person, place, and time. Data Review  not applicable       Assessment/Plan:      1. Dependent edema  - furosemide (LASIX) 20 MG tablet; Take 1 tablet by mouth 2 times daily  Dispense: 180 tablet; Refill: 1    2. At high risk for falls  On the basis of positive falls risk screening, assessment and plan is as follows: patient declines any further evaluation/treatment for increased falls risk.            Health Maintenance Due   Topic Date Due    DTaP/Tdap/Td vaccine (1 - Tdap) 07/03/1955    Shingles Vaccine (1 of 2) 07/03/1986    DEXA (modify frequency per FRAX score)  07/03/1991    Annual Wellness Visit (AWV)  06/23/2019       Electronically signed by Myla Murphy MD on 11/18/2020 at 4:35 PM

## 2020-11-18 NOTE — PROGRESS NOTES
Pt is here for a follow up for her feet. She has 1 day left of her antibiotics. She was told to come here instead of POD due to issues with COVID.

## 2020-11-22 ASSESSMENT — VISUAL ACUITY: OU: 1

## 2021-01-21 ENCOUNTER — HOSPITAL ENCOUNTER (OUTPATIENT)
Dept: WOUND CARE | Age: 86
Discharge: HOME | End: 2021-01-21
Payer: MEDICARE

## 2021-01-21 VITALS
SYSTOLIC BLOOD PRESSURE: 135 MMHG | TEMPERATURE: 97.6 F | RESPIRATION RATE: 18 BRPM | HEIGHT: 67 IN | HEART RATE: 74 BPM | DIASTOLIC BLOOD PRESSURE: 76 MMHG | WEIGHT: 120 LBS | BODY MASS INDEX: 18.83 KG/M2

## 2021-01-21 DIAGNOSIS — M20.12 HALLUX VALGUS OF LEFT FOOT: ICD-10-CM

## 2021-01-21 DIAGNOSIS — L85.3 XEROSIS CUTIS: ICD-10-CM

## 2021-01-21 DIAGNOSIS — R60.0 LOCALIZED EDEMA: ICD-10-CM

## 2021-01-21 PROCEDURE — 99213 OFFICE O/P EST LOW 20 MIN: CPT

## 2021-01-21 RX ORDER — AMMONIUM LACTATE 12 G/100G
CREAM TOPICAL
Qty: 1 BOTTLE | Refills: 4 | Status: SHIPPED | OUTPATIENT
Start: 2021-01-21 | End: 2022-02-10 | Stop reason: SDUPTHER

## 2021-01-21 ASSESSMENT — PAIN SCALES - GENERAL: PAINLEVEL_OUTOF10: 0

## 2021-01-21 ASSESSMENT — ENCOUNTER SYMPTOMS
NAUSEA: 0
DIARRHEA: 0
VOMITING: 0

## 2021-01-21 NOTE — PROGRESS NOTES
[address]   Progress Note and Procedure Note      [facility] RECORD NUMBER:  [id number]  AGE: 81 y.o. GENDER: female  : 1935  EPISODE DATE:  2021    Subjective:     Chief Complaint   Patient presents with    Wound Check     right lower posterior leg         HISTORY of PRESENT ILLNESS HPI     Cody Sanchez is a 81 y.o. female who presents today for wound/ulcer evaluation. History of Wound Context: Patient presents with her daughter today for evaluation of possible wound to the right posterior lower leg. Patient relates that she may have scratched it on her wheelchair. States that the wound has scabbed over but she wanted to bring her in today to be sure. She denies any drainage or redness from the ulceration. She denies being diabetic. Denies history of smoking. Denies any pain in the legs. Patient relates that she no longer walks.           PAST MEDICAL HISTORY        Diagnosis Date    Anxiety     Arthritis     Basal cell carcinoma     Cancer (HCC)     skin    CHF (congestive heart failure) (HCC)     Closed fracture of single pubic ramus of pelvis with routine healing, right 2020    Depression     Gout     H/O total hysterectomy 2018    Hyperlipidemia     Hypertension     Hypokalemia     Hypothyroidism     Kidney stone     Kidney stone     Melanoma ([address]     NSTEMI (non-ST elevated myocardial infarction) ([facility] 2020    Pressure injury of coccygeal region, stage 2 ([facility] [facility] 3/10/2020       PAST SURGICAL HISTORY    Past Surgical History:   Procedure Laterality Date    HYSTERECTOMY      HYSTERECTOMY, VAGINAL      TUBAL LIGATION         FAMILY HISTORY    Family History   Problem Relation Age of Onset    Heart Disease Father     Other Mother          in her sleep unknown cause       SOCIAL HISTORY    Social History     Tobacco Use    Smoking status: Former Smoker     Quit date: 1960     Years since quittin.7   Sharda Hagan Smokeless tobacco: Never Used   Substance Use Topics    Alcohol use: No     Alcohol/week: 0.0 standard drinks    Drug use: No       ALLERGIES    Allergies   Allergen Reactions    Bactrim [Sulfamethoxazole-Trimethoprim] Other (See Comments)     DIZZINESS       MEDICATIONS    Current Outpatient Medications on File Prior to Encounter   Medication Sig Dispense Refill    diclofenac sodium (VOLTAREN) 1 % GEL apply 4 grams to affected area twice a day AS NEEDED FOR PAIN      furosemide (LASIX) 20 MG tablet Take 1 tablet by mouth 2 times daily 180 tablet 1    nystatin (MYCOSTATIN) 297923 UNIT/GM powder Apply 3 times daily. 60 g 3    lisinopril (PRINIVIL;ZESTRIL) 20 MG tablet TAKE 1 TABLET DAILY 90 tablet 1    atorvastatin (LIPITOR) 10 MG tablet TAKE 1 TABLET DAILY 90 tablet 1    carvedilol (COREG) 12.5 MG tablet TAKE 1 TABLET TWICE A DAY WITH MEALS 180 tablet 1    PARoxetine (PAXIL) 40 MG tablet TAKE 1 TABLET EVERY MORNING 90 tablet 1    Diapers & Supplies MISC 1 each by Does not apply route 4 times daily 120 each 5    busPIRone (BUSPAR) 15 MG tablet TAKE 1 TABLET DAILY 90 tablet 1    levothyroxine (SYNTHROID) 100 MCG tablet TAKE 1 TABLET DAILY 90 tablet 1    aspirin 81 MG tablet Take 81 mg by mouth daily       Current Facility-Administered Medications on File Prior to Encounter   Medication Dose Route Frequency Provider Last Rate Last Admin    lidocaine 1 % injection 4 mL  4 mL Other Once [place], [place]           REVIEW OF SYSTEMS    Review of Systems   Constitutional: Negative for chills and fever. Cardiovascular: Positive for leg swelling. Both legs   Gastrointestinal: Negative for diarrhea, nausea and vomiting. Skin: Negative for wound. Neurological: Negative for weakness and numbness.          Objective:      /76   Pulse 74   Temp 97.6 °F (36.4 °C) (Tympanic)   Resp 18   Ht 5' 7\" (1.702 m)   Wt 120 lb (54.4 kg)   BMI 18.79 kg/m²     Wt Readings from Last 3 Encounters: 01/21/21 120 lb (54.4 kg)   11/11/20 120 lb (54.4 kg)   09/23/20 125 lb (56.7 kg)       Physical Exam:  General:  Alert and oriented x3. In no acute distress. Lower Extremity Physical Exam:    Vascular: DP pulses are palpable, Bilateral. PT pulses are not palpable, Bilateral. CFT <3 seconds to all digits, Bilateral.  Pitting edema, Bilateral.  Hair growth is absent to the level of the digits, Bilateral.     Neuro: Saph/sural/SP/DP/plantar sensation intact to light touch. Musculoskeletal: EHL/FHL/GS/TA gross motor intact. Gross deformity is present, HAV deformity left foot. Dermatologic: Open wound absent. Posterior right leg with dry well-adhered scab indicative of previous healing ulceration. There is no erythema. There is no drainage. There is no fluctuance or crepitus. Interdigital maceration absent, Bilateral.  Given of bilateral lower leg and foot is xerotic and flaky. Assessment:      Active Hospital Problems    Diagnosis Date Noted    Localized edema [R60.0] 01/21/2021    Xerosis cutis [L85.3] 01/21/2021    Hallux valgus of left foot [M20.12] 01/21/2021       Plan:     Treatment Note please see attached Discharge Instructions    Recommend patient obtain compression stockings 15 to 20 mmHg in order to reduce lower extremity edema and prevent ulceration. She is instructed to wear them during the day when the legs are in the dependent position and remove them at night or when the legs are elevated. Continue Lasix as prescribed by the PCP and follow with them regularly    Rx ammonium lactate 12% cream for xerosis of skin to prevent skin breakdown and infection    Instructed to monitor the foot and leg daily for any open wounds or sign of infection. Educated on signs and symptoms of infection. Instructed to call clinic immediately or go to ER if signs and symptoms of infection are present.      Advised to follow with her podiatrist regularly as directed by them    RTC prn for any wound that may develops or any new problem       Written patient discharge instructions given to patient and signed by patient or POA. Discharge Instructions         [address]      Visit  Discharge Instructions / Physician Orders  DATE: 01/21/2021     Home Care:     SUPPLIES ORDERED THRU:     Wound Location:       Cleanse with: Liquid antibacterial soap and water, rinse well      Dressing Orders:  No dressing needed. Wear compression stockings for swelling (tubigrips applied today in wound care)     Frequency:       Additional Orders: Increase protein to diet (meat, cheese, eggs, fish, peanut butter, nuts and beans)  Multivitamin daily  Prescription sent to pharmacy for ammonium lactate lotion  Wear compression stockings 15-20mmhg    MY CHART []     Smart Device  []     HYPERBARIC TREATMENT-                TREATMENT #     Your next appointment with the [email or web address] is as needed                                                                                                   ROOM TYPE   [] CHAIR     [] BED   [] EITHER        TIME [] 45 MIN     [] 60 MIN     (Please note your next appointment above and if you are unable to keep, kindly give a 24 hour notice. Thank you.)     If you experience any of the following, please call the [email or web address] during business hours:  [phone]     * Increase in Pain  * Temperature over 101  * Increase in drainage from your wound  * Drainage with a foul odor  * Bleeding  * Increase in swelling  * Need for compression bandage changes due to slippage, breakthrough drainage. If you need medical attention outside of the business hours [email or web address] please contact your PCP or go to the nearest emergency room. The information contained in the After Visit Summary has been reviewed with me, the patient and/or responsible adult, by my health care provider(s).  I had the opportunity to ask questions regarding this information.  I have elected to receive;      []After Visit Summary  [x]Comprehensive Discharge Instruction      Patient signature______________________________________Date:________  Electronically signed by Warden Sarita RN on 1/21/2021 at 10:09 AM  Electronically signed by Quin Monreal DPM on 1/21/2021 at 9:37 AM            Electronically signed by Quin Monreal DPM on 1/21/2021 at 10:20 AM

## 2021-02-17 NOTE — TELEPHONE ENCOUNTER
Last visit: 5/7/18  Last Med refill: 3/25/19, 3/29/19  Does patient have enough medication for 72 hours: Yes    Next Visit Date:  Future Appointments   Date Time Provider Roberto Drummond   6/26/2019 11:10 AM NORMA Chery - CNP [facility] [address] Maintenance   Topic Date Due    DTaP/Tdap/Td vaccine (1 - Tdap) 07/03/1955    Shingles Vaccine (1 of 2) 07/03/1986    DEXA (modify frequency per FRAX score)  07/03/2001    Pneumococcal 65+ years Vaccine (1 of 2 - PCV13) 07/03/2001    TSH testing  02/07/2019    Potassium monitoring  02/07/2019    Creatinine monitoring  02/07/2019    Flu vaccine (Season Ended) 09/01/2019       No results found for: LABA1C          ( goal A1C is < 7)   No results found for: LABMICR  LDL Cholesterol (mg/dL)   Date Value   02/07/2018 58       (goal LDL is <100)   AST (U/L)   Date Value   02/07/2018 13     ALT (U/L)   Date Value   02/07/2018 7     BUN (mg/dL)   Date Value   02/07/2018 14     BP Readings from Last 3 Encounters:   05/07/18 128/84   02/07/18 122/80   08/31/17 (!) 148/92          (goal 120/80)    All Future Testing planned in [facility]  Lab Frequency Next Occurrence               Patient Active Problem List:     HTN (hypertension)     Dependent edema     SOB (shortness of breath)     Hypertension     Kidney stone     Cancer (HCC)     Hypothyroidism     Depression     Hyperlipidemia     Basal cell carcinoma     Melanoma (HCC)     Anxiety     Hypokalemia     Gout     H/O total hysterectomy     CKD (chronic kidney disease) stage 3, GFR 30-59 ml/min (HCC)     Risk for falls
Pt needs refill on:  -Carvedilol 12.5mg  -Paroxetine 40mg    [facility] [address]
Chart(s)/Patient

## 2021-03-01 ENCOUNTER — OFFICE VISIT (OUTPATIENT)
Dept: FAMILY MEDICINE CLINIC | Age: 86
End: 2021-03-01
Payer: MEDICARE

## 2021-03-01 VITALS
TEMPERATURE: 96.8 F | OXYGEN SATURATION: 97 % | DIASTOLIC BLOOD PRESSURE: 82 MMHG | HEART RATE: 79 BPM | SYSTOLIC BLOOD PRESSURE: 128 MMHG

## 2021-03-01 DIAGNOSIS — I50.22 CHRONIC SYSTOLIC HEART FAILURE (HCC): ICD-10-CM

## 2021-03-01 DIAGNOSIS — L97.511 ULCER OF RIGHT FOOT, LIMITED TO BREAKDOWN OF SKIN (HCC): ICD-10-CM

## 2021-03-01 DIAGNOSIS — I10 ESSENTIAL HYPERTENSION: ICD-10-CM

## 2021-03-01 DIAGNOSIS — F33.1 MODERATE EPISODE OF RECURRENT MAJOR DEPRESSIVE DISORDER (HCC): ICD-10-CM

## 2021-03-01 DIAGNOSIS — F32.A DEPRESSION, UNSPECIFIED DEPRESSION TYPE: Primary | ICD-10-CM

## 2021-03-01 DIAGNOSIS — R22.1 MASS OF LEFT SIDE OF NECK: ICD-10-CM

## 2021-03-01 DIAGNOSIS — E78.5 HYPERLIPIDEMIA, UNSPECIFIED HYPERLIPIDEMIA TYPE: ICD-10-CM

## 2021-03-01 DIAGNOSIS — L98.9 SKIN LESION OF FACE: ICD-10-CM

## 2021-03-01 DIAGNOSIS — B35.6 TINEA CRURIS: ICD-10-CM

## 2021-03-01 DIAGNOSIS — E43 SEVERE MALNUTRITION (HCC): ICD-10-CM

## 2021-03-01 DIAGNOSIS — E03.9 HYPOTHYROIDISM, UNSPECIFIED TYPE: ICD-10-CM

## 2021-03-01 DIAGNOSIS — C43.9 MALIGNANT MELANOMA, UNSPECIFIED SITE (HCC): ICD-10-CM

## 2021-03-01 PROCEDURE — 1036F TOBACCO NON-USER: CPT | Performed by: INTERNAL MEDICINE

## 2021-03-01 PROCEDURE — G8400 PT W/DXA NO RESULTS DOC: HCPCS | Performed by: INTERNAL MEDICINE

## 2021-03-01 PROCEDURE — G8484 FLU IMMUNIZE NO ADMIN: HCPCS | Performed by: INTERNAL MEDICINE

## 2021-03-01 PROCEDURE — 1090F PRES/ABSN URINE INCON ASSESS: CPT | Performed by: INTERNAL MEDICINE

## 2021-03-01 PROCEDURE — G8420 CALC BMI NORM PARAMETERS: HCPCS | Performed by: INTERNAL MEDICINE

## 2021-03-01 PROCEDURE — G8427 DOCREV CUR MEDS BY ELIG CLIN: HCPCS | Performed by: INTERNAL MEDICINE

## 2021-03-01 PROCEDURE — 99214 OFFICE O/P EST MOD 30 MIN: CPT | Performed by: INTERNAL MEDICINE

## 2021-03-01 PROCEDURE — 1123F ACP DISCUSS/DSCN MKR DOCD: CPT | Performed by: INTERNAL MEDICINE

## 2021-03-01 PROCEDURE — 4040F PNEUMOC VAC/ADMIN/RCVD: CPT | Performed by: INTERNAL MEDICINE

## 2021-03-01 RX ORDER — CARVEDILOL 12.5 MG/1
TABLET ORAL
Qty: 180 TABLET | Refills: 1 | Status: SHIPPED | OUTPATIENT
Start: 2021-03-01 | End: 2021-08-30

## 2021-03-01 RX ORDER — ATORVASTATIN CALCIUM 10 MG/1
TABLET, FILM COATED ORAL
Qty: 90 TABLET | Refills: 1 | Status: SHIPPED | OUTPATIENT
Start: 2021-03-01 | End: 2021-08-30

## 2021-03-01 RX ORDER — PAROXETINE HYDROCHLORIDE 40 MG/1
TABLET, FILM COATED ORAL
Qty: 90 TABLET | Refills: 1 | Status: SHIPPED | OUTPATIENT
Start: 2021-03-01 | End: 2021-08-30

## 2021-03-01 RX ORDER — BUSPIRONE HYDROCHLORIDE 15 MG/1
TABLET ORAL
Qty: 90 TABLET | Refills: 1 | Status: SHIPPED | OUTPATIENT
Start: 2021-03-01 | End: 2021-08-11

## 2021-03-01 RX ORDER — LISINOPRIL 20 MG/1
TABLET ORAL
Qty: 90 TABLET | Refills: 1 | Status: SHIPPED | OUTPATIENT
Start: 2021-03-01 | End: 2021-08-30 | Stop reason: SDUPTHER

## 2021-03-01 RX ORDER — CEPHALEXIN 500 MG/1
500 CAPSULE ORAL 2 TIMES DAILY
Qty: 14 CAPSULE | Refills: 0 | Status: SHIPPED | OUTPATIENT
Start: 2021-03-01 | End: 2021-03-08

## 2021-03-01 RX ORDER — NYSTATIN 100000 [USP'U]/G
POWDER TOPICAL
Qty: 120 G | Refills: 1 | Status: SHIPPED | OUTPATIENT
Start: 2021-03-01

## 2021-03-01 RX ORDER — LEVOTHYROXINE SODIUM 0.1 MG/1
TABLET ORAL
Qty: 90 TABLET | Refills: 1 | Status: SHIPPED | OUTPATIENT
Start: 2021-03-01 | End: 2021-08-18 | Stop reason: SDUPTHER

## 2021-03-01 ASSESSMENT — ENCOUNTER SYMPTOMS
COUGH: 0
CHEST TIGHTNESS: 0
CONSTIPATION: 0
ABDOMINAL PAIN: 0
DIARRHEA: 0
NAUSEA: 0
SHORTNESS OF BREATH: 0
WHEEZING: 0
CHOKING: 0
VOMITING: 0
BLOOD IN STOOL: 0
ANAL BLEEDING: 0

## 2021-03-01 ASSESSMENT — VISUAL ACUITY: OU: 1

## 2021-03-01 NOTE — PROGRESS NOTES
Pt is here today for a check up. She states has a mass on LT side of neck. She states has been there for a month now. She has no pain.  She states she is a  and has been picking at her face and neck

## 2021-03-01 NOTE — PROGRESS NOTES
Subjective:       Patient ID:     Isom Seip is a 81 y.o. female who presents for   Chief Complaint   Patient presents with    Mass     LT side of neck       HPI:  Nursing note reviewed and discussed with patient. Here for follow-up today     Lump on left side of neck - exophytic mass, no pain. Has been there for a month, daughter states she just saw it one day a few weeks ago and it has not changed since then. She denies febers, chills, nausea, vomtiing, diarrhea. Has multiple excorioations on facial skin, states picks at them all the time so they dont heal.   Hypertension-tolerating current regimen without chest pain, palpitations, dizziness, peripheral edema, dyspnea on exertion, orthopnea, paroxysmal nocturnal dyspnea. Hyperlipidemia-tolerating current regimen without myalgias, dyspepsia, jaundice. Mostly compliant with diet recommendations for low carb diet, not very compliant with exercise recommendations. Hypothyroidism - Patient complains of hypothyroidism. Current symptoms: none. Patient denies change in energy level, diarrhea, heat / cold intolerance, nervousness, palpitations and weight changes. Onset of symptoms was several years ago. Symptoms have been well-controlled. Cardiovascular risk factors besides obesity: advanced age (older than 54 for men, 72 for women), dyslipidemia, hypertension and sedentary lifestyle          Patient's medications, allergies, past medical, surgical, social and family histories were reviewed and updated as appropriate.     Past Medical History:   Diagnosis Date    Anxiety     Arthritis     Basal cell carcinoma     Cancer (HCC)     skin    CHF (congestive heart failure) (HCC)     Closed fracture of single pubic ramus of pelvis with routine healing, right 1/28/2020    Depression     Gout     H/O total hysterectomy 2/7/2018    Hyperlipidemia     Hypertension     Hypokalemia     Hypothyroidism     Kidney stone     Kidney stone     [facility]oma ([facility]     NSTEMI (non-ST elevated myocardial infarction) ([facility] 2020    Pressure injury of coccygeal region, stage 2 ([facility] 3/10/2020     Past Surgical History:   Procedure Laterality Date    HYSTERECTOMY      HYSTERECTOMY, VAGINAL      TUBAL LIGATION         Social History     Tobacco Use    Smoking status: Former Smoker     Quit date: 1960     Years since quittin.8    Smokeless tobacco: Never Used   Substance Use Topics    Alcohol use: No     Alcohol/week: 0.0 standard drinks      Patient Active Problem List   Diagnosis    Essential hypertension    Cancer ([facility]    Hypothyroidism    Depression    Hyperlipidemia    Basal cell carcinoma    Melanoma ([facility]    Anxiety    Hypokalemia    Acute idiopathic gout of right foot    CKD (chronic kidney disease) stage 3, GFR 30-59 ml/min ([facility])    Risk for falls    Pneumococcal vaccine refused    Influenza vaccination declined    Chronic fatigue    Bilateral lower extremity pain    Chronic diastolic heart failure (HCC)    Acute cystitis without hematuria    Chronic systolic heart failure (HCC)    Decreased mobility    Localized edema    Xerosis cutis    Hallux valgus of left foot         Prior to Visit Medications    Medication Sig Taking? Authorizing Provider   ammonium lactate (LAC-HYDRIN) 12 % cream Apply topically to dry skin twice daily. Yes Nicolás Jordan DPM   diclofenac sodium (VOLTAREN) 1 % GEL apply 4 grams to affected area twice a day AS NEEDED FOR PAIN Yes KAY Barney   furosemide (LASIX) 20 MG tablet Take 1 tablet by mouth 2 times daily Yes Abel Apodaca MD   nystatin (MYCOSTATIN) 222647 UNIT/GM powder Apply 3 times daily.  Yes Abel Apodaca MD   lisinopril (PRINIVIL;ZESTRIL) 20 MG tablet TAKE 1 TABLET DAILY Yes Cecille Cruz MD   atorvastatin (LIPITOR) 10 MG tablet TAKE 1 TABLET DAILY Yes Cecille Cruz MD   carvedilol (COREG) 12.5 MG tablet TAKE 1 TABLET TWICE A DAY WITH MEALS Yes Cecille Cruz MD   PARoxetine (PAXIL) 40 MG tablet TAKE 1 TABLET EVERY MORNING Yes Cecille Chavez MD   busPIRone (BUSPAR) 15 MG tablet TAKE 1 TABLET DAILY Yes Cecille Chavez MD   levothyroxine (SYNTHROID) 100 MCG tablet TAKE 1 TABLET DAILY Yes Cecille Chavez MD   aspirin 81 MG tablet Take 81 mg by mouth daily Yes Historical Provider, MD     Review of Systems  Review of Systems   Constitutional: Negative for fatigue, fever and unexpected weight change. Respiratory: Negative for cough, choking, chest tightness, shortness of breath and wheezing. Cardiovascular: Negative for chest pain, palpitations and leg swelling. Gastrointestinal: Negative for abdominal pain, anal bleeding, blood in stool, constipation, diarrhea, nausea and vomiting. Endocrine: Negative. Musculoskeletal: Negative for joint swelling and myalgias. Skin: Negative. Neurological: Negative for dizziness. Psychiatric/Behavioral: Negative for sleep disturbance. All other systems reviewed and are negative. Objective:       Physical Exam:  BP (!) 146/98 (Site: Left Upper Arm, Position: Sitting, Cuff Size: Medium Adult)   Pulse 79   Temp 96.8 °F (36 °C) (Temporal)   SpO2 97%   Physical Exam  Vitals signs and nursing note reviewed. Constitutional:       General: She is not in acute distress. Appearance: She is well-developed. She is not ill-appearing. Eyes:      General: Lids are normal. Vision grossly intact. Cardiovascular:      Rate and Rhythm: Normal rate and regular rhythm. Heart sounds: Normal heart sounds, S1 normal and S2 normal. No murmur. No friction rub. No gallop. Pulmonary:      Effort: Pulmonary effort is normal. No respiratory distress. Breath sounds: Normal breath sounds. No wheezing. Abdominal:      General: Bowel sounds are normal.      Palpations: Abdomen is soft. There is no mass. Tenderness: There is no abdominal tenderness. There is no guarding. Musculoskeletal: Normal range of motion.    Skin: General: Skin is warm and dry. Capillary Refill: Capillary refill takes less than 2 seconds. Comments: Multiple excoriations on face including erythematous sore on left cheek   Multiple macular lesions on scalp and face, bilateral forearms with excoriation   3cm diameter exophytic lesion on left neck anteriorly    Neurological:      General: No focal deficit present. Mental Status: She is alert and oriented to person, place, and time. Data Review  No visits with results within 2 Month(s) from this visit. Latest known visit with results is:   Hospital Outpatient Visit on 11/11/2020   Component Date Value    TSH 11/11/2020 0.98           Assessment/Plan:      1. Depression, unspecified depression type  - busPIRone (BUSPAR) 15 MG tablet; Take 1 tablet daily  Dispense: 90 tablet; Refill: 1  - PARoxetine (PAXIL) 40 MG tablet; TAKE 1 TABLET EVERY MORNING  Dispense: 90 tablet; Refill: 1    2. Essential hypertension  - carvedilol (COREG) 12.5 MG tablet; TAKE 1 TABLET TWICE A DAY WITH MEALS  Dispense: 180 tablet; Refill: 1  - lisinopril (PRINIVIL;ZESTRIL) 20 MG tablet; TAKE 1 TABLET DAILY  Dispense: 90 tablet; Refill: 1    3. Hyperlipidemia, unspecified hyperlipidemia type  - atorvastatin (LIPITOR) 10 MG tablet; TAKE 1 TABLET DAILY  Dispense: 90 tablet; Refill: 1    4. Tinea cruris  - nystatin (MYCOSTATIN) 080210 UNIT/GM powder; Apply 3 times daily. Dispense: 120 g; Refill: 1    5. Skin lesion of face  - cephALEXin (KEFLEX) 500 MG capsule; Take 1 capsule by mouth 2 times daily for 7 days  Dispense: 14 capsule; Refill: 0  - External Referral To Dermatology    6. Mass of left side of neck  - US HEAD NECK SOFT TISSUE THYROID; Future    7. Hypothyroidism, unspecified type  - levothyroxine (SYNTHROID) 100 MCG tablet; TAKE 1 TABLET DAILY  Dispense: 90 tablet; Refill: 1    8. Malignant melanoma, unspecified site [facility])  F/u dermatology    9.  Ulcer of right foot, limited to breakdown of skin ([address]  Finish keflex    10. Severe malnutrition (HCC)  Continue current regimen    11. Chronic systolic heart failure (HCC)  Continue current regimen     12.  Moderate episode of recurrent major depressive disorder ([facility]  Continue paxil            Health Maintenance Due   Topic Date Due    Shingles Vaccine (1 of 2) 07/03/1986    DEXA (modify frequency per FRAX score)  07/03/1991    Annual Wellness Visit (AWV)  06/23/2019    Lipid screen  01/29/2021    Potassium monitoring  03/06/2021    Creatinine monitoring  03/06/2021       Electronically signed by Ivette Barreto MD on 3/1/2021 at 5:09 PM

## 2021-03-04 ENCOUNTER — HOSPITAL ENCOUNTER (OUTPATIENT)
Dept: ULTRASOUND IMAGING | Age: 86
Discharge: HOME | End: 2021-03-06
Payer: MEDICARE

## 2021-03-04 DIAGNOSIS — R22.1 MASS OF LEFT SIDE OF NECK: ICD-10-CM

## 2021-03-04 PROCEDURE — 76536 US EXAM OF HEAD AND NECK: CPT

## 2021-03-08 DIAGNOSIS — R22.1 MASS OF LEFT SIDE OF NECK: Primary | ICD-10-CM

## 2021-03-09 ENCOUNTER — TELEPHONE (OUTPATIENT)
Dept: FAMILY MEDICINE CLINIC | Age: 86
End: 2021-03-09

## 2021-03-10 ENCOUNTER — HOSPITAL ENCOUNTER (INPATIENT)
Age: 86
LOS: 3 days | Discharge: NURSING, REHAB OR LONG-TERM CARE | DRG: 603 | End: 2021-03-13
Attending: STUDENT IN AN ORGANIZED HEALTH CARE EDUCATION/TRAINING PROGRAM | Admitting: INTERNAL MEDICINE
Payer: MEDICARE

## 2021-03-10 ENCOUNTER — APPOINTMENT (OUTPATIENT)
Dept: GENERAL RADIOLOGY | Age: 86
DRG: 603 | End: 2021-03-10
Payer: MEDICARE

## 2021-03-10 DIAGNOSIS — L03.90 CELLULITIS, UNSPECIFIED CELLULITIS SITE: ICD-10-CM

## 2021-03-10 DIAGNOSIS — R53.83 FATIGUE, UNSPECIFIED TYPE: Primary | ICD-10-CM

## 2021-03-10 DIAGNOSIS — E87.6 HYPOKALEMIA: ICD-10-CM

## 2021-03-10 DIAGNOSIS — L03.119 CELLULITIS OF LOWER EXTREMITY, UNSPECIFIED LATERALITY: ICD-10-CM

## 2021-03-10 PROBLEM — N39.0 UTI (URINARY TRACT INFECTION): Status: ACTIVE | Noted: 2021-03-10

## 2021-03-10 PROBLEM — L89.159 DECUBITUS ULCER OF COCCYX: Status: ACTIVE | Noted: 2021-03-10

## 2021-03-10 LAB
-: ABNORMAL
ABSOLUTE EOS #: 0.3 K/UL (ref 0–0.4)
ABSOLUTE IMMATURE GRANULOCYTE: ABNORMAL K/UL (ref 0–0.3)
ABSOLUTE LYMPH #: 1.2 K/UL (ref 1–4.8)
ABSOLUTE MONO #: 0.6 K/UL (ref 0.1–1.3)
ALBUMIN SERPL-MCNC: 3.6 G/DL (ref 3.5–5.2)
ALBUMIN/GLOBULIN RATIO: ABNORMAL (ref 1–2.5)
ALP BLD-CCNC: 117 U/L (ref 35–104)
ALT SERPL-CCNC: 7 U/L (ref 5–33)
AMORPHOUS: ABNORMAL
ANION GAP SERPL CALCULATED.3IONS-SCNC: 9 MMOL/L (ref 9–17)
AST SERPL-CCNC: 10 U/L
BACTERIA: ABNORMAL
BASOPHILS # BLD: 1 % (ref 0–2)
BASOPHILS ABSOLUTE: 0.1 K/UL (ref 0–0.2)
BILIRUB SERPL-MCNC: 0.55 MG/DL (ref 0.3–1.2)
BILIRUBIN URINE: NEGATIVE
BNP INTERPRETATION: ABNORMAL
BUN BLDV-MCNC: 17 MG/DL (ref 8–23)
BUN/CREAT BLD: ABNORMAL (ref 9–20)
CALCIUM SERPL-MCNC: 9.1 MG/DL (ref 8.6–10.4)
CASTS UA: ABNORMAL /LPF
CHLORIDE BLD-SCNC: 100 MMOL/L (ref 98–107)
CO2: 29 MMOL/L (ref 20–31)
COLOR: ABNORMAL
COMMENT UA: ABNORMAL
CREAT SERPL-MCNC: 1.15 MG/DL (ref 0.5–0.9)
CRYSTALS, UA: ABNORMAL /HPF
DIFFERENTIAL TYPE: ABNORMAL
EOSINOPHILS RELATIVE PERCENT: 5 % (ref 0–4)
EPITHELIAL CELLS UA: ABNORMAL /HPF
GFR AFRICAN AMERICAN: 54 ML/MIN
GFR NON-AFRICAN AMERICAN: 45 ML/MIN
GFR SERPL CREATININE-BSD FRML MDRD: ABNORMAL ML/MIN/{1.73_M2}
GFR SERPL CREATININE-BSD FRML MDRD: ABNORMAL ML/MIN/{1.73_M2}
GLUCOSE BLD-MCNC: 144 MG/DL (ref 70–99)
GLUCOSE URINE: NEGATIVE
HCT VFR BLD CALC: 34.4 % (ref 36–46)
HEMOGLOBIN: 11.4 G/DL (ref 12–16)
IMMATURE GRANULOCYTES: ABNORMAL %
KETONES, URINE: NEGATIVE
LACTIC ACID: 1.1 MMOL/L (ref 0.5–2.2)
LEUKOCYTE ESTERASE, URINE: ABNORMAL
LYMPHOCYTES # BLD: 17 % (ref 24–44)
MAGNESIUM: 2 MG/DL (ref 1.6–2.6)
MCH RBC QN AUTO: 29 PG (ref 26–34)
MCHC RBC AUTO-ENTMCNC: 33.3 G/DL (ref 31–37)
MCV RBC AUTO: 87.2 FL (ref 80–100)
MONOCYTES # BLD: 8 % (ref 1–7)
MUCUS: ABNORMAL
NITRITE, URINE: POSITIVE
NRBC AUTOMATED: ABNORMAL PER 100 WBC
OTHER OBSERVATIONS UA: ABNORMAL
PDW BLD-RTO: 13.2 % (ref 11.5–14.9)
PH UA: 6 (ref 5–8)
PLATELET # BLD: 275 K/UL (ref 150–450)
PLATELET ESTIMATE: ABNORMAL
PMV BLD AUTO: 8.2 FL (ref 6–12)
POTASSIUM SERPL-SCNC: 3.3 MMOL/L (ref 3.7–5.3)
PRO-BNP: 524 PG/ML
PROTEIN UA: ABNORMAL
RBC # BLD: 3.95 M/UL (ref 4–5.2)
RBC # BLD: ABNORMAL 10*6/UL
RBC UA: ABNORMAL /HPF
RENAL EPITHELIAL, UA: ABNORMAL /HPF
SEG NEUTROPHILS: 69 % (ref 36–66)
SEGMENTED NEUTROPHILS ABSOLUTE COUNT: 4.8 K/UL (ref 1.3–9.1)
SODIUM BLD-SCNC: 138 MMOL/L (ref 135–144)
SPECIFIC GRAVITY UA: 1.02 (ref 1–1.03)
TOTAL PROTEIN: 7.4 G/DL (ref 6.4–8.3)
TRICHOMONAS: ABNORMAL
TSH SERPL DL<=0.05 MIU/L-ACNC: 0.9 MIU/L (ref 0.3–5)
TURBIDITY: ABNORMAL
URINE HGB: ABNORMAL
UROBILINOGEN, URINE: NORMAL
WBC # BLD: 7 K/UL (ref 3.5–11)
WBC # BLD: ABNORMAL 10*3/UL
WBC UA: ABNORMAL /HPF
YEAST: ABNORMAL

## 2021-03-10 PROCEDURE — 6370000000 HC RX 637 (ALT 250 FOR IP): Performed by: STUDENT IN AN ORGANIZED HEALTH CARE EDUCATION/TRAINING PROGRAM

## 2021-03-10 PROCEDURE — 83605 ASSAY OF LACTIC ACID: CPT

## 2021-03-10 PROCEDURE — 73590 X-RAY EXAM OF LOWER LEG: CPT

## 2021-03-10 PROCEDURE — 84443 ASSAY THYROID STIM HORMONE: CPT

## 2021-03-10 PROCEDURE — 83735 ASSAY OF MAGNESIUM: CPT

## 2021-03-10 PROCEDURE — 73610 X-RAY EXAM OF ANKLE: CPT

## 2021-03-10 PROCEDURE — 81001 URINALYSIS AUTO W/SCOPE: CPT

## 2021-03-10 PROCEDURE — 36415 COLL VENOUS BLD VENIPUNCTURE: CPT

## 2021-03-10 PROCEDURE — 73502 X-RAY EXAM HIP UNI 2-3 VIEWS: CPT

## 2021-03-10 PROCEDURE — 83880 ASSAY OF NATRIURETIC PEPTIDE: CPT

## 2021-03-10 PROCEDURE — 99284 EMERGENCY DEPT VISIT MOD MDM: CPT

## 2021-03-10 PROCEDURE — 1200000000 HC SEMI PRIVATE

## 2021-03-10 PROCEDURE — 80053 COMPREHEN METABOLIC PANEL: CPT

## 2021-03-10 PROCEDURE — 73562 X-RAY EXAM OF KNEE 3: CPT

## 2021-03-10 PROCEDURE — 85025 COMPLETE CBC W/AUTO DIFF WBC: CPT

## 2021-03-10 RX ORDER — ACETAMINOPHEN 325 MG/1
650 TABLET ORAL ONCE
Status: COMPLETED | OUTPATIENT
Start: 2021-03-10 | End: 2021-03-10

## 2021-03-10 RX ADMIN — POTASSIUM BICARBONATE 20 MEQ: 782 TABLET, EFFERVESCENT ORAL at 22:41

## 2021-03-10 RX ADMIN — ACETAMINOPHEN 650 MG: 325 TABLET ORAL at 22:39

## 2021-03-10 ASSESSMENT — ENCOUNTER SYMPTOMS
SHORTNESS OF BREATH: 0
RHINORRHEA: 0
EYE ITCHING: 0
NAUSEA: 0
FACIAL SWELLING: 0
COLOR CHANGE: 1
DIARRHEA: 0
VOMITING: 0
PHOTOPHOBIA: 0
COUGH: 0
COLOR CHANGE: 0
ABDOMINAL PAIN: 0

## 2021-03-10 ASSESSMENT — PAIN SCALES - GENERAL: PAINLEVEL_OUTOF10: 5

## 2021-03-10 ASSESSMENT — PAIN DESCRIPTION - PAIN TYPE: TYPE: ACUTE PAIN

## 2021-03-10 ASSESSMENT — PAIN DESCRIPTION - ORIENTATION: ORIENTATION: RIGHT;LEFT

## 2021-03-10 ASSESSMENT — PAIN DESCRIPTION - LOCATION: LOCATION: HIP;LEG

## 2021-03-11 PROBLEM — R53.83 FATIGUE: Status: ACTIVE | Noted: 2020-01-28

## 2021-03-11 PROBLEM — E44.1 MILD MALNUTRITION (HCC): Status: ACTIVE | Noted: 2021-03-11

## 2021-03-11 LAB
ANION GAP SERPL CALCULATED.3IONS-SCNC: 9 MMOL/L (ref 9–17)
BUN BLDV-MCNC: 15 MG/DL (ref 8–23)
BUN/CREAT BLD: ABNORMAL (ref 9–20)
CALCIUM SERPL-MCNC: 8.5 MG/DL (ref 8.6–10.4)
CHLORIDE BLD-SCNC: 101 MMOL/L (ref 98–107)
CO2: 25 MMOL/L (ref 20–31)
CREAT SERPL-MCNC: 0.91 MG/DL (ref 0.5–0.9)
GFR AFRICAN AMERICAN: >60 ML/MIN
GFR NON-AFRICAN AMERICAN: 59 ML/MIN
GFR SERPL CREATININE-BSD FRML MDRD: ABNORMAL ML/MIN/{1.73_M2}
GFR SERPL CREATININE-BSD FRML MDRD: ABNORMAL ML/MIN/{1.73_M2}
GLUCOSE BLD-MCNC: 117 MG/DL (ref 70–99)
HCT VFR BLD CALC: 32.7 % (ref 36–46)
HEMOGLOBIN: 11 G/DL (ref 12–16)
MAGNESIUM: 2 MG/DL (ref 1.6–2.6)
MCH RBC QN AUTO: 28.9 PG (ref 26–34)
MCHC RBC AUTO-ENTMCNC: 33.7 G/DL (ref 31–37)
MCV RBC AUTO: 85.8 FL (ref 80–100)
NRBC AUTOMATED: ABNORMAL PER 100 WBC
PDW BLD-RTO: 13 % (ref 11.5–14.9)
PLATELET # BLD: 226 K/UL (ref 150–450)
PMV BLD AUTO: 7.7 FL (ref 6–12)
POTASSIUM SERPL-SCNC: 3 MMOL/L (ref 3.7–5.3)
POTASSIUM SERPL-SCNC: 3.8 MMOL/L (ref 3.7–5.3)
RBC # BLD: 3.82 M/UL (ref 4–5.2)
SODIUM BLD-SCNC: 135 MMOL/L (ref 135–144)
WBC # BLD: 8.6 K/UL (ref 3.5–11)

## 2021-03-11 PROCEDURE — 85027 COMPLETE CBC AUTOMATED: CPT

## 2021-03-11 PROCEDURE — 83735 ASSAY OF MAGNESIUM: CPT

## 2021-03-11 PROCEDURE — 2580000003 HC RX 258: Performed by: STUDENT IN AN ORGANIZED HEALTH CARE EDUCATION/TRAINING PROGRAM

## 2021-03-11 PROCEDURE — 2580000003 HC RX 258: Performed by: NURSE PRACTITIONER

## 2021-03-11 PROCEDURE — 6370000000 HC RX 637 (ALT 250 FOR IP): Performed by: NURSE PRACTITIONER

## 2021-03-11 PROCEDURE — 87186 SC STD MICRODIL/AGAR DIL: CPT

## 2021-03-11 PROCEDURE — 87040 BLOOD CULTURE FOR BACTERIA: CPT

## 2021-03-11 PROCEDURE — 2580000003 HC RX 258: Performed by: INTERNAL MEDICINE

## 2021-03-11 PROCEDURE — 80048 BASIC METABOLIC PNL TOTAL CA: CPT

## 2021-03-11 PROCEDURE — 84132 ASSAY OF SERUM POTASSIUM: CPT

## 2021-03-11 PROCEDURE — 87070 CULTURE OTHR SPECIMN AEROBIC: CPT

## 2021-03-11 PROCEDURE — 6360000002 HC RX W HCPCS: Performed by: NURSE PRACTITIONER

## 2021-03-11 PROCEDURE — 36415 COLL VENOUS BLD VENIPUNCTURE: CPT

## 2021-03-11 PROCEDURE — 1200000000 HC SEMI PRIVATE

## 2021-03-11 PROCEDURE — 2500000003 HC RX 250 WO HCPCS: Performed by: NURSE PRACTITIONER

## 2021-03-11 PROCEDURE — 6360000002 HC RX W HCPCS: Performed by: INTERNAL MEDICINE

## 2021-03-11 PROCEDURE — 87077 CULTURE AEROBIC IDENTIFY: CPT

## 2021-03-11 PROCEDURE — 86403 PARTICLE AGGLUT ANTBDY SCRN: CPT

## 2021-03-11 PROCEDURE — 6360000002 HC RX W HCPCS: Performed by: STUDENT IN AN ORGANIZED HEALTH CARE EDUCATION/TRAINING PROGRAM

## 2021-03-11 PROCEDURE — 99223 1ST HOSP IP/OBS HIGH 75: CPT | Performed by: INTERNAL MEDICINE

## 2021-03-11 PROCEDURE — 6370000000 HC RX 637 (ALT 250 FOR IP): Performed by: INTERNAL MEDICINE

## 2021-03-11 PROCEDURE — 87086 URINE CULTURE/COLONY COUNT: CPT

## 2021-03-11 PROCEDURE — 87205 SMEAR GRAM STAIN: CPT

## 2021-03-11 RX ORDER — POTASSIUM CHLORIDE 7.45 MG/ML
10 INJECTION INTRAVENOUS PRN
Status: DISCONTINUED | OUTPATIENT
Start: 2021-03-11 | End: 2021-03-13 | Stop reason: HOSPADM

## 2021-03-11 RX ORDER — PROMETHAZINE HYDROCHLORIDE 25 MG/1
12.5 TABLET ORAL EVERY 6 HOURS PRN
Status: DISCONTINUED | OUTPATIENT
Start: 2021-03-11 | End: 2021-03-13 | Stop reason: HOSPADM

## 2021-03-11 RX ORDER — ASPIRIN 81 MG/1
81 TABLET, CHEWABLE ORAL DAILY
Status: DISCONTINUED | OUTPATIENT
Start: 2021-03-12 | End: 2021-03-13 | Stop reason: HOSPADM

## 2021-03-11 RX ORDER — MAGNESIUM SULFATE 1 G/100ML
1000 INJECTION INTRAVENOUS PRN
Status: DISCONTINUED | OUTPATIENT
Start: 2021-03-11 | End: 2021-03-13 | Stop reason: HOSPADM

## 2021-03-11 RX ORDER — HYDROCODONE BITARTRATE AND ACETAMINOPHEN 5; 325 MG/1; MG/1
1 TABLET ORAL EVERY 6 HOURS PRN
Status: DISCONTINUED | OUTPATIENT
Start: 2021-03-11 | End: 2021-03-13 | Stop reason: HOSPADM

## 2021-03-11 RX ORDER — SODIUM CHLORIDE 0.9 % (FLUSH) 0.9 %
10 SYRINGE (ML) INJECTION EVERY 12 HOURS SCHEDULED
Status: DISCONTINUED | OUTPATIENT
Start: 2021-03-11 | End: 2021-03-13 | Stop reason: HOSPADM

## 2021-03-11 RX ORDER — ONDANSETRON 2 MG/ML
4 INJECTION INTRAMUSCULAR; INTRAVENOUS EVERY 6 HOURS PRN
Status: DISCONTINUED | OUTPATIENT
Start: 2021-03-11 | End: 2021-03-13 | Stop reason: HOSPADM

## 2021-03-11 RX ORDER — FUROSEMIDE 20 MG/1
20 TABLET ORAL 2 TIMES DAILY
Status: DISCONTINUED | OUTPATIENT
Start: 2021-03-11 | End: 2021-03-13 | Stop reason: HOSPADM

## 2021-03-11 RX ORDER — ASPIRIN 81 MG/1
81 TABLET ORAL DAILY
Status: DISCONTINUED | OUTPATIENT
Start: 2021-03-11 | End: 2021-03-11

## 2021-03-11 RX ORDER — ATORVASTATIN CALCIUM 40 MG/1
40 TABLET, FILM COATED ORAL NIGHTLY
Status: DISCONTINUED | OUTPATIENT
Start: 2021-03-11 | End: 2021-03-11

## 2021-03-11 RX ORDER — ATORVASTATIN CALCIUM 10 MG/1
10 TABLET, FILM COATED ORAL NIGHTLY
Status: DISCONTINUED | OUTPATIENT
Start: 2021-03-11 | End: 2021-03-13 | Stop reason: HOSPADM

## 2021-03-11 RX ORDER — SODIUM CHLORIDE 0.9 % (FLUSH) 0.9 %
10 SYRINGE (ML) INJECTION PRN
Status: DISCONTINUED | OUTPATIENT
Start: 2021-03-11 | End: 2021-03-13 | Stop reason: HOSPADM

## 2021-03-11 RX ORDER — LEVOTHYROXINE SODIUM 0.1 MG/1
100 TABLET ORAL DAILY
Status: DISCONTINUED | OUTPATIENT
Start: 2021-03-11 | End: 2021-03-13 | Stop reason: HOSPADM

## 2021-03-11 RX ORDER — FENTANYL CITRATE 50 UG/ML
25 INJECTION, SOLUTION INTRAMUSCULAR; INTRAVENOUS EVERY 4 HOURS PRN
Status: DISCONTINUED | OUTPATIENT
Start: 2021-03-11 | End: 2021-03-11

## 2021-03-11 RX ORDER — ALBUTEROL SULFATE 90 UG/1
2 AEROSOL, METERED RESPIRATORY (INHALATION) EVERY 6 HOURS PRN
Status: DISCONTINUED | OUTPATIENT
Start: 2021-03-11 | End: 2021-03-13 | Stop reason: HOSPADM

## 2021-03-11 RX ORDER — LISINOPRIL 20 MG/1
20 TABLET ORAL DAILY
Status: DISCONTINUED | OUTPATIENT
Start: 2021-03-11 | End: 2021-03-13 | Stop reason: HOSPADM

## 2021-03-11 RX ORDER — PAROXETINE HYDROCHLORIDE 20 MG/1
40 TABLET, FILM COATED ORAL DAILY
Status: DISCONTINUED | OUTPATIENT
Start: 2021-03-11 | End: 2021-03-13 | Stop reason: HOSPADM

## 2021-03-11 RX ORDER — CARVEDILOL 12.5 MG/1
12.5 TABLET ORAL 2 TIMES DAILY WITH MEALS
Status: DISCONTINUED | OUTPATIENT
Start: 2021-03-11 | End: 2021-03-13 | Stop reason: HOSPADM

## 2021-03-11 RX ORDER — POTASSIUM CHLORIDE 20 MEQ/1
40 TABLET, EXTENDED RELEASE ORAL PRN
Status: DISCONTINUED | OUTPATIENT
Start: 2021-03-11 | End: 2021-03-13 | Stop reason: HOSPADM

## 2021-03-11 RX ORDER — POLYETHYLENE GLYCOL 3350 17 G/17G
17 POWDER, FOR SOLUTION ORAL DAILY PRN
Status: DISCONTINUED | OUTPATIENT
Start: 2021-03-11 | End: 2021-03-13 | Stop reason: HOSPADM

## 2021-03-11 RX ORDER — AMMONIUM LACTATE 12 G/100G
LOTION TOPICAL 2 TIMES DAILY
Status: DISCONTINUED | OUTPATIENT
Start: 2021-03-11 | End: 2021-03-13 | Stop reason: HOSPADM

## 2021-03-11 RX ORDER — ACETAMINOPHEN 650 MG/1
650 SUPPOSITORY RECTAL EVERY 6 HOURS PRN
Status: DISCONTINUED | OUTPATIENT
Start: 2021-03-11 | End: 2021-03-13 | Stop reason: HOSPADM

## 2021-03-11 RX ORDER — ACETAMINOPHEN 325 MG/1
650 TABLET ORAL EVERY 6 HOURS PRN
Status: DISCONTINUED | OUTPATIENT
Start: 2021-03-11 | End: 2021-03-13 | Stop reason: HOSPADM

## 2021-03-11 RX ORDER — BUSPIRONE HYDROCHLORIDE 15 MG/1
15 TABLET ORAL DAILY
Status: DISCONTINUED | OUTPATIENT
Start: 2021-03-11 | End: 2021-03-13 | Stop reason: HOSPADM

## 2021-03-11 RX ADMIN — SODIUM CHLORIDE, PRESERVATIVE FREE 10 ML: 5 INJECTION INTRAVENOUS at 08:02

## 2021-03-11 RX ADMIN — HYDROCODONE BITARTRATE AND ACETAMINOPHEN 1 TABLET: 5; 325 TABLET ORAL at 16:02

## 2021-03-11 RX ADMIN — HYDROCODONE BITARTRATE AND ACETAMINOPHEN 1 TABLET: 5; 325 TABLET ORAL at 09:53

## 2021-03-11 RX ADMIN — CEFEPIME 2000 MG: 2 INJECTION, POWDER, FOR SOLUTION INTRAVENOUS at 17:11

## 2021-03-11 RX ADMIN — ANTI-FUNGAL POWDER MICONAZOLE NITRATE TALC FREE: 1.42 POWDER TOPICAL at 19:24

## 2021-03-11 RX ADMIN — VANCOMYCIN HYDROCHLORIDE 1500 MG: 1.5 INJECTION, POWDER, LYOPHILIZED, FOR SOLUTION INTRAVENOUS at 01:52

## 2021-03-11 RX ADMIN — ATORVASTATIN CALCIUM 10 MG: 10 TABLET, FILM COATED ORAL at 19:24

## 2021-03-11 RX ADMIN — LISINOPRIL 20 MG: 20 TABLET ORAL at 07:55

## 2021-03-11 RX ADMIN — BUSPIRONE HYDROCHLORIDE 15 MG: 15 TABLET ORAL at 07:55

## 2021-03-11 RX ADMIN — FUROSEMIDE 20 MG: 20 TABLET ORAL at 07:55

## 2021-03-11 RX ADMIN — Medication 650 MG: at 14:50

## 2021-03-11 RX ADMIN — LEVOTHYROXINE SODIUM 100 MCG: 0.1 TABLET ORAL at 07:55

## 2021-03-11 RX ADMIN — POTASSIUM CHLORIDE: 2 INJECTION, SOLUTION, CONCENTRATE INTRAVENOUS at 09:54

## 2021-03-11 RX ADMIN — CARVEDILOL 12.5 MG: 12.5 TABLET, FILM COATED ORAL at 07:55

## 2021-03-11 RX ADMIN — PAROXETINE HYDROCHLORIDE 40 MG: 20 TABLET, FILM COATED ORAL at 07:55

## 2021-03-11 RX ADMIN — ASPIRIN 81 MG: 81 TABLET, COATED ORAL at 07:55

## 2021-03-11 RX ADMIN — CARVEDILOL 12.5 MG: 12.5 TABLET, FILM COATED ORAL at 17:32

## 2021-03-11 RX ADMIN — Medication 650 MG: at 07:54

## 2021-03-11 RX ADMIN — CEFTRIAXONE SODIUM 1000 MG: 1 INJECTION, POWDER, FOR SOLUTION INTRAMUSCULAR; INTRAVENOUS at 01:00

## 2021-03-11 RX ADMIN — FUROSEMIDE 20 MG: 20 TABLET ORAL at 17:32

## 2021-03-11 RX ADMIN — ANTI-FUNGAL POWDER MICONAZOLE NITRATE TALC FREE: 1.42 POWDER TOPICAL at 08:01

## 2021-03-11 RX ADMIN — Medication: at 08:01

## 2021-03-11 ASSESSMENT — PAIN DESCRIPTION - DESCRIPTORS
DESCRIPTORS: ACHING;SORE;SHARP
DESCRIPTORS: ACHING;SHARP;SORE

## 2021-03-11 ASSESSMENT — PAIN SCALES - GENERAL
PAINLEVEL_OUTOF10: 5
PAINLEVEL_OUTOF10: 9
PAINLEVEL_OUTOF10: 0
PAINLEVEL_OUTOF10: 5
PAINLEVEL_OUTOF10: 9

## 2021-03-11 ASSESSMENT — PAIN DESCRIPTION - PAIN TYPE
TYPE: ACUTE PAIN
TYPE: ACUTE PAIN

## 2021-03-11 ASSESSMENT — PAIN DESCRIPTION - FREQUENCY
FREQUENCY: CONTINUOUS
FREQUENCY: CONTINUOUS

## 2021-03-11 ASSESSMENT — PAIN DESCRIPTION - ONSET: ONSET: ON-GOING

## 2021-03-11 ASSESSMENT — PAIN DESCRIPTION - PROGRESSION
CLINICAL_PROGRESSION: GRADUALLY WORSENING

## 2021-03-11 ASSESSMENT — PAIN DESCRIPTION - LOCATION
LOCATION: HIP
LOCATION: HIP;KNEE
LOCATION: HIP;LEG

## 2021-03-11 ASSESSMENT — PAIN DESCRIPTION - ORIENTATION
ORIENTATION: RIGHT
ORIENTATION: LEFT;RIGHT
ORIENTATION: RIGHT;LEFT

## 2021-03-11 NOTE — PROGRESS NOTES
Comprehensive Nutrition Assessment    Type and Reason for Visit:  Positive Nutrition Screen(poor intake and wounds)    Nutrition Recommendations/Plan: Continue diet as ordered. Provide Ensure Enlive twice daily. Nutrition Assessment:  Nurse reports pt only eating about 25% of meals and could benefit from a nutritional supplement. Malnutrition Assessment:  Malnutrition Status:  Mild malnutrition    Context:  Acute Illness     Findings of the 6 clinical characteristics of malnutrition:  Energy Intake:  7 - 50% or less of estimated energy requirements for 5 or more days  Weight Loss:  Unable to assess     Body Fat Loss:  1 - Mild body fat loss Orbital   Muscle Mass Loss:  1 - Mild muscle mass loss Temples (temporalis), Clavicles (pectoralis & deltoids)  Fluid Accumulation:  1 - Mild Extremities   Strength:  Not Performed    Estimated Daily Nutrient Needs:  Energy (kcal):  1578 kcal based on 30 kcal/kg using adm wt 52.6 kg; Weight Used for Energy Requirements:  Admission     Protein (g):  63-74 gm protein based on 1.2-1.4 gm/kg using adm wt; Weight Used for Protein Requirements:  Admission          Nutrition Related Findings:  Edema: +1 pitting BLE's      Wounds:  Wound Consult Pending, Open Wounds       Current Nutrition Therapies:    DIET CARDIAC;   Dietary Nutrition Supplements: Standard High Calorie Oral Supplement    Anthropometric Measures:  · Height: 5' 5\" (165.1 cm)  · Current Body Weight: 115 lb (52.2 kg)   · Admission Body Weight: 115 lb (52.2 kg)    · Ideal Body Weight: 125 lbs; % Ideal Body Weight 92 %   · BMI: 19.1  · BMI Categories: Underweight (BMI less than 22) age over 72       Nutrition Diagnosis:   · Inadequate oral intake related to early satiety, other (comment)(advanced age) as evidenced by poor intake prior to admission, intake 0-25%      Nutrition Interventions:   Food and/or Nutrient Delivery:  Continue Current Diet, Start Oral Nutrition Supplement  Nutrition Education/Counseling: No recommendation at this time   Coordination of Nutrition Care:  Continue to monitor while inpatient    Goals:  Meet 50% or greater of nutrient needs with PO diet and supplements       Nutrition Monitoring and Evaluation:   Food/Nutrient Intake Outcomes:  Food and Nutrient Intake, Supplement Intake  Physical Signs/Symptoms Outcomes:  Biochemical Data, Nutrition Focused Physical Findings, Fluid Status or Edema, Skin, Weight     Discharge Planning: Too soon to determine     Some areas of assessment may be incomplete due to COVID-19 precautions. Elisha Leach R.D., L.D.   Clinical Dietitian  Office: [phone]

## 2021-03-11 NOTE — PROGRESS NOTES
Pt arrives to floor from ED via stretcher. Pt a&o x4, no s/s of distress noted. VSS. Pt noted to have 2 open wounds on coccyx upon transfer to bed. Pt wounds cleaned with soap and water and mepilex applied. Pt has bilateral wounds on lower extremities with weeping noted that writer cleansed with soap and water, and applied ABD and roll gauze over wounds. Head to toe and admission assessment completed. Brenda Gomes CNP saw pt down in ED and placed orders. POC initiated. All questions answered at this time. Waffle mattress in place. Bed in lowest position with wheels locked, 2/4 siderails up, and bed alarm activated. Will continue to monitor.

## 2021-03-11 NOTE — PROGRESS NOTES
Pharmacy Note  Vancomycin Consult    Melany Buckley is a 81 y.o. female started on Vancomycin for cellulitis; consult received from Dr. Milka Mercedes to manage therapy. Patient Active Problem List   Diagnosis    Essential hypertension    Cancer ([facility]    Hypothyroidism    Depression    Hyperlipidemia    Basal cell carcinoma    [facility]    Anxiety    Hypokalemia    Acute idiopathic gout of right foot    CKD (chronic kidney disease) stage 3, GFR 30-59 ml/min (HCC)    Risk for falls    Pneumococcal vaccine refused    Influenza vaccination declined    Chronic fatigue    Bilateral lower extremity pain    Chronic diastolic heart failure (HCC)    Acute cystitis without hematuria    Chronic systolic heart failure (HCC)    Decreased mobility    Localized edema    Xerosis cutis    Hallux valgus of left foot    Ulcer of right foot, limited to breakdown of skin (HCC)    Severe malnutrition (HCC)    Moderate episode of recurrent major depressive disorder (HCC)    Cellulitis of lower extremity    UTI (urinary tract infection)    Decubitus ulcer of coccyx     Allergies:  Bactrim [sulfamethoxazole-trimethoprim]     Temp max: 97.8    Recent Labs     03/10/21  2050   BUN 17   CREATININE 1.15*   WBC 7.0     No intake or output data in the 24 hours ending 03/11/21 0000  Culture Date      Source                       Results      Ht Readings from Last 1 Encounters:   03/10/21 5' 5\" (1.651 m)        Wt Readings from Last 1 Encounters:   03/10/21 125 lb (56.7 kg)       Body mass index is 20.8 kg/m². Estimated Creatinine Clearance: 33 mL/min (A) (based on SCr of 1.15 mg/dL (H)). Goal Trough Level: 15-20 mcg/mL    Assessment/Plan:  Will initiate Vancomycin with a one time loading dose of 1500 mg x1, followed by 1000 mg IV every 24 hours. Timing of trough level will be determined based on culture results, renal function, and clinical response. Thank you for the consult. Will continue to follow.      Tigre Hardy RP -3/11/2021 at 12:01 AM

## 2021-03-11 NOTE — DISCHARGE INSTR - COC
Continuity of Care Form    Patient Name: Davi Villa   :  1935  MRN:  [id number]    Admit date:  3/10/2021  Discharge date:  3/13/21    Code Status Order: Full Code   Advance Directives:   [facility] Documentation       Date/Time Healthcare Directive Type of Healthcare Directive Copy in [facility] [address] Agent's Name Healthcare Agent's Phone Number    21 0255  No, patient does not have an advance directive for healthcare treatment -- -- -- -- --            Admitting Physician:  Lorene Andres MD  PCP: Shikha Monreal MD    Discharging Nurse: [facility] Unit/Room#: 2067/2067-01  Discharging Unit Phone Number: [phone]    Emergency Contact:   Extended Emergency Contact Information  Primary Emergency Contact: [address] Phone: [phone]  Relation: Child   needed? No  Secondary Emergency Contact: [address] Phone: [phone]  Relation: Child   needed? No    Past Surgical History:  Past Surgical History:   Procedure Laterality Date    HYSTERECTOMY      HYSTERECTOMY, VAGINAL      TUBAL LIGATION         Immunization History: There is no immunization history on file for this patient.     Active Problems:  Patient Active Problem List   Diagnosis Code    Essential hypertension I10    Cancer ([facility] C80.1    Hypothyroidism E03.9    Depression F32.9    Hyperlipidemia E78.5    Basal cell carcinoma C44.91    Melanoma ([facility] C43.9    Anxiety F41.9    Hypokalemia E87.6    Acute idiopathic gout of right foot M10.071    CKD (chronic kidney disease) stage 3, GFR 30-59 ml/min ([facility]) N18.30    Risk for falls Z91.81    Pneumococcal vaccine refused Z28.21    Influenza vaccination declined Z28.21    Chronic fatigue R53.82    Bilateral lower extremity pain M79.604, M79.605    Chronic diastolic heart failure (HCC) I50.32    Acute cystitis without hematuria N30.00    Chronic systolic heart failure ([facility] I50.22    Decreased mobility R26.89    Localized edema R60.0    Xerosis cutis L85.3    Hallux valgus of left foot M20.12    Ulcer of right foot, limited to breakdown of skin ([facility]) L97.511    Severe malnutrition ([facility]) E43    Moderate episode of recurrent major depressive disorder ([facility]) F33.1    Cellulitis of lower extremity L03.119    UTI (urinary tract infection) N39.0    Decubitus ulcer of coccyx L89.159       Isolation/Infection:   Isolation            No Isolation          Patient Infection Status       Infection Onset Added Last Indicated Last Indicated By Review Planned Expiration Resolved Resolved By    None active    Resolved    COVID-19 Rule Out 06/24/20 06/24/20 06/25/20 COVID-19 (Ordered)   06/26/20 Rule-Out Test Resulted            Nurse Assessment:  Last Vital Signs: BP (!) 154/59   Pulse 81   Temp 98 °F (36.7 °C) (Oral)   Resp 18   Ht 5' 5\" (1.651 m)   Wt 115 lb 15.4 oz (52.6 kg)   SpO2 100%   BMI 19.30 kg/m²     Last documented pain score (0-10 scale): Pain Level: 5  Last Weight:   Wt Readings from Last 1 Encounters:   03/11/21 115 lb 15.4 oz (52.6 kg)     Mental Status:  oriented and alert    IV Access:  - None    Nursing Mobility/ADLs:  Walking   Dependent  Transfer  Dependent  Bathing  Dependent  Dressing  Assisted  Toileting  Dependent  Feeding  Independent  Med Admin  Independent  Med Delivery   whole    Wound Care Documentation and Therapy:  Wound 03/11/21 Pretibial Right clustered open wounds, weeping (Active)   Dressing Status Clean;Dry; Intact 03/11/21 0817   Wound Cleansed Cleansed with saline; Soap and water 03/11/21 0005   Dressing/Treatment ABD;Roll gauze 03/11/21 0817   Dressing Change Due 03/12/21 03/11/21 0005   Wound Assessment [place]/red;Superficial 03/11/21 0005   Drainage Amount Small 03/11/21 0005   Drainage Description Clear 03/11/21 0005   Odor None 03/11/21 0005   Mesha-wound Assessment Fragile;Dry/flaky 03/11/21 0005   Number of days: 0 Wound 03/11/21 Pretibial Left clustered open wounds, weeping (Active)   Dressing Status Clean;Dry; Intact 03/11/21 0817   Wound Cleansed Cleansed with saline; Soap and water 03/11/21 0005   Dressing/Treatment ABD;Roll gauze 03/11/21 0817   Dressing Change Due 03/12/21 03/11/21 0005   Wound Assessment Brunson/red;Superficial 03/11/21 0005   Drainage Amount Small 03/11/21 0005   Drainage Description Clear 03/11/21 0005   Odor None 03/11/21 0005   Mesha-wound Assessment Fragile;Dry/flaky 03/11/21 0005   Number of days: 0       Wound 03/11/21 Coccyx small, open sore (Active)   Dressing Status Clean;Dry; Intact 03/11/21 0817   Wound Cleansed Cleansed with saline 03/11/21 0005   Dressing/Treatment Foam 03/11/21 0817   Dressing Change Due 03/14/21 03/11/21 0005   Wound Assessment Slough;Pink/red 03/11/21 0005   Drainage Amount Scant 03/11/21 0005   Drainage Description Serosanguinous 03/11/21 0005   Odor None 03/11/21 0005   Mesha-wound Assessment Intact 03/11/21 0005   Number of days: 0       Wound 03/11/21 Coccyx Right small, open sore (Active)   Dressing Status Clean;Dry; Intact 03/11/21 0817   Wound Cleansed Cleansed with saline 03/11/21 0005   Dressing/Treatment Foam 03/11/21 0817   Dressing Change Due 03/14/21 03/11/21 0005   Wound Assessment Slough;Pink/red 03/11/21 0005   Drainage Amount Scant 03/11/21 0005   Drainage Description Serosanguinous 03/11/21 0005   Odor None 03/11/21 0005   Mesha-wound Assessment Intact 03/11/21 0005   Number of days: 0        Elimination:  Continence:   · Bowel: No  · Bladder: No  Urinary Catheter: None   Colostomy/Ileostomy/Ileal Conduit: No       Date of Last BM: 3/11/21    Intake/Output Summary (Last 24 hours) at 3/11/2021 0926  Last data filed at 3/11/2021 0829  Gross per 24 hour   Intake 250 ml   Output --   Net 250 ml     No intake/output data recorded.     Safety Concerns:     None    Impairments/Disabilities:      Vision    Nutrition Therapy:  Current Nutrition Therapy:   - Oral Diet: Cardiac    Routes of Feeding: Oral  Liquids: Thin Liquids  Daily Fluid Restriction: no  Last Modified Barium Swallow with Video (Video Swallowing Test): not done    Treatments at the Time of Hospital Discharge:   Respiratory Treatments: none  Oxygen Therapy:  is not on home oxygen therapy. Ventilator:    - No ventilator support    Rehab Therapies: Physical Therapy and Occupational Therapy  Weight Bearing Status/Restrictions: No weight bearing restirctions  Other Medical Equipment (for information only, NOT a DME order): Wheelchair, Rollator  Other Treatments: Skilled Nursing Assessment Per Protocol. Medication Education & Monitoring. Follow with Wound care as outpatient. Patient's personal belongings (please select all that are sent with patient):  Glasses    RN SIGNATURE:  Electronically signed by Joel Babcock RN on 3/13/21 at 3:43 PM EST    CASE MANAGEMENT/SOCIAL WORK SECTION    Inpatient Status Date: ***    Readmission Risk Assessment Score:  Readmission Risk              Risk of Unplanned Readmission:        24           Discharging to Facility/ [facility] [place] [address] P: [phone]  · F: [facility] [address] fax [phone]  Dialysis Facility (if applicable)   · Name:  · Address:  · Dialysis Schedule:  · Phone:  · Fax:    / signature: Electronically signed by Leanna Hanna RN on 3/11/21 at 9:26 AM EST    PHYSICIAN SECTION    Prognosis: {Prognosis:1154630700}    Condition at Discharge: [address] Patient Condition:[id number]}    Rehab Potential (if transferring to Rehab): {Prognosis:7746458796}    Recommended Labs or Other Treatments After Discharge: ***    Physician Certification: I certify the above information and transfer of Rina Posey  is necessary for the continuing treatment of the diagnosis listed and that she requires {Admit to Appropriate Level of Care:03977} for {GREATER/LESS:363227708} 30 days.      Update Admission H&P: {CHP DME Changes in DZO:416274825}    PHYSICIAN SIGNATURE:  Electronically signed by Leobardo Tinsley MD on 3/13/21 at 3:35 PM EST

## 2021-03-11 NOTE — ED NOTES
Report given to Madie Rose from [place]. Report method by phone   The following was reviewed with receiving RN:   Current vital signs:  BP (!) 187/77   Pulse 69   Temp 97.8 °F (36.6 °C) (Oral)   Resp 16   SpO2 99%                MEWS Score: 0     Any medication or safety alerts were reviewed. Any pending diagnostics and notifications were also reviewed, as well as any safety concerns or issues, abnormal labs, abnormal imaging, and abnormal assessment findings. Questions were answered.           David Sosa RN  03/10/21 4241

## 2021-03-11 NOTE — H&P
[id number] [facility]     HISTORY AND PHYSICAL EXAMINATION            Date:   3/11/2021  Patientname:  Prince Blair  Date of admission:  3/10/2021  8:36 PM  MRN:   [id number]  Account:  [de-identified]  YOB: 1935  PCP:    Candace Galarza MD  Room:   2067/2067-01  Code Status:    Full Code    CHIEF COMPLAINT     Chief Complaint   Patient presents with    Fatigue    Leg Swelling       HISTORY OF PRESENT ILLNESS  (Character, Onset, Location, Duration,  Exacerbating/RelievingFactors, Radiation,   Associated Symptoms, Severity )      The patient is a 81 y.o.  female, with a history of melanoma, hypertension, basal cell carcinoma, hypothyroidism, CKD, CHF coccygeal pressure ulcer, and lower extremity wounds. Patient presents for worsening lower extremity swelling, pain and redness. She also complains of fatigue. Patient denies chest pain, cough, shortness of breath, abdominal pain, nausea or vomiting. No fever or chills. Patient is wheelchair-bound. Patient is supposed be taking Lasix 20 mg twice daily but states she has only been taking it once a day because she cannot make it to the bathroom and urinates herself. HPI   1) Location/Symptom lower extremity redness and swelling  2) Timing/Onset: Gradually worsening over the past several days  3) Context/Setting: Patient has chronic wounds on her lower extremities.   4) Quality: Burning pain in her lower extremities  5) Duration: continuous   6) Modifying Factors: None  7) Severity: moderate       PAST MEDICAL HISTORY   Patient  has a past medical history of Anxiety, Arthritis, Basal cell carcinoma, Cancer (HCC), CHF (congestive heart failure) ([address] Closed fracture of single pubic ramus of pelvis with routine healing, right, Depression, Gout, H/O total hysterectomy, Hyperlipidemia, Hypertension, Hypokalemia, Hypothyroidism, Kidney stone, Kidney stone, Melanoma ([address] NSTEMI (non-ST elevated myocardial infarction) ([address] and Pressure injury of coccygeal region, stage 2 ([facility] PAST SURGICAL HISTORY    Patient  has a past surgical history that includes Tubal ligation; Hysterectomy; and Hysterectomy, vaginal.     FAMILY HISTORY    Patient family history includes Heart Disease in her father; Other in her mother. SOCIAL HISTORY    Patient  reports that she quit smoking about 60 years ago. She has never used smokeless tobacco. She reports that she does not drink alcohol or use drugs. HOME MEDICATIONS        Prior to Admission medications    Medication Sig Start Date End Date Taking? Authorizing Provider   levothyroxine (SYNTHROID) 100 MCG tablet TAKE 1 TABLET DAILY 3/1/21   Cecille Chavez MD   busPIRone (BUSPAR) 15 MG tablet Take 1 tablet daily 3/1/21   Cecille Chavez MD   PARoxetine (PAXIL) 40 MG tablet TAKE 1 TABLET EVERY MORNING 3/1/21   Cecille Chavez MD   carvedilol (COREG) 12.5 MG tablet TAKE 1 TABLET TWICE A DAY WITH MEALS 3/1/21   Cecille Chavez MD   atorvastatin (LIPITOR) 10 MG tablet TAKE 1 TABLET DAILY 3/1/21   Cecille Chavez MD   lisinopril (PRINIVIL;ZESTRIL) 20 MG tablet TAKE 1 TABLET DAILY 3/1/21   Cecille Chavez MD   nystatin (MYCOSTATIN) 723782 UNIT/GM powder Apply 3 times daily. 3/1/21   Cecille Chavez MD   ammonium lactate (LAC-HYDRIN) 12 % cream Apply topically to dry skin twice daily. 1/21/21   Marjan Turner DPM   diclofenac sodium (VOLTAREN) 1 % GEL apply 4 grams to affected area twice a day AS NEEDED FOR PAIN 11/11/20   KAY Almazan   furosemide (LASIX) 20 MG tablet Take 1 tablet by mouth 2 times daily 11/18/20   Cecille Chavez MD   aspirin 81 MG tablet Take 81 mg by mouth daily    Historical Provider, MD       ALLERGIES      Bactrim [sulfamethoxazole-trimethoprim]    REVIEW OF SYSTEMS     Review of Systems   Respiratory: Negative for cough and shortness of breath. Cardiovascular: Positive for leg swelling. Negative for chest pain.    Gastrointestinal: Negative for abdominal 3 seconds. Findings: Erythema present. Comments: ER nurse reports pictures were taken of the patient's coccyx decubitus and placed in the chart   Neurological:      Mental Status: She is alert and oriented to person, place, and time. GCS: GCS eye subscore is 4. GCS verbal subscore is 5. GCS motor subscore is 6. Psychiatric:         Mood and Affect: Mood normal.         Behavior: Behavior normal.         Thought Content: Thought content normal.         Judgment: Judgment normal.       DIAGNOSTICS      EKG:    Labs:  CBC:   Recent Labs     03/10/21  2050   WBC 7.0   HGB 11.4*        BMP:    Recent Labs     03/10/21  2050      K 3.3*      CO2 29   BUN 17   CREATININE 1.15*   GLUCOSE 144*     S. Calcium:  Recent Labs     03/10/21  2050   CALCIUM 9.1     S. Ionized Calcium:No results for input(s): IONCA in the last 72 hours. S. Magnesium:  Recent Labs     03/10/21  2050   MG 2.0     S. Phosphorus:No results for input(s): PHOS in the last 72 hours. S. Glucose:No results for input(s): POCGLU in the last 72 hours. Glycosylated hemoglobin A1C:   Lab Results   Component Value Date    LABA1C 5.0 03/06/2020     Hepatic:   Recent Labs     03/10/21  2050   AST 10   ALT 7   ALKPHOS 117*     CARDIAC ENZY: No results for input(s): CKTOTAL, CKMB, CKMBINDEX, TROPHS, MYOGLOBIN in the last 72 hours. INR: No results for input(s): INR in the last 72 hours. BNP:   Recent Labs     03/10/21  2050   PROBNP 524*      ABGs: No results for input(s): PH, PCO2, PO2, HCO3, O2SAT in the last 72 hours. Lipids: No results for input(s): CHOL, TRIG, HDL, LDLCALC in the last 72 hours. Invalid input(s): LDL  Pancreatic functions:No results for input(s): LIPASE, AMYLASE in the last 72 hours.   Mamei President:   Recent Labs     03/10/21  2050   LACTA 1.1     Thyroid functions:   Lab Results   Component Value Date    TSH 0.90 03/10/2021      U/A:  Recent Labs     03/10/21  [address] TOO NUMEROUS TO COUNT   RBCUA 5 TO 10   MUCUS NOT REPORTED   BACTERIA MANY*   SPECGRAV 1.020   LEUKOCYTESUR LARGE*   GLUCOSEU NEGATIVE   AMORPHOUS NOT REPORTED       Imaging/Diagonstics:     Xr Knee Right (3 Views)    Result Date: 3/10/2021  EXAMINATION: XRAY VIEWS OF THE RIGHT TIBIA AND FIBULA; THREE XRAY VIEWS OF THE RIGHT KNEE; THREE XRAY VIEWS OF THE RIGHT ANKLE; ONE XRAY VIEW OF THE PELVIS AND TWO XRAY VIEWS RIGHT HIP 3/10/2021 6:33 pm; 3/10/2021 6:31 pm; 3/10/2021 6:30 pm COMPARISON: 01/28/2020 in 08/01/2020 HISTORY: ORDERING SYSTEM PROVIDED HISTORY: infection TECHNOLOGIST PROVIDED HISTORY: infection Reason for Exam: infection Acuity: Unknown Type of Exam: Unknown Additional signs and symptoms: Rt lower leg and ankle swelling and unhealing infection, weeping edema. Pt unable to straighten right leg for imaging. Relevant Medical/Surgical History: Rt lower leg and ankle swelling and unhealing infection, weeping edema. Pt unable to straighten right leg for imaging.; ORDERING SYSTEM PROVIDED HISTORY: pain TECHNOLOGIST PROVIDED HISTORY: pain Reason for Exam: pain Acuity: Chronic Type of Exam: Unknown Mechanism of Injury: Rt knee pain radiating from right hip. Pt also has infection in distal right lower leg and right ankle. Pt unable to straighten right leg for imaging. Additional signs and symptoms: Rt knee pain radiating from right hip. Pt also has infection in distal right lower leg and right ankle. Pt unable to straighten right leg for imaging. Relevant Medical/Surgical History: Rt knee pain radiating from right hip. Pt also has infection in distal right lower leg and right ankle. Pt unable to straighten right leg for imaging. ; ORDERING SYSTEM PROVIDED HISTORY: infection TECHNOLOGIST PROVIDED HISTORY: infection Reason for Exam: infection Type of Exam: Unknown Additional signs and symptoms: Rt lower leg and ankle swelling and unhealing infection, weeping edema.   Pt unable to straighten right leg for imaging. Relevant Medical/Surgical History: Rt lower leg and ankle swelling and unhealing infection, weeping edema. Pt unable to straighten right leg for imaging.; ORDERING SYSTEM PROVIDED HISTORY: pain TECHNOLOGIST PROVIDED HISTORY: pain Reason for Exam: pain Acuity: Chronic Type of Exam: Unknown Mechanism of Injury: Rt hip pain. No known injuries. Pt unable to straighten right leg for imaging. Additional signs and symptoms: Rt hip pain. No known injuries. Pt unable to straighten right leg for imaging. Relevant Medical/Surgical History: Rt hip pain. No known injuries. Pt unable to straighten right leg for imaging. FINDINGS: The visualized bones are osteopenic. There is no evidence of fracture or dislocation. 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 Mild degenerative changes of the left hip. The remaining a joint spaces appear well maintained. Vascular calcifications are seen compatible with atherosclerotic disease. No acute bony abnormalities are noted Severe osteoarthritis of the right knee and right hip. Mild to moderate osteoarthritis of the left hip     Xr Tibia Fibula Right (2 Views)    Result Date: 3/10/2021  EXAMINATION: XRAY VIEWS OF THE RIGHT TIBIA AND FIBULA; THREE XRAY VIEWS OF THE RIGHT KNEE; THREE XRAY VIEWS OF THE RIGHT ANKLE; ONE XRAY VIEW OF THE PELVIS AND TWO XRAY VIEWS RIGHT HIP 3/10/2021 6:33 pm; 3/10/2021 6:31 pm; 3/10/2021 6:30 pm COMPARISON: 01/28/2020 in 08/01/2020 HISTORY: ORDERING SYSTEM PROVIDED HISTORY: infection TECHNOLOGIST PROVIDED HISTORY: infection Reason for Exam: infection Acuity: Unknown Type of Exam: Unknown Additional signs and symptoms: Rt lower leg and ankle swelling and unhealing infection, weeping edema. Pt unable to straighten right leg for imaging. Relevant Medical/Surgical History: Rt lower leg and ankle swelling and unhealing infection, weeping edema.   Pt unable to straighten right leg compatible with atherosclerotic disease. No acute bony abnormalities are noted Severe osteoarthritis of the right knee and right hip. Mild to moderate osteoarthritis of the left hip     Xr Ankle Right (min 3 Views)    Result Date: 3/10/2021  EXAMINATION: XRAY VIEWS OF THE RIGHT TIBIA AND FIBULA; THREE XRAY VIEWS OF THE RIGHT KNEE; THREE XRAY VIEWS OF THE RIGHT ANKLE; ONE XRAY VIEW OF THE PELVIS AND TWO XRAY VIEWS RIGHT HIP 3/10/2021 6:33 pm; 3/10/2021 6:31 pm; 3/10/2021 6:30 pm COMPARISON: 01/28/2020 in 08/01/2020 HISTORY: ORDERING SYSTEM PROVIDED HISTORY: infection TECHNOLOGIST PROVIDED HISTORY: infection Reason for Exam: infection Acuity: Unknown Type of Exam: Unknown Additional signs and symptoms: Rt lower leg and ankle swelling and unhealing infection, weeping edema. Pt unable to straighten right leg for imaging. Relevant Medical/Surgical History: Rt lower leg and ankle swelling and unhealing infection, weeping edema. Pt unable to straighten right leg for imaging.; ORDERING SYSTEM PROVIDED HISTORY: pain TECHNOLOGIST PROVIDED HISTORY: pain Reason for Exam: pain Acuity: Chronic Type of Exam: Unknown Mechanism of Injury: Rt knee pain radiating from right hip. Pt also has infection in distal right lower leg and right ankle. Pt unable to straighten right leg for imaging. Additional signs and symptoms: Rt knee pain radiating from right hip. Pt also has infection in distal right lower leg and right ankle. Pt unable to straighten right leg for imaging. Relevant Medical/Surgical History: Rt knee pain radiating from right hip. Pt also has infection in distal right lower leg and right ankle. Pt unable to straighten right leg for imaging. ; ORDERING SYSTEM PROVIDED HISTORY: infection TECHNOLOGIST PROVIDED HISTORY: infection Reason for Exam: infection Type of Exam: Unknown Additional signs and symptoms: Rt lower leg and ankle swelling and unhealing infection, weeping edema.   Pt unable to straighten right leg for imaging. Relevant Medical/Surgical History: Rt lower leg and ankle swelling and unhealing infection, weeping edema. Pt unable to straighten right leg for imaging.; ORDERING SYSTEM PROVIDED HISTORY: pain TECHNOLOGIST PROVIDED HISTORY: pain Reason for Exam: pain Acuity: Chronic Type of Exam: Unknown Mechanism of Injury: Rt hip pain. No known injuries. Pt unable to straighten right leg for imaging. Additional signs and symptoms: Rt hip pain. No known injuries. Pt unable to straighten right leg for imaging. Relevant Medical/Surgical History: Rt hip pain. No known injuries. Pt unable to straighten right leg for imaging. FINDINGS: The visualized bones are osteopenic. There is no evidence of fracture or dislocation. 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 Mild degenerative changes of the left hip. The remaining a joint spaces appear well maintained. Vascular calcifications are seen compatible with atherosclerotic disease. No acute bony abnormalities are noted Severe osteoarthritis of the right knee and right hip. Mild to moderate osteoarthritis of the left hip     Us Head Neck Soft Tissue Thyroid    Result Date: 3/4/2021  EXAMINATION: ULTRASOUND OF THE SOFT TISSUES OF THE HEAD AND NECK 3/4/2021 COMPARISON: None. HISTORY: ORDERING SYSTEM PROVIDED HISTORY: Mass of left side of neck TECHNOLOGIST PROVIDED HISTORY: FIRM NECK MASS Acuity: Acute Type of Exam: Initial History of melanoma FINDINGS: Direct scanning over a palpable lump on the left neck was performed. Corresponding to the palpable lump is a superficial 1.7 x 1.7 x 1 cm hypoechoic mass with color flow. Given history, metastatic deposit/lymph node cannot be excluded. Recommend CT soft tissues neck for further evaluation. This mass would be amenable to ultrasound-guided biopsy. 1.7 cm solid appearing mass corresponding to a palpable lump in the left neck.   Appearance is nonspecific; given the patient's history, malignancy should be excluded. CT soft tissues neck is recommended for further evaluation. This mass would be amenable to ultrasound-guided biopsy if clinically warranted. Xr Hip 2-3 Vw W Pelvis Right    Result Date: 3/10/2021  EXAMINATION: XRAY VIEWS OF THE RIGHT TIBIA AND FIBULA; THREE XRAY VIEWS OF THE RIGHT KNEE; THREE XRAY VIEWS OF THE RIGHT ANKLE; ONE XRAY VIEW OF THE PELVIS AND TWO XRAY VIEWS RIGHT HIP 3/10/2021 6:33 pm; 3/10/2021 6:31 pm; 3/10/2021 6:30 pm COMPARISON: 01/28/2020 in 08/01/2020 HISTORY: ORDERING SYSTEM PROVIDED HISTORY: infection TECHNOLOGIST PROVIDED HISTORY: infection Reason for Exam: infection Acuity: Unknown Type of Exam: Unknown Additional signs and symptoms: Rt lower leg and ankle swelling and unhealing infection, weeping edema. Pt unable to straighten right leg for imaging. Relevant Medical/Surgical History: Rt lower leg and ankle swelling and unhealing infection, weeping edema. Pt unable to straighten right leg for imaging.; ORDERING SYSTEM PROVIDED HISTORY: pain TECHNOLOGIST PROVIDED HISTORY: pain Reason for Exam: pain Acuity: Chronic Type of Exam: Unknown Mechanism of Injury: Rt knee pain radiating from right hip. Pt also has infection in distal right lower leg and right ankle. Pt unable to straighten right leg for imaging. Additional signs and symptoms: Rt knee pain radiating from right hip. Pt also has infection in distal right lower leg and right ankle. Pt unable to straighten right leg for imaging. Relevant Medical/Surgical History: Rt knee pain radiating from right hip. Pt also has infection in distal right lower leg and right ankle. Pt unable to straighten right leg for imaging. ; ORDERING SYSTEM PROVIDED HISTORY: infection TECHNOLOGIST PROVIDED HISTORY: infection Reason for Exam: infection Type of Exam: Unknown Additional signs and symptoms: Rt lower leg and ankle swelling and unhealing infection, weeping edema.   Pt unable to straighten right leg for imaging. Relevant Medical/Surgical History: Rt lower leg and ankle swelling and unhealing infection, weeping edema. Pt unable to straighten right leg for imaging.; ORDERING SYSTEM PROVIDED HISTORY: pain TECHNOLOGIST PROVIDED HISTORY: pain Reason for Exam: pain Acuity: Chronic Type of Exam: Unknown Mechanism of Injury: Rt hip pain. No known injuries. Pt unable to straighten right leg for imaging. Additional signs and symptoms: Rt hip pain. No known injuries. Pt unable to straighten right leg for imaging. Relevant Medical/Surgical History: Rt hip pain. No known injuries. Pt unable to straighten right leg for imaging. FINDINGS: The visualized bones are osteopenic. There is no evidence of fracture or dislocation. 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 Mild degenerative changes of the left hip. The remaining a joint spaces appear well maintained. Vascular calcifications are seen compatible with atherosclerotic disease. No acute bony abnormalities are noted Severe osteoarthritis of the right knee and right hip. Mild to moderate osteoarthritis of the left hip     ASSESSMENT  and  PLAN     Principal Problem:    Cellulitis of lower extremity  Active Problems:    Hypokalemia    CKD (chronic kidney disease) stage 3, GFR 30-59 ml/min ([facility])    Chronic systolic heart failure (HCC)    UTI (urinary tract infection)    Decubitus ulcer of coccyx  Resolved Problems:    * No resolved hospital problems. *    Plan:    Cellulitis of lower extremities  -X-rays negative for acute findings  -WBC 7.0, lactic acid 1.1  -Patient started on vancomycin, pharmacy to dose    UTI  -Urinalysis shows numerous WBCs. Positive nitrites.   Urine sent for culture  -Patient started on Rocephin 1 g    Hypokalemia  -Potassium 3.3  -Patient received 20 mEq of potassium in the ED  -Recheck daily BMP in the morning    CHF  -proBNP 524  -SPO2 99% on room air  -Lung sounds clear to auscultation    Decubitus ulcer of coccyx  -Wound care eval and treat    CKD  -Creatinine 1.15, BUN 17    DVT prophylaxis-Lovenox    Consultations:     PHARMACY TO DOSE Yosef Maldonado, NORMA - CNP   3/11/2021  1:03 AM    [facility] [address] [place] [address], [address] [place] [address].    Phone [phone]

## 2021-03-11 NOTE — ED NOTES
Bed: 09  Expected date: 3/10/21  Expected time: 8:32 PM  Means of arrival:   Comments:  [facility] [address] [place] [address]  03/10/21 2036

## 2021-03-11 NOTE — PROGRESS NOTES
Perfect [employer] message sent to Cecilia Partida CNP, regarding pt continued hip pain. Awaiting response at this time.

## 2021-03-11 NOTE — ED NOTES
Admission Dx: Cellulitis of lower extremities    Pts Chief Complaints on Arrival: fatigue     ADL's - Total assistance    Pending Diagnostics:  none    Residence PTA: single story home    Special Considerations/Circumstances:  Pt does not walk; pt has wounds on bottom    Vitals: Current vital signs:  BP (!) 187/77   Pulse 69   Temp 97.8 °F (36.6 °C) (Oral)   Resp 16   SpO2 99%                MEWS Score: 0          Carolyn Scruggs RN  03/10/21 [id number]

## 2021-03-11 NOTE — PLAN OF CARE
Nutrition Problem #1: Inadequate oral intake  Intervention: Food and/or Nutrient Delivery: Continue Current Diet, Start Oral Nutrition Supplement  Nutritional Goals: Meet 50% or greater of nutrient needs with PO diet and supplements

## 2021-03-11 NOTE — PROGRESS NOTES
CRYSTAL spoke to pt's daughters. They want to take pt home with home care. [facility] is SNF choice but only as a last resort. CRYSTAL did send face sheet and spoke to Pastor at [facility]. She will follow pt a a distance. CRYSTAL did provide family with information on transportation assistance.

## 2021-03-11 NOTE — ED PROVIDER NOTES
EMERGENCY DEPARTMENT ENCOUNTER    Pt Name: Glenys Rodriguez  MRN: [id number]  [place] 1935  Date of evaluation: 3/10/21  CHIEF COMPLAINT       Chief Complaint   Patient presents with    Fatigue    Leg Swelling     HISTORY OF PRESENT ILLNESS   HPI  29-year-old female history of basal cell carcinoma, hypothyroid,, hypertension, hyperlipidemia, melanoma presents for evaluation of generalized fatigue, lower extremity swelling and pain. Patient describes right hip and knee pain along with right lower extremity swelling and pain worsening over the past week or so. Describes generalized fatigue over the same period of time. No chest pain or shortness of breath. No cough. No nausea or vomiting or abdominal pain. No fever chills. No history of similar symptoms. Does have wounds on her lower extremities which she states developed in the past several weeks. Is at home with  and daughter. Symptoms are moderate and progressive. Wheelchair-bound at baseline. REVIEW OF SYSTEMS     Review of Systems   Constitutional: Negative for chills and fatigue. HENT: Negative for facial swelling, postnasal drip and rhinorrhea. Eyes: Negative for photophobia and itching. Respiratory: Negative for cough and shortness of breath. Cardiovascular: Positive for leg swelling. Negative for chest pain. Gastrointestinal: Negative for abdominal pain, diarrhea, nausea and vomiting. Genitourinary: Negative for dysuria, flank pain and hematuria. Musculoskeletal: Negative for arthralgias and joint swelling. Skin: Negative for color change and rash. Neurological: Negative for dizziness, numbness and headaches.      PASTMEDICAL HISTORY     Past Medical History:   Diagnosis Date    Anxiety     Arthritis     Basal cell carcinoma     Cancer (HCC)     skin    CHF (congestive heart failure) (HCC)     Closed fracture of single pubic ramus of pelvis with routine healing, right 1/28/2020    Depression     Gout     H/O normal weight. HENT:      Head: Normocephalic and atraumatic. Eyes:      Extraocular Movements: Extraocular movements intact. Pupils: Pupils are equal, round, and reactive to light. Neck:      Musculoskeletal: Normal range of motion and neck supple. Cardiovascular:      Rate and Rhythm: Normal rate and regular rhythm. Pulmonary:      Effort: Pulmonary effort is normal.      Breath sounds: Normal breath sounds. Abdominal:      General: Abdomen is flat. There is no distension. Palpations: There is no mass. Musculoskeletal: Normal range of motion. General: No swelling. Comments: Bilateral lower extremity swelling. Wounds over the dorsum of the right foot and anterior right shin with surrounding erythema. Warm well perfused extremities. Soft compartments. No crepitus. Skin:     General: Skin is warm and dry. Neurological:      General: No focal deficit present. Mental Status: She is alert. Mental status is at baseline. MEDICAL DECISION MAKIN-year-old female presents for evaluation of generalized fatigue and lower extremity pain and swelling. Does appear to have superimposed cellulitis on her lower extremities on top of chronic appearing wounds. Hemodynamically stable. We will do some basic labs to assess for electrolyte abnormalities, symptomatic anemia, endorgan dysfunction. Will get x-rays of the lower extremities to assess for subcutaneous gas. Patient lives at home with  I think that she would benefit from admission for IV antibiotics, physical therapy evaluation. Patient agreeable daughter agreeable. Labs unremarkable. X-rays negative. Will admit the patient.          CRITICAL CARE:       PROCEDURES:    Procedures    DIAGNOSTIC RESULTS   EKG:All EKG's are interpreted by the Emergency Department Physician who either signs or Co-signs this chart in the absence of a cardiologist.        RADIOLOGY:All plain film, CT, MRI, and formal ultrasound images (except ED bedside ultrasound) are read by the radiologist, see reports below, unless otherwisenoted in MDM or here. XR ANKLE RIGHT (MIN 3 VIEWS)   Final Result   No acute bony abnormalities are noted      Severe osteoarthritis of the right knee and right hip. Mild to moderate   osteoarthritis of the left hip         XR TIBIA FIBULA RIGHT (2 VIEWS)   Final Result   No acute bony abnormalities are noted      Severe osteoarthritis of the right knee and right hip. Mild to moderate   osteoarthritis of the left hip         XR KNEE RIGHT (3 VIEWS)   Final Result   No acute bony abnormalities are noted      Severe osteoarthritis of the right knee and right hip. Mild to moderate   osteoarthritis of the left hip         XR HIP 2-3 VW W PELVIS RIGHT   Final Result   No acute bony abnormalities are noted      Severe osteoarthritis of the right knee and right hip. Mild to moderate   osteoarthritis of the left hip           LABS: All lab results were reviewed by myself, and all abnormals are listed below.   Labs Reviewed   CBC WITH AUTO DIFFERENTIAL - Abnormal; Notable for the following components:       Result Value    RBC 3.95 (*)     Hemoglobin 11.4 (*)     Hematocrit 34.4 (*)     Seg Neutrophils 69 (*)     Lymphocytes 17 (*)     Monocytes 8 (*)     Eosinophils % 5 (*)     All other components within normal limits   COMPREHENSIVE METABOLIC PANEL W/ REFLEX TO MG FOR LOW K - Abnormal; Notable for the following components:    Glucose 144 (*)     CREATININE 1.15 (*)     Potassium 3.3 (*)     Alkaline Phosphatase 117 (*)     GFR Non- 45 (*)     GFR  54 (*)     All other components within normal limits   URINALYSIS - Abnormal; Notable for the following components:    Color, UA DARK YELLOW (*)     Turbidity UA TURBID (*)     Urine Hgb LARGE (*)     Protein, UA 2+ (*)     Nitrite, Urine POSITIVE (*)     Leukocyte Esterase, Urine LARGE (*)     All other components within normal limits   MICROSCOPIC URINALYSIS - Abnormal; Notable for the following components:    Bacteria, UA MANY (*)     All other components within normal limits   BRAIN NATRIURETIC PEPTIDE - Abnormal; Notable for the following components:    Pro- (*)     All other components within normal limits   LACTIC ACID   MAGNESIUM   TSH WITH REFLEX   BASIC METABOLIC PANEL W/ REFLEX TO MG FOR LOW K   CBC       EMERGENCY DEPARTMENTCOURSE:         Vitals:    Vitals:    03/10/21 2245 03/10/21 2337 03/10/21 2340 03/11/21 0005   BP: (!) 187/77   (!) 175/94   Pulse: 69   73   Resp: 16   18   Temp:    97.8 °F (36.6 °C)   TempSrc:    Oral   SpO2: 99%   100%   Weight:  125 lb (56.7 kg)  115 lb 15.4 oz (52.6 kg)   Height:   5' 5\" (1.651 m) 5' 5\" (1.651 m)       The patient was given the following medications while in the emergency department:  Orders Placed This Encounter   Medications    acetaminophen (TYLENOL) tablet 650 mg    potassium bicarb-citric acid (EFFER-K) effervescent tablet 20 mEq    vancomycin (VANCOCIN) intermittent dosing (placeholder)     Order Specific Question:   Antimicrobial Indications     Answer:   Skin and Soft Tissue Infection    ammonium lactate (LAC-HYDRIN) 12 % lotion    aspirin EC tablet 81 mg    atorvastatin (LIPITOR) tablet 10 mg    busPIRone (BUSPAR) tablet 15 mg    carvedilol (COREG) tablet 12.5 mg    furosemide (LASIX) tablet 20 mg    levothyroxine (SYNTHROID) tablet 100 mcg    lisinopril (PRINIVIL;ZESTRIL) tablet 20 mg    miconazole (MICOTIN) 2 % powder    PARoxetine (PAXIL) tablet 40 mg    cefTRIAXone (ROCEPHIN) 1000 mg IVPB in 50 mL D5W minibag     Order Specific Question:   Antimicrobial Indications     Answer:   Urinary Tract Infection    cefTRIAXone (ROCEPHIN) 1000 mg IVPB in 50 mL D5W minibag     Order Specific Question:   Antimicrobial Indications     Answer:   Urinary Tract Infection    sodium chloride flush 0.9 % injection 10 mL    sodium chloride flush 0.9 % injection 10 mL  OR Linked Order Group     potassium chloride (KLOR-CON M) extended release tablet 40 mEq     potassium bicarb-citric acid (EFFER-K) effervescent tablet 40 mEq     potassium chloride 10 mEq/100 mL IVPB (Peripheral Line)    magnesium sulfate 1000 mg in dextrose 5% 100 mL IVPB    enoxaparin (LOVENOX) injection 40 mg    OR Linked Order Group     promethazine (PHENERGAN) tablet 12.5 mg     ondansetron (ZOFRAN) injection 4 mg    polyethylene glycol (GLYCOLAX) packet 17 g    OR Linked Order Group     acetaminophen (TYLENOL) tablet 650 mg     acetaminophen (TYLENOL) suppository 650 mg    FOLLOWED BY Linked Order Group     vancomycin (VANCOCIN) 1,500 mg in dextrose 5 % 250 mL IVPB (ADDAVIAL)      Order Specific Question:   Antimicrobial Indications      Answer:   Skin and Soft Tissue Infection     vancomycin 1000 mg IVPB in 250 mL D5W addavial      Order Specific Question:   Antimicrobial Indications      Answer:   Skin and Soft Tissue Infection     CONSULTS:  PHARMACY TO DOSE VANCOMYCIN    FINAL IMPRESSION      1. Fatigue, unspecified type    2. Cellulitis, unspecified cellulitis site    3. Hypokalemia          DISPOSITION/PLAN   DISPOSITION Admitted 03/10/2021 10:40:19 PM      PATIENT REFERRED TO:  No follow-up provider specified.   DISCHARGE MEDICATIONS:  Current Discharge Medication List        Natalie Wolf MD  Attending Emergency Physician                    Natalie Wolf MD  03/11/21 9261

## 2021-03-12 LAB
ANION GAP SERPL CALCULATED.3IONS-SCNC: 8 MMOL/L (ref 9–17)
BUN BLDV-MCNC: 12 MG/DL (ref 8–23)
BUN/CREAT BLD: ABNORMAL (ref 9–20)
CALCIUM SERPL-MCNC: 8.8 MG/DL (ref 8.6–10.4)
CHLORIDE BLD-SCNC: 99 MMOL/L (ref 98–107)
CO2: 26 MMOL/L (ref 20–31)
CREAT SERPL-MCNC: 1.02 MG/DL (ref 0.5–0.9)
GFR AFRICAN AMERICAN: >60 ML/MIN
GFR NON-AFRICAN AMERICAN: 52 ML/MIN
GFR SERPL CREATININE-BSD FRML MDRD: ABNORMAL ML/MIN/{1.73_M2}
GFR SERPL CREATININE-BSD FRML MDRD: ABNORMAL ML/MIN/{1.73_M2}
GLUCOSE BLD-MCNC: 117 MG/DL (ref 70–99)
HCT VFR BLD CALC: 32.6 % (ref 36–46)
HEMOGLOBIN: 11 G/DL (ref 12–16)
MCH RBC QN AUTO: 29.5 PG (ref 26–34)
MCHC RBC AUTO-ENTMCNC: 33.7 G/DL (ref 31–37)
MCV RBC AUTO: 87.7 FL (ref 80–100)
NRBC AUTOMATED: ABNORMAL PER 100 WBC
PDW BLD-RTO: 13.3 % (ref 11.5–14.9)
PLATELET # BLD: 235 K/UL (ref 150–450)
PMV BLD AUTO: 7.6 FL (ref 6–12)
POTASSIUM SERPL-SCNC: 4.8 MMOL/L (ref 3.7–5.3)
RBC # BLD: 3.72 M/UL (ref 4–5.2)
SODIUM BLD-SCNC: 133 MMOL/L (ref 135–144)
WBC # BLD: 8.4 K/UL (ref 3.5–11)

## 2021-03-12 PROCEDURE — 6360000002 HC RX W HCPCS: Performed by: INTERNAL MEDICINE

## 2021-03-12 PROCEDURE — 36415 COLL VENOUS BLD VENIPUNCTURE: CPT

## 2021-03-12 PROCEDURE — 99232 SBSQ HOSP IP/OBS MODERATE 35: CPT | Performed by: INTERNAL MEDICINE

## 2021-03-12 PROCEDURE — 6360000002 HC RX W HCPCS: Performed by: NURSE PRACTITIONER

## 2021-03-12 PROCEDURE — 6370000000 HC RX 637 (ALT 250 FOR IP): Performed by: INTERNAL MEDICINE

## 2021-03-12 PROCEDURE — 6370000000 HC RX 637 (ALT 250 FOR IP): Performed by: NURSE PRACTITIONER

## 2021-03-12 PROCEDURE — 6360000002 HC RX W HCPCS: Performed by: STUDENT IN AN ORGANIZED HEALTH CARE EDUCATION/TRAINING PROGRAM

## 2021-03-12 PROCEDURE — 80048 BASIC METABOLIC PNL TOTAL CA: CPT

## 2021-03-12 PROCEDURE — 97166 OT EVAL MOD COMPLEX 45 MIN: CPT

## 2021-03-12 PROCEDURE — 1200000000 HC SEMI PRIVATE

## 2021-03-12 PROCEDURE — 2580000003 HC RX 258: Performed by: INTERNAL MEDICINE

## 2021-03-12 PROCEDURE — 97530 THERAPEUTIC ACTIVITIES: CPT

## 2021-03-12 PROCEDURE — 2580000003 HC RX 258: Performed by: STUDENT IN AN ORGANIZED HEALTH CARE EDUCATION/TRAINING PROGRAM

## 2021-03-12 PROCEDURE — 97161 PT EVAL LOW COMPLEX 20 MIN: CPT

## 2021-03-12 PROCEDURE — 2580000003 HC RX 258: Performed by: NURSE PRACTITIONER

## 2021-03-12 PROCEDURE — 85027 COMPLETE CBC AUTOMATED: CPT

## 2021-03-12 RX ADMIN — SODIUM CHLORIDE, PRESERVATIVE FREE 10 ML: 5 INJECTION INTRAVENOUS at 09:00

## 2021-03-12 RX ADMIN — ANTI-FUNGAL POWDER MICONAZOLE NITRATE TALC FREE: 1.42 POWDER TOPICAL at 09:00

## 2021-03-12 RX ADMIN — ANTI-FUNGAL POWDER MICONAZOLE NITRATE TALC FREE: 1.42 POWDER TOPICAL at 20:47

## 2021-03-12 RX ADMIN — CEFEPIME 2000 MG: 2 INJECTION, POWDER, FOR SOLUTION INTRAVENOUS at 15:01

## 2021-03-12 RX ADMIN — Medication: at 09:00

## 2021-03-12 RX ADMIN — FUROSEMIDE 20 MG: 20 TABLET ORAL at 16:49

## 2021-03-12 RX ADMIN — ENOXAPARIN SODIUM 40 MG: 40 INJECTION SUBCUTANEOUS at 09:00

## 2021-03-12 RX ADMIN — LEVOTHYROXINE SODIUM 100 MCG: 0.1 TABLET ORAL at 05:30

## 2021-03-12 RX ADMIN — CARVEDILOL 12.5 MG: 12.5 TABLET, FILM COATED ORAL at 09:00

## 2021-03-12 RX ADMIN — CARVEDILOL 12.5 MG: 12.5 TABLET, FILM COATED ORAL at 16:48

## 2021-03-12 RX ADMIN — FUROSEMIDE 20 MG: 20 TABLET ORAL at 09:00

## 2021-03-12 RX ADMIN — ATORVASTATIN CALCIUM 10 MG: 10 TABLET, FILM COATED ORAL at 20:47

## 2021-03-12 RX ADMIN — BUSPIRONE HYDROCHLORIDE 15 MG: 15 TABLET ORAL at 09:00

## 2021-03-12 RX ADMIN — HYDROCODONE BITARTRATE AND ACETAMINOPHEN 1 TABLET: 5; 325 TABLET ORAL at 00:45

## 2021-03-12 RX ADMIN — ASPIRIN 81 MG CHEWABLE TABLET 81 MG: 81 TABLET CHEWABLE at 09:00

## 2021-03-12 RX ADMIN — PAROXETINE HYDROCHLORIDE 40 MG: 20 TABLET, FILM COATED ORAL at 09:00

## 2021-03-12 RX ADMIN — LISINOPRIL 20 MG: 20 TABLET ORAL at 09:00

## 2021-03-12 RX ADMIN — Medication: at 20:47

## 2021-03-12 RX ADMIN — CEFEPIME 2000 MG: 2 INJECTION, POWDER, FOR SOLUTION INTRAVENOUS at 04:05

## 2021-03-12 RX ADMIN — VANCOMYCIN HYDROCHLORIDE 1000 MG: 1 INJECTION, POWDER, LYOPHILIZED, FOR SOLUTION INTRAVENOUS at 04:43

## 2021-03-12 RX ADMIN — HYDROCODONE BITARTRATE AND ACETAMINOPHEN 1 TABLET: 5; 325 TABLET ORAL at 20:47

## 2021-03-12 ASSESSMENT — PAIN DESCRIPTION - LOCATION
LOCATION: HIP

## 2021-03-12 ASSESSMENT — PAIN DESCRIPTION - PAIN TYPE
TYPE: ACUTE PAIN
TYPE: CHRONIC PAIN;ACUTE PAIN

## 2021-03-12 ASSESSMENT — PAIN SCALES - GENERAL
PAINLEVEL_OUTOF10: 5
PAINLEVEL_OUTOF10: 3
PAINLEVEL_OUTOF10: 3
PAINLEVEL_OUTOF10: 10

## 2021-03-12 ASSESSMENT — PAIN DESCRIPTION - ORIENTATION
ORIENTATION: RIGHT
ORIENTATION: RIGHT

## 2021-03-12 ASSESSMENT — PAIN SCALES - WONG BAKER: WONGBAKER_NUMERICALRESPONSE: 6

## 2021-03-12 NOTE — PROGRESS NOTES
[address]   Occupational Therapy Evaluation  Date: 3/12/21  Patient Name: Arleen Healy       Room:   MRN: [id number]  Account: [de-identified]   : 1935  (81 y.o.) Gender: female     Discharge Recommendations:  Further Occupational Therapy is recommended upon facility discharge. Referring Practitioner: Sri Melendez MD  Diagnosis: Cellulitis of LE  Additional Pertinent Hx: 56-year-old female history of basal cell carcinoma, hypothyroid,, hypertension, hyperlipidemia, melanoma presents for evaluation of generalized fatigue, lower extremity swelling and pain. Patient describes right hip and knee pain along with right lower extremity swelling and pain worsening over the past week or so. Describes generalized fatigue over the same period of time. No chest pain or shortness of breath. No cough. No nausea or vomiting or abdominal pain. No fever chills. No history of similar symptoms. Does have wounds on her lower extremities which she states developed in the past several weeks. Is at home with  and daughter. Symptoms are moderate and progressive. Wheelchair-bound at baseline    Treatment Diagnosis: impaired self care status  Past Medical History:  has a past medical history of Anxiety, Arthritis, Basal cell carcinoma, Cancer (HCC), CHF (congestive heart failure) ([address] Closed fracture of single pubic ramus of pelvis with routine healing, right, Depression, Gout, H/O total hysterectomy, Hyperlipidemia, Hypertension, Hypokalemia, Hypothyroidism, Kidney stone, Kidney stone, Melanoma ([address] NSTEMI (non-ST elevated myocardial infarction) ([address] and Pressure injury of coccygeal region, stage 2 ([address] Past Surgical History:   has a past surgical history that includes Tubal ligation;  Hysterectomy; and Hysterectomy, vaginal.    Restrictions  Restrictions/Precautions: Fall Risk  Other position/activity restrictions: OT/PT Eval and treat       Subjective  Subjective: \"how did all of this happen to me? \"  Comments: RN ok'd OT/PT Evaluation this date. Overall Orientation Status: Within Normal Limits  Vision  Vision: Impaired  Vision Exceptions: Wears glasses at all times  Hearing  Hearing: Within functional limits  Social/Functional History  Lives With: Spouse, Daughter(daughter-Lina)  Type of Home: House  Home Layout: One level  Home Access: Ramped entrance(back entry- ramp)  Bathroom Shower/Tub: Tub/Shower unit  Bathroom Toilet: Standard  Bathroom Accessibility: Accessible  Home Equipment: [employer], [address] chair, Music Messenger (MM) Global Help From: Family  ADL Assistance: Needs assistance(reports assistance with toileting, dressing, bathing as needed from family)  [address]: Needs assistance  Homemaking Responsibilities: No  Ambulation Assistance: (uses wheelchair at all times- non ambulatory)  Transfer Assistance: Needs assistance(reports stand pivot transfer with assist from spouse or daughter)  Active : No  Patient's  Info: Family  IADL Comments: Sleeps in a recliner chair  Additional Comments: reports using wheelchair ~1 year, states daughter just started back to work but has adult children and grand children that rotate to assist. Reports spouse needs assist as well (per chart he has dementia)  Pain Assessment  Pain Assessment: Faces  Conklin-Baker Pain Rating: Hurts even more  Pain Type: Chronic pain, Acute pain  Pain Location: Hip  Pain Orientation: Right  Pain Descriptors: Patient unable to describe    Objective      Cognition  Overall Cognitive Status: Exceptions  Insights: Decreased awareness of deficits   Sensation  Overall Sensation Status: WFL(reports no numbness/tingling)   ADL  Feeding: Setup(set patient up with lunch tray at end of session)  Grooming: Minimal assistance  UE Bathing: Moderate assistance  LE Bathing: Maximum assistance  UE Dressing:  Moderate assistance  LE Dressing: Maximum assistance(assist with hospital socks this session)  Toileting: Dependent/Total(currently using female external catheter)  Additional Comments: Patient currently limited by increasing pain level with movement (R hip) and decreased overall endurance/tolerance to activity. Patient required assistance with hospital socks and is currently using brief/external catheter for toileting needs. Other assist levels based on clinical observation/reasoning. UE Function           LUE Strength  Gross LUE Strength: WFL  LUE Strength Comment: grossly 4+/5     LUE Tone: Normotonic     LUE AROM (degrees)  LUE AROM : WFL     Left Hand AROM (degrees)  Left Hand AROM: WFL  RUE Strength  Gross RUE Strength: WFL  RUE Strength Comment: grossly 4+/5      RUE Tone: Normotonic     RUE AROM (degrees)  RUE AROM : WFL     Right Hand AROM (degrees)  Right Hand AROM: WFL    Fine Motor Skills  Coordination  Movements Are Fluid And Coordinated: Yes                           Mobility          Balance  Sitting Balance: Contact guard assistance(seated edge of recliner chair)  Standing Balance: Moderate assistance(x2 with and without Corky Ewings)  Standing Balance  Time: 1 minute  Activity: standing in Corky Ewings to adjust gown  Comment: BUE support on Corky Ewings, low standing tolerance  Functional Mobility  Functional Mobility Comments: reports she is a wheelchair user  at baseline (~1 year now)  Bed mobility  Comment: no bed mobility observed - patient up in recliner chair, reports she sleeps in a recliner chair at home     Transfers  Sit to stand:  Moderate assistance, 2 Person assistance  Stand to sit: Moderate assistance, 2 Person assistance  Transfer Comments: in [address] support on horizontal bar, patient does not put weight on RLE while standing due to high pain in hip- LE remains in a flexed position      Assessment  Performance deficits / Impairments: Decreased functional mobility , Decreased endurance, Decreased ADL status, Decreased safe awareness, Decreased balance  Assessment: Patient currently requires Mod A x2 for sit<>stand with device (RW and Leretha Jennifer) patient is a high fall risk and uses wheelchair at baseline  Treatment Diagnosis: impaired self care status  Prognosis: Good  Decision Making: Medium Complexity  REQUIRES OT FOLLOW UP: Yes  Discharge Recommendations: Patient would benefit from continued therapy after discharge  Activity Tolerance: Patient limited by pain         Functional Outcome Measures  AM-PAC Daily Activity Inpatient   How much help for putting on and taking off regular lower body clothing?: A Lot  How much help for Bathing?: A Lot  How much help for Toileting?: Total  How much help for putting on and taking off regular upper body clothing?: A Lot  How much help for taking care of personal grooming?: A Little  How much help for eating meals?: A Little  AM-[facility] Inpatient Daily Activity Raw Score: 13  AM-PAC Inpatient ADL T-Scale Score : 32.03  ADL Inpatient CMS 0-100% Score: 63.03  ADL Inpatient CMS G-Code Modifier : CL       Goals  Short term goals  Time Frame for Short term goals: by discharge, patient will  Short term goal 1: perform functional transfers with Mod A x1 person using appropriate device (RW-pivot vs Leretha Jennifer) with Good safety techniques  Short term goal 2: increase standing tolerance to 2-3 minutes with UE support as needed to support self care/mobility needs  Short term goal 3: actively participate in 15+ minutes of therapeutic exercise/activity/self care for overall increased endurance/activity tolerance for functional tasks    Plan  Safety Devices  Safety Devices in place: Yes  Type of devices: Gait belt, Nurse notified, Call light within reach, Left in chair, Patient at risk for falls     Plan  Times per week: 4-5  Current Treatment Recommendations: Functional Mobility Training, Patient/Caregiver Education & Training, Endurance Training, Equipment Evaluation, Education, & procurement, Pain Management, Balance Training, Safety Education & Training, Self-Care / ADL  OT Education  OT Education: OT Role, Plan of Care, ADL Adaptive Strategies, Transfer Training, Equipment, Orientation  Patient Education: safety, activity promotion       OT Individual Minutes  Time In: 6618  Time Out: 0151  Minutes: 33    Electronically signed by Champ Dubon OT on 3/12/21 at 2:04 PM EST

## 2021-03-12 NOTE — PLAN OF CARE
Problem: Falls - Risk of:  Goal: Will remain free from falls  Description: Will remain free from falls  3/12/2021 1715 by Yola Daugherty RN  Outcome: Ongoing  3/12/2021 0412 by Eduardo Hartley RN  Outcome: Ongoing  Goal: Absence of physical injury  Description: Absence of physical injury  3/12/2021 1715 by Yola Daugherty RN  Outcome: Ongoing  3/12/2021 0412 by Eduardo Hartley RN  Outcome: Ongoing     Problem: Pain:  Goal: Pain level will decrease  Description: Pain level will decrease  3/12/2021 1715 by Yola Daugherty RN  Outcome: Ongoing  3/12/2021 0412 by Eduardo Hartley RN  Outcome: Ongoing  Goal: Control of acute pain  Description: Control of acute pain  3/12/2021 1715 by Yola Daugherty RN  Outcome: Ongoing  3/12/2021 0412 by Eduardo Hartley RN  Outcome: Ongoing  Goal: Control of chronic pain  Description: Control of chronic pain  3/12/2021 1715 by Yola Daugherty RN  Outcome: Ongoing  3/12/2021 0412 by Eduardo Hartley RN  Outcome: Ongoing     Problem: Skin Integrity:  Goal: Will show no infection signs and symptoms  Description: Will show no infection signs and symptoms  3/12/2021 1715 by Yola Daugherty RN  Outcome: Ongoing  3/12/2021 0412 by Eduarod Hartley RN  Outcome: Ongoing  Goal: Absence of new skin breakdown  Description: Absence of new skin breakdown  3/12/2021 1715 by Yola Daugherty RN  Outcome: Ongoing  3/12/2021 0412 by Eduardo Hartley RN  Outcome: Ongoing     Problem: Nutrition  Goal: Optimal nutrition therapy  3/12/2021 1715 by Yola Daugherty RN  Outcome: Ongoing  3/12/2021 0412 by Eduardo Hartley RN  Outcome: Ongoing

## 2021-03-12 NOTE — PROGRESS NOTES
SW spoke to to pt's daughter Laurie Us per her request. Laurie Us has concerns regarding the home situation. Cierra said pt does not get the care she needs at home and worries that pt sits in the recliner too long. Laurie Us also shared that her grandfather has dementia. Pt's daughter Emory Navarro lives with with pt. SW asked Laurie Us if she thought a call needed to be made to [facility]. She declined need.

## 2021-03-12 NOTE — PROGRESS NOTES
Physical Therapy    Facility/Department: [facility] MED SURG  Initial Assessment    NAME: Kitty James  : 1935  MRN: [id number]    Date of Service: 3/12/2021    Discharge Recommendations:  Patient would benefit from continued therapy after discharge   PT Equipment Recommendations  Equipment Needed: No    Assessment   Body structures, Functions, Activity limitations: Decreased functional mobility ; Decreased ROM; Decreased strength; Increased pain  Assessment: Impaired mobility due to pain and decreased strength and ROM  Decision Making: Low Complexity  History: Pelvic Fx  Exam: decreased mobility,Increased pain  Clinical Presentation: stable  REQUIRES PT FOLLOW UP: Yes  Activity Tolerance  Activity Tolerance: Patient limited by pain       Patient Diagnosis(es): The primary encounter diagnosis was Fatigue, unspecified type. Diagnoses of Cellulitis, unspecified cellulitis site and Hypokalemia were also pertinent to this visit. has a past medical history of Anxiety, Arthritis, Basal cell carcinoma, Cancer (HCC), CHF (congestive heart failure) ([address] Closed fracture of single pubic ramus of pelvis with routine healing, right, Depression, Gout, H/O total hysterectomy, Hyperlipidemia, Hypertension, Hypokalemia, Hypothyroidism, Kidney stone, Kidney stone, Melanoma ([address] NSTEMI (non-ST elevated myocardial infarction) ([address] and Pressure injury of coccygeal region, stage 2 ([address] has a past surgical history that includes Tubal ligation;  Hysterectomy; and Hysterectomy, vaginal.    Restrictions  Restrictions/Precautions  Restrictions/Precautions: Fall Risk  Position Activity Restriction  Other position/activity restrictions: OT/PT Eval and treat        Subjective  General  Patient assessed for rehabilitation services?: Yes  Family / Caregiver Present: No  Follows Commands: Within Functional Limits  General Comment  Comments: pt up in Recliner sleeping upon entry  Subjective  Subjective: pt c/o pain in R hip with movement of R LE  Pain Screening  Patient Currently in Pain: Yes  Pain Assessment  Pain Assessment: 0-10  Pain Level: 10(with movement of R leg)  Pain Location: Hip  Pain Orientation: Right  Vital Signs  Patient Currently in Pain: Yes       Orientation  Orientation  Overall Orientation Status: Within Functional Limits  Social/Functional History  Social/Functional History  Lives With: Spouse, Daughter(daughter-Lina)  Type of Home: House  Home Layout: One level  Home Access: Ramped entrance(back entry- ramp)  Bathroom Shower/Tub: Tub/Shower unit  Bathroom Toilet: Standard  Bathroom Accessibility: Accessible  Home Equipment: [place]ates, Lift chair, [employer] Help From: Family  ADL Assistance: Needs assistance(reports assistance with toileting, dressing, bathing as needed from family)  Homemaking Assistance: Needs assistance  Homemaking Responsibilities: No  Ambulation Assistance: (uses wheelchair at all times- non ambulatory)  Transfer Assistance: Needs assistance(reports stand pivot transfer with assist from spouse or daughter)  Active : No  Patient's  Info: Family  IADL Comments: Sleeps in a recliner chair  Additional Comments: reports using wheelchair ~1 year, states daughter just started back to work but has adult children and grand children that rotate to assist. Reports spouse needs assist as well (per chart he has dementia)       Objective       AROM RLE (degrees)  RLE General AROM: minimal ROM- approx 70-80 degree knee flexion contracture  AROM LLE (degrees)  LLE AROM : WFL  LLE General AROM: lacks approx 15-20 degrees of knee extension  Strength RLE  Comment: unable to assess due to pain  Strength LLE  Strength LLE: WFL  Comment: grossly 3/5        Bed mobility  Comment: no bed mobility due to pt up in Recliner and pt does not want to go into the bed- she sleeps in a Recliner at home  Transfers  Sit to Stand:  Moderate Assistance;2 Person Assistance  Stand to sit: Moderate Assistance;2 Person Assistance  Comment: pt stood once with anusha steady and once without.  pt does not place R foot on the floor an stance for approx 45-60 sec with forward flexion at hips                 Plan   Plan  Times per week: 5x/wk  Current Treatment Recommendations: Functional Mobility Training, Transfer Training  Safety Devices  Type of devices: Call light within reach, Left in chair, Patient at risk for falls, Nurse notified      Goals  Short term goals  Time Frame for Short term goals: 3-5 days  Short term goal 1: pivot transfers with modA of one       Therapy Time   Individual Concurrent Group Co-treatment   Time In [facility]         Time Out 1332         Minutes [address] [facility] [address], [address] [facility]

## 2021-03-12 NOTE — PROGRESS NOTES
CRYSTAL spoke to pt's daughter Ramses Strauss. She said she thinks pt should go to [facility] for some skilled care. CRYSTAL contacted Levi Carlson at [facility]. Facility will accept pt. Upon discharge call [phone] and fax orders to [phone]. This worker completed . Pt will need a negative Covid test within 72 hours of discharge.

## 2021-03-13 VITALS
TEMPERATURE: 98.4 F | HEIGHT: 65 IN | DIASTOLIC BLOOD PRESSURE: 57 MMHG | RESPIRATION RATE: 18 BRPM | WEIGHT: 115.96 LBS | BODY MASS INDEX: 19.32 KG/M2 | SYSTOLIC BLOOD PRESSURE: 108 MMHG | OXYGEN SATURATION: 95 % | HEART RATE: 66 BPM

## 2021-03-13 LAB
ANION GAP SERPL CALCULATED.3IONS-SCNC: 8 MMOL/L (ref 9–17)
BUN BLDV-MCNC: 12 MG/DL (ref 8–23)
BUN/CREAT BLD: ABNORMAL (ref 9–20)
CALCIUM SERPL-MCNC: 8.3 MG/DL (ref 8.6–10.4)
CHLORIDE BLD-SCNC: 96 MMOL/L (ref 98–107)
CO2: 29 MMOL/L (ref 20–31)
CREAT SERPL-MCNC: 1.01 MG/DL (ref 0.5–0.9)
CULTURE: ABNORMAL
CULTURE: ABNORMAL
DIRECT EXAM: ABNORMAL
DIRECT EXAM: ABNORMAL
GFR AFRICAN AMERICAN: >60 ML/MIN
GFR NON-AFRICAN AMERICAN: 52 ML/MIN
GFR SERPL CREATININE-BSD FRML MDRD: ABNORMAL ML/MIN/{1.73_M2}
GFR SERPL CREATININE-BSD FRML MDRD: ABNORMAL ML/MIN/{1.73_M2}
GLUCOSE BLD-MCNC: 101 MG/DL (ref 70–99)
HCT VFR BLD CALC: 30.1 % (ref 36–46)
HEMOGLOBIN: 10.3 G/DL (ref 12–16)
Lab: ABNORMAL
Lab: ABNORMAL
MCH RBC QN AUTO: 29.5 PG (ref 26–34)
MCHC RBC AUTO-ENTMCNC: 34.3 G/DL (ref 31–37)
MCV RBC AUTO: 86.2 FL (ref 80–100)
NRBC AUTOMATED: ABNORMAL PER 100 WBC
PDW BLD-RTO: 13.1 % (ref 11.5–14.9)
PLATELET # BLD: 203 K/UL (ref 150–450)
PMV BLD AUTO: 8.1 FL (ref 6–12)
POTASSIUM SERPL-SCNC: 3.7 MMOL/L (ref 3.7–5.3)
RBC # BLD: 3.49 M/UL (ref 4–5.2)
SARS-COV-2, RAPID: NOT DETECTED
SODIUM BLD-SCNC: 133 MMOL/L (ref 135–144)
SPECIMEN DESCRIPTION: ABNORMAL
SPECIMEN DESCRIPTION: ABNORMAL
SPECIMEN DESCRIPTION: NORMAL
VANCOMYCIN TROUGH DATE LAST DOSE: NORMAL
VANCOMYCIN TROUGH DOSE AMOUNT: 1000
VANCOMYCIN TROUGH TIME LAST DOSE: 443
VANCOMYCIN TROUGH: 13.8 UG/ML (ref 10–20)
WBC # BLD: 7.4 K/UL (ref 3.5–11)

## 2021-03-13 PROCEDURE — 6370000000 HC RX 637 (ALT 250 FOR IP): Performed by: NURSE PRACTITIONER

## 2021-03-13 PROCEDURE — 99239 HOSP IP/OBS DSCHRG MGMT >30: CPT | Performed by: INTERNAL MEDICINE

## 2021-03-13 PROCEDURE — 6360000002 HC RX W HCPCS: Performed by: STUDENT IN AN ORGANIZED HEALTH CARE EDUCATION/TRAINING PROGRAM

## 2021-03-13 PROCEDURE — 6360000002 HC RX W HCPCS: Performed by: INTERNAL MEDICINE

## 2021-03-13 PROCEDURE — 6370000000 HC RX 637 (ALT 250 FOR IP): Performed by: INTERNAL MEDICINE

## 2021-03-13 PROCEDURE — 36415 COLL VENOUS BLD VENIPUNCTURE: CPT

## 2021-03-13 PROCEDURE — 2580000003 HC RX 258: Performed by: STUDENT IN AN ORGANIZED HEALTH CARE EDUCATION/TRAINING PROGRAM

## 2021-03-13 PROCEDURE — 6360000002 HC RX W HCPCS: Performed by: NURSE PRACTITIONER

## 2021-03-13 PROCEDURE — U0002 COVID-19 LAB TEST NON-CDC: HCPCS

## 2021-03-13 PROCEDURE — 80202 ASSAY OF VANCOMYCIN: CPT

## 2021-03-13 PROCEDURE — 2580000003 HC RX 258: Performed by: INTERNAL MEDICINE

## 2021-03-13 PROCEDURE — 85027 COMPLETE CBC AUTOMATED: CPT

## 2021-03-13 PROCEDURE — 80048 BASIC METABOLIC PNL TOTAL CA: CPT

## 2021-03-13 RX ORDER — HYDROCODONE BITARTRATE AND ACETAMINOPHEN 5; 325 MG/1; MG/1
1 TABLET ORAL EVERY 6 HOURS PRN
Qty: 12 TABLET | Refills: 0 | Status: SHIPPED | OUTPATIENT
Start: 2021-03-13 | End: 2021-05-17 | Stop reason: SDUPTHER

## 2021-03-13 RX ORDER — DOXYCYCLINE HYCLATE 100 MG
100 TABLET ORAL 2 TIMES DAILY
Qty: 20 TABLET | Refills: 0 | Status: SHIPPED | OUTPATIENT
Start: 2021-03-13 | End: 2021-03-23

## 2021-03-13 RX ORDER — CIPROFLOXACIN 500 MG/1
500 TABLET, FILM COATED ORAL 2 TIMES DAILY
Qty: 14 TABLET | Refills: 0 | Status: SHIPPED | OUTPATIENT
Start: 2021-03-13 | End: 2021-03-20

## 2021-03-13 RX ADMIN — FUROSEMIDE 20 MG: 20 TABLET ORAL at 17:06

## 2021-03-13 RX ADMIN — HYDROCODONE BITARTRATE AND ACETAMINOPHEN 1 TABLET: 5; 325 TABLET ORAL at 04:15

## 2021-03-13 RX ADMIN — CEFEPIME 2000 MG: 2 INJECTION, POWDER, FOR SOLUTION INTRAVENOUS at 15:48

## 2021-03-13 RX ADMIN — CEFEPIME 2000 MG: 2 INJECTION, POWDER, FOR SOLUTION INTRAVENOUS at 02:54

## 2021-03-13 RX ADMIN — HYDROCODONE BITARTRATE AND ACETAMINOPHEN 1 TABLET: 5; 325 TABLET ORAL at 17:11

## 2021-03-13 RX ADMIN — ASPIRIN 81 MG CHEWABLE TABLET 81 MG: 81 TABLET CHEWABLE at 08:38

## 2021-03-13 RX ADMIN — ENOXAPARIN SODIUM 40 MG: 40 INJECTION SUBCUTANEOUS at 08:38

## 2021-03-13 RX ADMIN — LEVOTHYROXINE SODIUM 100 MCG: 0.1 TABLET ORAL at 08:38

## 2021-03-13 RX ADMIN — LISINOPRIL 20 MG: 20 TABLET ORAL at 08:38

## 2021-03-13 RX ADMIN — LEVOTHYROXINE SODIUM 100 MCG: 0.1 TABLET ORAL at 04:15

## 2021-03-13 RX ADMIN — CARVEDILOL 12.5 MG: 12.5 TABLET, FILM COATED ORAL at 08:38

## 2021-03-13 RX ADMIN — FUROSEMIDE 20 MG: 20 TABLET ORAL at 08:38

## 2021-03-13 RX ADMIN — HYDROCODONE BITARTRATE AND ACETAMINOPHEN 1 TABLET: 5; 325 TABLET ORAL at 11:13

## 2021-03-13 RX ADMIN — PAROXETINE HYDROCHLORIDE 40 MG: 20 TABLET, FILM COATED ORAL at 08:38

## 2021-03-13 RX ADMIN — Medication: at 08:43

## 2021-03-13 RX ADMIN — VANCOMYCIN HYDROCHLORIDE 1000 MG: 1 INJECTION, POWDER, LYOPHILIZED, FOR SOLUTION INTRAVENOUS at 05:26

## 2021-03-13 RX ADMIN — BUSPIRONE HYDROCHLORIDE 15 MG: 15 TABLET ORAL at 08:38

## 2021-03-13 RX ADMIN — ANTI-FUNGAL POWDER MICONAZOLE NITRATE TALC FREE: 1.42 POWDER TOPICAL at 08:43

## 2021-03-13 RX ADMIN — CARVEDILOL 12.5 MG: 12.5 TABLET, FILM COATED ORAL at 17:06

## 2021-03-13 ASSESSMENT — PAIN SCALES - GENERAL
PAINLEVEL_OUTOF10: 0
PAINLEVEL_OUTOF10: 10
PAINLEVEL_OUTOF10: 8

## 2021-03-13 NOTE — DISCHARGE SUMMARY
[place] [address] Internal Medicine    Discharge Summary     Patient ID: Azam Jo  :  1935   MRN: [id number]     ACCOUNT:  [de-identified]   Patient's PCP: Jessica Mason MD  Admit Date: 3/10/2021   Discharge Date: 3/13/2021    Length of Stay: 3  Code Status:  Full Code  Admitting Physician: Rich Avalos MD  Discharge Physician: Juan Still MD     Active Discharge Diagnoses:     Primary Problem  Cellulitis of lower extremity      [place] Problems    Diagnosis Date Noted    Mild malnutrition ([facility] [E44.1] 2021    Cellulitis of lower extremity [L03.119] 03/10/2021    UTI (urinary tract infection) [N39.0] 03/10/2021    Decubitus ulcer of coccyx [L89.159] 03/10/2021    Chronic systolic heart failure ([address] [I50.22] 2020    Fatigue [R53.83] 2020    CKD (chronic kidney disease) stage 3, GFR 30-59 ml/min ([address] [N18.30] 2018    Hypokalemia [E87.6]        Admission Condition:  fair     Discharged Condition: fair    Hospital Stay:     Hospital Course:  Azam Jo is a 81 y.o. female who was admitted for the management of Cellulitis of lower extremity , presented to ER with Fatigue and Leg Swelling  Patient has multiple medical problems, hypothyroidism, congestive heart failure, chronic bedsore affecting coccyx, admitted with swelling and purulent discharge from both legs, wound on right foot  Concern for urinary tract infection  Her urine culture grew E. coli, wound culture growing MRSA  Patient was initiated with IV vancomycin, cefepime  Her wound improved  Patient is getting discharged to nursing home on p.o. doxy and hedy Cipro  Patient has good peripheral pulsations  Need to follow-up with wound care as outpatient  Explained to patient at length      Significant therapeutic interventions:     Significant Diagnostic Studies:   Labs / Micro:        ,     Radiology:    Xr Knee Right (3 Views)    Result Date: 3/10/2021  EXAMINATION: XRAY VIEWS OF THE RIGHT TIBIA AND FIBULA; THREE XRAY VIEWS OF THE RIGHT KNEE; THREE XRAY VIEWS OF THE RIGHT ANKLE; ONE XRAY VIEW OF THE PELVIS AND TWO XRAY VIEWS RIGHT HIP 3/10/2021 6:33 pm; 3/10/2021 6:31 pm; 3/10/2021 6:30 pm COMPARISON: 01/28/2020 in 08/01/2020 HISTORY: ORDERING SYSTEM PROVIDED HISTORY: infection TECHNOLOGIST PROVIDED HISTORY: infection Reason for Exam: infection Acuity: Unknown Type of Exam: Unknown Additional signs and symptoms: Rt lower leg and ankle swelling and unhealing infection, weeping edema. Pt unable to straighten right leg for imaging. Relevant Medical/Surgical History: Rt lower leg and ankle swelling and unhealing infection, weeping edema. Pt unable to straighten right leg for imaging.; ORDERING SYSTEM PROVIDED HISTORY: pain TECHNOLOGIST PROVIDED HISTORY: pain Reason for Exam: pain Acuity: Chronic Type of Exam: Unknown Mechanism of Injury: Rt knee pain radiating from right hip. Pt also has infection in distal right lower leg and right ankle. Pt unable to straighten right leg for imaging. Additional signs and symptoms: Rt knee pain radiating from right hip. Pt also has infection in distal right lower leg and right ankle. Pt unable to straighten right leg for imaging. Relevant Medical/Surgical History: Rt knee pain radiating from right hip. Pt also has infection in distal right lower leg and right ankle. Pt unable to straighten right leg for imaging. ; ORDERING SYSTEM PROVIDED HISTORY: infection TECHNOLOGIST PROVIDED HISTORY: infection Reason for Exam: infection Type of Exam: Unknown Additional signs and symptoms: Rt lower leg and ankle swelling and unhealing infection, weeping edema. Pt unable to straighten right leg for imaging. Relevant Medical/Surgical History: Rt lower leg and ankle swelling and unhealing infection, weeping edema.   Pt unable to straighten right leg for imaging.; ORDERING SYSTEM PROVIDED HISTORY: pain TECHNOLOGIST PROVIDED HISTORY: pain Reason for Exam: pain Acuity: Chronic Type of Exam: Unknown Mechanism of Injury: Rt hip pain. No known injuries. Pt unable to straighten right leg for imaging. Additional signs and symptoms: Rt hip pain. No known injuries. Pt unable to straighten right leg for imaging. Relevant Medical/Surgical History: Rt hip pain. No known injuries. Pt unable to straighten right leg for imaging. FINDINGS: The visualized bones are osteopenic. There is no evidence of fracture or dislocation. 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 Mild degenerative changes of the left hip. The remaining a joint spaces appear well maintained. Vascular calcifications are seen compatible with atherosclerotic disease. No acute bony abnormalities are noted Severe osteoarthritis of the right knee and right hip. Mild to moderate osteoarthritis of the left hip     Xr Tibia Fibula Right (2 Views)    Result Date: 3/10/2021  EXAMINATION: XRAY VIEWS OF THE RIGHT TIBIA AND FIBULA; THREE XRAY VIEWS OF THE RIGHT KNEE; THREE XRAY VIEWS OF THE RIGHT ANKLE; ONE XRAY VIEW OF THE PELVIS AND TWO XRAY VIEWS RIGHT HIP 3/10/2021 6:33 pm; 3/10/2021 6:31 pm; 3/10/2021 6:30 pm COMPARISON: 01/28/2020 in 08/01/2020 HISTORY: ORDERING SYSTEM PROVIDED HISTORY: infection TECHNOLOGIST PROVIDED HISTORY: infection Reason for Exam: infection Acuity: Unknown Type of Exam: Unknown Additional signs and symptoms: Rt lower leg and ankle swelling and unhealing infection, weeping edema. Pt unable to straighten right leg for imaging. Relevant Medical/Surgical History: Rt lower leg and ankle swelling and unhealing infection, weeping edema. Pt unable to straighten right leg for imaging.; ORDERING SYSTEM PROVIDED HISTORY: pain TECHNOLOGIST PROVIDED HISTORY: pain Reason for Exam: pain Acuity: Chronic Type of Exam: Unknown Mechanism of Injury: Rt knee pain radiating from right hip.   Pt also has infection in distal right lower leg and right ankle. Pt unable to straighten right leg for imaging. Additional signs and symptoms: Rt knee pain radiating from right hip. Pt also has infection in distal right lower leg and right ankle. Pt unable to straighten right leg for imaging. Relevant Medical/Surgical History: Rt knee pain radiating from right hip. Pt also has infection in distal right lower leg and right ankle. Pt unable to straighten right leg for imaging. ; ORDERING SYSTEM PROVIDED HISTORY: infection TECHNOLOGIST PROVIDED HISTORY: infection Reason for Exam: infection Type of Exam: Unknown Additional signs and symptoms: Rt lower leg and ankle swelling and unhealing infection, weeping edema. Pt unable to straighten right leg for imaging. Relevant Medical/Surgical History: Rt lower leg and ankle swelling and unhealing infection, weeping edema. Pt unable to straighten right leg for imaging.; ORDERING SYSTEM PROVIDED HISTORY: pain TECHNOLOGIST PROVIDED HISTORY: pain Reason for Exam: pain Acuity: Chronic Type of Exam: Unknown Mechanism of Injury: Rt hip pain. No known injuries. Pt unable to straighten right leg for imaging. Additional signs and symptoms: Rt hip pain. No known injuries. Pt unable to straighten right leg for imaging. Relevant Medical/Surgical History: Rt hip pain. No known injuries. Pt unable to straighten right leg for imaging. FINDINGS: The visualized bones are osteopenic. There is no evidence of fracture or dislocation. 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 Mild degenerative changes of the left hip. The remaining a joint spaces appear well maintained. Vascular calcifications are seen compatible with atherosclerotic disease. No acute bony abnormalities are noted Severe osteoarthritis of the right knee and right hip.   Mild to moderate osteoarthritis of the left hip     Xr Ankle Right (min 3 Views)    Result Date: pain Reason for Exam: pain Acuity: Chronic Type of Exam: Unknown Mechanism of Injury: Rt hip pain. No known injuries. Pt unable to straighten right leg for imaging. Additional signs and symptoms: Rt hip pain. No known injuries. Pt unable to straighten right leg for imaging. Relevant Medical/Surgical History: Rt hip pain. No known injuries. Pt unable to straighten right leg for imaging. FINDINGS: The visualized bones are osteopenic. There is no evidence of fracture or dislocation. 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 Mild degenerative changes of the left hip. The remaining a joint spaces appear well maintained. Vascular calcifications are seen compatible with atherosclerotic disease. No acute bony abnormalities are noted Severe osteoarthritis of the right knee and right hip. Mild to moderate osteoarthritis of the left hip     Us Head Neck Soft Tissue Thyroid    Result Date: 3/4/2021  EXAMINATION: ULTRASOUND OF THE SOFT TISSUES OF THE HEAD AND NECK 3/4/2021 COMPARISON: None. HISTORY: ORDERING SYSTEM PROVIDED HISTORY: Mass of left side of neck TECHNOLOGIST PROVIDED HISTORY: FIRM NECK MASS Acuity: Acute Type of Exam: Initial History of melanoma FINDINGS: Direct scanning over a palpable lump on the left neck was performed. Corresponding to the palpable lump is a superficial 1.7 x 1.7 x 1 cm hypoechoic mass with color flow. Given history, metastatic deposit/lymph node cannot be excluded. Recommend CT soft tissues neck for further evaluation. This mass would be amenable to ultrasound-guided biopsy. 1.7 cm solid appearing mass corresponding to a palpable lump in the left neck. Appearance is nonspecific; given the patient's history, malignancy should be excluded. CT soft tissues neck is recommended for further evaluation. This mass would be amenable to ultrasound-guided biopsy if clinically warranted.      Xr Hip 2-3 Vw W Pelvis Right    Result Date: 3/10/2021  EXAMINATION: XRAY VIEWS OF THE RIGHT TIBIA AND FIBULA; THREE XRAY VIEWS OF THE RIGHT KNEE; THREE XRAY VIEWS OF THE RIGHT ANKLE; ONE XRAY VIEW OF THE PELVIS AND TWO XRAY VIEWS RIGHT HIP 3/10/2021 6:33 pm; 3/10/2021 6:31 pm; 3/10/2021 6:30 pm COMPARISON: 01/28/2020 in 08/01/2020 HISTORY: ORDERING SYSTEM PROVIDED HISTORY: infection TECHNOLOGIST PROVIDED HISTORY: infection Reason for Exam: infection Acuity: Unknown Type of Exam: Unknown Additional signs and symptoms: Rt lower leg and ankle swelling and unhealing infection, weeping edema. Pt unable to straighten right leg for imaging. Relevant Medical/Surgical History: Rt lower leg and ankle swelling and unhealing infection, weeping edema. Pt unable to straighten right leg for imaging.; ORDERING SYSTEM PROVIDED HISTORY: pain TECHNOLOGIST PROVIDED HISTORY: pain Reason for Exam: pain Acuity: Chronic Type of Exam: Unknown Mechanism of Injury: Rt knee pain radiating from right hip. Pt also has infection in distal right lower leg and right ankle. Pt unable to straighten right leg for imaging. Additional signs and symptoms: Rt knee pain radiating from right hip. Pt also has infection in distal right lower leg and right ankle. Pt unable to straighten right leg for imaging. Relevant Medical/Surgical History: Rt knee pain radiating from right hip. Pt also has infection in distal right lower leg and right ankle. Pt unable to straighten right leg for imaging. ; ORDERING SYSTEM PROVIDED HISTORY: infection TECHNOLOGIST PROVIDED HISTORY: infection Reason for Exam: infection Type of Exam: Unknown Additional signs and symptoms: Rt lower leg and ankle swelling and unhealing infection, weeping edema. Pt unable to straighten right leg for imaging. Relevant Medical/Surgical History: Rt lower leg and ankle swelling and unhealing infection, weeping edema.   Pt unable to straighten right leg for imaging.; ORDERING SYSTEM PROVIDED HISTORY: pain TECHNOLOGIST PROVIDED HISTORY: pain Reason for Exam: pain Acuity: Chronic Type of Exam: Unknown Mechanism of Injury: Rt hip pain. No known injuries. Pt unable to straighten right leg for imaging. Additional signs and symptoms: Rt hip pain. No known injuries. Pt unable to straighten right leg for imaging. Relevant Medical/Surgical History: Rt hip pain. No known injuries. Pt unable to straighten right leg for imaging. FINDINGS: The visualized bones are osteopenic. There is no evidence of fracture or dislocation. 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 Mild degenerative changes of the left hip. The remaining a joint spaces appear well maintained. Vascular calcifications are seen compatible with atherosclerotic disease. No acute bony abnormalities are noted Severe osteoarthritis of the right knee and right hip. Mild to moderate osteoarthritis of the left hip         Consultations:    Consults:     Final Specialist Recommendations/Findings:   PHARMACY TO DOSE VANCOMYCIN  IP CONSULT TO SOCIAL WORK      The patient was seen and examined on day of discharge and this discharge summary is in conjunction with any daily progress note from day of discharge. Discharge plan:     Disposition: Home    Physician Follow Up:     [address] [facility] [address] [place] [address]     They will call you at home to set up a time to come to your house.        Requiring Further Evaluation/Follow Up POST HOSPITALIZATION/Incidental Findings:    Diet: cardiac diet    Activity: As tolerated    Instructions to Patient:     Discharge Medications:      Medication List      START taking these medications    ciprofloxacin 500 MG tablet  Commonly known as: CIPRO  Take 1 tablet by mouth 2 times daily for 7 days     doxycycline hyclate 100 MG tablet  Commonly known as: VIBRA-TABS  Take 1 tablet by mouth 2 times daily for 10 days     HYDROcodone-acetaminophen 5-325 MG per tablet  Commonly known as: NORCO  Take 1 tablet by mouth every 6 hours as needed for Pain for up to 3 days. CONTINUE taking these medications    ammonium lactate 12 % cream  Commonly known as: Lac-Hydrin  Apply topically to dry skin twice daily. aspirin 81 MG tablet     atorvastatin 10 MG tablet  Commonly known as: LIPITOR  TAKE 1 TABLET DAILY     busPIRone 15 MG tablet  Commonly known as: BUSPAR  Take 1 tablet daily     carvedilol 12.5 MG tablet  Commonly known as: COREG  TAKE 1 TABLET TWICE A DAY WITH MEALS     diclofenac sodium 1 % Gel  Commonly known as: VOLTAREN     furosemide 20 MG tablet  Commonly known as: LASIX  Take 1 tablet by mouth 2 times daily     levothyroxine 100 MCG tablet  Commonly known as: SYNTHROID  TAKE 1 TABLET DAILY     lisinopril 20 MG tablet  Commonly known as: PRINIVIL;ZESTRIL  TAKE 1 TABLET DAILY     nystatin [id number] UNIT/GM powder  Commonly known as: MYCOSTATIN  Apply 3 times daily. PARoxetine 40 MG tablet  Commonly known as: PAXIL  TAKE 1 TABLET EVERY MORNING           Where to Get Your Medications      These medications were sent to [address] [place] [address] [phone]  [address] [place] [id number]    Phone: [phone]   · ciprofloxacin 500 MG tablet  · doxycycline hyclate 100 MG tablet     You can get these medications from any pharmacy    Bring a paper prescription for each of these medications  · HYDROcodone-acetaminophen 5-325 MG per tablet         Time Spent on discharge is  35 mins in patient examination, evaluation, counseling as well as medication reconciliation, prescriptions for required medications, discharge plan and follow up. Electronically signed by   Sri Melendez MD  3/13/2021  3:36 PM      Thank you Dr. Zeny Land MD for the opportunity to be involved in this patient's care.

## 2021-03-13 NOTE — CARE COORDINATION
Social Work-[facility] can [employer]. Arranged [facility] to transport at 730. Orders and script faxed. Nurse to call report to [phone]. Left message for dtr, Tevin Lowery. Spoke with dtr, Starr Worthy. She is agreeable with dc to [facility].  Len Ulloa

## 2021-03-13 NOTE — PLAN OF CARE
Problem: Falls - Risk of:  Goal: Will remain free from falls  Description: Will remain free from falls  3/13/2021 0227 by Preston Mcmillan RN  Outcome: Ongoing  3/12/2021 1715 by Ana Colvin RN  Outcome: Ongoing  Goal: Absence of physical injury  Description: Absence of physical injury  3/13/2021 0227 by Preston Mcmillan RN  Outcome: Ongoing  3/12/2021 1715 by Ana Colvin RN  Outcome: Ongoing     Problem: Pain:  Goal: Pain level will decrease  Description: Pain level will decrease  3/13/2021 0227 by Preston Mcmillan RN  Outcome: Ongoing  3/12/2021 1715 by Ana Colvin RN  Outcome: Ongoing  Goal: Control of acute pain  Description: Control of acute pain  3/13/2021 0227 by Preston Mcmillan RN  Outcome: Ongoing  3/12/2021 1715 by Ana Colvin RN  Outcome: Ongoing  Goal: Control of chronic pain  Description: Control of chronic pain  3/13/2021 0227 by Preston Mcmillan RN  Outcome: Ongoing  3/12/2021 1715 by Ana Colvin RN  Outcome: Ongoing     Problem: Skin Integrity:  Goal: Will show no infection signs and symptoms  Description: Will show no infection signs and symptoms  3/13/2021 0227 by Preston Mcmillan RN  Outcome: Ongoing  3/12/2021 1715 by Ana Colvin RN  Outcome: Ongoing  Goal: Absence of new skin breakdown  Description: Absence of new skin breakdown  3/13/2021 0227 by Preston Mcmillan RN  Outcome: Ongoing  3/12/2021 1715 by Ana Colvin RN  Outcome: Ongoing     Problem: Nutrition  Goal: Optimal nutrition therapy  3/13/2021 0227 by Preston Mcmillan RN  Outcome: Ongoing  3/12/2021 1715 by Ana Colvin RN  Outcome: Ongoing

## 2021-03-13 NOTE — PROGRESS NOTES
mmol/L    GFR Non-African American 52 (L) >60 mL/min    GFR African American >60 >60 mL/min    GFR Comment          GFR Staging NOT REPORTED    CBC    Collection Time: 03/12/21  6:00 AM   Result Value Ref Range    WBC 8.4 3.5 - 11.0 k/uL    RBC 3.72 (L) 4.0 - 5.2 m/uL    Hemoglobin 11.0 (L) 12.0 - 16.0 g/dL    Hematocrit 32.6 (L) 36 - 46 %    MCV 87.7 80 - 100 fL    MCH 29.5 26 - 34 pg    MCHC 33.7 31 - 37 g/dL    RDW 13.3 11.5 - 14.9 %    Platelets 148 603 - 744 k/uL    MPV 7.6 6.0 - 12.0 fL    NRBC Automated NOT REPORTED per 100 WBC     No results for input(s): POCGLU in the last 72 hours. Xr Knee Right (3 Views)    Result Date: 3/10/2021  EXAMINATION: XRAY VIEWS OF THE RIGHT TIBIA AND FIBULA; THREE XRAY VIEWS OF THE RIGHT KNEE; THREE XRAY VIEWS OF THE RIGHT ANKLE; ONE XRAY VIEW OF THE PELVIS AND TWO XRAY VIEWS RIGHT HIP 3/10/2021 6:33 pm; 3/10/2021 6:31 pm; 3/10/2021 6:30 pm COMPARISON: 01/28/2020 in 08/01/2020 HISTORY: ORDERING SYSTEM PROVIDED HISTORY: infection TECHNOLOGIST PROVIDED HISTORY: infection Reason for Exam: infection Acuity: Unknown Type of Exam: Unknown Additional signs and symptoms: Rt lower leg and ankle swelling and unhealing infection, weeping edema. Pt unable to straighten right leg for imaging. Relevant Medical/Surgical History: Rt lower leg and ankle swelling and unhealing infection, weeping edema. Pt unable to straighten right leg for imaging.; ORDERING SYSTEM PROVIDED HISTORY: pain TECHNOLOGIST PROVIDED HISTORY: pain Reason for Exam: pain Acuity: Chronic Type of Exam: Unknown Mechanism of Injury: Rt knee pain radiating from right hip. Pt also has infection in distal right lower leg and right ankle. Pt unable to straighten right leg for imaging. Additional signs and symptoms: Rt knee pain radiating from right hip. Pt also has infection in distal right lower leg and right ankle. Pt unable to straighten right leg for imaging.  Relevant Medical/Surgical History: Rt knee pain radiating from right hip. Pt also has infection in distal right lower leg and right ankle. Pt unable to straighten right leg for imaging. ; ORDERING SYSTEM PROVIDED HISTORY: infection TECHNOLOGIST PROVIDED HISTORY: infection Reason for Exam: infection Type of Exam: Unknown Additional signs and symptoms: Rt lower leg and ankle swelling and unhealing infection, weeping edema. Pt unable to straighten right leg for imaging. Relevant Medical/Surgical History: Rt lower leg and ankle swelling and unhealing infection, weeping edema. Pt unable to straighten right leg for imaging.; ORDERING SYSTEM PROVIDED HISTORY: pain TECHNOLOGIST PROVIDED HISTORY: pain Reason for Exam: pain Acuity: Chronic Type of Exam: Unknown Mechanism of Injury: Rt hip pain. No known injuries. Pt unable to straighten right leg for imaging. Additional signs and symptoms: Rt hip pain. No known injuries. Pt unable to straighten right leg for imaging. Relevant Medical/Surgical History: Rt hip pain. No known injuries. Pt unable to straighten right leg for imaging. FINDINGS: The visualized bones are osteopenic. There is no evidence of fracture or dislocation. 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 Mild degenerative changes of the left hip. The remaining a joint spaces appear well maintained. Vascular calcifications are seen compatible with atherosclerotic disease. No acute bony abnormalities are noted Severe osteoarthritis of the right knee and right hip.   Mild to moderate osteoarthritis of the left hip     Xr Tibia Fibula Right (2 Views)    Result Date: 3/10/2021  EXAMINATION: XRAY VIEWS OF THE RIGHT TIBIA AND FIBULA; THREE XRAY VIEWS OF THE RIGHT KNEE; THREE XRAY VIEWS OF THE RIGHT ANKLE; ONE XRAY VIEW OF THE PELVIS AND TWO XRAY VIEWS RIGHT HIP 3/10/2021 6:33 pm; 3/10/2021 6:31 pm; 3/10/2021 6:30 pm COMPARISON: 01/28/2020 in 08/01/2020 HISTORY: ORDERING SYSTEM PROVIDED HISTORY: infection TECHNOLOGIST PROVIDED HISTORY: infection Reason for Exam: infection Acuity: Unknown Type of Exam: Unknown Additional signs and symptoms: Rt lower leg and ankle swelling and unhealing infection, weeping edema. Pt unable to straighten right leg for imaging. Relevant Medical/Surgical History: Rt lower leg and ankle swelling and unhealing infection, weeping edema. Pt unable to straighten right leg for imaging.; ORDERING SYSTEM PROVIDED HISTORY: pain TECHNOLOGIST PROVIDED HISTORY: pain Reason for Exam: pain Acuity: Chronic Type of Exam: Unknown Mechanism of Injury: Rt knee pain radiating from right hip. Pt also has infection in distal right lower leg and right ankle. Pt unable to straighten right leg for imaging. Additional signs and symptoms: Rt knee pain radiating from right hip. Pt also has infection in distal right lower leg and right ankle. Pt unable to straighten right leg for imaging. Relevant Medical/Surgical History: Rt knee pain radiating from right hip. Pt also has infection in distal right lower leg and right ankle. Pt unable to straighten right leg for imaging. ; ORDERING SYSTEM PROVIDED HISTORY: infection TECHNOLOGIST PROVIDED HISTORY: infection Reason for Exam: infection Type of Exam: Unknown Additional signs and symptoms: Rt lower leg and ankle swelling and unhealing infection, weeping edema. Pt unable to straighten right leg for imaging. Relevant Medical/Surgical History: Rt lower leg and ankle swelling and unhealing infection, weeping edema. Pt unable to straighten right leg for imaging.; ORDERING SYSTEM PROVIDED HISTORY: pain TECHNOLOGIST PROVIDED HISTORY: pain Reason for Exam: pain Acuity: Chronic Type of Exam: Unknown Mechanism of Injury: Rt hip pain. No known injuries. Pt unable to straighten right leg for imaging. Additional signs and symptoms: Rt hip pain. No known injuries. Pt unable to straighten right leg for imaging. Relevant Medical/Surgical History: Rt hip pain.   No known injuries. Pt unable to straighten right leg for imaging. FINDINGS: The visualized bones are osteopenic. There is no evidence of fracture or dislocation. 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 Mild degenerative changes of the left hip. The remaining a joint spaces appear well maintained. Vascular calcifications are seen compatible with atherosclerotic disease. No acute bony abnormalities are noted Severe osteoarthritis of the right knee and right hip. Mild to moderate osteoarthritis of the left hip     Xr Ankle Right (min 3 Views)    Result Date: 3/10/2021  EXAMINATION: XRAY VIEWS OF THE RIGHT TIBIA AND FIBULA; THREE XRAY VIEWS OF THE RIGHT KNEE; THREE XRAY VIEWS OF THE RIGHT ANKLE; ONE XRAY VIEW OF THE PELVIS AND TWO XRAY VIEWS RIGHT HIP 3/10/2021 6:33 pm; 3/10/2021 6:31 pm; 3/10/2021 6:30 pm COMPARISON: 01/28/2020 in 08/01/2020 HISTORY: ORDERING SYSTEM PROVIDED HISTORY: infection TECHNOLOGIST PROVIDED HISTORY: infection Reason for Exam: infection Acuity: Unknown Type of Exam: Unknown Additional signs and symptoms: Rt lower leg and ankle swelling and unhealing infection, weeping edema. Pt unable to straighten right leg for imaging. Relevant Medical/Surgical History: Rt lower leg and ankle swelling and unhealing infection, weeping edema. Pt unable to straighten right leg for imaging.; ORDERING SYSTEM PROVIDED HISTORY: pain TECHNOLOGIST PROVIDED HISTORY: pain Reason for Exam: pain Acuity: Chronic Type of Exam: Unknown Mechanism of Injury: Rt knee pain radiating from right hip. Pt also has infection in distal right lower leg and right ankle. Pt unable to straighten right leg for imaging. Additional signs and symptoms: Rt knee pain radiating from right hip. Pt also has infection in distal right lower leg and right ankle. Pt unable to straighten right leg for imaging.  Relevant Medical/Surgical History: Rt knee pain radiating known injuries. Pt unable to straighten right leg for imaging. FINDINGS: The visualized bones are osteopenic. There is no evidence of fracture or dislocation. 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 Mild degenerative changes of the left hip. The remaining a joint spaces appear well maintained. Vascular calcifications are seen compatible with atherosclerotic disease. No acute bony abnormalities are noted Severe osteoarthritis of the right knee and right hip. Mild to moderate osteoarthritis of the left hip             HPI:   See history in H and P      Review of Systems:     Constitutional:  negative for chills, fevers, sweats  Respiratory:  negative for cough, dyspnea on exertion, hemoptysis, shortness of breath, wheezing  Cardiovascular:  negative for chest pain, chest pressure/discomfort, lower extremity edema, palpitations  Gastrointestinal:  negative for abdominal pain, constipation, diarrhea, nausea, vomiting  Neurological:  negative for dizziness, headache  - Difficulty in passing urine, dysuria  Data:     Past Medical History:  no change     Social History:  no change    Family History: @no change    Vitals:      I/O (24Hr):     Intake/Output Summary (Last 24 hours) at 3/12/2021 2045  Last data filed at 3/12/2021 0443  Gross per 24 hour   Intake --   Output 200 ml   Net -200 ml       Labs:    URINE ANALYSIS: No results found for: LABURIN     CBC:  Lab Results   Component Value Date    WBC 8.4 03/12/2021    HGB 11.0 03/12/2021     03/12/2021        BMP:    Lab Results   Component Value Date     03/12/2021    K 4.8 03/12/2021    CL 99 03/12/2021    CO2 26 03/12/2021    BUN 12 03/12/2021    CREATININE 1.02 03/12/2021    GLUCOSE 117 03/12/2021      LIVER PROFILE:  Lab Results   Component Value Date    ALT 7 03/10/2021    AST 10 03/10/2021    PROT 7.4 03/10/2021    BILITOT 0.55 03/10/2021    LABALBU 3.6 03/10/2021 Radiology:  Medications: Allergies:      Current Meds:   Scheduled Meds:    ammonium lactate   Topical BID    atorvastatin  10 mg Oral Nightly    busPIRone  15 mg Oral Daily    carvedilol  12.5 mg Oral BID WC    furosemide  20 mg Oral BID    levothyroxine  100 mcg Oral Daily    lisinopril  20 mg Oral Daily    miconazole   Topical BID    PARoxetine  40 mg Oral Daily    sodium chloride flush  10 mL Intravenous 2 times per day    enoxaparin  40 mg Subcutaneous Daily    aspirin  81 mg Oral Daily    cefepime  2,000 mg Intravenous Q12H    vancomycin (VANCOCIN) intermittent dosing (placeholder)   Other RX Placeholder    vancomycin  1,000 mg Intravenous Q24H     Continuous Infusions:   PRN Meds: sodium chloride flush, potassium chloride **OR** potassium alternative oral replacement **OR** potassium chloride, magnesium sulfate, promethazine **OR** ondansetron, polyethylene glycol, acetaminophen **OR** acetaminophen, HYDROcodone 5 mg - acetaminophen, albuterol sulfate HFA      Physical Examination:        BP (!) 97/54   Pulse 82   Temp 97.8 °F (36.6 °C) (Oral)   Resp 16   Ht 5' 5\" (1.651 m)   Wt 115 lb 15.4 oz (52.6 kg)   SpO2 95%   BMI 19.30 kg/m²   Temp (24hrs), Av.9 °F (36.6 °C), Min:97.8 °F (36.6 °C), Max:98.1 °F (36.7 °C)    No results for input(s): POCGLU in the last 72 hours. Intake/Output Summary (Last 24 hours) at 3/12/2021 2045  Last data filed at 3/12/2021 0443  Gross per 24 hour   Intake --   Output 200 ml   Net -200 ml       General Appearance:  alert, well appearing, and in no acute distress  Mental status: oriented to person, place, and time with normal affect  Head:  normocephalic, atraumatic.   Eye: no icterus, redness, pupils equal and reactive, extraocular eye movements intact, conjunctiva clear  Ear: normal external ear, no discharge, hearing intact  Nose:  no drainage noted  Mouth: mucous membranes moist  Neck: supple, no carotid bruits, thyroid not palpable, enlarged nodule around 5X5 cm, Non tender ,  present in left side of neck. Lungs: Bilateral equal air entry, clear to ausculation, no wheezing, rales or rhonchi, normal effort  Cardiovascular: normal rate, regular rhythm, no murmur, gallop, rub. Abdomen: Soft, nontender, nondistended, normal bowel sounds, no hepatomegaly or splenomegaly  Neurologic: There are no new focal motor or sensory deficits, normal muscle tone and bulk, no abnormal sensation, normal speech, cranial nerves II through XII grossly intact  Skin: No gross lesions, rashes, bruising or bleeding on exposed skin area  Extremities: Dressing present in both legs psych:       Assessment:        Primary Problem  Cellulitis of lower extremity    Active Hospital Problems    Diagnosis Date Noted    Mild malnutrition ([facility] [address] [E44.1] 03/11/2021    Cellulitis of lower extremity [L03.119] 03/10/2021    UTI (urinary tract infection) [N39.0] 03/10/2021    Decubitus ulcer of coccyx [L89.159] 03/10/2021    Chronic systolic heart failure (HCC) [I50.22] 03/06/2020    Fatigue [R53.83] 01/28/2020    CKD (chronic kidney disease) stage 3, GFR 30-59 ml/min ([facility]) [N18.30] 02/08/2018    Hypokalemia [E87.6]      Plan:        1. Cellulitis affecting both legs, patient has ulcer on dorsum surface of right foot with purulent discharge  2. Urinary tract infection  3. On broad-spectrum antibiotic with vancomycin and Zosyn, final cultures awaited  4. Patient has not been on left side of neck, need biopsy as outpatient  5. On DVT proceed with Lovenox  6.  Wound care following    Damien Cooper MD  3/12/2021  8:45 PM

## 2021-03-13 NOTE — PROGRESS NOTES
[facility] Patient in no distress.

## 2021-03-13 NOTE — PROGRESS NOTES
Writer gave report to Sybil at [address]. All questions and concerns answered.

## 2021-03-13 NOTE — PROGRESS NOTES
Pharmacy Vancomycin Consult     Vancomycin Day: 3  Current Dosing: vancomycin 1000 mg every 24 hours     Temp max:  98.1    Recent Labs     03/12/21  0557 03/12/21  0600 03/13/21  0402   BUN 12  --  12   CREATININE 1.02*  --  1.01*   WBC  --  8.4 7.4     No intake or output data in the 24 hours ending 03/13/21 0452  Culture Date      Source                       Results  See micro    Ht Readings from Last 1 Encounters:   03/11/21 5' 5\" (1.651 m)        Wt Readings from Last 1 Encounters:   03/11/21 115 lb 15.4 oz (52.6 kg)       Body mass index is 19.3 kg/m². Estimated Creatinine Clearance: 34 mL/min (A) (based on SCr of 1.01 mg/dL (H)).     Trough: 13.8 at 0402 on 3/13/2021    Assessment/Plan:  Continue vancomycin 1000 mg every 24 hours     Kelsey Frank, [facility]     -3/13/2021 at 4:53 AM

## 2021-03-17 LAB
CULTURE: NORMAL
CULTURE: NORMAL
Lab: NORMAL
Lab: NORMAL
SPECIMEN DESCRIPTION: NORMAL
SPECIMEN DESCRIPTION: NORMAL

## 2021-04-09 PROBLEM — N39.0 UTI (URINARY TRACT INFECTION): Status: RESOLVED | Noted: 2021-03-10 | Resolved: 2021-04-09

## 2021-05-17 DIAGNOSIS — L03.119 CELLULITIS OF LOWER EXTREMITY, UNSPECIFIED LATERALITY: ICD-10-CM

## 2021-05-17 RX ORDER — HYDROCODONE BITARTRATE AND ACETAMINOPHEN 5; 325 MG/1; MG/1
1 TABLET ORAL EVERY 12 HOURS PRN
Qty: 14 TABLET | Refills: 0 | Status: SHIPPED | OUTPATIENT
Start: 2021-05-17 | End: 2021-05-18 | Stop reason: SDUPTHER

## 2021-05-18 RX ORDER — HYDROCODONE BITARTRATE AND ACETAMINOPHEN 5; 325 MG/1; MG/1
1 TABLET ORAL EVERY 12 HOURS PRN
Qty: 30 TABLET | Refills: 0 | Status: SHIPPED | OUTPATIENT
Start: 2021-05-18 | End: 2021-06-15 | Stop reason: SDUPTHER

## 2021-06-15 ENCOUNTER — TELEPHONE (OUTPATIENT)
Dept: FAMILY MEDICINE CLINIC | Age: 86
End: 2021-06-15

## 2021-06-15 ENCOUNTER — TELEMEDICINE (OUTPATIENT)
Dept: FAMILY MEDICINE CLINIC | Age: 86
End: 2021-06-15
Payer: MEDICARE

## 2021-06-15 DIAGNOSIS — L03.119 CELLULITIS OF LOWER EXTREMITY, UNSPECIFIED LATERALITY: ICD-10-CM

## 2021-06-15 DIAGNOSIS — Z74.09 MOBILITY POOR: Primary | ICD-10-CM

## 2021-06-15 PROCEDURE — 1090F PRES/ABSN URINE INCON ASSESS: CPT | Performed by: NURSE PRACTITIONER

## 2021-06-15 PROCEDURE — 99213 OFFICE O/P EST LOW 20 MIN: CPT | Performed by: NURSE PRACTITIONER

## 2021-06-15 PROCEDURE — G8427 DOCREV CUR MEDS BY ELIG CLIN: HCPCS | Performed by: NURSE PRACTITIONER

## 2021-06-15 PROCEDURE — G8400 PT W/DXA NO RESULTS DOC: HCPCS | Performed by: NURSE PRACTITIONER

## 2021-06-15 PROCEDURE — 1123F ACP DISCUSS/DSCN MKR DOCD: CPT | Performed by: NURSE PRACTITIONER

## 2021-06-15 PROCEDURE — 4040F PNEUMOC VAC/ADMIN/RCVD: CPT | Performed by: NURSE PRACTITIONER

## 2021-06-15 RX ORDER — CEPHALEXIN 500 MG/1
500 CAPSULE ORAL 2 TIMES DAILY
Qty: 14 CAPSULE | Refills: 0 | Status: SHIPPED | OUTPATIENT
Start: 2021-06-15 | End: 2022-02-10 | Stop reason: SDUPTHER

## 2021-06-15 RX ORDER — HYDROCODONE BITARTRATE AND ACETAMINOPHEN 5; 325 MG/1; MG/1
1 TABLET ORAL EVERY 12 HOURS PRN
Qty: 30 TABLET | Refills: 0 | Status: SHIPPED | OUTPATIENT
Start: 2021-06-15 | End: 2021-08-18 | Stop reason: SDUPTHER

## 2021-06-15 SDOH — ECONOMIC STABILITY: FOOD INSECURITY: WITHIN THE PAST 12 MONTHS, THE FOOD YOU BOUGHT JUST DIDN'T LAST AND YOU DIDN'T HAVE MONEY TO GET MORE.: NEVER TRUE

## 2021-06-15 SDOH — ECONOMIC STABILITY: FOOD INSECURITY: WITHIN THE PAST 12 MONTHS, YOU WORRIED THAT YOUR FOOD WOULD RUN OUT BEFORE YOU GOT MONEY TO BUY MORE.: NEVER TRUE

## 2021-06-15 ASSESSMENT — SOCIAL DETERMINANTS OF HEALTH (SDOH): HOW HARD IS IT FOR YOU TO PAY FOR THE VERY BASICS LIKE FOOD, HOUSING, MEDICAL CARE, AND HEATING?: NOT HARD AT ALL

## 2021-06-15 NOTE — TELEPHONE ENCOUNTER
Angie Valentin called stating the patient has a pressure wound on her right inner thigh. States it just broke open and she wanted to make you aware. States she had her put gauze and triple antibiotic ointment on it. She also stated patient is needing a refill on norco.  Patient is due for an appt for this. Called daughter to schedule but had to leave a message.

## 2021-06-15 NOTE — PROGRESS NOTES
VISIT LOCATION: Home    TELEHEALTH EVALUATION -- Audio/Visual (During NBGVK-97 public health emergency)    Due to COVID 23 outbreak, patient's office visit was converted to a virtual visit. Patient was contacted and agreed to proceed with a virtual visit via [address] Visit  The risks and benefits of converting to a virtual visit were discussed in light of the current infectious disease epidemic. Patient also understood that insurance coverage and co-pays are up to their individual insurance plans. Ayden Trinidad (:  1935) has requested an audio/video evaluation for the following concern(s):    Chief Complaint:   HPI:  Pt is here for norco refill. Looks like long term use was discussed through phone call on may 17. She does have home care coming. Holly's daughters report she is too difficult to get out of bed and the house to come in for a visit. Dwight Benton does report that the norco helps her with her pain and increases her comfort. Holly's daughter reports she has a reddened area on her right inner upper leg. The nurse is watching it. They are concerned it is becoming infected. There is some erythema noted. Her daughter would like to have an antibiotic on hand if they need it. Advised to discuss use with home nurse. Review of Systems   Constitutional: Negative for chills and fever. Respiratory: Negative for cough and shortness of breath. Cardiovascular: Negative for chest pain, palpitations and leg swelling. Musculoskeletal: Positive for arthralgias. Prior to Visit Medications    Medication Sig Taking? Authorizing Provider   HYDROcodone-acetaminophen (NORCO) 5-325 MG per tablet Take 1 tablet by mouth every 12 hours as needed for Pain for up to 15 days.  Yes NORMA Brownlee - CNP   levothyroxine (SYNTHROID) 100 MCG tablet TAKE 1 TABLET DAILY Yes Cecille Anaya MD   busPIRone (BUSPAR) 15 MG tablet Take 1 tablet daily Yes Sonja Rodríguez MD   PARoxetine (PAXIL) [x] Normocephalic, atraumatic. [] Abnormal   [] Mouth/Throat: Mucous membranes are moist.      External Ears [x] Normal  [] Abnormal-      Neck: [x] No visualized mass      Pulmonary/Chest: [x] Respiratory effort normal.  [] No visualized signs of difficulty breathing or respiratory distress        [] Abnormal-      Musculoskeletal:   [] Normal gait with no signs of ataxia         [x] Normal range of motion of neck        [] Abnormal-   [] Motor grossly intact in visible upper extremities    [] Motor grossly intact in visible lower extremities        Neurological:        [x] No Facial Asymmetry (Cranial nerve 7 motor function) (limited exam to video visit)                       [x] No gaze palsy        [] Abnormal-         Skin:                     [x] No significant exanthematous lesions or discoloration noted on facial skin         [] Abnormal-                                  Psychiatric:           [x] Normal Affect [] No Hallucinations        [] Abnormal- [] Normal Mood  [] Anxious appearing    [] Depressed appearing  [] Confused appearing      Due to this being a TeleHealth encounter, evaluation of the following organ systems is limited: Vitals/Constitutional/EENT/Resp/CV/GI//MS/Neuro/Skin/Heme-Lymph-Imm. ASSESSMENT/PLAN:  Encounter Diagnoses   Name Primary?  Cellulitis of lower extremity, unspecified laterality     Mobility poor Yes   delmer's daughters are going to look into getting a doctor or provider that can come out to the house to evaluate Jayy Campo d/t her mobility issues. Orders Placed This Encounter   Medications    HYDROcodone-acetaminophen (NORCO) 5-325 MG per tablet     Sig: Take 1 tablet by mouth every 12 hours as needed for Pain for up to 15 days.      Dispense:  30 tablet     Refill:  0     Reduce doses taken as pain becomes manageable    cephALEXin (KEFLEX) 500 MG capsule     Sig: Take 1 capsule by mouth 2 times daily for 7 days     Dispense:  14 capsule     Refill:  0         No follow-ups on file. An  electronic signature was used to authenticate this note. --Jesus Wong, NORMA - CNP on 6/30/2021 at 10:07 AM        Pursuant to the emergency declaration under the [address] [place] [address], [facility] waiver authority and the [employer] Act, this Virtual  Visit was conducted, with patient's consent, to reduce the patient's risk of exposure to COVID-19 and provide continuity of care for an established patient. Services were provided through a telephone discussion virtually to substitute for in-person clinic visit.

## 2021-06-30 ASSESSMENT — ENCOUNTER SYMPTOMS
COUGH: 0
SHORTNESS OF BREATH: 0

## 2021-07-22 NOTE — PLAN OF CARE
Problem: Falls - Risk of:  Goal: Will remain free from falls  Outcome: Ongoing  Note: Pt remains free from falls this shift. Bed in lowest position with wheels locked and 2/4 siderails up. Call light and bedside table within reach. Bed alarm activated. Hourly rounding to ensure pt safety. Will continue to monitor. Goal: Absence of physical injury  Outcome: Ongoing     Problem: Pain:  Goal: Pain level will decrease  Outcome: Ongoing  Goal: Control of acute pain  Outcome: Ongoing  Goal: Control of chronic pain  Outcome: Ongoing     Problem: Skin Integrity:  Goal: Will show no infection signs and symptoms  Outcome: Ongoing  Goal: Absence of new skin breakdown  Outcome: Ongoing  Note: Skin assessment as charted. No new breakdown noted this shift. Pt repositioned q2h. Waffle mattress in place. Wound care PRN. Will continue to monitor. Spoke to Monique Nguyen and instructed her to take Keven Jacques to [facility] . FYI.

## 2021-08-11 DIAGNOSIS — F32.A DEPRESSION, UNSPECIFIED DEPRESSION TYPE: ICD-10-CM

## 2021-08-11 RX ORDER — BUSPIRONE HYDROCHLORIDE 15 MG/1
TABLET ORAL
Qty: 90 TABLET | Refills: 1 | Status: SHIPPED | OUTPATIENT
Start: 2021-08-11 | End: 2022-02-10 | Stop reason: SDUPTHER

## 2021-08-11 NOTE — TELEPHONE ENCOUNTER
Last visit: 6/15/21  Last Med refill: 3/1/21  Does patient have enough medication for 72 hours: Yes    Next Visit Date:  Future Appointments   Date Time Provider Roberto Hoda   9/1/2021  9:45 AM Cecille Lord  [address] [facility]   Topic Date Due    COVID-19 Vaccine (1) Never done    Shingles Vaccine (1 of 2) Never done    DEXA (modify frequency per FRAX score)  Never done    Annual Wellness Visit (AWV)  Never done    Lipid screen  01/29/2021    DTaP/Tdap/Td vaccine (1 - Tdap) 11/18/2021 (Originally 7/3/1954)    Pneumococcal 65+ years Vaccine (1 of 1 - PPSV23) 11/11/2023 (Originally 7/3/2001)    Flu vaccine (1) 09/01/2021    TSH testing  03/10/2022    Potassium monitoring  03/13/2022    Creatinine monitoring  03/13/2022    Hepatitis A vaccine  Aged Out    Hepatitis B vaccine  Aged Out    Hib vaccine  Aged Out    Meningococcal (ACWY) vaccine  Aged Out       Hemoglobin A1C (%)   Date Value   03/06/2020 5.0             ( goal A1C is < 7)   No results found for: LABMICR  LDL Cholesterol (mg/dL)   Date Value   01/29/2020 65   07/02/2019 58       (goal LDL is <100)   AST (U/L)   Date Value   03/10/2021 10     ALT (U/L)   Date Value   03/10/2021 7     BUN (mg/dL)   Date Value   03/13/2021 12     BP Readings from Last 3 Encounters:   03/13/21 (!) 108/57   03/01/21 128/82   01/21/21 135/76          (goal 120/80)    All Future Testing planned in [facility]  Lab Frequency Next Occurrence               Patient Active Problem List:     Essential hypertension     Cancer (HCC)     Hypothyroidism     Depression     Hyperlipidemia     Basal cell carcinoma     Melanoma (HCC)     Anxiety     Hypokalemia     Acute idiopathic gout of right foot     CKD (chronic kidney disease) stage 3, GFR 30-59 ml/min (HCC)     Risk for falls     Pneumococcal vaccine refused     Influenza vaccination declined     Fatigue     Bilateral lower extremity pain     Chronic diastolic heart failure ([address] Acute cystitis without hematuria     Chronic systolic heart failure (HCC)     Decreased mobility     Localized edema     Xerosis cutis     Hallux valgus of left foot     Ulcer of right foot, limited to breakdown of skin (HCC)     Severe malnutrition (HCC)     Moderate episode of recurrent major depressive disorder (HCC)     Cellulitis of lower extremity     Decubitus ulcer of coccyx     Mild malnutrition ([address]

## 2021-08-18 ENCOUNTER — TELEPHONE (OUTPATIENT)
Dept: FAMILY MEDICINE CLINIC | Age: 86
End: 2021-08-18

## 2021-08-18 DIAGNOSIS — E03.9 HYPOTHYROIDISM, UNSPECIFIED TYPE: ICD-10-CM

## 2021-08-18 DIAGNOSIS — L03.119 CELLULITIS OF LOWER EXTREMITY, UNSPECIFIED LATERALITY: ICD-10-CM

## 2021-08-18 RX ORDER — HYDROCODONE BITARTRATE AND ACETAMINOPHEN 5; 325 MG/1; MG/1
1 TABLET ORAL EVERY 12 HOURS PRN
Qty: 30 TABLET | Refills: 0 | Status: SHIPPED | OUTPATIENT
Start: 2021-08-18 | End: 2021-11-11 | Stop reason: SDUPTHER

## 2021-08-18 RX ORDER — LEVOTHYROXINE SODIUM 0.1 MG/1
TABLET ORAL
Qty: 90 TABLET | Refills: 1 | Status: SHIPPED | OUTPATIENT
Start: 2021-08-18 | End: 2022-02-10 | Stop reason: SDUPTHER

## 2021-08-18 NOTE — TELEPHONE ENCOUNTER
Patient's daughter Hannah Stack ([phone]) called in regards to getting medication refilled and labs. States that she has about 40 days left of her Levothyroxine and knows that this medication requires follow up blood work.      She stated patient is very bed ridden and would like her homecare to draw her blood when they come out [facility])      Daughter also stated patient will need a refill on her Norco

## 2021-08-18 NOTE — TELEPHONE ENCOUNTER
Daughter contacted office, wanted to inform that patient does not need any scripts at this time, just thyroid med (see other request)    Due to patient being in Hospital and nursing home, patient has a stock of other meds.  She will contact our office when she comes due to scripts

## 2021-08-19 ENCOUNTER — HOSPITAL ENCOUNTER (OUTPATIENT)
Age: 86
Setting detail: SPECIMEN
Discharge: HOME | End: 2021-08-19

## 2021-08-19 LAB — TSH SERPL DL<=0.05 MIU/L-ACNC: 0.35 MIU/L (ref 0.3–5)

## 2021-08-20 ENCOUNTER — TELEPHONE (OUTPATIENT)
Dept: FAMILY MEDICINE CLINIC | Age: 86
End: 2021-08-20

## 2021-08-20 NOTE — TELEPHONE ENCOUNTER
LM for daughter to call the office for lab results.       8/19/2021  7:47 PM - Lucas, M[facility]pn Incoming Lab Results From [email or web address]    Component Value Ref Range & Units Status Collected Lab   TSH 0.35  0.30 - 5.00 mIU/L Final 08/19/2021  1:10 PM [address] Performed By    [address] Name Director Address Valid Date Range   [facility] [facility] [address] 08/30/17 0801-Present   Lab and Collection    TSH with Reflex - 8/19/2021  Result History    TSH with Reflex on 8/19/2021   Result Information    Status: Final result (Collected: 8/19/2021 13:10) Provider Status: Reviewed   All Reviewers List    Cecille Buck MD on 8/20/2021 14:43   TSH with Reflex: Result Notes   Felisa Martinez MD   8/20/2021  2:43 PM EDT       Normal TSH, continue current dose of synthroid - please notify

## 2021-08-28 DIAGNOSIS — I10 ESSENTIAL HYPERTENSION: ICD-10-CM

## 2021-08-28 DIAGNOSIS — F32.A DEPRESSION, UNSPECIFIED DEPRESSION TYPE: ICD-10-CM

## 2021-08-28 DIAGNOSIS — E78.5 HYPERLIPIDEMIA, UNSPECIFIED HYPERLIPIDEMIA TYPE: ICD-10-CM

## 2021-08-30 RX ORDER — ATORVASTATIN CALCIUM 10 MG/1
TABLET, FILM COATED ORAL
Qty: 90 TABLET | Refills: 3 | Status: SHIPPED | OUTPATIENT
Start: 2021-08-30 | End: 2022-09-30 | Stop reason: SDUPTHER

## 2021-08-30 RX ORDER — CARVEDILOL 12.5 MG/1
TABLET ORAL
Qty: 180 TABLET | Refills: 3 | Status: SHIPPED | OUTPATIENT
Start: 2021-08-30 | End: 2022-09-30 | Stop reason: SDUPTHER

## 2021-08-30 RX ORDER — LISINOPRIL 20 MG/1
TABLET ORAL
Qty: 90 TABLET | Refills: 1 | Status: SHIPPED | OUTPATIENT
Start: 2021-08-30 | End: 2022-02-10 | Stop reason: SDUPTHER

## 2021-08-30 RX ORDER — PAROXETINE HYDROCHLORIDE 40 MG/1
TABLET, FILM COATED ORAL
Qty: 90 TABLET | Refills: 3 | Status: SHIPPED | OUTPATIENT
Start: 2021-08-30 | End: 2022-11-03 | Stop reason: SDUPTHER

## 2021-08-30 NOTE — TELEPHONE ENCOUNTER
Last visit: 06/15/2021  emily/Marianne  Last Med refill: 06/01/2021  Does patient have enough medication for 72 hours: Yes    Next Visit Date:  No future appointments.     Health Maintenance   Topic Date Due    COVID-19 Vaccine (1) Never done    Shingles Vaccine (1 of 2) Never done    DEXA (modify frequency per FRAX score)  Never done    Lipid screen  01/29/2021    DTaP/Tdap/Td vaccine (1 - Tdap) 11/18/2021 (Originally 7/3/1954)    Pneumococcal 65+ years Vaccine (1 of 1 - PPSV23) 11/11/2023 (Originally 7/3/2001)    Flu vaccine (1) 09/01/2021    Potassium monitoring  03/13/2022    Creatinine monitoring  03/13/2022    TSH testing  08/19/2022    Hepatitis A vaccine  Aged Out    Hepatitis B vaccine  Aged Out    Hib vaccine  Aged Out    Meningococcal (ACWY) vaccine  Aged Out       Hemoglobin A1C (%)   Date Value   03/06/2020 5.0             ( goal A1C is < 7)   No results found for: LABMICR  LDL Cholesterol (mg/dL)   Date Value   01/29/2020 65   07/02/2019 58       (goal LDL is <100)   AST (U/L)   Date Value   03/10/2021 10     ALT (U/L)   Date Value   03/10/2021 7     BUN (mg/dL)   Date Value   03/13/2021 12     BP Readings from Last 3 Encounters:   03/13/21 (!) 108/57   03/01/21 128/82   01/21/21 135/76          (goal 120/80)    All Future Testing planned in [facility]  Lab Frequency Next Occurrence   TSH With Reflex Ft4 Once 09/26/2021               Patient Active Problem List:     Essential hypertension     Cancer (HCC)     Hypothyroidism     Depression     Hyperlipidemia     Basal cell carcinoma     Melanoma (HCC)     Anxiety     Hypokalemia     Acute idiopathic gout of right foot     CKD (chronic kidney disease) stage 3, GFR 30-59 ml/min (HCC)     Risk for falls     Pneumococcal vaccine refused     Influenza vaccination declined     Fatigue     Bilateral lower extremity pain     Chronic diastolic heart failure (HCC)     Acute cystitis without hematuria     Chronic systolic heart failure (HCC)     Decreased mobility     Localized edema     Xerosis cutis     Hallux valgus of left foot     Ulcer of right foot, limited to breakdown of skin (HCC)     Severe malnutrition (HCC)     Moderate episode of recurrent major depressive disorder (HCC)     Cellulitis of lower extremity     Decubitus ulcer of coccyx     Mild malnutrition ([address]

## 2021-11-11 ENCOUNTER — VIRTUAL VISIT (OUTPATIENT)
Dept: FAMILY MEDICINE CLINIC | Age: 86
End: 2021-11-11
Payer: MEDICARE

## 2021-11-11 DIAGNOSIS — L03.119 CELLULITIS OF LOWER EXTREMITY, UNSPECIFIED LATERALITY: ICD-10-CM

## 2021-11-11 PROCEDURE — G8420 CALC BMI NORM PARAMETERS: HCPCS | Performed by: NURSE PRACTITIONER

## 2021-11-11 PROCEDURE — 1036F TOBACCO NON-USER: CPT | Performed by: NURSE PRACTITIONER

## 2021-11-11 PROCEDURE — 99213 OFFICE O/P EST LOW 20 MIN: CPT | Performed by: NURSE PRACTITIONER

## 2021-11-11 PROCEDURE — 1090F PRES/ABSN URINE INCON ASSESS: CPT | Performed by: NURSE PRACTITIONER

## 2021-11-11 PROCEDURE — G8427 DOCREV CUR MEDS BY ELIG CLIN: HCPCS | Performed by: NURSE PRACTITIONER

## 2021-11-11 PROCEDURE — 1123F ACP DISCUSS/DSCN MKR DOCD: CPT | Performed by: NURSE PRACTITIONER

## 2021-11-11 PROCEDURE — 4040F PNEUMOC VAC/ADMIN/RCVD: CPT | Performed by: NURSE PRACTITIONER

## 2021-11-11 PROCEDURE — G8400 PT W/DXA NO RESULTS DOC: HCPCS | Performed by: NURSE PRACTITIONER

## 2021-11-11 PROCEDURE — G8484 FLU IMMUNIZE NO ADMIN: HCPCS | Performed by: NURSE PRACTITIONER

## 2021-11-11 RX ORDER — HYDROCODONE BITARTRATE AND ACETAMINOPHEN 5; 325 MG/1; MG/1
1 TABLET ORAL EVERY 12 HOURS PRN
Qty: 30 TABLET | Refills: 0 | Status: SHIPPED | OUTPATIENT
Start: 2021-11-11 | End: 2022-01-20 | Stop reason: SDUPTHER

## 2021-11-11 ASSESSMENT — ENCOUNTER SYMPTOMS
SHORTNESS OF BREATH: 0
COUGH: 0

## 2021-11-11 NOTE — PROGRESS NOTES
Patient is present with daughter for check up  States they would like to discuss lab results   Cecille Silva MD   8/20/2021  2:43 PM EDT         Normal TSH, continue current dose of synthroid - please notify

## 2021-11-11 NOTE — PROGRESS NOTES
VISIT LOCATION: Home    TELEHEALTH EVALUATION -- Audio/Visual (During AMD-89 public health emergency)    Due to COVID 23 outbreak, patient's office visit was converted to a virtual visit. Patient was contacted and agreed to proceed with a virtual visit via [email or web address]  The risks and benefits of converting to a virtual visit were discussed in light of the current infectious disease epidemic. Patient also understood that insurance coverage and co-pays are up to their individual insurance plans. Kristen  (:  1935) has requested an audio/video evaluation for the following concern(s):    Chief Complaint:   HPI:  Per last office note:  Pt is here for norco refill. Looks like long term use was discussed through phone call on may 17. She does have home care coming. Holly's daughters report she is too difficult to get out of bed and the house to come in for a visit. Isa Hamm does report that the norco helps her with her pain and increases her comfort      Review of Systems   Constitutional: Negative for chills and fever. Respiratory: Negative for cough and shortness of breath. Cardiovascular: Negative for chest pain, palpitations and leg swelling. Prior to Visit Medications    Medication Sig Taking? Authorizing Provider   HYDROcodone-acetaminophen (NORCO) 5-325 MG per tablet Take 1 tablet by mouth every 12 hours as needed for Pain for up to 15 days. Yes Marta Rodriguez, APRN - CNP   atorvastatin (LIPITOR) 10 MG tablet TAKE 1 TABLET DAILY Yes Cecille Griffin MD   carvedilol (COREG) 12.5 MG tablet TAKE 1 TABLET TWICE A DAY WITH MEALS Yes Cecille Griffin MD   lisinopril (PRINIVIL;ZESTRIL) 20 MG tablet TAKE 1 TABLET DAILY Yes Cecille Griffin MD   levothyroxine (SYNTHROID) 100 MCG tablet TAKE 1 TABLET DAILY Yes Cecille Griffin MD   busPIRone (BUSPAR) 15 MG tablet TAKE 1 TABLET DAILY Yes Cecille Griffin MD   ammonium lactate (LAC-HYDRIN) 12 % cream Apply topically to dry skin twice daily. Yes Margy Soulier, DPM   diclofenac sodium (VOLTAREN) 1 % GEL apply 4 grams to affected area twice a day AS NEEDED FOR PAIN Yes KAY Rich   furosemide (LASIX) 20 MG tablet Take 1 tablet by mouth 2 times daily Yes Willie Holder MD   aspirin 81 MG tablet Take 81 mg by mouth daily Yes Historical Provider, MD   PARoxetine (PAXIL) 40 MG tablet TAKE 1 TABLET EVERY MORNING  Patient not taking: Reported on 11/11/2021  Cecille Alexander MD   nystatin (MYCOSTATIN) 929114 UNIT/GM powder Apply 3 times daily. Patient not taking: Reported on 11/11/2021  Cecille Alexander MD     Social- none    Past Medical History:   Diagnosis Date    Anxiety     Arthritis     Basal cell carcinoma     Cancer [facility]     skin    CHF (congestive heart failure) (HCC)     Closed fracture of single pubic ramus of pelvis with routine healing, right 1/28/2020    Depression     Gout     H/O total hysterectomy 2/7/2018    Hyperlipidemia     Hypertension     Hypokalemia     Hypothyroidism     Kidney stone     Kidney stone     [facility]anoma ([facility]     NSTEMI (non-ST elevated myocardial infarction) ([facility] 1/29/2020    Pressure injury of coccygeal region, stage 2 ([facility] 3/10/2020   ,   Past Surgical History:   Procedure Laterality Date    HYSTERECTOMY      HYSTERECTOMY, VAGINAL      TUBAL LIGATION               [] OTHER:    Constitutional: [x] Appears well-developed and well-nourished [] No apparent distress                            [] Abnormal-   Mental status  [x] Alert and awake  [x] Oriented to person/place/time [x]Able to follow commands       Eyes:  EOM    [x]  Normal  [] Abnormal-  Sclera  [x]  Normal  [] Abnormal -         Discharge [x]  None visible  [] Abnormal -     HENT:   [x] Normocephalic, atraumatic.   [] Abnormal   [] Mouth/Throat: Mucous membranes are moist.      External Ears [x] Normal  [] Abnormal-      Neck: [x] No visualized mass      Pulmonary/Chest: [x] Respiratory effort normal.  [] No visualized signs of difficulty breathing or respiratory distress        [] Abnormal-      Musculoskeletal:   [] Normal gait with no signs of ataxia         [x] Normal range of motion of neck        [] Abnormal-   [] Motor grossly intact in visible upper extremities    [] Motor grossly intact in visible lower extremities        Neurological:        [x] No Facial Asymmetry (Cranial nerve 7 motor function) (limited exam to video visit)                       [x] No gaze palsy        [] Abnormal-         Skin:                     [x] No significant exanthematous lesions or discoloration noted on facial skin         [] Abnormal-                                  Psychiatric:           [x] Normal Affect [] No Hallucinations        [] Abnormal- [] Normal Mood  [] Anxious appearing    [] Depressed appearing  [] Confused appearing      Due to this being a TeleHealth encounter, evaluation of the following organ systems is limited: Vitals/Constitutional/EENT/Resp/CV/GI//MS/Neuro/Skin/Heme-Lymph-Imm. ASSESSMENT/PLAN:  Encounter Diagnosis   Name Primary?  Cellulitis of lower extremity, unspecified laterality        Orders Placed This Encounter   Medications    HYDROcodone-acetaminophen (NORCO) 5-325 MG per tablet     Sig: Take 1 tablet by mouth every 12 hours as needed for Pain for up to 15 days. Dispense:  30 tablet     Refill:  0     Reduce doses taken as pain becomes manageable         No follow-ups on file. An  electronic signature was used to authenticate this note. --NORMA Farrell - CNP on 11/24/2021 at 9:48 AM        Pursuant to the emergency declaration under the [facility], [facility] waiver authority and the [employer] Act, this Virtual  Visit was conducted, with patient's consent, to reduce the patient's risk of exposure to COVID-19 and provide continuity of care for an established patient.     Services were provided through a telephone discussion virtually to substitute for in-person clinic visit.

## 2022-01-20 DIAGNOSIS — L03.119 CELLULITIS OF LOWER EXTREMITY, UNSPECIFIED LATERALITY: ICD-10-CM

## 2022-01-20 RX ORDER — HYDROCODONE BITARTRATE AND ACETAMINOPHEN 5; 325 MG/1; MG/1
1 TABLET ORAL EVERY 12 HOURS PRN
Qty: 30 TABLET | Refills: 0 | Status: SHIPPED | OUTPATIENT
Start: 2022-01-20 | End: 2022-02-10 | Stop reason: SDUPTHER

## 2022-01-20 RX ORDER — LACTOSE-REDUCED FOOD
330 POWDER (GRAM) ORAL 2 TIMES DAILY
Qty: 60 BOX | Refills: 2 | Status: SHIPPED | OUTPATIENT
Start: 2022-01-20

## 2022-01-20 NOTE — TELEPHONE ENCOUNTER
Laurenmegan Yi stopped into the office to request a refill for Esther Barkley  She states Esther Barkley is not eating, talking and is now bed ridden. Lauren Yi said Esther Barkley stopped talking and really eating since her  passed 3 weeks ago.   The family would like an order for Boost (not chocolate flavor)

## 2022-01-21 ENCOUNTER — TELEPHONE (OUTPATIENT)
Dept: FAMILY MEDICINE CLINIC | Age: 87
End: 2022-01-21

## 2022-01-21 NOTE — TELEPHONE ENCOUNTER
[facility] called stating they received script for Ensure. States this is a retail product and they do not carry this.

## 2022-02-10 ENCOUNTER — VIRTUAL VISIT (OUTPATIENT)
Dept: FAMILY MEDICINE CLINIC | Age: 87
End: 2022-02-10
Payer: MEDICARE

## 2022-02-10 DIAGNOSIS — M17.0 PRIMARY OSTEOARTHRITIS OF BOTH KNEES: ICD-10-CM

## 2022-02-10 DIAGNOSIS — I50.22 CHRONIC SYSTOLIC HEART FAILURE (HCC): ICD-10-CM

## 2022-02-10 DIAGNOSIS — C43.9 MALIGNANT MELANOMA, UNSPECIFIED SITE (HCC): ICD-10-CM

## 2022-02-10 DIAGNOSIS — I10 ESSENTIAL HYPERTENSION: ICD-10-CM

## 2022-02-10 DIAGNOSIS — F32.A DEPRESSION, UNSPECIFIED DEPRESSION TYPE: ICD-10-CM

## 2022-02-10 DIAGNOSIS — N18.30 STAGE 3 CHRONIC KIDNEY DISEASE, UNSPECIFIED WHETHER STAGE 3A OR 3B CKD (HCC): ICD-10-CM

## 2022-02-10 DIAGNOSIS — L97.511 ULCER OF RIGHT FOOT, LIMITED TO BREAKDOWN OF SKIN (HCC): ICD-10-CM

## 2022-02-10 DIAGNOSIS — E03.9 HYPOTHYROIDISM, UNSPECIFIED TYPE: ICD-10-CM

## 2022-02-10 DIAGNOSIS — Z78.0 POST-MENOPAUSAL: ICD-10-CM

## 2022-02-10 DIAGNOSIS — L03.119 CELLULITIS OF LOWER EXTREMITY, UNSPECIFIED LATERALITY: ICD-10-CM

## 2022-02-10 DIAGNOSIS — Z74.09 MOBILITY POOR: ICD-10-CM

## 2022-02-10 DIAGNOSIS — F33.1 MODERATE EPISODE OF RECURRENT MAJOR DEPRESSIVE DISORDER (HCC): ICD-10-CM

## 2022-02-10 DIAGNOSIS — E43 SEVERE MALNUTRITION (HCC): Primary | ICD-10-CM

## 2022-02-10 DIAGNOSIS — M15.9 PRIMARY OSTEOARTHRITIS INVOLVING MULTIPLE JOINTS: ICD-10-CM

## 2022-02-10 PROCEDURE — 4040F PNEUMOC VAC/ADMIN/RCVD: CPT | Performed by: INTERNAL MEDICINE

## 2022-02-10 PROCEDURE — G8420 CALC BMI NORM PARAMETERS: HCPCS | Performed by: INTERNAL MEDICINE

## 2022-02-10 PROCEDURE — G8427 DOCREV CUR MEDS BY ELIG CLIN: HCPCS | Performed by: INTERNAL MEDICINE

## 2022-02-10 PROCEDURE — G8484 FLU IMMUNIZE NO ADMIN: HCPCS | Performed by: INTERNAL MEDICINE

## 2022-02-10 PROCEDURE — G8400 PT W/DXA NO RESULTS DOC: HCPCS | Performed by: INTERNAL MEDICINE

## 2022-02-10 PROCEDURE — 1090F PRES/ABSN URINE INCON ASSESS: CPT | Performed by: INTERNAL MEDICINE

## 2022-02-10 PROCEDURE — 1036F TOBACCO NON-USER: CPT | Performed by: INTERNAL MEDICINE

## 2022-02-10 PROCEDURE — 99214 OFFICE O/P EST MOD 30 MIN: CPT | Performed by: INTERNAL MEDICINE

## 2022-02-10 PROCEDURE — 1123F ACP DISCUSS/DSCN MKR DOCD: CPT | Performed by: INTERNAL MEDICINE

## 2022-02-10 RX ORDER — CEPHALEXIN 500 MG/1
500 CAPSULE ORAL 2 TIMES DAILY
Qty: 14 CAPSULE | Refills: 0 | Status: SHIPPED | OUTPATIENT
Start: 2022-02-10 | End: 2022-02-17

## 2022-02-10 RX ORDER — LEVOTHYROXINE SODIUM 0.1 MG/1
TABLET ORAL
Qty: 90 TABLET | Refills: 1 | Status: SHIPPED | OUTPATIENT
Start: 2022-02-10 | End: 2022-08-04

## 2022-02-10 RX ORDER — BUSPIRONE HYDROCHLORIDE 15 MG/1
TABLET ORAL
Qty: 90 TABLET | Refills: 1 | Status: SHIPPED | OUTPATIENT
Start: 2022-02-10 | End: 2022-09-06 | Stop reason: SDUPTHER

## 2022-02-10 RX ORDER — HYDROCODONE BITARTRATE AND ACETAMINOPHEN 5; 325 MG/1; MG/1
1 TABLET ORAL EVERY 12 HOURS PRN
Qty: 60 TABLET | Refills: 0 | Status: SHIPPED | OUTPATIENT
Start: 2022-02-10 | End: 2022-04-25

## 2022-02-10 RX ORDER — LEVOTHYROXINE SODIUM 0.1 MG/1
TABLET ORAL
Qty: 90 TABLET | Refills: 1 | Status: CANCELLED | OUTPATIENT
Start: 2022-02-10

## 2022-02-10 RX ORDER — LISINOPRIL 20 MG/1
TABLET ORAL
Qty: 90 TABLET | Refills: 1 | Status: SHIPPED | OUTPATIENT
Start: 2022-02-10 | End: 2022-09-30 | Stop reason: SDUPTHER

## 2022-02-10 RX ORDER — LISINOPRIL 20 MG/1
TABLET ORAL
Qty: 90 TABLET | Refills: 1 | Status: SHIPPED | OUTPATIENT
Start: 2022-02-10 | End: 2022-02-10 | Stop reason: SDUPTHER

## 2022-02-10 RX ORDER — AMMONIUM LACTATE 12 G/100G
CREAM TOPICAL
Qty: 385 G | Refills: 2 | Status: SHIPPED | OUTPATIENT
Start: 2022-02-10

## 2022-02-10 ASSESSMENT — PATIENT HEALTH QUESTIONNAIRE - PHQ9: DEPRESSION UNABLE TO ASSESS: FUNCTIONAL CAPACITY MOTIVATION LIMITS ACCURACY

## 2022-02-10 NOTE — PROGRESS NOTES
Shannen Hart (:  1935) is a Established patient, here for evaluation of the following:  Addendum 22 - patient now has two bedsores, pictures in chart for evaluation (media). She would benefit from getting hospital bed for pressure relief and repositioning to prevent progression and further wounds. Assessment & Plan   Below is the assessment and plan developed based on review of pertinent history, physical exam, labs, studies, and medications. 1. Severe malnutrition ([facility]  -     External Referral To Home Health  2. Post-menopausal  3. Hypothyroidism, unspecified type  -     levothyroxine (SYNTHROID) 100 MCG tablet; TAKE 1 TABLET DAILY, Disp-90 tablet, R-1Normal  4. Essential hypertension  5. Depression, unspecified depression type  6. Ulcer of right foot, limited to breakdown of skin ([facility]  7. Chronic systolic heart failure ([facility]  -     External Referral To Home Health  8. Moderate episode of recurrent major depressive disorder ([facility]  9. Malignant melanoma, unspecified site ([facility]  10. Stage 3 chronic kidney disease, unspecified whether stage 3a or 3b CKD ([facility]  11. Mobility poor  -     External Referral To Home Health  12. Primary osteoarthritis involving multiple joints  -     External Referral To Home Health  -     HYDROcodone-acetaminophen (NORCO) 5-325 MG per tablet; Take 1 tablet by mouth every 12 hours as needed for Pain for up to 30 days. , Disp-60 tablet, R-0Normal  13. Cellulitis of lower extremity, unspecified laterality  -     ammonium lactate (LAC-HYDRIN) 12 % cream; Apply topically to dry skin twice daily. , Disp-385 g, R-2, Normal  -     cephALEXin (KEFLEX) 500 MG capsule; Take 1 capsule by mouth 2 times daily for 7 days, Disp-14 capsule, R-0Normal  14. Primary osteoarthritis of both knees    No follow-ups on file. Subjective    passed away on 21 - she has been depressed more since then according to her daughters.  Emmanuel Loco states Dina Stoddard is not doing much, staying in bed all day. Does talk to visitors and family, eating okay, but complaining of pain and they think that if her pain were better controlled she would be able to get out of bed and do more things. She is currently taking vicodin once daily for pain. Review of Systems   Constitutional: Positive for fatigue. Negative for fever and unexpected weight change. Respiratory: Negative for cough, choking, chest tightness, shortness of breath and wheezing. Cardiovascular: Negative for chest pain, palpitations and leg swelling. Gastrointestinal: Negative for abdominal pain, anal bleeding, blood in stool, constipation, diarrhea, nausea and vomiting. Endocrine: Negative. Musculoskeletal: Positive for arthralgias and back pain. Negative for joint swelling and myalgias. Skin: Negative. Neurological: Negative for dizziness. Psychiatric/Behavioral: Negative for sleep disturbance. All other systems reviewed and are negative. Objective   Patient-Reported Vitals  No data recorded     Past Medical History:   Diagnosis Date    Anxiety     Arthritis     Basal cell carcinoma     Cancer ([facility]     skin    CHF (congestive heart failure) (HCC)     Closed fracture of single pubic ramus of pelvis with routine healing, right 1/28/2020    Depression     Gout     H/O total hysterectomy 2/7/2018    Hyperlipidemia     Hypertension     Hypokalemia     Hypothyroidism     Kidney stone     Kidney stone     Melanoma ([facility]     NSTEMI (non-ST elevated myocardial infarction) ([facility] 1/29/2020    Pressure injury of coccygeal region, stage 2 ([facility] 3/10/2020     Past Surgical History:   Procedure Laterality Date    HYSTERECTOMY      HYSTERECTOMY, VAGINAL      TUBAL LIGATION       Prior to Visit Medications    Medication Sig Taking?  Authorizing Provider   Nutritional Supplements (ENSURE MAX PROTEIN) LIQD Take 330 mLs by mouth 2 times daily Yes Cecille Doll MD   atorvastatin (LIPITOR) 10 MG tablet TAKE 1 TABLET DAILY Yes Cecille Perez MD   carvedilol (COREG) 12.5 MG tablet TAKE 1 TABLET TWICE A DAY WITH MEALS Yes Cecille Perez MD   PARoxetine (PAXIL) 40 MG tablet TAKE 1 TABLET EVERY MORNING Yes Cecille Preez MD   lisinopril (PRINIVIL;ZESTRIL) 20 MG tablet TAKE 1 TABLET DAILY Yes Cecille Perez MD   levothyroxine (SYNTHROID) 100 MCG tablet TAKE 1 TABLET DAILY Yes Cecille Perez MD   busPIRone (BUSPAR) 15 MG tablet TAKE 1 TABLET DAILY Yes Cecille Perez MD   nystatin (MYCOSTATIN) 913277 UNIT/GM powder Apply 3 times daily. Yes Constantin Barlow MD   ammonium lactate (LAC-HYDRIN) 12 % cream Apply topically to dry skin twice daily.  Yes Ivy Hood DPM   diclofenac sodium (VOLTAREN) 1 % GEL apply 4 grams to affected area twice a day AS NEEDED FOR PAIN Yes KAY Robins   furosemide (LASIX) 20 MG tablet Take 1 tablet by mouth 2 times daily Yes Constantin Barlow MD   aspirin 81 MG tablet Take 81 mg by mouth daily Yes Historical Provider, MD       Physical Exam  [INSTRUCTIONS:  \"[x]\" Indicates a positive item  \"[]\" Indicates a negative item  -- DELETE ALL ITEMS NOT EXAMINED]    Constitutional: [x] Appears well-developed and well-nourished [x] No apparent distress      [x] Abnormal -  Sores on face from picking at it, bedbound, sad when talking about spouse     Mental status: [x] Alert and awake  [x] Oriented to person/place/time [x] Able to follow commands    [] Abnormal -     Eyes:   EOM    [x]  Normal    [] Abnormal -   Sclera  [x]  Normal    [] Abnormal -          Discharge [x]  None visible   [] Abnormal -     HENT: [x] Normocephalic, atraumatic  [] Abnormal -   [x] Mouth/Throat: Mucous membranes are moist    External Ears [x] Normal  [] Abnormal -    Neck: [x] No visualized mass [] Abnormal -     Pulmonary/Chest: [x] Respiratory effort normal   [x] No visualized signs of difficulty breathing or respiratory distress        [] Abnormal -      Musculoskeletal:   [] Normal gait with no signs of ataxia [x] Normal range of motion of neck        [x] Abnormal - unable to leave bed without maximal assistance from daughters     Neurological:        [x] No Facial Asymmetry (Cranial nerve 7 motor function) (limited exam due to video visit)          [x] No gaze palsy        [] Abnormal -          Skin:        [x] No significant exanthematous lesions or discoloration noted on facial skin         [] Abnormal -            Psychiatric:       [x] Normal Affect [] Abnormal -        [x] No Hallucinations    Other pertinent observable physical exam findings:-             On this date 2/10/2022 I have spent 15 minutes reviewing previous notes, test results and face to face (virtual) with the patient discussing the diagnosis and importance of compliance with the treatment plan as well as documenting on the day of the visit. Gael Ag, was evaluated through a synchronous (real-time) audio-video encounter. The patient (or guardian if applicable) is aware that this is a billable service, which includes applicable co-pays. This Virtual Visit was conducted with patient's (and/or legal guardian's) consent. The visit was conducted pursuant to the emergency declaration under the [facility], [address] [place] [address] [facility] authority and the [employer] Act. Patient identification was verified, and a caregiver was present when appropriate. The patient was located at home in a state where the provider was licensed to provide care.        --Nicko Armijo MD

## 2022-02-10 NOTE — TELEPHONE ENCOUNTER
Last visit: 11/11/21   Last Med refill: 11/12/21  Does patient have enough medication for 72 hours: Yes    Next Visit Date:  Future Appointments   Date Time Provider Roberto Velasquezi   2/10/2022  1:00 PM Cecille Mckenzie  [address] Maintenance   Topic Date Due    Depression Monitoring  Never done    DTaP/Tdap/Td vaccine (1 - Tdap) Never done    Shingles Vaccine (1 of 2) Never done    DEXA (modify frequency per FRAX score)  Never done    Lipid screen  01/29/2021    Flu vaccine (1) Never done    COVID-19 Vaccine (3 - Booster) 09/19/2021    Pneumococcal 65+ years Vaccine (1 of 1 - PPSV23) 11/11/2023 (Originally 7/3/2001)    Potassium monitoring  03/13/2022    Creatinine monitoring  03/13/2022    TSH testing  08/19/2022    Hepatitis A vaccine  Aged Out    Hepatitis B vaccine  Aged Out    Hib vaccine  Aged Out    Meningococcal (ACWY) vaccine  Aged Out       Hemoglobin A1C (%)   Date Value   03/06/2020 5.0             ( goal A1C is < 7)   No results found for: LABMICR  LDL Cholesterol (mg/dL)   Date Value   01/29/2020 65   07/02/2019 58       (goal LDL is <100)   AST (U/L)   Date Value   03/10/2021 10     ALT (U/L)   Date Value   03/10/2021 7     BUN (mg/dL)   Date Value   03/13/2021 12     BP Readings from Last 3 Encounters:   03/13/21 (!) 108/57   03/01/21 128/82   01/21/21 135/76          (goal 120/80)    All Future Testing planned in [facility]  Lab Frequency Next Occurrence               Patient Active Problem List:     Essential hypertension     Cancer (HCC)     Hypothyroidism     Depression     Hyperlipidemia     Basal cell carcinoma     Melanoma (HCC)     Anxiety     Hypokalemia     Acute idiopathic gout of right foot     CKD (chronic kidney disease) stage 3, GFR 30-59 ml/min (HCC)     Risk for falls     Pneumococcal vaccine refused     Influenza vaccination declined     Fatigue     Bilateral lower extremity pain     Chronic diastolic heart failure (HCC)     Acute cystitis without hematuria     Chronic systolic heart failure (HCC)     Decreased mobility     Localized edema     Xerosis cutis     Hallux valgus of left foot     Ulcer of right foot, limited to breakdown of skin (HCC)     Severe malnutrition (HCC)     Moderate episode of recurrent major depressive disorder (HCC)     Cellulitis of lower extremity     Decubitus ulcer of coccyx     Mild malnutrition ([address]

## 2022-02-10 NOTE — PROGRESS NOTES
Daniela Wick, daughter will be on with the phone visit. Compression pad due to being bed ridden  Daniela Wick is also asking about home health a few days a week. Pt is having some depression per daughters.

## 2022-02-13 ASSESSMENT — ENCOUNTER SYMPTOMS
WHEEZING: 0
DIARRHEA: 0
VOMITING: 0
BACK PAIN: 1
CHEST TIGHTNESS: 0
CONSTIPATION: 0
ABDOMINAL PAIN: 0
CHOKING: 0
NAUSEA: 0
BLOOD IN STOOL: 0
SHORTNESS OF BREATH: 0
COUGH: 0
ANAL BLEEDING: 0

## 2022-02-14 ENCOUNTER — TELEPHONE (OUTPATIENT)
Dept: FAMILY MEDICINE CLINIC | Age: 87
End: 2022-02-14

## 2022-02-14 DIAGNOSIS — R64 EMACIATED (HCC): Primary | ICD-10-CM

## 2022-02-14 DIAGNOSIS — Z74.01 BEDBOUND: ICD-10-CM

## 2022-02-14 NOTE — TELEPHONE ENCOUNTER
Georgina from [facility] called with some concerns as followed:  BP is 94/48  Legs contracted to her abdomen, emaciated, pink, potential to become wounds. Pt is confined to hospital bed and may benefit with a gel overlay. Pt is skin and bones and may be a candidate for palliative.

## 2022-02-14 NOTE — TELEPHONE ENCOUNTER
Gel overlay ordered, please fax   At visit last week, pt and family were absolutely not interested in palliative care referral. Zaira Lemons was not interested even in changing frequency of pain meds. Home health staff can broach subject if feels appropriate since I only was able to do a VV with her.

## 2022-02-28 ENCOUNTER — TELEPHONE (OUTPATIENT)
Dept: FAMILY MEDICINE CLINIC | Age: 87
End: 2022-02-28

## 2022-02-28 DIAGNOSIS — L89.329 PRESSURE INJURY OF SKIN OF LEFT BUTTOCK, UNSPECIFIED INJURY STAGE: Primary | ICD-10-CM

## 2022-02-28 DIAGNOSIS — E43 SEVERE MALNUTRITION (HCC): ICD-10-CM

## 2022-02-28 NOTE — TELEPHONE ENCOUNTER
Received call from Georgina with [address]. States patient has a pressure wound on her Left [place]. Asking if it is ok to:  1. Wash Daily  2. Apply medihoney  3. Boarder dressing twice weekly. Also stated patient has an older style hospital bed. Patient's legs are bunched up and its hard for patient to stretch out. Asking for order for Air Mattress for her bed.      Please fax wound care order/approval to [address]

## 2022-03-01 NOTE — TELEPHONE ENCOUNTER
Agree with wound care recs. Air mattress order written since I cannot find an order in the system, please fax as indicated.

## 2022-03-01 NOTE — TELEPHONE ENCOUNTER
Faxed to [facility] and order for air mattress to [facility].

## 2022-03-04 NOTE — TELEPHONE ENCOUNTER
Spoke with Georgina in regards to message. Georgina states she is not with patient again until Thursday at 2pm and will see if they can get pictures uploaded then. I did reach out to the daughter and she was going to log on today if she can to upload pictures for Dr. Fabi Ayala to review.

## 2022-03-04 NOTE — TELEPHONE ENCOUNTER
DARRYL Erickson at [address], also need to know if home health or pt daughter can upload pictures through [employer] so Dr. Sea Grey can address.

## 2022-03-07 ENCOUNTER — TELEPHONE (OUTPATIENT)
Dept: FAMILY MEDICINE CLINIC | Age: 87
End: 2022-03-07

## 2022-03-07 NOTE — TELEPHONE ENCOUNTER
----- Message from Lula Soto sent at 3/7/2022  2:19 PM EST -----  Subject: Message to Provider    QUESTIONS  Information for Provider? [facility] if pt   documented chart notes can be faxed over to her? Fax# [phone] Please   call back Constanza Moore with any questions at [phone]  ---------------------------------------------------------------------------  --------------  CALL BACK INFO  What is the best way for the office to contact you? OK to leave message on   voicemail  Preferred Call Back Phone Number? [phone]  ---------------------------------------------------------------------------  --------------  SCRIPT ANSWERS  Relationship to Patient? Third Party  Representative Name?  Mal Saint

## 2022-03-08 NOTE — TELEPHONE ENCOUNTER
Spoke with daughter and she stated she has not been able to upload pictures yet. Explained to daughter how to go about sending the pictures. States she will try to do it today.

## 2022-03-21 ENCOUNTER — TELEPHONE (OUTPATIENT)
Dept: FAMILY MEDICINE CLINIC | Age: 87
End: 2022-03-21

## 2022-03-21 NOTE — TELEPHONE ENCOUNTER
Spoke with Georgina, nurse with [facility]. Insurance denied order for mattress. Stated patient needed two wounds for them to cover. She stated well now that patient has now received that mattress, patient does have another wound appearing close to the current one.  Daughter is supposed to work on sending a new picture

## 2022-03-23 ENCOUNTER — TELEPHONE (OUTPATIENT)
Dept: FAMILY MEDICINE CLINIC | Age: 87
End: 2022-03-23

## 2022-03-23 NOTE — TELEPHONE ENCOUNTER
Marcos Luis with PT states she ordered knee braces for patient but is having trouble getting them covered. She is asking if you can document on why she needs this. States she has arthritis but does not states arthritis of knees.

## 2022-03-28 NOTE — TELEPHONE ENCOUNTER
Called daughter to follow and see if she was able to send the picture. Daughter states she has not yet but she will work on this. States she has two more new wounds also. Informed her to take a picture of these as well.

## 2022-04-07 ENCOUNTER — TELEPHONE (OUTPATIENT)
Dept: FAMILY MEDICINE CLINIC | Age: 87
End: 2022-04-07

## 2022-04-07 NOTE — TELEPHONE ENCOUNTER
Georgina from [address] [place] [address] called and states the family would like to start weaning pt off of pain medication and would like something that is not an opioid called in, maybe a muscle relaxer? She also wanted to know about the air mattress progress.

## 2022-04-08 NOTE — TELEPHONE ENCOUNTER
What concerns do they have with the pain medication? She has been on and off norco for years. We can try adding muscle relaxers, what are we trying to treat?

## 2022-04-11 RX ORDER — TIZANIDINE 2 MG/1
2 TABLET ORAL 3 TIMES DAILY PRN
Qty: 30 TABLET | Refills: 0 | Status: SHIPPED | OUTPATIENT
Start: 2022-04-11 | End: 2022-04-26

## 2022-04-11 NOTE — TELEPHONE ENCOUNTER
Tizanidine called in as short supply - if works then will call in monthly prescription.  Please notify

## 2022-04-22 DIAGNOSIS — M15.9 PRIMARY OSTEOARTHRITIS INVOLVING MULTIPLE JOINTS: ICD-10-CM

## 2022-04-25 NOTE — TELEPHONE ENCOUNTER
Last visit: 2/10/22  Last Med refill: 2/10/22,4/11/22  Does patient have enough medication for 72 hours: No:     Next Visit Date:  Future Appointments   Date Time Provider Roberto Hoda   5/12/2022  1:00 PM Cecille Gooden  [address] [facility]   Topic Date Due    DTaP/Tdap/Td vaccine (1 - Tdap) Never done    Shingles Vaccine (1 of 2) Never done    DEXA (modify frequency per FRAX score)  Never done    Lipids  01/29/2021    Potassium  03/13/2022    Creatinine  03/13/2022    COVID-19 Vaccine (3 - Booster) 05/10/2022 (Originally 9/19/2021)    Flu vaccine (Season Ended) 02/10/2023 (Originally 9/1/2022)    Pneumococcal 65+ years Vaccine (1 - PCV) 11/11/2023 (Originally 7/3/1941)    Depression Monitoring  08/10/2022    TSH  08/19/2022    Hepatitis A vaccine  Aged Out    Hepatitis B vaccine  Aged Out    Hib vaccine  Aged Out    Meningococcal (ACWY) vaccine  Aged Out       Hemoglobin A1C (%)   Date Value   03/06/2020 5.0             ( goal A1C is < 7)   No results found for: LABMICR  LDL Cholesterol (mg/dL)   Date Value   01/29/2020 65   07/02/2019 58       (goal LDL is <100)   AST (U/L)   Date Value   03/10/2021 10     ALT (U/L)   Date Value   03/10/2021 7     BUN (mg/dL)   Date Value   03/13/2021 12     BP Readings from Last 3 Encounters:   03/13/21 (!) 108/57   03/01/21 128/82   01/21/21 135/76          (goal 120/80)    All Future Testing planned in [facility]  Lab Frequency Next Occurrence               Patient Active Problem List:     Essential hypertension     Cancer (HCC)     Hypothyroidism     Depression     Hyperlipidemia     Basal cell carcinoma     Melanoma (HCC)     Anxiety     Hypokalemia     Acute idiopathic gout of right foot     CKD (chronic kidney disease) stage 3, GFR 30-59 ml/min (HCC)     Risk for falls     Pneumococcal vaccine refused     Influenza vaccination declined     Fatigue     Bilateral lower extremity pain     Chronic diastolic heart failure ([address] Acute cystitis without hematuria     Chronic systolic heart failure (HCC)     Decreased mobility     Localized edema     Xerosis cutis     Hallux valgus of left foot     Ulcer of right foot, limited to breakdown of skin (HCC)     Severe malnutrition (HCC)     Moderate episode of recurrent major depressive disorder (HCC)     Cellulitis of lower extremity     Decubitus ulcer of coccyx     Mild malnutrition ([address]

## 2022-04-26 RX ORDER — TIZANIDINE 2 MG/1
TABLET ORAL
Qty: 30 TABLET | Refills: 0 | Status: SHIPPED | OUTPATIENT
Start: 2022-04-26

## 2022-04-26 RX ORDER — HYDROCODONE BITARTRATE AND ACETAMINOPHEN 5; 325 MG/1; MG/1
TABLET ORAL
Qty: 60 TABLET | Refills: 0 | Status: SHIPPED | OUTPATIENT
Start: 2022-04-26 | End: 2022-05-26

## 2022-05-10 ENCOUNTER — TELEPHONE (OUTPATIENT)
Dept: FAMILY MEDICINE CLINIC | Age: 87
End: 2022-05-10

## 2022-05-10 NOTE — TELEPHONE ENCOUNTER
ABDIRAHMAN Erickson-nurse with [facility] reached out to office to inform that patient is going to [facility]. She will see them on 5/16/22.

## 2022-05-12 ENCOUNTER — TELEMEDICINE (OUTPATIENT)
Dept: FAMILY MEDICINE CLINIC | Age: 87
End: 2022-05-12
Payer: MEDICARE

## 2022-05-12 DIAGNOSIS — L89.153 PRESSURE INJURY OF COCCYGEAL REGION, STAGE 3 (HCC): Primary | ICD-10-CM

## 2022-05-12 PROCEDURE — G8427 DOCREV CUR MEDS BY ELIG CLIN: HCPCS | Performed by: INTERNAL MEDICINE

## 2022-05-12 PROCEDURE — 1090F PRES/ABSN URINE INCON ASSESS: CPT | Performed by: INTERNAL MEDICINE

## 2022-05-12 PROCEDURE — 4040F PNEUMOC VAC/ADMIN/RCVD: CPT | Performed by: INTERNAL MEDICINE

## 2022-05-12 PROCEDURE — 1123F ACP DISCUSS/DSCN MKR DOCD: CPT | Performed by: INTERNAL MEDICINE

## 2022-05-12 PROCEDURE — 1036F TOBACCO NON-USER: CPT | Performed by: INTERNAL MEDICINE

## 2022-05-12 PROCEDURE — G8421 BMI NOT CALCULATED: HCPCS | Performed by: INTERNAL MEDICINE

## 2022-05-12 PROCEDURE — 99214 OFFICE O/P EST MOD 30 MIN: CPT | Performed by: INTERNAL MEDICINE

## 2022-05-12 PROCEDURE — G8400 PT W/DXA NO RESULTS DOC: HCPCS | Performed by: INTERNAL MEDICINE

## 2022-05-12 ASSESSMENT — PATIENT HEALTH QUESTIONNAIRE - PHQ9
7. TROUBLE CONCENTRATING ON THINGS, SUCH AS READING THE NEWSPAPER OR WATCHING TELEVISION: 0
4. FEELING TIRED OR HAVING LITTLE ENERGY: 0
SUM OF ALL RESPONSES TO PHQ QUESTIONS 1-9: 0
SUM OF ALL RESPONSES TO PHQ QUESTIONS 1-9: 0
6. FEELING BAD ABOUT YOURSELF - OR THAT YOU ARE A FAILURE OR HAVE LET YOURSELF OR YOUR FAMILY DOWN: 0
10. IF YOU CHECKED OFF ANY PROBLEMS, HOW DIFFICULT HAVE THESE PROBLEMS MADE IT FOR YOU TO DO YOUR WORK, TAKE CARE OF THINGS AT HOME, OR GET ALONG WITH OTHER PEOPLE: 0
2. FEELING DOWN, DEPRESSED OR HOPELESS: 0
8. MOVING OR SPEAKING SO SLOWLY THAT OTHER PEOPLE COULD HAVE NOTICED. OR THE OPPOSITE, BEING SO FIGETY OR RESTLESS THAT YOU HAVE BEEN MOVING AROUND A LOT MORE THAN USUAL: 0
1. LITTLE INTEREST OR PLEASURE IN DOING THINGS: 0
5. POOR APPETITE OR OVEREATING: 0
SUM OF ALL RESPONSES TO PHQ QUESTIONS 1-9: 0
SUM OF ALL RESPONSES TO PHQ9 QUESTIONS 1 & 2: 0
3. TROUBLE FALLING OR STAYING ASLEEP: 0
9. THOUGHTS THAT YOU WOULD BE BETTER OFF DEAD, OR OF HURTING YOURSELF: 0
SUM OF ALL RESPONSES TO PHQ QUESTIONS 1-9: 0

## 2022-05-12 NOTE — PROGRESS NOTES
pts daughter Mildred Chao is with pt today to her visit via [employer]     pts daughter is concerned about the sore on pts LT hip, it is red and \"nasty\" looking   Draining puss, and is painful,    pt does have a wound care appt on 05/16/2022     No vitals were taken at home

## 2022-05-12 NOTE — PROGRESS NOTES
Smitha Abdi (:  1935) is a Established patient, here for evaluation of the following:    Assessment & Plan   Below is the assessment and plan developed based on review of pertinent history, physical exam, labs, studies, and medications. 1. Pressure injury of coccygeal region, stage 3 (HCC)  mattress overlay order redone   No follow-ups on file. Subjective   Does have an appointment on Monday at [facility] wound care   Has not been able to get mattress overlay  Pt states she is in pain all the time, taking Norco, tizanidine, Advil. She is getting the Norco once a day, mostly in the mornings. States her pills helps sometimes but not consistently  She was not able to get the mattress overlay so her wound has progressed to a much deeper pressure sore and she is starting to get a new one close to the original sore. Due to her flexion contractures, Constantino Vasquez is unable to reposition herself or turn over to relieve pressure    Review of Systems   All other systems reviewed and are negative.          Objective   Patient-Reported Vitals  No data recorded     Physical Exam  [INSTRUCTIONS:  \"[x]\" Indicates a positive item  \"[]\" Indicates a negative item  -- DELETE ALL ITEMS NOT EXAMINED]    Constitutional: [] Appears well-developed and well-nourished [x] No apparent distress      [x] Abnormal -cachectic, chronically ill-appearing, flexion contractures noted, patient unable to extend her legs at hips and knees    Mental status: [x] Alert and awake  [x] Oriented to person/place/time [x] Able to follow commands    [] Abnormal -     Eyes:   EOM    [x]  Normal    [] Abnormal -   Sclera  [x]  Normal    [] Abnormal -          Discharge [x]  None visible   [] Abnormal -     HENT: [x] Normocephalic, atraumatic  [] Abnormal -   [x] Mouth/Throat: Mucous membranes are moist    External Ears [x] Normal  [] Abnormal -    Neck: [x] No visualized mass [] Abnormal -     Pulmonary/Chest: [x] Respiratory effort normal   [x] No visualized signs of difficulty breathing or respiratory distress        [] Abnormal -      Musculoskeletal:   [] Normal gait with no signs of ataxia         [] Normal range of motion of neck        [] Abnormal -     Neurological:        [x] No Facial Asymmetry (Cranial nerve 7 motor function) (limited exam due to video visit)          [x] No gaze palsy        [] Abnormal -          Skin:        [] No significant exanthematous lesions or discoloration noted on facial skin         [x] Abnormal -stage III-IV pressure ulcer noted in coccygeal area with new area of nonblanching redness just lateral to it           Psychiatric:       [x] Normal Affect [] Abnormal -        [x] No Hallucinations    Other pertinent observable physical exam findings:-             On this date 5/12/2022 I have spent 15 minutes reviewing previous notes, test results and face to face (virtual) with the patient discussing the diagnosis and importance of compliance with the treatment plan as well as documenting on the day of the visit. Yaritza Altamirano, was evaluated through a synchronous (real-time) audio-video encounter. The patient (or guardian if applicable) is aware that this is a billable service, which includes applicable co-pays. This Virtual Visit was conducted with patient's (and/or legal guardian's) consent. The visit was conducted pursuant to the emergency declaration under the [address] [place] [address] authority and the "[employer] and [employer] General Act. Patient identification was verified, and a caregiver was present when appropriate. The patient was located at home in a state where the provider was licensed to provide care.        --Junaid Leon MD

## 2022-05-16 ENCOUNTER — HOSPITAL ENCOUNTER (OUTPATIENT)
Dept: WOUND CARE | Age: 87
Discharge: HOME | End: 2022-05-16
Payer: MEDICARE

## 2022-05-16 VITALS
TEMPERATURE: 98.5 F | HEIGHT: 67 IN | RESPIRATION RATE: 18 BRPM | DIASTOLIC BLOOD PRESSURE: 105 MMHG | BODY MASS INDEX: 15.61 KG/M2 | SYSTOLIC BLOOD PRESSURE: 179 MMHG | WEIGHT: 99.44 LBS | HEART RATE: 75 BPM

## 2022-05-16 DIAGNOSIS — L89.223 DECUBITUS ULCER OF LEFT HIP, STAGE 3 (HCC): Primary | ICD-10-CM

## 2022-05-16 PROCEDURE — 11043 DBRDMT MUSC&/FSCA 1ST 20/<: CPT | Performed by: INTERNAL MEDICINE

## 2022-05-16 PROCEDURE — 87185 SC STD ENZYME DETCJ PER NZM: CPT

## 2022-05-16 PROCEDURE — 87205 SMEAR GRAM STAIN: CPT

## 2022-05-16 PROCEDURE — 99203 OFFICE O/P NEW LOW 30 MIN: CPT | Performed by: INTERNAL MEDICINE

## 2022-05-16 PROCEDURE — 86403 PARTICLE AGGLUT ANTBDY SCRN: CPT

## 2022-05-16 PROCEDURE — 99214 OFFICE O/P EST MOD 30 MIN: CPT

## 2022-05-16 PROCEDURE — 87076 CULTURE ANAEROBE IDENT EACH: CPT

## 2022-05-16 PROCEDURE — 87075 CULTR BACTERIA EXCEPT BLOOD: CPT

## 2022-05-16 PROCEDURE — 87070 CULTURE OTHR SPECIMN AEROBIC: CPT

## 2022-05-16 PROCEDURE — 11043 DBRDMT MUSC&/FSCA 1ST 20/<: CPT

## 2022-05-16 PROCEDURE — 87186 SC STD MICRODIL/AGAR DIL: CPT

## 2022-05-16 RX ORDER — LIDOCAINE HYDROCHLORIDE 40 MG/ML
SOLUTION TOPICAL ONCE
Status: CANCELLED | OUTPATIENT
Start: 2022-05-16 | End: 2022-05-16

## 2022-05-16 RX ORDER — BETAMETHASONE DIPROPIONATE 0.05 %
OINTMENT (GRAM) TOPICAL ONCE
Status: CANCELLED | OUTPATIENT
Start: 2022-05-16 | End: 2022-05-16

## 2022-05-16 RX ORDER — BACITRACIN ZINC AND POLYMYXIN B SULFATE 500; 1000 [USP'U]/G; [USP'U]/G
OINTMENT TOPICAL ONCE
Status: CANCELLED | OUTPATIENT
Start: 2022-05-16 | End: 2022-05-16

## 2022-05-16 RX ORDER — GINSENG 100 MG
CAPSULE ORAL ONCE
Status: CANCELLED | OUTPATIENT
Start: 2022-05-16 | End: 2022-05-16

## 2022-05-16 RX ORDER — LIDOCAINE HYDROCHLORIDE 20 MG/ML
JELLY TOPICAL ONCE
Status: CANCELLED | OUTPATIENT
Start: 2022-05-16 | End: 2022-05-16

## 2022-05-16 RX ORDER — CLOBETASOL PROPIONATE 0.5 MG/G
OINTMENT TOPICAL ONCE
Status: CANCELLED | OUTPATIENT
Start: 2022-05-16 | End: 2022-05-16

## 2022-05-16 RX ORDER — LIDOCAINE HYDROCHLORIDE 40 MG/ML
SOLUTION TOPICAL ONCE
Status: DISCONTINUED | OUTPATIENT
Start: 2022-05-16 | End: 2022-05-17 | Stop reason: HOSPADM

## 2022-05-16 RX ORDER — BACITRACIN, NEOMYCIN, POLYMYXIN B 400; 3.5; 5 [USP'U]/G; MG/G; [USP'U]/G
OINTMENT TOPICAL ONCE
Status: CANCELLED | OUTPATIENT
Start: 2022-05-16 | End: 2022-05-16

## 2022-05-16 RX ORDER — LIDOCAINE 50 MG/G
OINTMENT TOPICAL ONCE
Status: CANCELLED | OUTPATIENT
Start: 2022-05-16 | End: 2022-05-16

## 2022-05-16 RX ORDER — GENTAMICIN SULFATE 1 MG/G
OINTMENT TOPICAL ONCE
Status: CANCELLED | OUTPATIENT
Start: 2022-05-16 | End: 2022-05-16

## 2022-05-16 RX ORDER — LIDOCAINE 40 MG/G
CREAM TOPICAL ONCE
Status: CANCELLED | OUTPATIENT
Start: 2022-05-16 | End: 2022-05-16

## 2022-05-16 RX ORDER — AMOXICILLIN AND CLAVULANATE POTASSIUM 875; 125 MG/1; MG/1
1 TABLET, FILM COATED ORAL 2 TIMES DAILY
Qty: 14 TABLET | Refills: 0 | Status: SHIPPED | OUTPATIENT
Start: 2022-05-16 | End: 2022-05-23

## 2022-05-16 ASSESSMENT — PAIN DESCRIPTION - ONSET: ONSET: ON-GOING

## 2022-05-16 ASSESSMENT — PAIN SCALES - GENERAL: PAINLEVEL_OUTOF10: 1

## 2022-05-16 ASSESSMENT — PAIN DESCRIPTION - FREQUENCY: FREQUENCY: INTERMITTENT

## 2022-05-16 ASSESSMENT — PAIN DESCRIPTION - LOCATION: LOCATION: BUTTOCKS

## 2022-05-16 ASSESSMENT — PAIN DESCRIPTION - DESCRIPTORS: DESCRIPTORS: ACHING;SHARP

## 2022-05-16 NOTE — PROGRESS NOTES
[address]   Progress Note and Procedure Note      [facility] RECORD NUMBER:  [id number]  AGE: 81 y.o. GENDER: female  : 1935  EPISODE DATE:  2022    Subjective:     Chief Complaint   Patient presents with    Wound Check     left ishium         HISTORY of PRESENT ILLNESS HPI     Alex Giles is a 81 y.o. female who presents today for wound/ulcer evaluation. History of Wound Context: The patient is here for evaluation of the left ischium wound that has been open for around 2 months with mild amount of foul-smelling drainage and dark-colored tissue. She also had multiple wounds to the face with scabs due to picking.   She denied significant pain, denied fever or chills  Wound/Ulcer Pain Timing/Severity: none    Ulcer Identification:  Ulcer Type: pressure    Contributing Factors: chronic pressure, decreased mobility and decreased tissue oxygenation        PAST MEDICAL HISTORY        Diagnosis Date    Anxiety     Arthritis     Basal cell carcinoma     Cancer (HCC)     skin    CHF (congestive heart failure) (HCC)     Closed fracture of single pubic ramus of pelvis with routine healing, right 2020    Depression     Gout     H/O total hysterectomy 2018    Hyperlipidemia     Hypertension     Hypokalemia     Hypothyroidism     Kidney stone     Kidney stone     Melanoma ([address]     NSTEMI (non-ST elevated myocardial infarction) ([address] 2020    Pressure injury of coccygeal region, stage 2 ([address] 3/10/2020       PAST SURGICAL HISTORY    Past Surgical History:   Procedure Laterality Date    HYSTERECTOMY      HYSTERECTOMY, VAGINAL      TUBAL LIGATION         FAMILY HISTORY    Family History   Problem Relation Age of Onset    Heart Disease Father     Other Mother          in her sleep unknown cause       SOCIAL HISTORY    Social History     Tobacco Use    Smoking status: Former Smoker     Quit date: 1960     Years since quitting: 62.0    Smokeless tobacco: Never Used   Vaping Use    Vaping Use: Former   Substance Use Topics    Alcohol use: No     Alcohol/week: 0.0 standard drinks    Drug use: No       ALLERGIES    Allergies   Allergen Reactions    Bactrim [Sulfamethoxazole-Trimethoprim] Other (See Comments)     DIZZINESS       MEDICATIONS    Current Outpatient Medications on File Prior to Encounter   Medication Sig Dispense Refill    tiZANidine (ZANAFLEX) 2 MG tablet take 1 tablet by mouth three times a day if needed 30 tablet 0    HYDROcodone-acetaminophen (NORCO) 5-325 MG per tablet take 1 tablet by mouth every 12 hours if needed 60 tablet 0    Misc. Devices (MATTRESS PAD) MISC Gel overlay to be used on bed at all times 1 each 0    lisinopril (PRINIVIL;ZESTRIL) 20 MG tablet TAKE 1 TABLET DAILY 90 tablet 1    busPIRone (BUSPAR) 15 MG tablet Take 12 tablet daily 90 tablet 1    ammonium lactate (LAC-HYDRIN) 12 % cream Apply topically to dry skin twice daily. 385 g 2    levothyroxine (SYNTHROID) 100 MCG tablet TAKE 1 TABLET DAILY 90 tablet 1    Nutritional Supplements (ENSURE MAX PROTEIN) LIQD Take 330 mLs by mouth 2 times daily 60 box 2    atorvastatin (LIPITOR) 10 MG tablet TAKE 1 TABLET DAILY 90 tablet 3    carvedilol (COREG) 12.5 MG tablet TAKE 1 TABLET TWICE A DAY WITH MEALS 180 tablet 3    PARoxetine (PAXIL) 40 MG tablet TAKE 1 TABLET EVERY MORNING 90 tablet 3    nystatin (MYCOSTATIN) 660302 UNIT/GM powder Apply 3 times daily. 120 g 1    diclofenac sodium (VOLTAREN) 1 % GEL apply 4 grams to affected area twice a day AS NEEDED FOR PAIN      furosemide (LASIX) 20 MG tablet Take 1 tablet by mouth 2 times daily 180 tablet 1    aspirin 81 MG tablet Take 81 mg by mouth daily       No current facility-administered medications on file prior to encounter. REVIEW OF SYSTEMS  Review of Systems    Pertinent items are noted in HPI.     Objective:      BP (!) 179/105   Pulse 75   Temp 98.5 °F (36.9 °C) (Tympanic)   Resp 18 Ht 5' 7\" (1.702 m)   Wt 99 lb 7 oz (45.1 kg)   BMI 15.57 kg/m²     Wt Readings from Last 3 Encounters:   05/16/22 99 lb 7 oz (45.1 kg)   03/11/21 115 lb 15.4 oz (52.6 kg)   01/21/21 120 lb (54.4 kg)       PHYSICAL EXAM  Physical Exam  Constitutional:       General: She is not in acute distress. HENT:      Head: Normocephalic and atraumatic. Right Ear: External ear normal.      Left Ear: External ear normal.   Eyes:      General: No scleral icterus. Conjunctiva/sclera: Conjunctivae normal.   Pulmonary:      Effort: Pulmonary effort is normal. No respiratory distress. Abdominal:      General: There is no distension. Palpations: Abdomen is soft. Musculoskeletal:      Cervical back: Neck supple. No rigidity. Skin:     Comments: Multiple wounds to the face with scabs   Neurological:      Mental Status: She is alert and oriented to person, place, and time. Mental status is at baseline. Assessment:        Problem List Items Addressed This Visit     None      Visit Diagnoses     Decubitus ulcer of left hip, stage 3 (HCC)    -  Primary    Relevant Orders    CBC    BUN & Creatinine    C-Reactive Protein    Sedimentation Rate           Procedure Note  Indications:  Based on my examination of this patient's wound(s)/ulcer(s) today, debridement is required to promote healing and evaluate the wound base. Performed by: Salima Sanders MD    Consent obtained:  Yes    Time out taken:  Yes    Pain Control: Anesthetic  Anesthetic: 4% Lidocaine Liquid Topical       Debridement: Excisional Debridement    Using curette the wound(s)/ulcer(s) was/were debrided down through and including the removal of subcutaneous tissue and muscle/fascia.         Devitalized Tissue Debrided:  biofilm, slough and exudate    Pre Debridement Measurements:  Are located in the [place]  Documentation Flow Sheet    Diabetic/Pressure/Non Pressure Ulcers only:  Ulcer: N/A     Wound/Ulcer #: 1    Post Debridement Measurements:  Wound/Ulcer Descriptions are Pre Debridement except measurements:    Wound 03/11/21 Pretibial Right clustered open wounds, weeping (Active)   Number of days: 431       Wound 03/11/21 Pretibial Left clustered open wounds, weeping (Active)   Number of days: 431       Wound 03/11/21 Coccyx small, open sore (Active)   Number of days: 431       Wound 03/11/21 Coccyx Right small, open sore (Active)   Number of days: 431       Wound 05/16/22 Ischium Left wound #1 left ishium (Active)   Wound Image   05/16/22 1040   Dressing Status New drainage noted; Old drainage noted 05/16/22 1040   Wound Cleansed Cleansed with saline 05/16/22 1040   Wound Length (cm) 3.4 cm 05/16/22 1040   Wound Width (cm) 2 cm 05/16/22 1040   Wound Depth (cm) 1.4 cm 05/16/22 1040   Wound Surface Area (cm^2) 6.8 cm^2 05/16/22 1040   Wound Volume (cm^3) 9.52 cm^3 05/16/22 1040   Post-Procedure Length (cm) 3.4 cm 05/16/22 1040   Post-Procedure Width (cm) 2 cm 05/16/22 1040   Post-Procedure Depth (cm) 1.14 cm 05/16/22 1040   Post-Procedure Surface Area (cm^2) 6.8 cm^2 05/16/22 1040   Post-Procedure Volume (cm^3) 7.752 cm^3 05/16/22 1040   Undermining Starts ___ O'Clock 7 05/16/22 1040   Undermining Ends___ O'Clock 11 05/16/22 1040   Undermining Maxium Distance (cm) 1.1 @7 05/16/22 1040   Wound Assessment Pink/red;Slough; Subcutaneous 05/16/22 1040   Drainage Amount Moderate 05/16/22 1040   Drainage Description Serosanguinous; Yellow 05/16/22 1040   Odor Mild 05/16/22 1040   Mesha-wound Assessment Blanchable erythema; Intact 05/16/22 1040   Margins Attached edges 05/16/22 1040   Number of days: 0       Wound 05/16/22 Buttocks Lateral wound #2 left lateral buttock (Active)   Wound Image   05/16/22 1040   Wound Etiology Traumatic 05/16/22 1040   Dressing Status New drainage noted; Old drainage noted 05/16/22 1040   Wound Cleansed Cleansed with saline 05/16/22 1040   Wound Length (cm) 2.3 cm 05/16/22 1040   Wound Width (cm) 0.4 cm 05/16/22 1040 Wound Depth (cm) 0.1 cm 05/16/22 1040   Wound Surface Area (cm^2) 0.92 cm^2 05/16/22 1040   Wound Volume (cm^3) 0.092 cm^3 05/16/22 1040   Post-Procedure Length (cm) 2.3 cm 05/16/22 1040   Post-Procedure Width (cm) 0.4 cm 05/16/22 1040   Post-Procedure Depth (cm) 0.1 cm 05/16/22 1040   Post-Procedure Surface Area (cm^2) 0.92 cm^2 05/16/22 1040   Post-Procedure Volume (cm^3) 0.092 cm^3 05/16/22 1040   Drainage Amount Moderate 05/16/22 1040   Drainage Description Serosanguinous 05/16/22 1040   Odor None 05/16/22 1040   Mesha-wound Assessment Blanchable erythema 05/16/22 1040   Margins Attached edges 05/16/22 1040   Number of days: 0          Total Surface Area Debrided:  0.92 sq cm     Estimated Blood Loss:  Minimal    Hemostasis Achieved:  by pressure    Procedural Pain:  0  / 10     Post Procedural Pain:  0 / 10     Response to treatment:  Well tolerated by patient. Plan:   Tissue culture obtained  P.o. Augmentin for 1 week  Follow cultures and adjust antibiotics as needed  CBC, BUN/creatinine, C-reactive protein and sedimentation rate  Follow-up in 1 week  May need imaging if elevated inflammatory markers  Treatment Note please see attached Discharge Instructions    Written patient dismissal instructions given to patient and signed by patient or POA. Discharge Instructions         [id number]      Visit  Discharge Instructions / Physician Orders  DATE:5/16/22     Home Care: [facility]     SUPPLIES ORDERED THRU:  Home care to supply wound care dressings due to medicare guide lines               DATE LAST SUPPLIED     Wound Location:  Left ischium Left buttock       Cleanse with: Liquid antibacterial soap and water, rinse well      Dressing Orders:  Primary dressing Santyl Nickel thickness to wounds cover with normal saline moistened gauze then abd pad and mefix tape     x 30days     Frequency:  Daily     Additional Orders: Increase protein to diet (meat, cheese, eggs, fish, peanut butter, nuts and beans)  Multivitamin daily    OFFLOADING [x] YES  TYPE:                  [] NA  Checking into offloading mattress    Weekly wound care visits until determined otherwise. Antibiotic therapy-wound care related YES [] NO [] NA[]    MY CHART []     Smart Device  []     HYPERBARIC TREATMENT-                TREATMENT #                          Your next appointment with the [facility] is in 1 week                                                                                                   (Please note your next appointment above and if you are unable to keep, kindly give a 24 hour notice. Thank you.)  If more than 15 min late we cannot guarantee you will be seen due to clinician schedule  Per Policy, Excessive cancellation will call for dismissal from program.  If you experience any of the following, please call the [facility] during business hours:  [phone]     * Increase in Pain  * Temperature over 101  * Increase in drainage from your wound  * Drainage with a foul odor  * Bleeding  * Increase in swelling  * Need for compression bandage changes due to slippage, breakthrough drainage. If you need medical attention outside of the business hours [facility] please contact your PCP or go to the nearest emergency room. The information contained in the After Visit Summary has been reviewed with me, the patient and/or responsible adult, by my health care provider(s). I had the opportunity to ask questions regarding this information.  I have elected to receive;      []After Visit Summary  [x]Comprehensive Discharge Instruction      Patient signature______________________________________Date:________    Electronically signed by Mike Arellano RN on 5/16/2022 at 10:56 AM            Electronically signed by Nik Meredith MD on 5/16/2022 at 10:57 AM

## 2022-05-16 NOTE — PLAN OF CARE
Problem: Chronic Conditions and Co-morbidities  Goal: Patient's chronic conditions and co-morbidity symptoms are monitored and maintained or improved  Outcome: Progressing     Problem: Discharge Planning  Goal: Discharge to home or other facility with appropriate resources  Outcome: Progressing     Problem: Wound:  Goal: Will show signs of wound healing; wound closure and no evidence of infection  Description: Will show signs of wound healing; wound closure and no evidence of infection  Outcome: Progressing     Problem: Falls - Risk of:  Goal: Will remain free from falls  Description: Will remain free from falls  Outcome: Progressing     Problem: Pressure Ulcer:  Goal: Signs of wound healing will improve  Description: Signs of wound healing will improve  Outcome: Progressing  Goal: Absence of new pressure ulcer  Description: Absence of new pressure ulcer  Outcome: Progressing  Goal: Will show no infection signs and symptoms  Description: Will show no infection signs and symptoms  Outcome: Progressing

## 2022-05-19 ENCOUNTER — TELEPHONE (OUTPATIENT)
Dept: WOUND CARE | Age: 87
End: 2022-05-19

## 2022-05-19 LAB
CULTURE: ABNORMAL
DIRECT EXAM: ABNORMAL
DIRECT EXAM: ABNORMAL
Lab: ABNORMAL
SPECIMEN DESCRIPTION: ABNORMAL

## 2022-05-20 ENCOUNTER — TELEPHONE (OUTPATIENT)
Dept: WOUND CARE | Age: 87
End: 2022-05-20

## 2022-05-20 RX ORDER — DOXYCYCLINE HYCLATE 100 MG
100 TABLET ORAL 2 TIMES DAILY
Qty: 28 TABLET | Refills: 0 | Status: SHIPPED | OUTPATIENT
Start: 2022-05-20 | End: 2022-06-03

## 2022-05-23 ENCOUNTER — HOSPITAL ENCOUNTER (OUTPATIENT)
Dept: WOUND CARE | Age: 87
Discharge: HOME | End: 2022-05-23
Payer: MEDICARE

## 2022-05-23 VITALS
HEART RATE: 75 BPM | WEIGHT: 102 LBS | RESPIRATION RATE: 18 BRPM | BODY MASS INDEX: 16.01 KG/M2 | HEIGHT: 67 IN | DIASTOLIC BLOOD PRESSURE: 89 MMHG | SYSTOLIC BLOOD PRESSURE: 157 MMHG | TEMPERATURE: 98.1 F

## 2022-05-23 DIAGNOSIS — L89.324 PRESSURE INJURY OF LEFT ISCHIUM, STAGE 4 (HCC): ICD-10-CM

## 2022-05-23 DIAGNOSIS — L89.223 DECUBITUS ULCER OF LEFT HIP, STAGE 3 (HCC): Primary | ICD-10-CM

## 2022-05-23 PROCEDURE — 11043 DBRDMT MUSC&/FSCA 1ST 20/<: CPT | Performed by: INTERNAL MEDICINE

## 2022-05-23 PROCEDURE — 11043 DBRDMT MUSC&/FSCA 1ST 20/<: CPT

## 2022-05-23 RX ORDER — GINSENG 100 MG
CAPSULE ORAL ONCE
Status: CANCELLED | OUTPATIENT
Start: 2022-05-23 | End: 2022-05-23

## 2022-05-23 RX ORDER — LIDOCAINE HYDROCHLORIDE 20 MG/ML
JELLY TOPICAL ONCE
Status: CANCELLED | OUTPATIENT
Start: 2022-05-23 | End: 2022-05-23

## 2022-05-23 RX ORDER — CLOBETASOL PROPIONATE 0.5 MG/G
OINTMENT TOPICAL ONCE
Status: CANCELLED | OUTPATIENT
Start: 2022-05-23 | End: 2022-05-23

## 2022-05-23 RX ORDER — BACITRACIN, NEOMYCIN, POLYMYXIN B 400; 3.5; 5 [USP'U]/G; MG/G; [USP'U]/G
OINTMENT TOPICAL ONCE
Status: CANCELLED | OUTPATIENT
Start: 2022-05-23 | End: 2022-05-23

## 2022-05-23 RX ORDER — LIDOCAINE 50 MG/G
OINTMENT TOPICAL ONCE
Status: CANCELLED | OUTPATIENT
Start: 2022-05-23 | End: 2022-05-23

## 2022-05-23 RX ORDER — GENTAMICIN SULFATE 1 MG/G
OINTMENT TOPICAL ONCE
Status: CANCELLED | OUTPATIENT
Start: 2022-05-23 | End: 2022-05-23

## 2022-05-23 RX ORDER — LIDOCAINE HYDROCHLORIDE 40 MG/ML
SOLUTION TOPICAL ONCE
Status: CANCELLED | OUTPATIENT
Start: 2022-05-23 | End: 2022-05-23

## 2022-05-23 RX ORDER — BACITRACIN ZINC AND POLYMYXIN B SULFATE 500; 1000 [USP'U]/G; [USP'U]/G
OINTMENT TOPICAL ONCE
Status: CANCELLED | OUTPATIENT
Start: 2022-05-23 | End: 2022-05-23

## 2022-05-23 RX ORDER — BETAMETHASONE DIPROPIONATE 0.05 %
OINTMENT (GRAM) TOPICAL ONCE
Status: CANCELLED | OUTPATIENT
Start: 2022-05-23 | End: 2022-05-23

## 2022-05-23 RX ORDER — LIDOCAINE HYDROCHLORIDE 40 MG/ML
SOLUTION TOPICAL ONCE
Status: COMPLETED | OUTPATIENT
Start: 2022-05-23 | End: 2022-05-23

## 2022-05-23 RX ORDER — LIDOCAINE 40 MG/G
CREAM TOPICAL ONCE
Status: CANCELLED | OUTPATIENT
Start: 2022-05-23 | End: 2022-05-23

## 2022-05-23 RX ADMIN — LIDOCAINE HYDROCHLORIDE 10 ML: 40 SOLUTION TOPICAL at 10:36

## 2022-05-23 ASSESSMENT — PAIN SCALES - GENERAL: PAINLEVEL_OUTOF10: 0

## 2022-05-23 NOTE — PROGRESS NOTES
[address]   Progress Note and Procedure Note      7 [facility] RECORD NUMBER:  [id number]  AGE: 81 y.o. GENDER: female  : 1935  EPISODE DATE:  2022    Subjective:     Chief Complaint   Patient presents with    Wound Check     Left ischium, Left buttock         HISTORY of PRESENT ILLNESS HPI     Miladys Potts is a 81 y.o. female who presents today for wound/ulcer evaluation. History of Wound Context: The patient is here for evaluation of the left ischium wound that has been open for around 2 months with mild amount of foul-smelling drainage . Tissue culture on 2022 grew MRSA that was sensitive to tetracycline and Bacteroides. The patient was started on oral Augmentin last week, p.o. doxycycline was added. She also had multiple wounds to the face with scabs due to picking.   She denied significant pain, denied fever or chills  Wound/Ulcer Pain Timing/Severity: Moderate    Ulcer Identification:  Ulcer Type: pressure    Contributing Factors: chronic pressure, decreased mobility and decreased tissue oxygenation        PAST MEDICAL HISTORY        Diagnosis Date    Anxiety     Arthritis     Basal cell carcinoma     Cancer (HCC)     skin    CHF (congestive heart failure) (HCC)     Closed fracture of single pubic ramus of pelvis with routine healing, right 2020    Depression     Gout     H/O total hysterectomy 2018    Hyperlipidemia     Hypertension     Hypokalemia     Hypothyroidism     Kidney stone     Kidney stone     Melanoma ([address]     NSTEMI (non-ST elevated myocardial infarction) ([address] 2020    Pressure injury of coccygeal region, stage 2 ([address] 3/10/2020       PAST SURGICAL HISTORY    Past Surgical History:   Procedure Laterality Date    HYSTERECTOMY      HYSTERECTOMY, VAGINAL      TUBAL LIGATION         FAMILY HISTORY    Family History   Problem Relation Age of Onset    Heart Disease Father     Other Mother          in her sleep unknown cause       SOCIAL HISTORY    Social History     Tobacco Use    Smoking status: Former Smoker     Quit date: 1960     Years since quittin.1    Smokeless tobacco: Never Used   Vaping Use    Vaping Use: Never used   Substance Use Topics    Alcohol use: No     Alcohol/week: 0.0 standard drinks    Drug use: No       ALLERGIES    Allergies   Allergen Reactions    Bactrim [Sulfamethoxazole-Trimethoprim] Other (See Comments)     DIZZINESS       MEDICATIONS    Current Outpatient Medications on File Prior to Encounter   Medication Sig Dispense Refill    doxycycline hyclate (VIBRA-TABS) 100 MG tablet Take 1 tablet by mouth 2 times daily for 14 days 28 tablet 0    amoxicillin-clavulanate (AUGMENTIN) 875-125 MG per tablet Take 1 tablet by mouth 2 times daily for 7 days 14 tablet 0    collagenase (SANTYL) 250 UNIT/GM ointment Apply topically to wound followed by secondary dressing change daily. 90 g 2    tiZANidine (ZANAFLEX) 2 MG tablet take 1 tablet by mouth three times a day if needed 30 tablet 0    HYDROcodone-acetaminophen (NORCO) 5-325 MG per tablet take 1 tablet by mouth every 12 hours if needed 60 tablet 0    Misc. Devices (MATTRESS PAD) MISC Gel overlay to be used on bed at all times 1 each 0    lisinopril (PRINIVIL;ZESTRIL) 20 MG tablet TAKE 1 TABLET DAILY 90 tablet 1    busPIRone (BUSPAR) 15 MG tablet Take 12 tablet daily 90 tablet 1    ammonium lactate (LAC-HYDRIN) 12 % cream Apply topically to dry skin twice daily.  385 g 2    levothyroxine (SYNTHROID) 100 MCG tablet TAKE 1 TABLET DAILY 90 tablet 1    Nutritional Supplements (ENSURE MAX PROTEIN) LIQD Take 330 mLs by mouth 2 times daily 60 box 2    atorvastatin (LIPITOR) 10 MG tablet TAKE 1 TABLET DAILY 90 tablet 3    carvedilol (COREG) 12.5 MG tablet TAKE 1 TABLET TWICE A DAY WITH MEALS 180 tablet 3    PARoxetine (PAXIL) 40 MG tablet TAKE 1 TABLET EVERY MORNING 90 tablet 3    nystatin (MYCOSTATIN) 312482 UNIT/GM powder Apply 3 times daily. 120 g 1    diclofenac sodium (VOLTAREN) 1 % GEL apply 4 grams to affected area twice a day AS NEEDED FOR PAIN      furosemide (LASIX) 20 MG tablet Take 1 tablet by mouth 2 times daily 180 tablet 1    aspirin 81 MG tablet Take 81 mg by mouth daily       No current facility-administered medications on file prior to encounter. REVIEW OF SYSTEMS  Review of Systems    Pertinent items are noted in HPI. Objective:      BP (!) 157/89   Pulse 75   Temp 98.1 °F (36.7 °C) (Tympanic)   Resp 18   Ht 5' 7\" (1.702 m)   Wt 102 lb (46.3 kg)   BMI 15.98 kg/m²     Wt Readings from Last 3 Encounters:   05/23/22 102 lb (46.3 kg)   05/16/22 99 lb 7 oz (45.1 kg)   03/11/21 115 lb 15.4 oz (52.6 kg)       PHYSICAL EXAM  Physical Exam  Constitutional:       General: She is not in acute distress. HENT:      Head: Normocephalic and atraumatic. Right Ear: External ear normal.      Left Ear: External ear normal.   Eyes:      General: No scleral icterus. Conjunctiva/sclera: Conjunctivae normal.   Pulmonary:      Effort: Pulmonary effort is normal. No respiratory distress. Abdominal:      General: There is no distension. Palpations: Abdomen is soft. Musculoskeletal:      Cervical back: Neck supple. No rigidity. Skin:     Comments: Multiple wounds to the face with scabs   Neurological:      Mental Status: She is alert and oriented to person, place, and time. Mental status is at baseline. Assessment:        Problem List Items Addressed This Visit     Decubitus ulcer of left hip, stage 3 ([address] - Primary    Relevant Orders    Initiate Outpatient Wound Care Protocol    RESOLVED: Pressure injury of left ischium, stage 4 ([address]           Procedure Note  Indications:  Based on my examination of this patient's wound(s)/ulcer(s) today, debridement is required to promote healing and evaluate the wound base.     Performed by: Yanelis Mercado MD    Consent obtained:  Yes    Time out taken: Yes    Pain Control: Anesthetic  Anesthetic: 4% Lidocaine Liquid Topical       Debridement: Excisional Debridement    Using curette the wound(s)/ulcer(s) was/were debrided down through and including the removal of subcutaneous tissue and muscle/fascia. Devitalized Tissue Debrided:  biofilm, slough and exudate    Pre Debridement Measurements:  Are located in the [place]  Documentation Flow Sheet    Diabetic/Pressure/Non Pressure Ulcers only:  Ulcer: N/A     Wound/Ulcer #: 1    Post Debridement Measurements:  Wound/Ulcer Descriptions are Pre Debridement except measurements:    Wound 03/11/21 Pretibial Right clustered open wounds, weeping (Active)   Number of days: 438       Wound 03/11/21 Pretibial Left clustered open wounds, weeping (Active)   Number of days: 438       Wound 03/11/21 Coccyx small, open sore (Active)   Number of days: 438       Wound 03/11/21 Coccyx Right small, open sore (Active)   Number of days: 438       Wound 05/16/22 Ischium Left wound #1 left ishium (Active)   Wound Image   05/16/22 1040   Wound Etiology Pressure Stage 4 05/23/22 1033   Dressing Status New drainage noted; Old drainage noted 05/23/22 1033   Wound Cleansed Cleansed with saline 05/23/22 1033   Dressing/Treatment Other (comment) 05/16/22 1126   Wound Length (cm) 3.5 cm 05/23/22 1033   Wound Width (cm) 2 cm 05/23/22 1033   Wound Depth (cm) 0.8 cm 05/23/22 1033   Wound Surface Area (cm^2) 7 cm^2 05/23/22 1033   Change in Wound Size % (l*w) -2.94 05/23/22 1033   Wound Volume (cm^3) 5.6 cm^3 05/23/22 1033   Wound Healing % 41 05/23/22 1033   Post-Procedure Length (cm) 3.5 cm 05/23/22 1033   Post-Procedure Width (cm) 2 cm 05/23/22 1033   Post-Procedure Depth (cm) 0.8 cm 05/23/22 1033   Post-Procedure Surface Area (cm^2) 7 cm^2 05/23/22 1033   Post-Procedure Volume (cm^3) 5.6 cm^3 05/23/22 1033   Undermining Starts ___ O'Clock 0700 05/23/22 1033   Undermining Ends___ O'Clock 0900 05/23/22 1033   Undermining Maxium Distance (cm) Yuliya@Y'all 05/23/22 1033   Wound Assessment Hulbert/red;Slough 05/23/22 1033   Drainage Amount Moderate 05/23/22 1033   Drainage Description Serosanguinous; Yellow 05/23/22 1033   Odor None 05/23/22 1033   Mesha-wound Assessment Blanchable erythema 05/23/22 1033   Margins Defined edges 05/23/22 1033   Number of days: 7       Wound 05/16/22 Buttocks Lateral wound #2 left lateral buttock (Active)   Wound Image   05/23/22 1033   Wound Etiology Pressure Stage 3 05/16/22 1040   Dressing Status New drainage noted; Old drainage noted 05/16/22 1040   Wound Cleansed Cleansed with saline 05/16/22 1040   Dressing/Treatment Other (comment) 05/16/22 1126   Wound Length (cm) 0 cm 05/23/22 1033   Wound Width (cm) 0 cm 05/23/22 1033   Wound Depth (cm) 0 cm 05/23/22 1033   Wound Surface Area (cm^2) 0 cm^2 05/23/22 1033   Change in Wound Size % (l*w) 100 05/23/22 1033   Wound Volume (cm^3) 0 cm^3 05/23/22 1033   Wound Healing % 100 05/23/22 1033   Post-Procedure Length (cm) 0 cm 05/23/22 1033   Post-Procedure Width (cm) 0 cm 05/23/22 1033   Post-Procedure Depth (cm) 0 cm 05/23/22 1033   Post-Procedure Surface Area (cm^2) 0 cm^2 05/23/22 1033   Post-Procedure Volume (cm^3) 0 cm^3 05/23/22 1033   Wound Assessment Epithelialization 05/23/22 1033   Drainage Amount Moderate 05/16/22 1040   Drainage Description Serosanguinous 05/16/22 1040   Odor None 05/16/22 1040   Mesha-wound Assessment Blanchable erythema 05/16/22 1040   Margins Attached edges 05/16/22 1040   Number of days: 7          Total Surface Area Debrided:  7sq cm     Estimated Blood Loss:  Minimal    Hemostasis Achieved:  by pressure    Procedural Pain:  0  / 10     Post Procedural Pain:  0 / 10     Response to treatment:  Well tolerated by patient. Plan:   Tissue culture obtained  P.o.  Augmentin for 1 week  Follow cultures and adjust antibiotics as needed  CBC, BUN/creatinine, C-reactive protein and sedimentation rate  Follow-up in 1 week  May need imaging if elevated inflammatory markers  Treatment Note please see attached Discharge Instructions    Written patient dismissal instructions given to patient and signed by patient or POA. Discharge Instructions         [address]                                 Visit  Discharge Instructions / Physician Orders  DATE:5/23/22     Home Care: [facility] please draw labs-Sed rate CRP, Bun Creat,cbc send results to Wound care [phone]     SUPPLIES ORDERED THRU:  Home care to supply wound care dressings due to medicare guide lines               DATE LAST SUPPLIED     Wound Location:  Left ischium Left buttock        Cleanse with: Liquid antibacterial soap and water, rinse well      Dressing Orders:  Primary dressing Santyl Nickel thickness to wounds cover with normal saline moistened gauze then abd pad and mefix tape     x 30days     Frequency:  Daily     Additional Orders: Increase protein to diet (meat, cheese, eggs, fish, peanut butter, nuts and beans)  Multivitamin daily     OFFLOADING [x]? YES  TYPE:                  []? NA  Alternating air mattress through [facility] -order sent 5/16/22     Weekly wound care visits until determined otherwise.     Antibiotic therapy-wound care related YES [x]? NO []? NA[]? Augmentin 875/125mg  Take 2 x per day for 1 week  MY CHART []?     Smart Device  []?      HYPERBARIC TREATMENT-                [facility] #                          Your next appointment with the [address] [place] [address] is in 1 week                                                                                                   (Please note your next appointment above and if you are unable to keep, kindly give a 24 hour notice.  Thank you.)  If more than 15 min late we cannot guarantee you will be seen due to clinician schedule  Per Policy, Excessive cancellation will call for dismissal from program.  If you experience any of the following, please call the [address] [place] [address] during business hours:  [phone]     * Increase in Pain  * Temperature over 101  * Increase in drainage from your wound  * Drainage with a foul odor  * Bleeding  * Increase in swelling  * Need for compression bandage changes due to slippage, breakthrough drainage.     If you need medical attention outside of the business hours of the [address] [place] [address] Road please contact your PCP or go to the nearest emergency room. The information contained in the After Visit Summary has been reviewed with me, the patient and/or responsible adult, by my health care provider(s). I had the opportunity to ask questions regarding this information. I have elected to receive;      []? After Visit Summary  [x]? Comprehensive Discharge Instruction        Patient signature______________________________________  Electronically signed by Porsche Alvarado RN on 5/23/2022 at 10:19 AM          Electronically signed by Bakari Dunne MD on 5/23/2022 at 10:49 AM

## 2022-05-23 NOTE — PLAN OF CARE
Problem: Chronic Conditions and Co-morbidities  Goal: Patient's chronic conditions and co-morbidity symptoms are monitored and maintained or improved  Outcome: Progressing     Problem: Discharge Planning  Goal: Discharge to home or other facility with appropriate resources  Outcome: Progressing     Problem: Pressure Ulcer:  Goal: Signs of wound healing will improve  Description: Signs of wound healing will improve  Outcome: Progressing  Goal: Absence of new pressure ulcer  Description: Absence of new pressure ulcer  Outcome: Progressing  Goal: Will show no infection signs and symptoms  Description: Will show no infection signs and symptoms  Outcome: Progressing     Problem: Wound:  Goal: Will show signs of wound healing; wound closure and no evidence of infection  Description: Will show signs of wound healing; wound closure and no evidence of infection  Outcome: Progressing     Problem: Falls - Risk of:  Goal: Will remain free from falls  Description: Will remain free from falls  Outcome: Progressing

## 2022-06-06 ENCOUNTER — HOSPITAL ENCOUNTER (OUTPATIENT)
Dept: WOUND CARE | Age: 87
Discharge: HOME | End: 2022-06-06
Payer: MEDICARE

## 2022-06-06 VITALS
RESPIRATION RATE: 18 BRPM | DIASTOLIC BLOOD PRESSURE: 77 MMHG | WEIGHT: 102 LBS | SYSTOLIC BLOOD PRESSURE: 143 MMHG | BODY MASS INDEX: 16.01 KG/M2 | HEIGHT: 67 IN | HEART RATE: 62 BPM | TEMPERATURE: 98.1 F

## 2022-06-06 DIAGNOSIS — L89.223 DECUBITUS ULCER OF LEFT HIP, STAGE 3 (HCC): Primary | ICD-10-CM

## 2022-06-06 PROCEDURE — 11043 DBRDMT MUSC&/FSCA 1ST 20/<: CPT | Performed by: INTERNAL MEDICINE

## 2022-06-06 PROCEDURE — 11043 DBRDMT MUSC&/FSCA 1ST 20/<: CPT

## 2022-06-06 RX ORDER — LIDOCAINE HYDROCHLORIDE 40 MG/ML
SOLUTION TOPICAL ONCE
Status: CANCELLED | OUTPATIENT
Start: 2022-06-06 | End: 2022-06-06

## 2022-06-06 RX ORDER — GENTAMICIN SULFATE 1 MG/G
OINTMENT TOPICAL ONCE
Status: CANCELLED | OUTPATIENT
Start: 2022-06-06 | End: 2022-06-06

## 2022-06-06 RX ORDER — LIDOCAINE HYDROCHLORIDE 20 MG/ML
JELLY TOPICAL ONCE
Status: CANCELLED | OUTPATIENT
Start: 2022-06-06 | End: 2022-06-06

## 2022-06-06 RX ORDER — LIDOCAINE 50 MG/G
OINTMENT TOPICAL ONCE
Status: CANCELLED | OUTPATIENT
Start: 2022-06-06 | End: 2022-06-06

## 2022-06-06 RX ORDER — BETAMETHASONE DIPROPIONATE 0.05 %
OINTMENT (GRAM) TOPICAL ONCE
Status: CANCELLED | OUTPATIENT
Start: 2022-06-06 | End: 2022-06-06

## 2022-06-06 RX ORDER — CLOBETASOL PROPIONATE 0.5 MG/G
OINTMENT TOPICAL ONCE
Status: CANCELLED | OUTPATIENT
Start: 2022-06-06 | End: 2022-06-06

## 2022-06-06 RX ORDER — BACITRACIN, NEOMYCIN, POLYMYXIN B 400; 3.5; 5 [USP'U]/G; MG/G; [USP'U]/G
OINTMENT TOPICAL ONCE
Status: CANCELLED | OUTPATIENT
Start: 2022-06-06 | End: 2022-06-06

## 2022-06-06 RX ORDER — BACITRACIN ZINC AND POLYMYXIN B SULFATE 500; 1000 [USP'U]/G; [USP'U]/G
OINTMENT TOPICAL ONCE
Status: CANCELLED | OUTPATIENT
Start: 2022-06-06 | End: 2022-06-06

## 2022-06-06 RX ORDER — LIDOCAINE 40 MG/G
CREAM TOPICAL ONCE
Status: CANCELLED | OUTPATIENT
Start: 2022-06-06 | End: 2022-06-06

## 2022-06-06 RX ORDER — GINSENG 100 MG
CAPSULE ORAL ONCE
Status: CANCELLED | OUTPATIENT
Start: 2022-06-06 | End: 2022-06-06

## 2022-06-06 RX ORDER — LIDOCAINE HYDROCHLORIDE 40 MG/ML
SOLUTION TOPICAL ONCE
Status: COMPLETED | OUTPATIENT
Start: 2022-06-06 | End: 2022-06-06

## 2022-06-06 RX ADMIN — LIDOCAINE HYDROCHLORIDE 7.5 ML: 40 SOLUTION TOPICAL at 10:21

## 2022-06-06 ASSESSMENT — PAIN - FUNCTIONAL ASSESSMENT: PAIN_FUNCTIONAL_ASSESSMENT: ACTIVITIES ARE NOT PREVENTED

## 2022-06-06 ASSESSMENT — PAIN DESCRIPTION - ORIENTATION: ORIENTATION: LEFT

## 2022-06-06 ASSESSMENT — PAIN DESCRIPTION - ONSET: ONSET: ON-GOING

## 2022-06-06 ASSESSMENT — PAIN DESCRIPTION - FREQUENCY: FREQUENCY: INTERMITTENT

## 2022-06-06 ASSESSMENT — PAIN DESCRIPTION - DESCRIPTORS: DESCRIPTORS: THROBBING

## 2022-06-06 ASSESSMENT — PAIN SCALES - GENERAL: PAINLEVEL_OUTOF10: 7

## 2022-06-06 ASSESSMENT — PAIN DESCRIPTION - LOCATION: LOCATION: BUTTOCKS

## 2022-06-06 NOTE — PROGRESS NOTES
[address]   Progress Note and Procedure Note      7 [facility] RECORD NUMBER:  [id number]  AGE: 81 y.o. GENDER: female  : 1935  EPISODE DATE:  2022    Subjective:     Chief Complaint   Patient presents with    Wound Check     left ischium, left buttocks. HISTORY of PRESENT ILLNESS HPI     Kiara Ocampo is a 81 y.o. female who presents today for wound/ulcer evaluation. History of Wound Context: The patient is here for evaluation of the left ischium wound. Less drainage. She denied fever or chills, no other complaints. Labs from my 2022 reviewed C-reactive protein 0.5, renal function normal.  Tissue culture on 2022 grew MRSA that was sensitive to tetracycline and Bacteroides. The patient was treated with a course of doxycycline and Augmentin that was completed on Friday. She also had multiple wounds to the face with scabs due to picking.   Wound/Ulcer Pain Timing/Severity: Moderate    Ulcer Identification:  Ulcer Type: pressure    Contributing Factors: chronic pressure, decreased mobility and decreased tissue oxygenation        PAST MEDICAL HISTORY        Diagnosis Date    Anxiety     Arthritis     Basal cell carcinoma     Cancer (HCC)     skin    CHF (congestive heart failure) (HCC)     Closed fracture of single pubic ramus of pelvis with routine healing, right 2020    Depression     Gout     H/O total hysterectomy 2018    Hyperlipidemia     Hypertension     Hypokalemia     Hypothyroidism     Kidney stone     Kidney stone     Melanoma ([address]     NSTEMI (non-ST elevated myocardial infarction) ([address] 2020    Pressure injury of coccygeal region, stage 2 ([address] 3/10/2020       PAST SURGICAL HISTORY    Past Surgical History:   Procedure Laterality Date    HYSTERECTOMY      HYSTERECTOMY, VAGINAL      TUBAL LIGATION         FAMILY HISTORY    Family History   Problem Relation Age of Onset    Heart Disease Father  Other Mother          in her sleep unknown cause       SOCIAL HISTORY    Social History     Tobacco Use    Smoking status: Former Smoker     Quit date: 1960     Years since quittin.1    Smokeless tobacco: Never Used   Vaping Use    Vaping Use: Never used   Substance Use Topics    Alcohol use: No     Alcohol/week: 0.0 standard drinks    Drug use: No       ALLERGIES    Allergies   Allergen Reactions    Bactrim [Sulfamethoxazole-Trimethoprim] Other (See Comments)     DIZZINESS       MEDICATIONS    Current Outpatient Medications on File Prior to Encounter   Medication Sig Dispense Refill    collagenase (SANTYL) 250 UNIT/GM ointment Apply topically to wound followed by secondary dressing change daily. 90 g 2    tiZANidine (ZANAFLEX) 2 MG tablet take 1 tablet by mouth three times a day if needed 30 tablet 0    Misc. Devices (MATTRESS PAD) MISC Gel overlay to be used on bed at all times 1 each 0    lisinopril (PRINIVIL;ZESTRIL) 20 MG tablet TAKE 1 TABLET DAILY 90 tablet 1    busPIRone (BUSPAR) 15 MG tablet Take 12 tablet daily 90 tablet 1    ammonium lactate (LAC-HYDRIN) 12 % cream Apply topically to dry skin twice daily. 385 g 2    levothyroxine (SYNTHROID) 100 MCG tablet TAKE 1 TABLET DAILY 90 tablet 1    Nutritional Supplements (ENSURE MAX PROTEIN) LIQD Take 330 mLs by mouth 2 times daily 60 box 2    atorvastatin (LIPITOR) 10 MG tablet TAKE 1 TABLET DAILY 90 tablet 3    carvedilol (COREG) 12.5 MG tablet TAKE 1 TABLET TWICE A DAY WITH MEALS 180 tablet 3    PARoxetine (PAXIL) 40 MG tablet TAKE 1 TABLET EVERY MORNING 90 tablet 3    nystatin (MYCOSTATIN) 151332 UNIT/GM powder Apply 3 times daily.  120 g 1    diclofenac sodium (VOLTAREN) 1 % GEL apply 4 grams to affected area twice a day AS NEEDED FOR PAIN      furosemide (LASIX) 20 MG tablet Take 1 tablet by mouth 2 times daily 180 tablet 1    aspirin 81 MG tablet Take 81 mg by mouth daily       No current facility-administered medications on file prior to encounter. REVIEW OF SYSTEMS  Review of Systems    Pertinent items are noted in HPI. Objective:      BP (!) 143/77   Pulse 62   Temp 98.1 °F (36.7 °C) (Tympanic)   Resp 18   Ht 5' 7\" (1.702 m)   Wt 102 lb (46.3 kg)   BMI 15.98 kg/m²     Wt Readings from Last 3 Encounters:   06/06/22 102 lb (46.3 kg)   05/23/22 102 lb (46.3 kg)   05/16/22 99 lb 7 oz (45.1 kg)       PHYSICAL EXAM  Physical Exam  Constitutional:       General: She is not in acute distress. HENT:      Head: Normocephalic and atraumatic. Right Ear: External ear normal.      Left Ear: External ear normal.   Eyes:      General: No scleral icterus. Conjunctiva/sclera: Conjunctivae normal.   Pulmonary:      Effort: Pulmonary effort is normal. No respiratory distress. Abdominal:      General: There is no distension. Palpations: Abdomen is soft. Musculoskeletal:      Cervical back: Neck supple. No rigidity. Skin:     Comments: Multiple wounds to the face with scabs   Neurological:      Mental Status: She is alert and oriented to person, place, and time. Mental status is at baseline. Assessment:        Problem List Items Addressed This Visit     Decubitus ulcer of left hip, stage 3 ([address] - Primary    Relevant Orders    Initiate Outpatient Wound Care Protocol           Procedure Note  Indications:  Based on my examination of this patient's wound(s)/ulcer(s) today, debridement is required to promote healing and evaluate the wound base. Performed by: Ronny Pappas MD    Consent obtained:  Yes    Time out taken:  Yes    Pain Control: Anesthetic  Anesthetic: 4% Lidocaine Liquid Topical       Debridement: Excisional Debridement    Using curette the wound(s)/ulcer(s) was/were debrided down through and including the removal of subcutaneous tissue and muscle/fascia.         Devitalized Tissue Debrided:  biofilm, slough and exudate    Pre Debridement Measurements:  Are located in the Wound/Ulcer Documentation Flow Sheet    Diabetic/Pressure/Non Pressure Ulcers only:  Ulcer: N/A     Wound/Ulcer #: 1    Post Debridement Measurements:  Wound/Ulcer Descriptions are Pre Debridement except measurements:    Wound 05/16/22 Ischium Left wound #1 left ishium (Active)   Wound Image   05/16/22 1040   Wound Etiology Pressure Stage 4 06/06/22 1011   Dressing Status New drainage noted; Old drainage noted 06/06/22 1011   Wound Cleansed Cleansed with saline 06/06/22 1011   Dressing/Treatment Other (comment) 05/23/22 1056   Wound Length (cm) 2.8 cm 06/06/22 1011   Wound Width (cm) 2.5 cm 06/06/22 1011   Wound Depth (cm) 1 cm 06/06/22 1011   Wound Surface Area (cm^2) 7 cm^2 06/06/22 1011   Change in Wound Size % (l*w) -2.94 06/06/22 1011   Wound Volume (cm^3) 7 cm^3 06/06/22 1011   Wound Healing % 26 06/06/22 1011   Post-Procedure Length (cm) 2.8 cm 06/06/22 1011   Post-Procedure Width (cm) 2.5 cm 06/06/22 1011   Post-Procedure Depth (cm) 1 cm 06/06/22 1011   Post-Procedure Surface Area (cm^2) 7 cm^2 06/06/22 1011   Post-Procedure Volume (cm^3) 7 cm^3 06/06/22 1011   Undermining Starts ___ O'Clock 0700 06/06/22 1011   Undermining Ends___ O'Clock 0900 06/06/22 1011   Undermining Maxium Distance (cm) 1.5 cm @ 0900 06/06/22 1011   Wound Assessment Pink/red;Slough; Exposed structure fascia 06/06/22 1011   Drainage Amount Moderate 06/06/22 1011   Drainage Description Serosanguinous; Yellow 06/06/22 1011   Odor None 06/06/22 1011   Mesha-wound Assessment Blanchable erythema 06/06/22 1011   Margins Defined edges 06/06/22 1011   Number of days: 20          Total Surface Area Debrided:  7sq cm     Estimated Blood Loss:  Minimal    Hemostasis Achieved:  by pressure    Procedural Pain:  0  / 10     Post Procedural Pain:  0 / 10     Response to treatment:  Well tolerated by patient.        Plan:     Treatment Note please see attached Discharge Instructions    Written patient dismissal instructions given to patient and signed by patient or POA. Discharge Instructions         [address] [place]                                 Visit Matias Instructions / Physician Orders  DATE:6/6/22     Home Care: [facility]-      SUPPLIES ORDERED THRU:  Home care to supply wound care dressings due to medicare guide lines               DATE LAST SUPPLIED     Wound Location:  Left ischium Left buttock        Cleanse with: Liquid antibacterial soap and water, rinse well      Dressing Orders:  Primary dressing Santyl Nickel thickness to wounds cover with normal saline moistened gauze then abd pad and mefix tape     x 30days     Frequency:  Daily     Additional Orders: Increase protein to diet (meat, cheese, eggs, fish, peanut butter, nuts and beans)  Multivitamin daily     OFFLOADING [x]?? YES  TYPE:                  []? ? NA  Alternating air mattress through [facility] -order sent 5/16/22     Weekly wound care visits until determined otherwise.     Antibiotic therapy-wound care related YES [x]? ? NO []?? NA[]? ? Augmentin 875/125mg  Take 2 x per day for 1 week  MY CHART []? ?     Smart Device  []? ?      HYPERBARIC TREATMENT-                TREATMENT #                          Your next appointment with the [address] [place] [address] is in 1 week                                                                                                   (Please note your next appointment above and if you are unable to keep, kindly give a 24 hour notice.  Thank you.)  If more than 15 min late we cannot guarantee you will be seen due to clinician schedule  Per Policy, Excessive cancellation will call for dismissal from program.  If you experience any of the following, please call the [address] [place] [address] during business hours:  [phone]     * Increase in Pain  * Temperature over 101  * Increase in drainage from your wound  * Drainage with a foul odor  * Bleeding  * Increase in swelling  * Need for compression bandage changes due to slippage, breakthrough drainage.     If you need medical attention outside of the business hours of the [address] [place] [address] Road please contact your PCP or go to the nearest emergency room.     The information contained in the After Visit Summary has been reviewed with me, the patient and/or responsible adult, by my health care provider(s). I had the opportunity to ask questions regarding this information. I have elected to receive;      []? ? After Visit Summary  [x]? ? Comprehensive Discharge Instruction        Patient signature______________________________________  Electronically signed by Samaria Pettit RN on 6/6/2022 at 10:24 AM          Electronically signed by Sonja Martino MD on 6/6/2022 at 10:30 AM

## 2022-06-20 ENCOUNTER — HOSPITAL ENCOUNTER (OUTPATIENT)
Dept: WOUND CARE | Age: 87
Discharge: HOME | End: 2022-06-20
Payer: MEDICARE

## 2022-06-20 VITALS
BODY MASS INDEX: 16.01 KG/M2 | HEART RATE: 70 BPM | DIASTOLIC BLOOD PRESSURE: 74 MMHG | HEIGHT: 67 IN | TEMPERATURE: 98.4 F | RESPIRATION RATE: 18 BRPM | WEIGHT: 102 LBS | SYSTOLIC BLOOD PRESSURE: 126 MMHG

## 2022-06-20 DIAGNOSIS — L89.223 DECUBITUS ULCER OF LEFT HIP, STAGE 3 (HCC): Primary | ICD-10-CM

## 2022-06-20 PROCEDURE — 11043 DBRDMT MUSC&/FSCA 1ST 20/<: CPT

## 2022-06-20 PROCEDURE — 11043 DBRDMT MUSC&/FSCA 1ST 20/<: CPT | Performed by: INTERNAL MEDICINE

## 2022-06-20 RX ORDER — BACITRACIN, NEOMYCIN, POLYMYXIN B 400; 3.5; 5 [USP'U]/G; MG/G; [USP'U]/G
OINTMENT TOPICAL ONCE
Status: CANCELLED | OUTPATIENT
Start: 2022-06-20 | End: 2022-06-20

## 2022-06-20 RX ORDER — GENTAMICIN SULFATE 1 MG/G
OINTMENT TOPICAL ONCE
Status: CANCELLED | OUTPATIENT
Start: 2022-06-20 | End: 2022-06-20

## 2022-06-20 RX ORDER — GINSENG 100 MG
CAPSULE ORAL ONCE
Status: CANCELLED | OUTPATIENT
Start: 2022-06-20 | End: 2022-06-20

## 2022-06-20 RX ORDER — LIDOCAINE 40 MG/G
CREAM TOPICAL ONCE
Status: CANCELLED | OUTPATIENT
Start: 2022-06-20 | End: 2022-06-20

## 2022-06-20 RX ORDER — LIDOCAINE HYDROCHLORIDE 20 MG/ML
JELLY TOPICAL ONCE
Status: CANCELLED | OUTPATIENT
Start: 2022-06-20 | End: 2022-06-20

## 2022-06-20 RX ORDER — BETAMETHASONE DIPROPIONATE 0.05 %
OINTMENT (GRAM) TOPICAL ONCE
Status: CANCELLED | OUTPATIENT
Start: 2022-06-20 | End: 2022-06-20

## 2022-06-20 RX ORDER — LIDOCAINE 50 MG/G
OINTMENT TOPICAL ONCE
Status: CANCELLED | OUTPATIENT
Start: 2022-06-20 | End: 2022-06-20

## 2022-06-20 RX ORDER — BACITRACIN ZINC AND POLYMYXIN B SULFATE 500; 1000 [USP'U]/G; [USP'U]/G
OINTMENT TOPICAL ONCE
Status: CANCELLED | OUTPATIENT
Start: 2022-06-20 | End: 2022-06-20

## 2022-06-20 RX ORDER — LIDOCAINE HYDROCHLORIDE 40 MG/ML
SOLUTION TOPICAL ONCE
Status: COMPLETED | OUTPATIENT
Start: 2022-06-20 | End: 2022-06-20

## 2022-06-20 RX ORDER — CLOBETASOL PROPIONATE 0.5 MG/G
OINTMENT TOPICAL ONCE
Status: CANCELLED | OUTPATIENT
Start: 2022-06-20 | End: 2022-06-20

## 2022-06-20 RX ORDER — LIDOCAINE HYDROCHLORIDE 40 MG/ML
SOLUTION TOPICAL ONCE
Status: CANCELLED | OUTPATIENT
Start: 2022-06-20 | End: 2022-06-20

## 2022-06-20 RX ADMIN — LIDOCAINE HYDROCHLORIDE 7.5 ML: 40 SOLUTION TOPICAL at 10:35

## 2022-06-20 ASSESSMENT — PAIN DESCRIPTION - FREQUENCY: FREQUENCY: INTERMITTENT

## 2022-06-20 ASSESSMENT — PAIN DESCRIPTION - PAIN TYPE: TYPE: CHRONIC PAIN

## 2022-06-20 ASSESSMENT — PAIN DESCRIPTION - ONSET: ONSET: ON-GOING

## 2022-06-20 ASSESSMENT — PAIN - FUNCTIONAL ASSESSMENT: PAIN_FUNCTIONAL_ASSESSMENT: ACTIVITIES ARE NOT PREVENTED

## 2022-06-20 ASSESSMENT — PAIN SCALES - GENERAL: PAINLEVEL_OUTOF10: 4

## 2022-06-20 ASSESSMENT — PAIN DESCRIPTION - LOCATION: LOCATION: HIP

## 2022-06-20 ASSESSMENT — PAIN DESCRIPTION - DESCRIPTORS: DESCRIPTORS: ACHING;THROBBING

## 2022-06-20 ASSESSMENT — PAIN DESCRIPTION - ORIENTATION: ORIENTATION: LEFT

## 2022-06-20 NOTE — PROGRESS NOTES
[address]   Progress Note and Procedure Note      7 [facility] RECORD NUMBER:  [id number]  AGE: 81 y.o. GENDER: female  : 1935  EPISODE DATE:  2022    Subjective:     Chief Complaint   Patient presents with    Wound Check     left ischium         HISTORY of PRESENT ILLNESS HPI     Esvin Mars is a 81 y.o. female who presents today for wound/ulcer evaluation. History of Wound Context: The patient is here for evaluation of the left ischium wound that is improving, decreasing in size with no significant  drainage or surrounding redness  She denied fever or chills, no other complaints. 2C-reactive protein 0.5, renal function normal.  Tissue culture on 2022 grew MRSA that was sensitive to tetracycline and Bacteroides. The patient was treated with a course of doxycycline and Augmentin that was completed . She also had multiple wounds to the face with scabs due to picking.   Wound/Ulcer Pain Timing/Severity: Moderate    Ulcer Identification:  Ulcer Type: pressure    Contributing Factors: chronic pressure, decreased mobility and decreased tissue oxygenation        PAST MEDICAL HISTORY        Diagnosis Date    Anxiety     Arthritis     Basal cell carcinoma     Cancer (HCC)     skin    CHF (congestive heart failure) (HCC)     Closed fracture of single pubic ramus of pelvis with routine healing, right 2020    Depression     Gout     H/O total hysterectomy 2018    Hyperlipidemia     Hypertension     Hypokalemia     Hypothyroidism     Kidney stone     Kidney stone     Melanoma ([address]     NSTEMI (non-ST elevated myocardial infarction) ([address] 2020    Pressure injury of coccygeal region, stage 2 ([address] 3/10/2020       PAST SURGICAL HISTORY    Past Surgical History:   Procedure Laterality Date    HYSTERECTOMY (CERVIX STATUS UNKNOWN)      HYSTERECTOMY, VAGINAL      TUBAL LIGATION         FAMILY HISTORY    Family History   Problem Relation Age of Onset    Heart Disease Father     Other Mother          in her sleep unknown cause       SOCIAL HISTORY    Social History     Tobacco Use    Smoking status: Former Smoker     Quit date: 1960     Years since quittin.1    Smokeless tobacco: Never Used   Vaping Use    Vaping Use: Never used   Substance Use Topics    Alcohol use: No     Alcohol/week: 0.0 standard drinks    Drug use: No       ALLERGIES    Allergies   Allergen Reactions    Bactrim [Sulfamethoxazole-Trimethoprim] Other (See Comments)     DIZZINESS       MEDICATIONS    Current Outpatient Medications on File Prior to Encounter   Medication Sig Dispense Refill    collagenase (SANTYL) 250 UNIT/GM ointment Apply topically to wound followed by secondary dressing change daily. 90 g 2    tiZANidine (ZANAFLEX) 2 MG tablet take 1 tablet by mouth three times a day if needed 30 tablet 0    Misc. Devices (MATTRESS PAD) MISC Gel overlay to be used on bed at all times 1 each 0    lisinopril (PRINIVIL;ZESTRIL) 20 MG tablet TAKE 1 TABLET DAILY 90 tablet 1    busPIRone (BUSPAR) 15 MG tablet Take 12 tablet daily 90 tablet 1    ammonium lactate (LAC-HYDRIN) 12 % cream Apply topically to dry skin twice daily. 385 g 2    levothyroxine (SYNTHROID) 100 MCG tablet TAKE 1 TABLET DAILY 90 tablet 1    Nutritional Supplements (ENSURE MAX PROTEIN) LIQD Take 330 mLs by mouth 2 times daily 60 box 2    atorvastatin (LIPITOR) 10 MG tablet TAKE 1 TABLET DAILY 90 tablet 3    carvedilol (COREG) 12.5 MG tablet TAKE 1 TABLET TWICE A DAY WITH MEALS 180 tablet 3    PARoxetine (PAXIL) 40 MG tablet TAKE 1 TABLET EVERY MORNING 90 tablet 3    nystatin (MYCOSTATIN) 266900 UNIT/GM powder Apply 3 times daily.  120 g 1    diclofenac sodium (VOLTAREN) 1 % GEL apply 4 grams to affected area twice a day AS NEEDED FOR PAIN      furosemide (LASIX) 20 MG tablet Take 1 tablet by mouth 2 times daily 180 tablet 1    aspirin 81 MG tablet Take 81 mg by mouth daily No current facility-administered medications on file prior to encounter. REVIEW OF SYSTEMS  Review of Systems    Pertinent items are noted in HPI. Objective:      /74   Pulse 70   Temp 98.4 °F (36.9 °C) (Tympanic)   Resp 18   Ht 5' 7\" (1.702 m)   Wt 102 lb (46.3 kg)   BMI 15.98 kg/m²     Wt Readings from Last 3 Encounters:   06/20/22 102 lb (46.3 kg)   06/06/22 102 lb (46.3 kg)   05/23/22 102 lb (46.3 kg)       PHYSICAL EXAM  Physical Exam  Constitutional:       General: She is not in acute distress. HENT:      Head: Normocephalic and atraumatic. Right Ear: External ear normal.      Left Ear: External ear normal.   Eyes:      General: No scleral icterus. Conjunctiva/sclera: Conjunctivae normal.   Pulmonary:      Effort: Pulmonary effort is normal. No respiratory distress. Abdominal:      General: There is no distension. Palpations: Abdomen is soft. Musculoskeletal:      Cervical back: Neck supple. No rigidity. Skin:     Comments: Multiple wounds to the face with scabs   Neurological:      Mental Status: She is alert and oriented to person, place, and time. Mental status is at baseline. Assessment:        Problem List Items Addressed This Visit     Decubitus ulcer of left hip, stage 3 ([address] - Primary    Relevant Orders    Initiate Outpatient Wound Care Protocol           Procedure Note  Indications:  Based on my examination of this patient's wound(s)/ulcer(s) today, debridement is required to promote healing and evaluate the wound base. Performed by: Estela Etienne MD    Consent obtained:  Yes    Time out taken:  Yes    Pain Control: Anesthetic  Anesthetic: 4% Lidocaine Liquid Topical       Debridement: Excisional Debridement    Using curette the wound(s)/ulcer(s) was/were debrided down through and including the removal of subcutaneous tissue and muscle/fascia.         Devitalized Tissue Debrided:  biofilm, slough and exudate    Pre Debridement Measurements:  Are located in the [place]  Documentation Flow Sheet    Diabetic/Pressure/Non Pressure Ulcers only:  Ulcer: N/A     Wound/Ulcer #: 1    Post Debridement Measurements:  Wound/Ulcer Descriptions are Pre Debridement except measurements:    Wound 05/16/22 Ischium Left wound #1 left ishium (Active)   Wound Image   05/16/22 1040   Wound Etiology Pressure Stage 4 06/20/22 1033   Dressing Status New drainage noted; Old drainage noted 06/20/22 1033   Wound Cleansed Cleansed with saline 06/20/22 1033   Dressing/Treatment Other (comment) 06/06/22 1041   Wound Length (cm) 2.5 cm 06/20/22 1033   Wound Width (cm) 1.9 cm 06/20/22 1033   Wound Depth (cm) 1 cm 06/20/22 1033   Wound Surface Area (cm^2) 4.75 cm^2 06/20/22 1033   Change in Wound Size % (l*w) 30.15 06/20/22 1033   Wound Volume (cm^3) 4.75 cm^3 06/20/22 1033   Wound Healing % 50 06/20/22 1033   Post-Procedure Length (cm) 2.5 cm 06/20/22 1033   Post-Procedure Width (cm) 1.9 cm 06/20/22 1033   Post-Procedure Depth (cm) 1 cm 06/20/22 1033   Post-Procedure Surface Area (cm^2) 4.75 cm^2 06/20/22 1033   Post-Procedure Volume (cm^3) 4.75 cm^3 06/20/22 1033   Undermining Starts ___ O'Clock 0800 06/20/22 1033   Undermining Ends___ O'Clock 0900 06/20/22 1033   Undermining Maxium Distance (cm) 1.3 cm @ 0900 06/20/22 1033   Wound Assessment Pink/red;Slough; Exposed structure fascia 06/20/22 1033   Drainage Amount Moderate 06/20/22 1033   Drainage Description Serosanguinous; Yellow 06/20/22 1033   Odor None 06/20/22 1033   Mesha-wound Assessment Blanchable erythema 06/20/22 1033   Margins Defined edges 06/06/22 1011   Number of days: 35          Total Surface Area Debrided:  4.75 sq cm     Estimated Blood Loss:  Minimal    Hemostasis Achieved:  by pressure    Procedural Pain:  0  / 10     Post Procedural Pain:  0 / 10     Response to treatment:  Well tolerated by patient.        Plan:     Treatment Note please see attached Discharge Instructions    Written patient dismissal instructions given to patient and signed by patient or POA. Discharge Instructions         [address] [facility]                                 Visit Matias Instructions / Physician Orders  DATE:6/20/22     Home Care: [facility]-      SUPPLIES ORDERED THRU:  Home care to supply wound care dressings due to medicare guide lines-See New Order                    Wound Location:  Left ischium, Left buttock        Cleanse with: Liquid antibacterial soap and water, rinse well      Dressing Orders:  Primary dressing Paty to wound cover with 4x4 Silicone border dressing (gentac)     x 30days     Frequency:  Daily     Additional Orders: Increase protein to diet (meat, cheese, eggs, fish, peanut butter, nuts and beans)  Multivitamin daily     OFFLOADING [x]??? YES  TYPE:                  []? ?? NA  Alternating air mattress through [facility] -order sent 5/16/22     Weekly wound care visits until determined otherwise.     Antibiotic therapy-wound care related YES [x]? ?? NO []??? NA[]? ??    MY CHART []???     Smart Device  []???      HYPERBARIC TREATMENT-                TREATMENT #                          Your next appointment with the [address] [place] [address] is in 1 week                                                                                                   (Please note your next appointment above and if you are unable to keep, kindly give a 24 hour notice.  Thank you.)  If more than 15 min late we cannot guarantee you will be seen due to clinician schedule  Per Policy, Excessive cancellation will call for dismissal from program.  If you experience any of the following, please call the [address] [place] [address] during business hours:  [phone]     * Increase in Pain  * Temperature over 101  * Increase in drainage from your wound  * Drainage with a foul odor  * Bleeding  * Increase in swelling  * Need for compression bandage changes due to slippage, breakthrough drainage.     If you need medical attention outside of the business hours of the [address] [place] [address] Road please contact your PCP or go to the nearest emergency room.     The information contained in the After Visit Summary has been reviewed with me, the patient and/or responsible adult, by my health care provider(s). I had the opportunity to ask questions regarding this information. I have elected to receive;      []? ? ? After Visit Summary  [x]? ?? Comprehensive Discharge Instruction        Patient signature______________________________________        Electronically signed by Dario Sánchez MD on 6/20/2022 at 10:42 AM

## 2022-07-11 ENCOUNTER — HOSPITAL ENCOUNTER (OUTPATIENT)
Dept: WOUND CARE | Age: 87
Discharge: HOME | End: 2022-07-11
Payer: MEDICARE

## 2022-07-11 VITALS
SYSTOLIC BLOOD PRESSURE: 138 MMHG | HEART RATE: 77 BPM | RESPIRATION RATE: 18 BRPM | BODY MASS INDEX: 15.57 KG/M2 | DIASTOLIC BLOOD PRESSURE: 82 MMHG | WEIGHT: 99.43 LBS | TEMPERATURE: 98.7 F

## 2022-07-11 DIAGNOSIS — L89.223 DECUBITUS ULCER OF LEFT HIP, STAGE 3 (HCC): Primary | ICD-10-CM

## 2022-07-11 DIAGNOSIS — R26.89 DECREASED MOBILITY: ICD-10-CM

## 2022-07-11 PROBLEM — L03.119 CELLULITIS OF LOWER EXTREMITY: Status: RESOLVED | Noted: 2021-03-10 | Resolved: 2022-07-11

## 2022-07-11 PROBLEM — L89.159 DECUBITUS ULCER OF COCCYX: Status: RESOLVED | Noted: 2021-03-10 | Resolved: 2022-07-11

## 2022-07-11 PROBLEM — L97.511 ULCER OF RIGHT FOOT, LIMITED TO BREAKDOWN OF SKIN (HCC): Status: RESOLVED | Noted: 2021-03-01 | Resolved: 2022-07-11

## 2022-07-11 PROCEDURE — 11042 DBRDMT SUBQ TIS 1ST 20SQCM/<: CPT | Performed by: NURSE PRACTITIONER

## 2022-07-11 PROCEDURE — 11042 DBRDMT SUBQ TIS 1ST 20SQCM/<: CPT

## 2022-07-11 RX ORDER — LIDOCAINE 50 MG/G
OINTMENT TOPICAL ONCE
Status: CANCELLED | OUTPATIENT
Start: 2022-07-11 | End: 2022-07-11

## 2022-07-11 RX ORDER — GINSENG 100 MG
CAPSULE ORAL ONCE
Status: CANCELLED | OUTPATIENT
Start: 2022-07-11 | End: 2022-07-11

## 2022-07-11 RX ORDER — LIDOCAINE HYDROCHLORIDE 20 MG/ML
JELLY TOPICAL ONCE
Status: CANCELLED | OUTPATIENT
Start: 2022-07-11 | End: 2022-07-11

## 2022-07-11 RX ORDER — LIDOCAINE HYDROCHLORIDE 40 MG/ML
SOLUTION TOPICAL ONCE
Status: CANCELLED | OUTPATIENT
Start: 2022-07-11 | End: 2022-07-11

## 2022-07-11 RX ORDER — GENTAMICIN SULFATE 1 MG/G
OINTMENT TOPICAL ONCE
Status: CANCELLED | OUTPATIENT
Start: 2022-07-11 | End: 2022-07-11

## 2022-07-11 RX ORDER — BACITRACIN, NEOMYCIN, POLYMYXIN B 400; 3.5; 5 [USP'U]/G; MG/G; [USP'U]/G
OINTMENT TOPICAL ONCE
Status: CANCELLED | OUTPATIENT
Start: 2022-07-11 | End: 2022-07-11

## 2022-07-11 RX ORDER — BACITRACIN ZINC AND POLYMYXIN B SULFATE 500; 1000 [USP'U]/G; [USP'U]/G
OINTMENT TOPICAL ONCE
Status: CANCELLED | OUTPATIENT
Start: 2022-07-11 | End: 2022-07-11

## 2022-07-11 RX ORDER — LIDOCAINE 40 MG/G
CREAM TOPICAL ONCE
Status: CANCELLED | OUTPATIENT
Start: 2022-07-11 | End: 2022-07-11

## 2022-07-11 RX ORDER — BETAMETHASONE DIPROPIONATE 0.05 %
OINTMENT (GRAM) TOPICAL ONCE
Status: CANCELLED | OUTPATIENT
Start: 2022-07-11 | End: 2022-07-11

## 2022-07-11 RX ORDER — CLOBETASOL PROPIONATE 0.5 MG/G
OINTMENT TOPICAL ONCE
Status: CANCELLED | OUTPATIENT
Start: 2022-07-11 | End: 2022-07-11

## 2022-07-11 RX ORDER — LIDOCAINE HYDROCHLORIDE 40 MG/ML
SOLUTION TOPICAL ONCE
Status: COMPLETED | OUTPATIENT
Start: 2022-07-11 | End: 2022-07-11

## 2022-07-11 RX ADMIN — LIDOCAINE HYDROCHLORIDE 10 ML: 40 SOLUTION TOPICAL at 10:12

## 2022-07-11 ASSESSMENT — PAIN SCALES - GENERAL: PAINLEVEL_OUTOF10: 0

## 2022-07-11 NOTE — PLAN OF CARE
Problem: Chronic Conditions and Co-morbidities  Goal: Patient's chronic conditions and co-morbidity symptoms are monitored and maintained or improved  7/11/2022 1025 by Livier Macdonald RN  Outcome: Progressing  7/11/2022 1016 by Livier Macdonald RN  Outcome: Progressing     Problem: Discharge Planning  Goal: Discharge to home or other facility with appropriate resources  7/11/2022 1025 by Livier Macdonald RN  Outcome: Progressing  7/11/2022 1016 by Livier Macdonald RN  Outcome: Progressing     Problem: ABCDS Injury Assessment  Goal: Absence of physical injury  7/11/2022 1025 by Livier Macdonald RN  Outcome: Progressing  7/11/2022 1016 by Livier Macdonald RN  Outcome: Progressing  Flowsheets (Taken 7/11/2022 0958 by Shraddha Lazo RN)  Absence of Physical Injury: Implement safety measures based on patient assessment

## 2022-07-11 NOTE — PROGRESS NOTES
[address]   Progress Note and Procedure Note      7 [facility] RECORD NUMBER:  [id number]  AGE: 81 y.o. GENDER: female  : 1935  EPISODE DATE:  2022    Subjective:     Chief Complaint   Patient presents with    Wound Check     Left ischium         HISTORY of PRESENT ILLNESS HPI     Kamar Munoz is a 81 y.o. female who presents today for wound/ulcer evaluation. History of Wound Context: here to follow up on left ischial pressure ulceration. Healing well without signs of infection.    Wound/Ulcer Pain Timing/Severity: none  Quality of pain: N/A  Severity:  0 / 10   Modifying Factors: None  Associated Signs/Symptoms: none    Ulcer Identification:  Ulcer Type: pressure  Contributing Factors: chronic pressure, decreased mobility, decreased tissue oxygenation and malnutrition    Wound: N/A        PAST MEDICAL HISTORY        Diagnosis Date    Anxiety     Arthritis     Basal cell carcinoma     Cancer (HCC)     skin    CHF (congestive heart failure) (HCC)     Closed fracture of single pubic ramus of pelvis with routine healing, right 2020    Depression     Gout     H/O total hysterectomy 2018    Hyperlipidemia     Hypertension     Hypokalemia     Hypothyroidism     Kidney stone     Kidney stone     Melanoma ([address]     NSTEMI (non-ST elevated myocardial infarction) ([address] 2020    Pressure injury of coccygeal region, stage 2 ([address] 3/10/2020       PAST SURGICAL HISTORY    Past Surgical History:   Procedure Laterality Date    HYSTERECTOMY (CERVIX STATUS UNKNOWN)      HYSTERECTOMY, VAGINAL      TUBAL LIGATION         FAMILY HISTORY    Family History   Problem Relation Age of Onset    Heart Disease Father     Other Mother          in her sleep unknown cause       SOCIAL HISTORY    Social History     Tobacco Use    Smoking status: Former Smoker     Quit date: 1960     Years since quittin.2    Smokeless tobacco: Never Used Vaping Use    Vaping Use: Never used   Substance Use Topics    Alcohol use: No     Alcohol/week: 0.0 standard drinks    Drug use: No       ALLERGIES    Allergies   Allergen Reactions    Bactrim [Sulfamethoxazole-Trimethoprim] Other (See Comments)     DIZZINESS       MEDICATIONS    Current Outpatient Medications on File Prior to Encounter   Medication Sig Dispense Refill    collagenase (SANTYL) 250 UNIT/GM ointment Apply topically to wound followed by secondary dressing change daily. 90 g 2    tiZANidine (ZANAFLEX) 2 MG tablet take 1 tablet by mouth three times a day if needed 30 tablet 0    Misc. Devices (MATTRESS PAD) MISC Gel overlay to be used on bed at all times 1 each 0    lisinopril (PRINIVIL;ZESTRIL) 20 MG tablet TAKE 1 TABLET DAILY 90 tablet 1    busPIRone (BUSPAR) 15 MG tablet Take 12 tablet daily 90 tablet 1    ammonium lactate (LAC-HYDRIN) 12 % cream Apply topically to dry skin twice daily. 385 g 2    levothyroxine (SYNTHROID) 100 MCG tablet TAKE 1 TABLET DAILY 90 tablet 1    Nutritional Supplements (ENSURE MAX PROTEIN) LIQD Take 330 mLs by mouth 2 times daily 60 box 2    atorvastatin (LIPITOR) 10 MG tablet TAKE 1 TABLET DAILY 90 tablet 3    carvedilol (COREG) 12.5 MG tablet TAKE 1 TABLET TWICE A DAY WITH MEALS 180 tablet 3    PARoxetine (PAXIL) 40 MG tablet TAKE 1 TABLET EVERY MORNING 90 tablet 3    nystatin (MYCOSTATIN) 172268 UNIT/GM powder Apply 3 times daily. 120 g 1    diclofenac sodium (VOLTAREN) 1 % GEL apply 4 grams to affected area twice a day AS NEEDED FOR PAIN      furosemide (LASIX) 20 MG tablet Take 1 tablet by mouth 2 times daily 180 tablet 1    aspirin 81 MG tablet Take 81 mg by mouth daily       No current facility-administered medications on file prior to encounter.        REVIEW OF SYSTEMS    Pertinent items are noted in HPI    Objective:      /82   Pulse 77   Temp 98.7 °F (37.1 °C) (Tympanic)   Resp 18   Wt 99 lb 6.8 oz (45.1 kg)   BMI 15.57 kg/m²     Wt Readings from Last 3 Encounters:   07/11/22 99 lb 6.8 oz (45.1 kg)   06/20/22 102 lb (46.3 kg)   06/06/22 102 lb (46.3 kg)       PHYSICAL EXAM    General Appearance: alert and oriented to person, place and time, well-developed and well-nourished, in no acute distress  Skin: warm and dry, no rash or erythema, left ischial pressure ulceration   Head: normocephalic and atraumatic  Eyes: pupils equal, round, and conjunctivae normal  Pulmonary/Chest: normal air movement, no respiratory distress  Extremities: no cyanosis and no clubbing    Musculoskeletal: no joint swelling, deformity or tenderness  Neurologic: bedridden, coordination normal and speech normal      Assessment:     Problem List Items Addressed This Visit     Decreased mobility    Decubitus ulcer of left hip, stage 3 ([address] - Primary    Relevant Orders    Initiate Outpatient Wound Care Protocol           Procedure Note  Indications:  Based on my examination of this patient's wound(s)/ulcer(s) today, debridement is required to promote healing and evaluate the wound base. Performed by: NORMA Valdez - CNP    Consent obtained:  Yes    Time out taken:  Yes    Pain Control: Anesthetic  Anesthetic: 4% Lidocaine Liquid Topical     Debridement:Excisional Debridement    Using curette the wound(s)/ulcer(s) was/were sharply debrided down through and including the removal of subcutaneous tissue. Devitalized Tissue Debrided:  biofilm and slough    Pre Debridement Measurements:  Are located in the [place]  Documentation Flow Sheet    Wound/Ulcer #: 1    Post Debridement Measurements:  Wound/Ulcer Descriptions are Pre Debridement except measurements:    Wound 05/16/22 Ischium Left wound #1 left ishium (Active)   Wound Image   07/11/22 1006   Wound Etiology Pressure Stage 4 07/11/22 1006   Dressing Status New drainage noted; Old drainage noted 07/11/22 1006   Wound Cleansed Cleansed with saline 07/11/22 1006   Dressing/Treatment Other (comment) 06/20/22 1049   Wound Length (cm) 1 cm 07/11/22 1006   Wound Width (cm) 0.8 cm 07/11/22 1006   Wound Depth (cm) 1 cm 07/11/22 1006   Wound Surface Area (cm^2) 0.8 cm^2 07/11/22 1006   Change in Wound Size % (l*w) 88.24 07/11/22 1006   Wound Volume (cm^3) 0.8 cm^3 07/11/22 1006   Wound Healing % 92 07/11/22 1006   Post-Procedure Length (cm) 1 cm 07/11/22 1006   Post-Procedure Width (cm) 0.8 cm 07/11/22 1006   Post-Procedure Depth (cm) 1 cm 07/11/22 1006   Post-Procedure Surface Area (cm^2) 0.8 cm^2 07/11/22 1006   Post-Procedure Volume (cm^3) 0.8 cm^3 07/11/22 1006   Undermining Starts ___ O'Clock 8 07/11/22 1006   Undermining Ends___ O'Clock 9 07/11/22 1006   Undermining Maxium Distance (cm) 1.0 cm 07/11/22 1006   Wound Assessment Pink/red;Slough 07/11/22 1006   Drainage Amount Moderate 07/11/22 1006   Drainage Description Serosanguinous; Yellow 07/11/22 1006   Odor None 07/11/22 1006   Mesha-wound Assessment Blanchable erythema 07/11/22 1006   Margins Defined edges 07/11/22 1006   Number of days: 55          Percent of Wound(s)/Ulcer(s) Debrided: 100%    Total Surface Area Debrided:  0.80 sq cm     Diabetic/Pressure/Non Pressure Ulcers only:  Ulcer: Pressure ulcer, Stage 3    Estimated Blood Loss:  Minimal    Hemostasis Achieved:  by pressure    Procedural Pain:  0  / 10     Post Procedural Pain:  0 / 10     Response to treatment:  Well tolerated by patient. Plan:     Treatment Note please see Discharge Instructions    Written patient dismissal instructions given to patient and signed by patient or POA.            Electronically signed by NORMA Nicole CNP on 7/11/2022 at 10:31 AM

## 2022-07-11 NOTE — PLAN OF CARE
Problem: Chronic Conditions and Co-morbidities  Goal: Patient's chronic conditions and co-morbidity symptoms are monitored and maintained or improved  Outcome: Progressing     Problem: Discharge Planning  Goal: Discharge to home or other facility with appropriate resources  Outcome: Progressing     Problem: ABCDS Injury Assessment  Goal: Absence of physical injury  Outcome: Progressing  Flowsheets (Taken 7/11/2022 0958 by Palmira Abdul RN)  Absence of Physical Injury: Implement safety measures based on patient assessment

## 2022-07-31 NOTE — DISCHARGE INSTRUCTIONS
[id number]                                 Visit  Discharge Instructions / Physician Orders  [facility]: [place]-     SUPPLIES ORDERED THRU:  Home care to supply wound care dressings due to medicare guide lines-See New Order                   Wound Location:  Left ischium        Cleanse with: Liquid antibacterial soap and water, rinse well      Dressing Orders:  Primary dressing Paty to wound cover with 4x4 Silicone border dressing (gentac)     x 30days     Frequency:  Change every other day     Additional Orders: Increase protein to diet (meat, cheese, eggs, fish, peanut butter, nuts and beans)  Multivitamin daily     OFFLOADING [x] YES  TYPE:                  [] NA        Weekly wound care visits until determined otherwise. Antibiotic therapy-wound care related YES [x] NO [] NA[]     MY CHART []     Smart Device  []      HYPERBARIC TREATMENT-                TREATMENT #                          Your next appointment with the [facility] is in 3 weeks with Dr. Sheldon Franco                                                                                                   (Please note your next appointment above and if you are unable to keep, kindly give a 24 hour notice. Thank you.)  If more than 15 min late we cannot guarantee you will be seen due to clinician schedule  Per Policy, Excessive cancellation will call for dismissal from program.  If you experience any of the following, please call the [facility] during business hours:  [phone]     * Increase in Pain  * Temperature over 101  * Increase in drainage from your wound  * Drainage with a foul odor  * Bleeding  * Increase in swelling  * Need for compression bandage changes due to slippage, breakthrough drainage. If you need medical attention [facility] please contact your PCP or go to the nearest emergency room.      The information contained in the After Visit Summary has been reviewed with me, the patient and/or responsible adult, by my health care provider(s). I had the opportunity to ask questions regarding this information.  I have elected to receive;      []After Visit Summary  [x]Comprehensive Discharge Instruction        Patient signature______________________________________  Electronically signed by Kaylen Falcon RN on 8/1/2022 at 10:48 AM  Electronically signed by Chris Diaz MD on 8/1/2022 at 11:14 AM

## 2022-08-01 ENCOUNTER — HOSPITAL ENCOUNTER (OUTPATIENT)
Dept: WOUND CARE | Age: 87
Discharge: HOME | End: 2022-08-01
Payer: MEDICARE

## 2022-08-01 VITALS
WEIGHT: 98.99 LBS | HEIGHT: 67 IN | DIASTOLIC BLOOD PRESSURE: 63 MMHG | SYSTOLIC BLOOD PRESSURE: 120 MMHG | TEMPERATURE: 98.7 F | RESPIRATION RATE: 18 BRPM | BODY MASS INDEX: 15.54 KG/M2 | HEART RATE: 60 BPM

## 2022-08-01 DIAGNOSIS — L89.223 DECUBITUS ULCER OF LEFT HIP, STAGE 3 (HCC): Primary | ICD-10-CM

## 2022-08-01 PROCEDURE — 11043 DBRDMT MUSC&/FSCA 1ST 20/<: CPT | Performed by: INTERNAL MEDICINE

## 2022-08-01 PROCEDURE — 11042 DBRDMT SUBQ TIS 1ST 20SQCM/<: CPT

## 2022-08-01 RX ORDER — LIDOCAINE HYDROCHLORIDE 20 MG/ML
JELLY TOPICAL ONCE
Status: CANCELLED | OUTPATIENT
Start: 2022-08-01 | End: 2022-08-01

## 2022-08-01 RX ORDER — LIDOCAINE HYDROCHLORIDE 40 MG/ML
SOLUTION TOPICAL ONCE
Status: CANCELLED | OUTPATIENT
Start: 2022-08-01 | End: 2022-08-01

## 2022-08-01 RX ORDER — LIDOCAINE HYDROCHLORIDE 40 MG/ML
SOLUTION TOPICAL ONCE
Status: COMPLETED | OUTPATIENT
Start: 2022-08-01 | End: 2022-08-01

## 2022-08-01 RX ADMIN — LIDOCAINE HYDROCHLORIDE 5 ML: 40 SOLUTION TOPICAL at 10:45

## 2022-08-01 ASSESSMENT — PAIN DESCRIPTION - PROGRESSION: CLINICAL_PROGRESSION: NOT CHANGED

## 2022-08-01 ASSESSMENT — PAIN SCALES - GENERAL: PAINLEVEL_OUTOF10: 3

## 2022-08-01 ASSESSMENT — PAIN - FUNCTIONAL ASSESSMENT: PAIN_FUNCTIONAL_ASSESSMENT: ACTIVITIES ARE NOT PREVENTED

## 2022-08-01 ASSESSMENT — PAIN DESCRIPTION - ONSET: ONSET: ON-GOING

## 2022-08-01 ASSESSMENT — PAIN DESCRIPTION - DESCRIPTORS: DESCRIPTORS: ACHING

## 2022-08-01 ASSESSMENT — PAIN DESCRIPTION - PAIN TYPE: TYPE: CHRONIC PAIN

## 2022-08-01 ASSESSMENT — PAIN DESCRIPTION - ORIENTATION: ORIENTATION: LEFT

## 2022-08-01 ASSESSMENT — PAIN DESCRIPTION - LOCATION: LOCATION: HIP

## 2022-08-01 ASSESSMENT — PAIN DESCRIPTION - FREQUENCY: FREQUENCY: INTERMITTENT

## 2022-08-01 NOTE — PLAN OF CARE
Problem: Chronic Conditions and Co-morbidities  Goal: Patient's chronic conditions and co-morbidity symptoms are monitored and maintained or improved  Outcome: Progressing     Problem: Discharge Planning  Goal: Discharge to home or other facility with appropriate resources  Outcome: Progressing     Problem: Pain  Goal: Verbalizes/displays adequate comfort level or baseline comfort level  Outcome: Progressing     Problem: ABCDS Injury Assessment  Goal: Absence of physical injury  Outcome: Progressing  Flowsheets (Taken 8/1/2022 1033 by Suzie Olivares RN)  Absence of Physical Injury: Implement safety measures based on patient assessment

## 2022-08-01 NOTE — PROGRESS NOTES
[address]   Progress Note and Procedure Note      7 [facility] RECORD NUMBER:  [id number]  AGE: 81 y.o. GENDER: female  : 1935  EPISODE DATE:  2022    Subjective:     Chief Complaint   Patient presents with    Wound Check     Left ischium         HISTORY of PRESENT ILLNESS HPI     Lsia Vásquez is a 81 y.o. female who presents today for wound/ulcer evaluation. History of Wound Context: The patient is here for evaluation of the left ischium wound that is improving with no purulent drainage, no surrounding redness. She denied fever or chills, denied nausea or vomiting, no other complaints  Tissue culture on 2022 grew MRSA that was sensitive to tetracycline and Bacteroides. The patient was treated with a course of doxycycline and Augmentin that was completed . She also had multiple wounds to the face with scabs due to picking.   Wound/Ulcer Pain Timing/Severity: Moderate    Ulcer Identification:  Ulcer Type: pressure    Contributing Factors: chronic pressure, decreased mobility and decreased tissue oxygenation        PAST MEDICAL HISTORY        Diagnosis Date    Anxiety     Arthritis     Basal cell carcinoma     Cancer (HCC)     skin    CHF (congestive heart failure) (HCC)     Closed fracture of single pubic ramus of pelvis with routine healing, right 2020    Depression     Gout     H/O total hysterectomy 2018    Hyperlipidemia     Hypertension     Hypokalemia     Hypothyroidism     Kidney stone     Kidney stone     Melanoma ([address]     NSTEMI (non-ST elevated myocardial infarction) ([address] 2020    Pressure injury of coccygeal region, stage 2 ([address] 3/10/2020       PAST SURGICAL HISTORY    Past Surgical History:   Procedure Laterality Date    HYSTERECTOMY (CERVIX STATUS UNKNOWN)      HYSTERECTOMY, VAGINAL      TUBAL LIGATION         FAMILY HISTORY    Family History   Problem Relation Age of Onset    Heart Disease Father     Other Mother          in her sleep unknown cause       SOCIAL HISTORY    Social History     Tobacco Use    Smoking status: Former     Types: Cigarettes     Quit date: 1960     Years since quittin.2    Smokeless tobacco: Never   Vaping Use    Vaping Use: Never used   Substance Use Topics    Alcohol use: No     Alcohol/week: 0.0 standard drinks    Drug use: No       ALLERGIES    Allergies   Allergen Reactions    Bactrim [Sulfamethoxazole-Trimethoprim] Other (See Comments)     DIZZINESS       MEDICATIONS    Current Outpatient Medications on File Prior to Encounter   Medication Sig Dispense Refill    collagenase (SANTYL) 250 UNIT/GM ointment Apply topically to wound followed by secondary dressing change daily. 90 g 2    tiZANidine (ZANAFLEX) 2 MG tablet take 1 tablet by mouth three times a day if needed 30 tablet 0    Misc. Devices (MATTRESS PAD) MISC Gel overlay to be used on bed at all times 1 each 0    lisinopril (PRINIVIL;ZESTRIL) 20 MG tablet TAKE 1 TABLET DAILY 90 tablet 1    busPIRone (BUSPAR) 15 MG tablet Take 12 tablet daily 90 tablet 1    ammonium lactate (LAC-HYDRIN) 12 % cream Apply topically to dry skin twice daily. 385 g 2    levothyroxine (SYNTHROID) 100 MCG tablet TAKE 1 TABLET DAILY 90 tablet 1    Nutritional Supplements (ENSURE MAX PROTEIN) LIQD Take 330 mLs by mouth 2 times daily 60 box 2    atorvastatin (LIPITOR) 10 MG tablet TAKE 1 TABLET DAILY 90 tablet 3    carvedilol (COREG) 12.5 MG tablet TAKE 1 TABLET TWICE A DAY WITH MEALS 180 tablet 3    PARoxetine (PAXIL) 40 MG tablet TAKE 1 TABLET EVERY MORNING 90 tablet 3    nystatin (MYCOSTATIN) 638903 UNIT/GM powder Apply 3 times daily. 120 g 1    diclofenac sodium (VOLTAREN) 1 % GEL apply 4 grams to affected area twice a day AS NEEDED FOR PAIN      furosemide (LASIX) 20 MG tablet Take 1 tablet by mouth 2 times daily 180 tablet 1    aspirin 81 MG tablet Take 81 mg by mouth daily       No current facility-administered medications on file prior to encounter. REVIEW OF SYSTEMS  Review of Systems    Pertinent items are noted in HPI. Objective:      /63   Pulse 60   Temp 98.7 °F (37.1 °C) (Tympanic)   Resp 18   Ht 5' 7\" (1.702 m)   Wt 98 lb 15.8 oz (44.9 kg)   BMI 15.50 kg/m²     Wt Readings from Last 3 Encounters:   08/01/22 98 lb 15.8 oz (44.9 kg)   07/11/22 99 lb 6.8 oz (45.1 kg)   06/20/22 102 lb (46.3 kg)       PHYSICAL EXAM  Physical Exam  Constitutional:       General: She is not in acute distress. HENT:      Head: Normocephalic and atraumatic. Right Ear: External ear normal.      Left Ear: External ear normal.   Eyes:      General: No scleral icterus. Conjunctiva/sclera: Conjunctivae normal.   Pulmonary:      Effort: Pulmonary effort is normal. No respiratory distress. Abdominal:      General: There is no distension. Palpations: Abdomen is soft. Musculoskeletal:      Cervical back: Neck supple. No rigidity. Neurological:      Mental Status: She is alert and oriented to person, place, and time. Mental status is at baseline. Assessment:        Problem List Items Addressed This Visit       Decubitus ulcer of left hip, stage 3 ([address] - Primary    Relevant Orders    Initiate Outpatient Wound Care Protocol        Procedure Note  Indications:  Based on my examination of this patient's wound(s)/ulcer(s) today, debridement is required to promote healing and evaluate the wound base. Performed by: Mari Munoz MD    Consent obtained:  Yes    Time out taken:  Yes    Pain Control: Anesthetic  Anesthetic: 4% Lidocaine Liquid Topical       Debridement: Excisional Debridement    Using curette the wound(s)/ulcer(s) was/were debrided down through and including the removal of subcutaneous tissue and muscle/fascia.         Devitalized Tissue Debrided:  biofilm, slough and exudate    Pre Debridement Measurements:  Are located in the [place]  Documentation Flow Sheet    Diabetic/Pressure/Non Pressure Ulcers only:  Ulcer: N/A Wound/Ulcer #: 1    Post Debridement Measurements:  Wound/Ulcer Descriptions are Pre Debridement except measurements:    Wound 05/16/22 Ischium Left wound #1 left ishium (Active)   Wound Image   07/11/22 1006   Wound Etiology Pressure Stage 4 08/01/22 1037   Dressing Status New drainage noted; Old drainage noted 08/01/22 1037   Wound Cleansed Cleansed with saline 08/01/22 1037   Dressing/Treatment Other (comment) 07/11/22 1030   Wound Length (cm) 1.1 cm 08/01/22 1037   Wound Width (cm) 1.2 cm 08/01/22 1037   Wound Depth (cm) 0.2 cm 08/01/22 1037   Wound Surface Area (cm^2) 1.32 cm^2 08/01/22 1037   Change in Wound Size % (l*w) 80.59 08/01/22 1037   Wound Volume (cm^3) 0.264 cm^3 08/01/22 1037   Wound Healing % 97 08/01/22 1037   Post-Procedure Length (cm) 1 cm 07/11/22 1006   Post-Procedure Width (cm) 0.8 cm 07/11/22 1006   Post-Procedure Depth (cm) 1 cm 07/11/22 1006   Post-Procedure Surface Area (cm^2) 0.8 cm^2 07/11/22 1006   Post-Procedure Volume (cm^3) 0.8 cm^3 07/11/22 1006   Undermining Starts ___ O'Clock 8 07/11/22 1006   Undermining Ends___ O'Clock 9 07/11/22 1006   Undermining Maxium Distance (cm) 1.0 cm 07/11/22 1006   Wound Assessment [place]/red;Slough 08/01/22 1037   Drainage Amount Moderate 08/01/22 1037   Drainage Description Serosanguinous; Yellow 08/01/22 1037   Odor None 08/01/22 1037   Mesha-wound Assessment Blanchable erythema 08/01/22 1037   Margins Defined edges 08/01/22 1037   Number of days: 77          Total Surface Area Debrided:  0.8 sq cm     Estimated Blood Loss:  Minimal    Hemostasis Achieved:  by pressure    Procedural Pain:  0  / 10     Post Procedural Pain:  0 / 10     Response to treatment:  Well tolerated by patient. Plan:     Treatment Note please see attached Discharge Instructions    Written patient dismissal instructions given to patient and signed by patient or POA.          Discharge Instructions           [address] [facility] and [address] [facility] Visit  Discharge Instructions / Physician Orders  [facility]: [place]-     SUPPLIES ORDERED THRU:  Home care to supply wound care dressings due to medicare guide lines-See New Order                   Wound Location:  Left ischium        Cleanse with: Liquid antibacterial soap and water, rinse well      Dressing Orders:  Primary dressing Paty to wound cover with 4x4 Silicone border dressing (gentac)     x 30days     Frequency:  Change every other day     Additional Orders: Increase protein to diet (meat, cheese, eggs, fish, peanut butter, nuts and beans)  Multivitamin daily     OFFLOADING [x] YES  TYPE:                  [] NA        Weekly wound care visits until determined otherwise. Antibiotic therapy-wound care related YES [x] NO [] NA[]     MY CHART []     Smart Device  []      HYPERBARIC TREATMENT-                TREATMENT #                          Your next appointment with the [facility] is in 3 weeks with Dr. Jessica Dickinson                                                                                                   (Please note your next appointment above and if you are unable to keep, kindly give a 24 hour notice. Thank you.)  If more than 15 min late we cannot guarantee you will be seen due to clinician schedule  Per Policy, Excessive cancellation will call for dismissal from program.  If you experience any of the following, please call the [facility] during business hours:  [phone]     * Increase in Pain  * Temperature over 101  * Increase in drainage from your wound  * Drainage with a foul odor  * Bleeding  * Increase in swelling  * Need for compression bandage changes due to slippage, breakthrough drainage. If you need medical attention [facility] please contact your PCP or go to the nearest emergency room.      The information contained in the After Visit Summary has been reviewed with me, the patient and/or responsible adult, by my health care provider(s). I had the opportunity to ask questions regarding this information.  I have elected to receive;      []After Visit Summary  [x]Comprehensive Discharge Instruction        Patient signature______________________________________  Electronically signed by Reyes Zazueta RN on 8/1/2022 at 10:48 AM          Electronically signed by Arabella Mitchell MD on 8/1/2022 at 11:11 AM

## 2022-08-03 DIAGNOSIS — E03.9 HYPOTHYROIDISM, UNSPECIFIED TYPE: ICD-10-CM

## 2022-08-04 RX ORDER — LEVOTHYROXINE SODIUM 0.1 MG/1
TABLET ORAL
Qty: 90 TABLET | Refills: 1 | Status: SHIPPED | OUTPATIENT
Start: 2022-08-04

## 2022-08-04 NOTE — TELEPHONE ENCOUNTER
Last visit: 05/12/2022 via VV  Last Med refill: 02/10/2022  Does patient have enough medication for 72 hours: Yes    Next Visit Date:  Future Appointments   Date Time Provider Roberto Hoda   8/11/2022  1:00 PM Cecille Holder MD [facility]   8/22/2022 10:15 AM Rafat Fish MD [facility] Maintenance   Topic Date Due    DTaP/Tdap/Td vaccine (1 - Tdap) Never done    Shingles vaccine (1 of 2) Never done    Lipids  01/29/2021    COVID-19 Vaccine (3 - Booster) 09/19/2021    Pneumococcal 65+ years Vaccine (1 - PCV) 11/11/2023 (Originally 7/3/1941)    Flu vaccine (1) 09/01/2022    Depression Monitoring  11/12/2022    Hepatitis A vaccine  Aged Out    Hepatitis B vaccine  Aged Out    Hib vaccine  Aged Out    Meningococcal (ACWY) vaccine  Aged Out       Hemoglobin A1C (%)   Date Value   03/06/2020 5.0             ( goal A1C is < 7)   No results found for: LABMICR  LDL Cholesterol (mg/dL)   Date Value   01/29/2020 65   07/02/2019 58       (goal LDL is <100)   AST (U/L)   Date Value   03/10/2021 10     ALT (U/L)   Date Value   03/10/2021 7     BUN (mg/dL)   Date Value   03/13/2021 12     BP Readings from Last 3 Encounters:   08/01/22 120/63   07/11/22 138/82   06/20/22 126/74          (goal 120/80)    All Future Testing planned in [facility]  Lab Frequency Next Occurrence   CBC Once 05/30/2022   BUN & Creatinine Once 05/16/2022   C-Reactive Protein Once 05/16/2022   Sedimentation Rate Once 05/16/2022               Patient Active Problem List:     Essential hypertension     Cancer ([address]     Hypothyroidism     Depression     Hyperlipidemia     Basal cell carcinoma     Melanoma ([address]     Anxiety     Hypokalemia     Acute idiopathic gout of right foot     CKD (chronic kidney disease) stage 3, GFR 30-59 ml/min (HCC)     Risk for falls     Pneumococcal vaccine refused     Influenza vaccination declined     Fatigue     Bilateral lower extremity pain     Chronic diastolic heart failure ([address] Acute cystitis without hematuria     Chronic systolic heart failure (HCC)     Decreased mobility     Localized edema     Xerosis cutis     Hallux valgus of left foot     Severe malnutrition (HCC)     Moderate episode of recurrent major depressive disorder (HCC)     Mild malnutrition ([address]     Decubitus ulcer of left hip, stage 3 ([address]

## 2022-08-11 ENCOUNTER — TELEMEDICINE (OUTPATIENT)
Dept: FAMILY MEDICINE CLINIC | Age: 87
End: 2022-08-11
Payer: MEDICARE

## 2022-08-11 DIAGNOSIS — E03.9 HYPOTHYROIDISM, UNSPECIFIED TYPE: ICD-10-CM

## 2022-08-11 DIAGNOSIS — F33.1 MODERATE EPISODE OF RECURRENT MAJOR DEPRESSIVE DISORDER (HCC): ICD-10-CM

## 2022-08-11 DIAGNOSIS — I10 ESSENTIAL HYPERTENSION: ICD-10-CM

## 2022-08-11 DIAGNOSIS — L89.223 DECUBITUS ULCER OF LEFT HIP, STAGE 3 (HCC): ICD-10-CM

## 2022-08-11 DIAGNOSIS — L85.3 XEROSIS CUTIS: ICD-10-CM

## 2022-08-11 DIAGNOSIS — R26.89 DECREASED MOBILITY: ICD-10-CM

## 2022-08-11 DIAGNOSIS — I50.32 CHRONIC DIASTOLIC HEART FAILURE (HCC): Primary | ICD-10-CM

## 2022-08-11 DIAGNOSIS — E78.5 HYPERLIPIDEMIA, UNSPECIFIED HYPERLIPIDEMIA TYPE: ICD-10-CM

## 2022-08-11 PROCEDURE — G8427 DOCREV CUR MEDS BY ELIG CLIN: HCPCS | Performed by: INTERNAL MEDICINE

## 2022-08-11 PROCEDURE — G8419 CALC BMI OUT NRM PARAM NOF/U: HCPCS | Performed by: INTERNAL MEDICINE

## 2022-08-11 PROCEDURE — 1036F TOBACCO NON-USER: CPT | Performed by: INTERNAL MEDICINE

## 2022-08-11 PROCEDURE — 1123F ACP DISCUSS/DSCN MKR DOCD: CPT | Performed by: INTERNAL MEDICINE

## 2022-08-11 PROCEDURE — 99214 OFFICE O/P EST MOD 30 MIN: CPT | Performed by: INTERNAL MEDICINE

## 2022-08-11 PROCEDURE — 1090F PRES/ABSN URINE INCON ASSESS: CPT | Performed by: INTERNAL MEDICINE

## 2022-08-11 SDOH — ECONOMIC STABILITY: FOOD INSECURITY: WITHIN THE PAST 12 MONTHS, YOU WORRIED THAT YOUR FOOD WOULD RUN OUT BEFORE YOU GOT MONEY TO BUY MORE.: NEVER TRUE

## 2022-08-11 SDOH — ECONOMIC STABILITY: FOOD INSECURITY: WITHIN THE PAST 12 MONTHS, THE FOOD YOU BOUGHT JUST DIDN'T LAST AND YOU DIDN'T HAVE MONEY TO GET MORE.: NEVER TRUE

## 2022-08-11 ASSESSMENT — ENCOUNTER SYMPTOMS
SHORTNESS OF BREATH: 0
NAUSEA: 0
WHEEZING: 0
DIARRHEA: 0
VOMITING: 0
CHOKING: 0
ABDOMINAL PAIN: 0
CHEST TIGHTNESS: 0
BLOOD IN STOOL: 0
CONSTIPATION: 0
ANAL BLEEDING: 0
COUGH: 0

## 2022-08-11 ASSESSMENT — SOCIAL DETERMINANTS OF HEALTH (SDOH): HOW HARD IS IT FOR YOU TO PAY FOR THE VERY BASICS LIKE FOOD, HOUSING, MEDICAL CARE, AND HEATING?: NOT HARD AT ALL

## 2022-08-11 NOTE — PROGRESS NOTES
Pt is being seen today via VV for a regular 3 month f/u     Pts daughter Fara Pompa will be doing the visit with pt today, pts daughter states they have no complaints today     No vitals were available     [phone]

## 2022-08-11 NOTE — PROGRESS NOTES
Marcelino Blanco (:  1935) is a Established patient, here for evaluation of the following:    Assessment & Plan   Below is the assessment and plan developed based on review of pertinent history, physical exam, labs, studies, and medications. 1. Chronic diastolic heart failure ([address]  2. Decreased mobility  3. Decubitus ulcer of left hip, stage 3 ([address]  4. Moderate episode of recurrent major depressive disorder ([address]  5. Xerosis cutis  6. Essential hypertension  -     Comprehensive Metabolic Panel; Future  7. Hypothyroidism, unspecified type  -     TSH With Reflex Ft4; Future  8. Hyperlipidemia, unspecified hyperlipidemia type  -     Comprehensive Metabolic Panel; Future  -     Lipid, Fasting; Future  No follow-ups on file. Subjective   Home health stopped coming out, wound is healed   Was able to get all her DME equipment. Arabella Daniels thinks Earline Estrada is doing well for most part, stable. Still has pain, taking advil and Salonpas patches. Hypertension-tolerating current regimen without chest pain, palpitations, dizziness, peripheral edema, dyspnea on exertion, orthopnea, paroxysmal nocturnal dyspnea. Hyperlipidemia-tolerating current regimen without myalgias, dyspepsia, jaundice. Mostly compliant with diet recommendations for low salt diet, tries to limit greasy/cheesy/fried foods, not very compliant with exercise recommendations. Cardiovascular risk factors: advanced age (older than 54 for men, 72 for women), dyslipidemia, hypertension, and sedentary lifestyle      Congestive Heart Failure  Presents for follow-up visit. Pertinent negatives include no abdominal pain, chest pain, fatigue, palpitations, shortness of breath or unexpected weight change. The symptoms have been stable. Compliance with total regimen is 51-75%. Compliance problems include adherence to exercise. Compliance with diet is %. Compliance with exercise is 0-25%. Compliance with medications is %.    Review of Systems (ZANAFLEX) 2 MG tablet take 1 tablet by mouth three times a day if needed  Patient not taking: Reported on 8/11/2022  Cecille Luis MD   nystatin (MYCOSTATIN) 429594 UNIT/GM powder Apply 3 times daily.   Patient not taking: Reported on 8/11/2022  Cecille Luis MD   diclofenac sodium (VOLTAREN) 1 % GEL apply 4 grams to affected area twice a day AS NEEDED FOR PAIN  Patient not taking: Reported on 8/11/2022  Diania Gowers, PA       Patient-Reported Vitals  No data recorded     Physical Exam  [INSTRUCTIONS:  \"[x]\" Indicates a positive item  \"[]\" Indicates a negative item  -- DELETE ALL ITEMS NOT EXAMINED]    Constitutional: [x] Appears well-developed and well-nourished [x] No apparent distress      [] Abnormal -     Mental status: [x] Alert and awake  [x] Oriented to person/place/time [x] Able to follow commands    [] Abnormal -     Eyes:   EOM    [x]  Normal    [] Abnormal -   Sclera  [x]  Normal    [] Abnormal -          Discharge [x]  None visible   [] Abnormal -     HENT: [x] Normocephalic, atraumatic  [] Abnormal -   [x] Mouth/Throat: Mucous membranes are moist    External Ears [x] Normal  [] Abnormal -    Neck: [x] No visualized mass [] Abnormal -     Pulmonary/Chest: [x] Respiratory effort normal   [x] No visualized signs of difficulty breathing or respiratory distress        [] Abnormal -      Musculoskeletal:   [x] Normal gait with no signs of ataxia         [x] Normal range of motion of neck        [] Abnormal -     Neurological:        [x] No Facial Asymmetry (Cranial nerve 7 motor function) (limited exam due to video visit)          [x] No gaze palsy        [] Abnormal -          Skin:        [] No significant exanthematous lesions or discoloration noted on facial skin         [x] Abnormal - excoriation and rash of face with bleeding - reportedly due to new lotion, stopped using it today            Psychiatric:       [x] Normal Affect [] Abnormal -        [x] No Hallucinations    Other pertinent observable physical exam findings:-             Angie Wallis, was evaluated through a synchronous (real-time) audio-video encounter. The patient (or guardian if applicable) is aware that this is a billable service, which includes applicable co-pays. This Virtual Visit was conducted with patient's (and/or legal guardian's) consent. The visit was conducted pursuant to the emergency declaration under the [address] [place] [address], [address] [place] [address] authority and the [employer] and [employer] General Act. Patient identification was verified, and a caregiver was present when appropriate. The patient was located at Home: [address] [place] [address].    Provider was located at [facility] ([address] [place], [place] [address]t): [address] [place] [address].        --Josee Sinclair MD

## 2022-08-18 ENCOUNTER — TELEMEDICINE (OUTPATIENT)
Dept: FAMILY MEDICINE CLINIC | Age: 87
End: 2022-08-18
Payer: MEDICARE

## 2022-08-18 DIAGNOSIS — L03.213 PRESEPTAL CELLULITIS OF LEFT EYE: Primary | ICD-10-CM

## 2022-08-18 PROCEDURE — 99213 OFFICE O/P EST LOW 20 MIN: CPT | Performed by: INTERNAL MEDICINE

## 2022-08-18 PROCEDURE — 1036F TOBACCO NON-USER: CPT | Performed by: INTERNAL MEDICINE

## 2022-08-18 PROCEDURE — G8419 CALC BMI OUT NRM PARAM NOF/U: HCPCS | Performed by: INTERNAL MEDICINE

## 2022-08-18 PROCEDURE — 1090F PRES/ABSN URINE INCON ASSESS: CPT | Performed by: INTERNAL MEDICINE

## 2022-08-18 PROCEDURE — G8427 DOCREV CUR MEDS BY ELIG CLIN: HCPCS | Performed by: INTERNAL MEDICINE

## 2022-08-18 PROCEDURE — 1123F ACP DISCUSS/DSCN MKR DOCD: CPT | Performed by: INTERNAL MEDICINE

## 2022-08-18 RX ORDER — CEPHALEXIN 500 MG/1
500 CAPSULE ORAL 2 TIMES DAILY
Qty: 14 CAPSULE | Refills: 0 | Status: SHIPPED | OUTPATIENT
Start: 2022-08-18 | End: 2022-08-25

## 2022-08-18 RX ORDER — OFLOXACIN 3 MG/ML
1 SOLUTION/ DROPS OPHTHALMIC 4 TIMES DAILY
Qty: 5 ML | Refills: 0 | Status: SHIPPED | OUTPATIENT
Start: 2022-08-18 | End: 2022-08-28

## 2022-08-18 ASSESSMENT — ENCOUNTER SYMPTOMS
PHOTOPHOBIA: 0
EYE ITCHING: 0
FOREIGN BODY SENSATION: 0
DOUBLE VISION: 0
EYE DISCHARGE: 1
VOMITING: 0
NAUSEA: 0
BLURRED VISION: 0
EYE REDNESS: 1

## 2022-08-18 NOTE — PROGRESS NOTES
Pt is being seen via VV     Pts LT eye is matted and really red, has a lot of white goopey stuff in it, no rash, no runny nose, no sneezing, it was noticed yesterday       Vitals not available

## 2022-08-18 NOTE — PROGRESS NOTES
Josseline Sharp (:  1935) is a Established patient, here for evaluation of the following:    Assessment & Plan   Below is the assessment and plan developed based on review of pertinent history, physical exam, labs, studies, and medications. 1. Preseptal cellulitis of left eye  -     cephALEXin (KEFLEX) 500 MG capsule; Take 1 capsule by mouth 2 times daily for 7 days, Disp-14 capsule, R-0Normal  -     ofloxacin (OCUFLOX) 0.3 % solution; Place 1 drop into the left eye 4 times daily for 10 days, Disp-5 mL, R-0Normal  Aquaphor to facial sores to prevent drying and itching   No follow-ups on file. Subjective   Eye Problem   The left eye is affected. This is a new problem. The current episode started yesterday. The problem occurs constantly. The problem has been rapidly worsening. There was no injury mechanism. The pain is mild. There is No known exposure to pink eye. She Does not wear contacts. Associated symptoms include an eye discharge and eye redness. Pertinent negatives include no blurred vision, double vision, fever, foreign body sensation, itching, nausea, photophobia, recent URI or vomiting. Review of Systems   Constitutional:  Negative for fever. Eyes:  Positive for discharge and redness. Negative for blurred vision, double vision, photophobia and itching. Gastrointestinal:  Negative for nausea and vomiting. All other systems reviewed and are negative. Prior to Visit Medications    Medication Sig Taking? Authorizing Provider   levothyroxine (SYNTHROID) 100 MCG tablet take 1 tablet by mouth once daily Yes Rupert La MD   [facility]. Devices (MATTRESS PAD) MISC Gel overlay to be used on bed at all times Yes Cecille Brock MD   lisinopril (PRINIVIL;ZESTRIL) 20 MG tablet TAKE 1 TABLET DAILY Yes Cecille Brock MD   busPIRone (BUSPAR) 15 MG tablet Take 12 tablet daily Yes Rupert La MD   ammonium lactate (LAC-HYDRIN) 12 % cream Apply topically to dry skin twice daily.  Yes Cecille Margot Flores MD   Nutritional Supplements (ENSURE MAX PROTEIN) LIQD Take 330 mLs by mouth 2 times daily Yes Marcelo Mckeon MD   atorvastatin (LIPITOR) 10 MG tablet TAKE 1 TABLET DAILY Yes Cecille Flores MD   carvedilol (COREG) 12.5 MG tablet TAKE 1 TABLET TWICE A DAY WITH MEALS Yes Cecille Flores MD   PARoxetine (PAXIL) 40 MG tablet TAKE 1 TABLET EVERY MORNING Yes Cecille Flores MD   furosemide (LASIX) 20 MG tablet Take 1 tablet by mouth 2 times daily Yes Marcelo Mckeon MD   aspirin 81 MG tablet Take 81 mg by mouth daily Yes Historical Provider, MD   collagenase (SANTYL) 250 UNIT/GM ointment Apply topically to wound followed by secondary dressing change daily. Patient not taking: Reported on 8/11/2022  Kamini Boyce MD   tiZANidine (ZANAFLEX) 2 MG tablet take 1 tablet by mouth three times a day if needed  Patient not taking: Reported on 8/11/2022  Cecille Flores MD   nystatin (MYCOSTATIN) 094401 UNIT/GM powder Apply 3 times daily.   Patient not taking: Reported on 8/11/2022  Cecille Flores MD   diclofenac sodium (VOLTAREN) 1 % GEL apply 4 grams to affected area twice a day AS NEEDED FOR PAIN  Patient not taking: Reported on 8/11/2022  KAY Lockhart       Objective   Patient-Reported Vitals  No data recorded     Physical Exam  [INSTRUCTIONS:  \"[x]\" Indicates a positive item  \"[]\" Indicates a negative item  -- DELETE ALL ITEMS NOT EXAMINED]    Constitutional: [x] Appears well-developed and well-nourished [x] No apparent distress      [] Abnormal -     Mental status: [x] Alert and awake  [x] Oriented to person/place/time [x] Able to follow commands    [] Abnormal -     Eyes:   EOM    [x]  Normal    [] Abnormal -   Sclera  [x]  Normal    [] Abnormal -          Discharge [x]  None visible   [] Abnormal -     HENT: [x] Normocephalic, atraumatic  [] Abnormal -   [x] Mouth/Throat: Mucous membranes are moist    External Ears [x] Normal  [] Abnormal -    Neck: [x] No visualized mass [] Abnormal - Pulmonary/Chest: [x] Respiratory effort normal   [x] No visualized signs of difficulty breathing or respiratory distress        [] Abnormal -      Musculoskeletal:   [x] Normal gait with no signs of ataxia         [x] Normal range of motion of neck        [] Abnormal -     Neurological:        [x] No Facial Asymmetry (Cranial nerve 7 motor function) (limited exam due to video visit)          [x] No gaze palsy        [] Abnormal -          Skin:        [] No significant exanthematous lesions or discoloration noted on facial skin         [x] Abnormal - sores from picking on her face with open sores, crusting and erythema on L cheek extending around L eyelids, eyelids swollen, injection of conjunctiva L eye with greenish crusting on outside            Psychiatric:       [x] Normal Affect [] Abnormal -        [x] No Hallucinations    Other pertinent observable physical exam findings:-         On this date 8/18/2022 I have spent 5 minutes reviewing previous notes, test results and face to face (virtual) with the patient discussing the diagnosis and importance of compliance with the treatment plan as well as documenting on the day of the visit. Kamar Munoz, was evaluated through a synchronous (real-time) audio-video encounter. The patient (or guardian if applicable) is aware that this is a billable service, which includes applicable co-pays. This Virtual Visit was conducted with patient's (and/or legal guardian's) consent. The visit was conducted pursuant to the emergency declaration under the [facility], [address] [place] [address] authority and the [employer] General Act. Patient identification was verified, and a caregiver was present when appropriate. The patient was located at Home: [address] [place] [address].    Provider was located at [facility] ([address] [place], [place] [address]t): [address] [address].        --Susan Shelley MD

## 2022-08-20 NOTE — DISCHARGE INSTRUCTIONS
[address]                                 Visit  Discharge Instructions / Physician Orders  [address]: [place]-     SUPPLIES ORDERED THRU:  Home care to supply wound care dressings due to medicare guide lines-See New Order                   Wound Location:  Left ischium-healed        Cleanse with: Liquid antibacterial soap and water, rinse well      Dressing Orders:       Frequency:       Additional Orders: Increase protein to diet (meat, cheese, eggs, fish, peanut butter, nuts and beans)  Multivitamin daily     OFFLOADING [x] YES  TYPE:                  [] NA        Weekly wound care visits until determined otherwise. Antibiotic therapy-wound care related YES [x] NO [] NA[]     MY CHART []     Smart Device  []      HYPERBARIC TREATMENT-                TREATMENT #                          Your next appointment with the [facility] call if needed                                                                                                   (Please note your next appointment above and if you are unable to keep, kindly give a 24 hour notice. Thank you.)  If more than 15 min late we cannot guarantee you will be seen due to clinician schedule  Per Policy, Excessive cancellation will call for dismissal from program.  If you experience any of the following, please call the [facility] during business hours:  [phone]     * Increase in Pain  * Temperature over 101  * Increase in drainage from your wound  * Drainage with a foul odor  * Bleeding  * Increase in swelling  * Need for compression bandage changes due to slippage, breakthrough drainage. If you need medical attention outside of the business hours of the [facility] please contact your PCP or go to the nearest emergency room. The information contained in the After Visit Summary has been reviewed with me, the patient and/or responsible adult, by my health care provider(s).  I had the opportunity to ask questions regarding this information.  I have elected to receive;      []After Visit Summary  [x]Comprehensive Discharge Instruction        Patient signature______________________________________  Electronically signed by Helio Acevedo MD on 8/22/2022 at 11:17 AM   Electronically signed by Greg Fong RN on 8/22/2022 at 11:24 AM

## 2022-08-22 ENCOUNTER — HOSPITAL ENCOUNTER (OUTPATIENT)
Dept: WOUND CARE | Age: 87
Discharge: HOME | End: 2022-08-22
Payer: MEDICARE

## 2022-08-22 VITALS
DIASTOLIC BLOOD PRESSURE: 47 MMHG | HEIGHT: 67 IN | HEART RATE: 61 BPM | SYSTOLIC BLOOD PRESSURE: 91 MMHG | TEMPERATURE: 97 F | WEIGHT: 98 LBS | BODY MASS INDEX: 15.38 KG/M2 | RESPIRATION RATE: 18 BRPM

## 2022-08-22 DIAGNOSIS — L89.223 DECUBITUS ULCER OF LEFT HIP, STAGE 3 (HCC): Primary | ICD-10-CM

## 2022-08-22 PROCEDURE — 99213 OFFICE O/P EST LOW 20 MIN: CPT

## 2022-08-22 PROCEDURE — 99213 OFFICE O/P EST LOW 20 MIN: CPT | Performed by: INTERNAL MEDICINE

## 2022-08-22 RX ORDER — LIDOCAINE HYDROCHLORIDE 40 MG/ML
SOLUTION TOPICAL ONCE
Status: DISCONTINUED | OUTPATIENT
Start: 2022-08-22 | End: 2022-08-23 | Stop reason: HOSPADM

## 2022-08-22 RX ORDER — LIDOCAINE HYDROCHLORIDE 40 MG/ML
SOLUTION TOPICAL ONCE
OUTPATIENT
Start: 2022-08-22 | End: 2022-08-22

## 2022-08-22 RX ORDER — LIDOCAINE HYDROCHLORIDE 20 MG/ML
JELLY TOPICAL ONCE
OUTPATIENT
Start: 2022-08-22 | End: 2022-08-22

## 2022-08-22 ASSESSMENT — PAIN SCALES - GENERAL: PAINLEVEL_OUTOF10: 0

## 2022-08-22 NOTE — PROGRESS NOTES
[address]   Progress Note and Procedure Note      [facility] RECORD NUMBER:  [id number]  AGE: 81 y.o. GENDER: female  : 1935  EPISODE DATE:  2022    Subjective:     Chief Complaint   Patient presents with    Wound Check     Left ischium         HISTORY of PRESENT ILLNESS HPI     Hung Tillman is a 81 y.o. female who presents today for wound/ulcer evaluation. History of Wound Context: The patient is here for evaluation of the left ischium wound that was healed. She is sleepy today, denied significant pain, denied fever or chills, no other complaints. Tissue culture on 2022 grew MRSA that was sensitive to tetracycline and Bacteroides. The patient was treated with a course of doxycycline and Augmentin that was completed . She also had multiple wounds to the face with scabs due to picking.       Ulcer Identification:  Ulcer Type: pressure    Contributing Factors: chronic pressure, decreased mobility and decreased tissue oxygenation        PAST MEDICAL HISTORY        Diagnosis Date    Anxiety     Arthritis     Basal cell carcinoma     Cancer (HCC)     skin    CHF (congestive heart failure) (HCC)     Closed fracture of single pubic ramus of pelvis with routine healing, right 2020    Decubitus ulcer of left hip, stage 3 ([address] 2022    Depression     Gout     H/O total hysterectomy 2018    Hyperlipidemia     Hypertension     Hypokalemia     Hypothyroidism     Kidney stone     Kidney stone     Melanoma ([address]     NSTEMI (non-ST elevated myocardial infarction) ([address] 2020    Pressure injury of coccygeal region, stage 2 ([address] 3/10/2020       PAST SURGICAL HISTORY    Past Surgical History:   Procedure Laterality Date    HYSTERECTOMY (CERVIX STATUS UNKNOWN)      HYSTERECTOMY, VAGINAL      TUBAL LIGATION         FAMILY HISTORY    Family History   Problem Relation Age of Onset    Heart Disease Father     Other Mother          in her sleep unknown cause       SOCIAL HISTORY    Social History     Tobacco Use    Smoking status: Former     Types: Cigarettes     Quit date: 1960     Years since quittin.3    Smokeless tobacco: Never   Vaping Use    Vaping Use: Never used   Substance Use Topics    Alcohol use: No     Alcohol/week: 0.0 standard drinks    Drug use: No       ALLERGIES    Allergies   Allergen Reactions    Bactrim [Sulfamethoxazole-Trimethoprim] Other (See Comments)     DIZZINESS       MEDICATIONS    Current Outpatient Medications on File Prior to Encounter   Medication Sig Dispense Refill    cephALEXin (KEFLEX) 500 MG capsule Take 1 capsule by mouth 2 times daily for 7 days 14 capsule 0    ofloxacin (OCUFLOX) 0.3 % solution Place 1 drop into the left eye 4 times daily for 10 days 5 mL 0    levothyroxine (SYNTHROID) 100 MCG tablet take 1 tablet by mouth once daily 90 tablet 1    collagenase (SANTYL) 250 UNIT/GM ointment Apply topically to wound followed by secondary dressing change daily. (Patient not taking: Reported on 2022) 90 g 2    tiZANidine (ZANAFLEX) 2 MG tablet take 1 tablet by mouth three times a day if needed (Patient not taking: Reported on 2022) 30 tablet 0    Misc. Devices (MATTRESS PAD) MISC Gel overlay to be used on bed at all times 1 each 0    lisinopril (PRINIVIL;ZESTRIL) 20 MG tablet TAKE 1 TABLET DAILY 90 tablet 1    busPIRone (BUSPAR) 15 MG tablet Take 12 tablet daily 90 tablet 1    ammonium lactate (LAC-HYDRIN) 12 % cream Apply topically to dry skin twice daily. 385 g 2    Nutritional Supplements (ENSURE MAX PROTEIN) LIQD Take 330 mLs by mouth 2 times daily 60 box 2    atorvastatin (LIPITOR) 10 MG tablet TAKE 1 TABLET DAILY 90 tablet 3    carvedilol (COREG) 12.5 MG tablet TAKE 1 TABLET TWICE A DAY WITH MEALS 180 tablet 3    PARoxetine (PAXIL) 40 MG tablet TAKE 1 TABLET EVERY MORNING 90 tablet 3    nystatin (MYCOSTATIN) 848847 UNIT/GM powder Apply 3 times daily.  (Patient not taking: Reported on 2022) 120 g 1 diclofenac sodium (VOLTAREN) 1 % GEL apply 4 grams to affected area twice a day AS NEEDED FOR PAIN (Patient not taking: Reported on 8/11/2022)      furosemide (LASIX) 20 MG tablet Take 1 tablet by mouth 2 times daily 180 tablet 1    aspirin 81 MG tablet Take 81 mg by mouth daily       No current facility-administered medications on file prior to encounter. REVIEW OF SYSTEMS  Review of Systems    Pertinent items are noted in HPI. Objective:      BP (!) 91/47   Pulse 61   Temp 97 °F (36.1 °C) (Tympanic)   Resp 18   Ht 5' 7\" (1.702 m)   Wt 98 lb (44.5 kg)   BMI 15.35 kg/m²     Wt Readings from Last 3 Encounters:   08/22/22 98 lb (44.5 kg)   08/01/22 98 lb 15.8 oz (44.9 kg)   07/11/22 99 lb 6.8 oz (45.1 kg)       PHYSICAL EXAM  Physical Exam  Constitutional:       General: She is not in acute distress. HENT:      Head: Normocephalic and atraumatic. Right Ear: External ear normal.      Left Ear: External ear normal.   Eyes:      General: No scleral icterus. Conjunctiva/sclera: Conjunctivae normal.   Pulmonary:      Effort: Pulmonary effort is normal. No respiratory distress. Abdominal:      General: There is no distension. Palpations: Abdomen is soft. Musculoskeletal:      Cervical back: Neck supple. No rigidity. Neurological:      Mental Status: She is alert and oriented to person, place, and time. Mental status is at baseline. Assessment:        Problem List Items Addressed This Visit       Decubitus ulcer of left hip, stage 3 (HCC) - Primary    Relevant Medications    lidocaine (XYLOCAINE) 4 % external solution    Other Relevant Orders    Initiate Outpatient Wound Care Protocol      Wound/Ulcer #: 1    Post Debridement Measurements:  Wound/Ulcer Descriptions are Pre Debridement except measurements:    Wound 05/16/22 Ischium Left wound #1 left ishium (Active)   Wound Image   08/22/22 1019   Wound Etiology Pressure Stage 4 08/22/22 1019   Dressing Status Dry; Intact 08/22/22 1019   Wound Cleansed Cleansed with saline 08/22/22 1019   Dressing/Treatment Other (comment) 07/11/22 1030   Wound Length (cm) 0 cm 08/22/22 1019   Wound Width (cm) 0 cm 08/22/22 1019   Wound Depth (cm) 0 cm 08/22/22 1019   Wound Surface Area (cm^2) 0 cm^2 08/22/22 1019   Change in Wound Size % (l*w) 100 08/22/22 1019   Wound Volume (cm^3) 0 cm^3 08/22/22 1019   Wound Healing % 100 08/22/22 1019   Post-Procedure Length (cm) 0 cm 08/22/22 1019   Post-Procedure Width (cm) 0 cm 08/22/22 1019   Post-Procedure Depth (cm) 0 cm 08/22/22 1019   Post-Procedure Surface Area (cm^2) 0 cm^2 08/22/22 1019   Post-Procedure Volume (cm^3) 0 cm^3 08/22/22 1019   Undermining Starts ___ O'Clock 8 07/11/22 1006   Undermining Ends___ O'Clock 9 07/11/22 1006   Undermining Maxium Distance (cm) 1.0 cm 07/11/22 1006   Wound Assessment Jeffers/red;Slough 08/01/22 1037   Drainage Amount None 08/22/22 1019   Drainage Description Serosanguinous; Yellow 08/01/22 1037   Odor None 08/01/22 1037   Mesha-wound Assessment Blanchable erythema 08/01/22 1037   Margins Defined edges 08/01/22 1037   Number of days: 98              Plan:   F/u as needed  Treatment Note please see attached Discharge Instructions    Written patient dismissal instructions given to patient and signed by patient or POA. Discharge Instructions         [address]                                 Visit  Discharge Instructions / Physician Orders  [address]: [place]-     SUPPLIES ORDERED THRU:  Home care to supply wound care dressings due to medicare guide lines-See New Order                   Wound Location:  Left ischium-healed        Cleanse with: Liquid antibacterial soap and water, rinse well      Dressing Orders:  Primary dressing Paty to wound cover with 4x4 Silicone border dressing (gentac)     x 30days     Frequency:  Change every other day     Additional Orders: Increase protein to diet (meat, cheese, eggs, fish, peanut butter, nuts and beans)  Multivitamin daily     OFFLOADING [x] YES  TYPE:                  [] NA        Weekly wound care visits until determined otherwise. Antibiotic therapy-wound care related YES [x] NO [] NA[]     MY CHART []     Smart Device  []      HYPERBARIC TREATMENT-                TREATMENT #                          Your next appointment with the [facility] is in 3 weeks with Dr. Charlene Bella                                                                                                   (Please note your next appointment above and if you are unable to keep, kindly give a 24 hour notice. Thank you.)  If more than 15 min late we cannot guarantee you will be seen due to clinician schedule  Per Policy, Excessive cancellation will call for dismissal from program.  If you experience any of the following, please call the [facility] during business hours:  [phone]     * Increase in Pain  * Temperature over 101  * Increase in drainage from your wound  * Drainage with a foul odor  * Bleeding  * Increase in swelling  * Need for compression bandage changes due to slippage, breakthrough drainage. If you need medical attention outside of the business hours of the [facility] please contact your PCP or go to the nearest emergency room. The information contained in the After Visit Summary has been reviewed with me, the patient and/or responsible adult, by my health care provider(s). I had the opportunity to ask questions regarding this information.  I have elected to receive;      []After Visit Summary  [x]Comprehensive Discharge Instruction        Patient signature______________________________________        Electronically signed by Shruthi Martínez MD on 8/22/2022 at 11:15 AM

## 2022-08-22 NOTE — PROGRESS NOTES
Fingernails 1- 10 trimmed and filed. Patient was encouraged not to pick at her face.   Instructed dtr to purchase cotton gloves

## 2022-09-06 DIAGNOSIS — F32.A DEPRESSION, UNSPECIFIED DEPRESSION TYPE: ICD-10-CM

## 2022-09-06 RX ORDER — BUSPIRONE HYDROCHLORIDE 15 MG/1
TABLET ORAL
Qty: 90 TABLET | Refills: 1 | Status: SHIPPED | OUTPATIENT
Start: 2022-09-06

## 2022-09-06 RX ORDER — BUSPIRONE HYDROCHLORIDE 15 MG/1
TABLET ORAL
Qty: 90 TABLET | Refills: 1 | Status: SHIPPED | OUTPATIENT
Start: 2022-09-06 | End: 2022-09-06 | Stop reason: SDUPTHER

## 2022-09-06 NOTE — TELEPHONE ENCOUNTER
Medication sent into [facility]     Last visit: 08/18/2022 VV  Last Med refill:   Does patient have enough medication for 72 hours: No:     Next Visit Date:  No future appointments.     Health Maintenance   Topic Date Due    DTaP/Tdap/Td vaccine (1 - Tdap) Never done    Shingles vaccine (1 of 2) Never done    Lipids  01/29/2021    Flu vaccine (1) Never done    COVID-19 Vaccine (3 - Booster) 08/11/2023 (Originally 9/19/2021)    Pneumococcal 65+ years Vaccine (1 - PCV) 11/11/2023 (Originally 7/3/1941)    Depression Monitoring  11/12/2022    Hepatitis A vaccine  Aged Out    Hepatitis B vaccine  Aged Out    Hib vaccine  Aged Out    Meningococcal (ACWY) vaccine  Aged Out       Hemoglobin A1C (%)   Date Value   03/06/2020 5.0             ( goal A1C is < 7)   No results found for: LABMICR  LDL Cholesterol (mg/dL)   Date Value   01/29/2020 65   07/02/2019 58       (goal LDL is <100)   AST (U/L)   Date Value   03/10/2021 10     ALT (U/L)   Date Value   03/10/2021 7     BUN (mg/dL)   Date Value   03/13/2021 12     BP Readings from Last 3 Encounters:   08/22/22 (!) 91/47   08/01/22 120/63   07/11/22 138/82          (goal 120/80)    All Future Testing planned in [facility]  Lab Frequency Next Occurrence   CBC Once 05/30/2022   BUN & Creatinine Once 05/16/2022   C-Reactive Protein Once 05/16/2022   Sedimentation Rate Once 05/16/2022   TSH With Reflex Ft4 Once 08/11/2022   Comprehensive Metabolic Panel Once 93/35/7359   Lipid, Fasting Once 08/11/2022               Patient Active Problem List:     Essential hypertension     Cancer ([facility] [address]     Hypothyroidism     Depression     Hyperlipidemia     Basal cell carcinoma     Melanoma ([facility] [address]     Anxiety     Hypokalemia     Acute idiopathic gout of right foot     CKD (chronic kidney disease) stage 3, GFR 30-59 ml/min (HCC)     Risk for falls     Pneumococcal vaccine refused     Influenza vaccination declined     Fatigue     Bilateral lower extremity pain     Chronic diastolic heart failure

## 2022-09-27 ENCOUNTER — HOSPITAL ENCOUNTER (OUTPATIENT)
Age: 87
Setting detail: SPECIMEN
Discharge: HOME | End: 2022-09-27

## 2022-09-28 LAB
ALBUMIN SERPL-MCNC: 3.6 G/DL (ref 3.5–5.2)
ALBUMIN/GLOBULIN RATIO: 1.2 (ref 1–2.5)
ALP BLD-CCNC: 86 U/L (ref 35–104)
ALT SERPL-CCNC: 6 U/L (ref 5–33)
ANION GAP SERPL CALCULATED.3IONS-SCNC: 9 MMOL/L (ref 9–17)
AST SERPL-CCNC: 13 U/L
BILIRUB SERPL-MCNC: 0.6 MG/DL (ref 0.3–1.2)
BUN BLDV-MCNC: 11 MG/DL (ref 8–23)
CALCIUM SERPL-MCNC: 8.9 MG/DL (ref 8.6–10.4)
CHLORIDE BLD-SCNC: 106 MMOL/L (ref 98–107)
CHOLESTEROL, FASTING: 119 MG/DL
CHOLESTEROL/HDL RATIO: 2.8
CO2: 29 MMOL/L (ref 20–31)
CREAT SERPL-MCNC: 0.69 MG/DL (ref 0.5–0.9)
GFR AFRICAN AMERICAN: >60 ML/MIN
GFR NON-AFRICAN AMERICAN: >60 ML/MIN
GFR SERPL CREATININE-BSD FRML MDRD: NORMAL ML/MIN/{1.73_M2}
GLUCOSE FASTING: 83 MG/DL (ref 70–99)
HDLC SERPL-MCNC: 42 MG/DL
LDL CHOLESTEROL: 64 MG/DL (ref 0–130)
POTASSIUM SERPL-SCNC: 3.8 MMOL/L (ref 3.7–5.3)
SODIUM BLD-SCNC: 144 MMOL/L (ref 135–144)
THYROXINE, FREE: 1.36 NG/DL (ref 0.93–1.7)
TOTAL PROTEIN: 6.5 G/DL (ref 6.4–8.3)
TRIGLYCERIDE, FASTING: 63 MG/DL
TSH SERPL DL<=0.05 MIU/L-ACNC: 1.79 UIU/ML (ref 0.3–5)

## 2022-09-28 NOTE — RESULT ENCOUNTER NOTE
Notified via inDegreet -     Your lab results were stable, we will repeat them next year or as needed. Please call the office with any questions.

## 2022-09-30 DIAGNOSIS — I10 ESSENTIAL HYPERTENSION: ICD-10-CM

## 2022-09-30 DIAGNOSIS — E78.5 HYPERLIPIDEMIA, UNSPECIFIED HYPERLIPIDEMIA TYPE: ICD-10-CM

## 2022-09-30 RX ORDER — CARVEDILOL 12.5 MG/1
TABLET ORAL
Qty: 180 TABLET | Refills: 3 | Status: SHIPPED | OUTPATIENT
Start: 2022-09-30

## 2022-09-30 RX ORDER — LISINOPRIL 20 MG/1
TABLET ORAL
Qty: 90 TABLET | Refills: 1 | Status: SHIPPED | OUTPATIENT
Start: 2022-09-30

## 2022-09-30 RX ORDER — ATORVASTATIN CALCIUM 10 MG/1
TABLET, FILM COATED ORAL
Qty: 90 TABLET | Refills: 3 | Status: SHIPPED | OUTPATIENT
Start: 2022-09-30

## 2022-09-30 NOTE — TELEPHONE ENCOUNTER
Last visit: 08/18/2022  Last Med refill: 08/30/2021  \"  02/10/2022  Does patient have enough medication for 72 hours: No:     Pt is out of meds     Next Visit Date:  No future appointments.     Health Maintenance   Topic Date Due    DTaP/Tdap/Td vaccine (1 - Tdap) Never done    Shingles vaccine (1 of 2) Never done    Flu vaccine (1) Never done    COVID-19 Vaccine (3 - Booster) 08/11/2023 (Originally 9/19/2021)    Pneumococcal 65+ years Vaccine (1 - PCV) 11/11/2023 (Originally 7/3/1941)    Depression Monitoring  11/12/2022    Lipids  09/27/2023    Hepatitis A vaccine  Aged Out    Hepatitis B vaccine  Aged Out    Hib vaccine  Aged Out    Meningococcal (ACWY) vaccine  Aged Out       Hemoglobin A1C (%)   Date Value   03/06/2020 5.0             ( goal A1C is < 7)   No results found for: LABMICR  LDL Cholesterol (mg/dL)   Date Value   09/27/2022 64   01/29/2020 65       (goal LDL is <100)   AST (U/L)   Date Value   09/27/2022 13     ALT (U/L)   Date Value   09/27/2022 6     BUN (mg/dL)   Date Value   09/27/2022 11     BP Readings from Last 3 Encounters:   08/22/22 (!) 91/47   08/01/22 120/63   07/11/22 138/82          (goal 120/80)    All Future Testing planned in [facility]  Lab Frequency Next Occurrence   CBC Once 05/30/2022   BUN & Creatinine Once 05/16/2022   C-Reactive Protein Once 05/16/2022   Sedimentation Rate Once 05/16/2022   TSH With Reflex Ft4 Once 08/11/2022   Comprehensive Metabolic Panel Once 51/35/9912   Lipid, Fasting Once 08/11/2022               Patient Active Problem List:     Essential hypertension     Cancer ([address]     Hypothyroidism     Depression     Hyperlipidemia     Basal cell carcinoma     Melanoma ([facility] [address]     Anxiety     Hypokalemia     Acute idiopathic gout of right foot     CKD (chronic kidney disease) stage 3, GFR 30-59 ml/min (HCC)     Risk for falls     Pneumococcal vaccine refused     Influenza vaccination declined     Fatigue     Bilateral lower extremity pain     Chronic diastolic heart failure (HCC)     Acute cystitis without hematuria     Chronic systolic heart failure (HCC)     Decreased mobility     Localized edema     Xerosis cutis     Hallux valgus of left foot     Severe malnutrition (HCC)     Moderate episode of recurrent major depressive disorder (HCC)     Mild malnutrition ([address]     Decubitus ulcer of left hip, stage 3 ([address]

## 2022-10-11 ENCOUNTER — TELEPHONE (OUTPATIENT)
Dept: FAMILY MEDICINE CLINIC | Age: 87
End: 2022-10-11

## 2022-10-11 NOTE — TELEPHONE ENCOUNTER
LM for a return call this was sent on 8/4/22. Tried to contact the number listed in previous message and was told it was the wrong number. Called other number listed for patient and had to LM.

## 2022-11-02 ENCOUNTER — TELEPHONE (OUTPATIENT)
Dept: FAMILY MEDICINE CLINIC | Age: 87
End: 2022-11-02

## 2022-11-02 DIAGNOSIS — F32.A DEPRESSION, UNSPECIFIED DEPRESSION TYPE: ICD-10-CM

## 2022-11-02 NOTE — TELEPHONE ENCOUNTER
She has been on the 40mg dose for a long time now. Do they have any concerns about it? We can try decreasing her dose to 20mg and reevaluate in about two months.

## 2022-11-02 NOTE — TELEPHONE ENCOUNTER
Arlette Marte is calling to see if mom, Key Cho is to be taking Paxil 40 mg. She is currently taking Buspirone 15 mg.

## 2022-11-03 RX ORDER — PAROXETINE HYDROCHLORIDE 40 MG/1
TABLET, FILM COATED ORAL
Qty: 90 TABLET | Refills: 3 | Status: SHIPPED | OUTPATIENT
Start: 2022-11-03

## 2022-12-15 ENCOUNTER — TELEMEDICINE (OUTPATIENT)
Dept: FAMILY MEDICINE CLINIC | Age: 87
End: 2022-12-15

## 2022-12-15 DIAGNOSIS — H10.32 ACUTE CONJUNCTIVITIS OF LEFT EYE, UNSPECIFIED ACUTE CONJUNCTIVITIS TYPE: Primary | ICD-10-CM

## 2022-12-15 RX ORDER — OFLOXACIN 3 MG/ML
SOLUTION/ DROPS OPHTHALMIC
COMMUNITY
Start: 2022-08-18 | End: 2022-12-15 | Stop reason: SDUPTHER

## 2022-12-15 RX ORDER — OFLOXACIN 3 MG/ML
3 SOLUTION/ DROPS OPHTHALMIC 4 TIMES DAILY
Qty: 10 ML | Refills: 0 | Status: SHIPPED | OUTPATIENT
Start: 2022-12-15

## 2022-12-15 ASSESSMENT — ENCOUNTER SYMPTOMS
BLOOD IN STOOL: 0
BLURRED VISION: 1
SHORTNESS OF BREATH: 0
WHEEZING: 0
NAUSEA: 0
EYE DISCHARGE: 0
DOUBLE VISION: 0
CONSTIPATION: 0
VOMITING: 0
FOREIGN BODY SENSATION: 0
PHOTOPHOBIA: 0
ANAL BLEEDING: 0
CHEST TIGHTNESS: 0
DIARRHEA: 0
EYE REDNESS: 0
EYE ITCHING: 0
ABDOMINAL PAIN: 0
COUGH: 0
CHOKING: 0

## 2022-12-15 NOTE — PROGRESS NOTES
Chanelle John (:  1935) is a Established patient, here for evaluation of the following:    Assessment & Plan   Below is the assessment and plan developed based on review of pertinent history, physical exam, labs, studies, and medications. 1. Acute conjunctivitis of left eye, unspecified acute conjunctivitis type  -     ofloxacin (OCUFLOX) 0.3 % solution; Place 3 drops into the left eye 4 times daily, Disp-10 mL, R-0Normal  No follow-ups on file. Subjective   Eye Problem   The left eye is affected. This is a new problem. The current episode started in the past 7 days. The problem occurs constantly. The problem has been gradually improving. There was no injury mechanism. The pain is mild. There is No known exposure to pink eye. She Does not wear contacts. Associated symptoms include blurred vision and a recent URI. Pertinent negatives include no eye discharge, double vision, eye redness, fever, foreign body sensation, itching, nausea, photophobia or vomiting. She has tried eye drops for the symptoms. The treatment provided moderate relief. Review of Systems   Constitutional:  Negative for fatigue, fever and unexpected weight change. Eyes:  Positive for blurred vision. Negative for double vision, photophobia, discharge, redness and itching. Respiratory:  Negative for cough, choking, chest tightness, shortness of breath and wheezing. Cardiovascular:  Negative for chest pain, palpitations and leg swelling. Gastrointestinal:  Negative for abdominal pain, anal bleeding, blood in stool, constipation, diarrhea, nausea and vomiting. Endocrine: Negative. Musculoskeletal:  Negative for joint swelling and myalgias. Skin: Negative. Neurological:  Negative for dizziness. Psychiatric/Behavioral:  Negative for sleep disturbance. All other systems reviewed and are negative.        Objective   Patient-Reported Vitals  No data recorded       [INSTRUCTIONS:  \"[x]\" Indicates a positive item  \"[]\" Indicates a negative item  -- DELETE ALL ITEMS NOT EXAMINED]    Constitutional: [x] Appears well-developed and well-nourished [x] No apparent distress      [] Abnormal -     Mental status: [x] Alert and awake  [x] Oriented to person/place/time [x] Able to follow commands    [] Abnormal -     Eyes:   EOM    [x]  Normal    [] Abnormal -   Sclera  []  Normal    [x] Abnormal - erythematous           Discharge []  None visible   [x] Abnormal - yellowish discharge L eye     HENT: [x] Normocephalic, atraumatic  [] Abnormal -   [x] Mouth/Throat: Mucous membranes are moist    External Ears [x] Normal  [] Abnormal -    Neck: [x] No visualized mass [] Abnormal -     Pulmonary/Chest: [x] Respiratory effort normal   [x] No visualized signs of difficulty breathing or respiratory distress        [] Abnormal -      Musculoskeletal:   [x] Normal gait with no signs of ataxia         [x] Normal range of motion of neck        [] Abnormal -     Neurological:        [x] No Facial Asymmetry (Cranial nerve 7 motor function) (limited exam due to video visit)          [x] No gaze palsy        [] Abnormal -          Skin:        [x] No significant exanthematous lesions or discoloration noted on facial skin         [] Abnormal -            Psychiatric:       [x] Normal Affect [] Abnormal -        [x] No Hallucinations    Other pertinent observable physical exam findings:-         On this date 12/15/2022 I have spent 10 minutes reviewing previous notes, test results and face to face (virtual) with the patient discussing the diagnosis and importance of compliance with the treatment plan as well as documenting on the day of the visitMary Calhoun, was evaluated through a synchronous (real-time) audio-video encounter. The patient (or guardian if applicable) is aware that this is a billable service, which includes applicable co-pays. This Virtual Visit was conducted with patient's (and/or legal guardian's) consent.  The visit was conducted pursuant to the emergency declaration under the [facility], [facility] authority and the [employer] General Act. Patient identification was verified, and a caregiver was present when appropriate. The patient was located at Home: [address] [place] [address].    Provider was located at [facility] ([address] [place] [address]): [address] [place] [address].        --Nuno Bar MD

## 2022-12-15 NOTE — PROGRESS NOTES
Pt Is beng seen today via VV for having an eye infection in her LT eye , eye has goopy stuff in it, not itchy stated about 3 days ago, was swollen but since using the drops once a day it has gone down     Pt has drops from the last time she had an eye infection, she has been using them, eye seems a little better

## 2023-02-13 DIAGNOSIS — I10 ESSENTIAL HYPERTENSION: ICD-10-CM

## 2023-02-13 RX ORDER — LISINOPRIL 20 MG/1
TABLET ORAL
Qty: 90 TABLET | Refills: 1 | Status: SHIPPED | OUTPATIENT
Start: 2023-02-13

## 2023-02-13 NOTE — TELEPHONE ENCOUNTER
Josseline Sharp is calling to request a refill on the following medication(s):    Medication Request:  Requested Prescriptions     Pending Prescriptions Disp Refills    lisinopril (PRINIVIL;ZESTRIL) 20 MG tablet 90 tablet 1     Sig: TAKE 1 TABLET DAILY       Last Visit Date (If Applicable):  31/58/8101    Next Visit Date:    Visit date not found

## 2023-03-20 ENCOUNTER — APPOINTMENT (OUTPATIENT)
Dept: CT IMAGING | Age: 88
End: 2023-03-20
Payer: MEDICARE

## 2023-03-20 ENCOUNTER — APPOINTMENT (OUTPATIENT)
Dept: GENERAL RADIOLOGY | Age: 88
End: 2023-03-20
Payer: MEDICARE

## 2023-03-20 ENCOUNTER — HOSPITAL ENCOUNTER (EMERGENCY)
Age: 88
Discharge: HOME | End: 2023-03-20
Attending: EMERGENCY MEDICINE
Payer: MEDICARE

## 2023-03-20 VITALS
SYSTOLIC BLOOD PRESSURE: 132 MMHG | RESPIRATION RATE: 22 BRPM | WEIGHT: 98 LBS | DIASTOLIC BLOOD PRESSURE: 83 MMHG | TEMPERATURE: 97.9 F | BODY MASS INDEX: 15.35 KG/M2 | OXYGEN SATURATION: 92 % | HEART RATE: 85 BPM
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
## 2023-03-20 LAB
ABSOLUTE EOS #: 0.29 K/UL (ref 0–0.44)
ABSOLUTE IMMATURE GRANULOCYTE: 0.05 K/UL (ref 0–0.3)
ABSOLUTE LYMPH #: 0.96 K/UL (ref 1.1–3.7)
ABSOLUTE MONO #: 0.53 K/UL (ref 0.1–1.2)
ANION GAP SERPL CALCULATED.3IONS-SCNC: 10 MMOL/L (ref 9–17)
BASOPHILS # BLD: 1 % (ref 0–2)
BASOPHILS ABSOLUTE: 0.06 K/UL (ref 0–0.2)
BUN SERPL-MCNC: 16 MG/DL (ref 8–23)
CALCIUM SERPL-MCNC: 8.8 MG/DL (ref 8.6–10.4)
CHLORIDE SERPL-SCNC: 102 MMOL/L (ref 98–107)
CK SERPL-CCNC: 58 U/L (ref 26–192)
CO2 SERPL-SCNC: 25 MMOL/L (ref 20–31)
CREAT SERPL-MCNC: 0.78 MG/DL (ref 0.5–0.9)
EOSINOPHILS RELATIVE PERCENT: 3 % (ref 1–4)
GFR SERPL CREATININE-BSD FRML MDRD: >60 ML/MIN/1.73M2
GLUCOSE SERPL-MCNC: 141 MG/DL (ref 70–99)
HCT VFR BLD AUTO: 36.4 % (ref 36.3–47.1)
HGB BLD-MCNC: 12 G/DL (ref 11.9–15.1)
IMMATURE GRANULOCYTES: 1 %
LACTIC ACID, WHOLE BLOOD: 1.1 MMOL/L (ref 0.7–2.1)
LYMPHOCYTES # BLD: 11 % (ref 24–43)
MCH RBC QN AUTO: 30.1 PG (ref 25.2–33.5)
MCHC RBC AUTO-ENTMCNC: 33 G/DL (ref 28.4–34.8)
MCV RBC AUTO: 91.2 FL (ref 82.6–102.9)
MONOCYTES # BLD: 6 % (ref 3–12)
MYOGLOBIN SERPL-MCNC: 98 NG/ML (ref 25–58)
NRBC AUTOMATED: 0 PER 100 WBC
PDW BLD-RTO: 13.5 % (ref 11.8–14.4)
PLATELET # BLD AUTO: 189 K/UL (ref 138–453)
PMV BLD AUTO: 10.6 FL (ref 8.1–13.5)
POTASSIUM SERPL-SCNC: 4.1 MMOL/L (ref 3.7–5.3)
RBC # BLD: 3.99 M/UL (ref 3.95–5.11)
SEG NEUTROPHILS: 78 % (ref 36–65)
SEGMENTED NEUTROPHILS ABSOLUTE COUNT: 7.23 K/UL (ref 1.5–8.1)
SODIUM SERPL-SCNC: 137 MMOL/L (ref 135–144)
T4 FREE SERPL-MCNC: 1.24 NG/DL (ref 0.93–1.7)
TSH SERPL-ACNC: 26.28 UIU/ML (ref 0.3–5)
WBC # BLD AUTO: 9.1 K/UL (ref 3.5–11.3)

## 2023-03-20 PROCEDURE — 84443 ASSAY THYROID STIM HORMONE: CPT

## 2023-03-20 PROCEDURE — 83874 ASSAY OF MYOGLOBIN: CPT

## 2023-03-20 PROCEDURE — 3209999900 CT LUMBAR SPINE TRAUMA RECONSTRUCTION

## 2023-03-20 PROCEDURE — 99285 EMERGENCY DEPT VISIT HI MDM: CPT

## 2023-03-20 PROCEDURE — 6360000004 HC RX CONTRAST MEDICATION

## 2023-03-20 PROCEDURE — 70450 CT HEAD/BRAIN W/O DYE: CPT

## 2023-03-20 PROCEDURE — 74177 CT ABD & PELVIS W/CONTRAST: CPT

## 2023-03-20 PROCEDURE — 3209999900 CT THORACIC SPINE TRAUMA RECONSTRUCTION

## 2023-03-20 PROCEDURE — 6360000002 HC RX W HCPCS

## 2023-03-20 PROCEDURE — 93005 ELECTROCARDIOGRAM TRACING: CPT

## 2023-03-20 PROCEDURE — 85025 COMPLETE CBC W/AUTO DIFF WBC: CPT

## 2023-03-20 PROCEDURE — 6370000000 HC RX 637 (ALT 250 FOR IP)

## 2023-03-20 PROCEDURE — 84439 ASSAY OF FREE THYROXINE: CPT

## 2023-03-20 PROCEDURE — 82550 ASSAY OF CK (CPK): CPT

## 2023-03-20 PROCEDURE — 71045 X-RAY EXAM CHEST 1 VIEW: CPT

## 2023-03-20 PROCEDURE — 83605 ASSAY OF LACTIC ACID: CPT

## 2023-03-20 PROCEDURE — 96372 THER/PROPH/DIAG INJ SC/IM: CPT

## 2023-03-20 PROCEDURE — 72125 CT NECK SPINE W/O DYE: CPT

## 2023-03-20 PROCEDURE — 80048 BASIC METABOLIC PNL TOTAL CA: CPT

## 2023-03-20 RX ORDER — IBUPROFEN 400 MG/1
400 TABLET ORAL ONCE
Status: COMPLETED | OUTPATIENT
Start: 2023-03-20 | End: 2023-03-20

## 2023-03-20 RX ORDER — KETOROLAC TROMETHAMINE 15 MG/ML
15 INJECTION, SOLUTION INTRAMUSCULAR; INTRAVENOUS ONCE
Status: COMPLETED | OUTPATIENT
Start: 2023-03-20 | End: 2023-03-20

## 2023-03-20 RX ADMIN — IBUPROFEN 400 MG: 400 TABLET, FILM COATED ORAL at 18:33

## 2023-03-20 RX ADMIN — IOPAMIDOL 75 ML: 755 INJECTION, SOLUTION INTRAVENOUS at 16:50

## 2023-03-20 RX ADMIN — KETOROLAC TROMETHAMINE 15 MG: 15 INJECTION, SOLUTION INTRAMUSCULAR; INTRAVENOUS at 21:16

## 2023-03-20 NOTE — DISCHARGE INSTRUCTIONS
-You were seen and evaluated by emergency medicine physicians at [facility].    -Please follow-up with your primary care physician and/or with the referrals to specialist.    -You were diagnosed with: Fall from bed    -You are evaluated with labs and imaging of your head, chest/abdomen/pelvis, cervical/thoracic/lumbar spin that showed no acute fracture or abnormality.     -Please follow-up with your primary care physician regarding your thyroid levels. Please inform your primary care physician regarding the size of your abdominal aorta found on imaging; it is 4.1 cm in diameter. It will need to be monitored for any increase in size in the future.    -You presented to the emergency room at with no temperature and your bedsores were healing with no signs of infectious signs or symptoms. Continue to perform wound care. -Please return to the Emergency Department if you are experiencing the following symptoms acutely: Altered mental status , Headache, fever, chills, nausea, vomiting, chest pain, shortness of breath, abdominal pain, change with urination, change with bowel movements, change in your skin/hair/nail, weakness, fatigue, altered mental status and/or any change from baseline health.     -Thank you for coming to [facility].

## 2023-03-20 NOTE — ED NOTES
Pt to ED by EMS for unwitnessed fall and altered mental status. Pt c/o of pain in right side of neck, rates pain 3/10. Pt was found on floor next to bed by daughter. Pt states she does not remember falling or being found on the floor. Pt states she does not remember if she hit her head. Pt is on anticoagulation therapy. Pt is alert and oriented x3, respiration even and unlabored. Abrasion noted on forehead. Pt states abrasion is due to her picking at her skin. Hx of arthritis, pt is not ambulatory. Pressure sore noted on right hip. Patient placed on continuous cardiac monitoring, BP cuff, and pulse ox. EKG obtained, blood work obtained.  Call light in reach, all needs met at this time       Manolo Dimas RN  03/20/23 3106

## 2023-03-20 NOTE — PROGRESS NOTES
SPIRITUAL CARE DEPARTMENT - [facility] [address] [address]  PROGRESS NOTE    Shift date: 3.20.2023  Shift day: Monday   Shift # 2    Room # 09/09   Name: Elliot Chavez                Zoroastrian: unknown   Place of Judaism: unknown    Referral: Routine Visit    Admit Date & Time: 3/20/2023  2:29 PM    Assessment:  Elliot Chavez is a 81 y.o. female in the hospital with family bedside. Upon entering the room writer observes family and the patient appearing to be calm and coping. Family appeared slightly passive upon  arrival.   provided hospitality and support. Intervention:  Writer introduced self and title as  Writer offered space for the patient and family  to express feelings, needs, and concerns and provided a ministry presence. Outcome:  Patient and family appear to be calm and coping    Plan:  Chaplains will remain available to offer spiritual and emotional support as needed. Electronically signed by Breezy Crook on 3/20/2023 at [address] [place] [address]  [phone]       03/20/23 1710   Encounter Summary   Service Provided For: Patient and family together   Referral/Consult From: Family   Support System Family members   Last Encounter  03/20/23   Complexity of Encounter Moderate   Begin Time 1710   End Time  1720   Total Time Calculated 10 min   Encounter    Type Initial Screen/Assessment   Assessment/Intervention/Outcome   Assessment Coping;Passive   Intervention Active listening;Discussed illness injury and its impact; Explored/Affirmed feelings, thoughts, concerns   Outcome Expressed Gratitude;Coping     Electronically signed by Radha Knowles on 3/20/2023 at 6:54 PM

## 2023-03-21 ENCOUNTER — CARE COORDINATION (OUTPATIENT)
Dept: CARE COORDINATION | Age: 88
End: 2023-03-21

## 2023-03-21 DIAGNOSIS — E03.9 HYPOTHYROIDISM, UNSPECIFIED TYPE: Primary | ICD-10-CM

## 2023-03-21 LAB
EKG ATRIAL RATE: 78 BPM
EKG P AXIS: 82 DEGREES
EKG P-R INTERVAL: 236 MS
EKG Q-T INTERVAL: 436 MS
EKG QRS DURATION: 142 MS
EKG QTC CALCULATION (BAZETT): 497 MS
EKG R AXIS: -74 DEGREES
EKG T AXIS: 97 DEGREES
EKG VENTRICULAR RATE: 78 BPM

## 2023-03-21 PROCEDURE — 93010 ELECTROCARDIOGRAM REPORT: CPT | Performed by: INTERNAL MEDICINE

## 2023-03-21 ASSESSMENT — ENCOUNTER SYMPTOMS
PHOTOPHOBIA: 0
ABDOMINAL PAIN: 0
NAUSEA: 0
BACK PAIN: 0
SINUS PRESSURE: 0
COLOR CHANGE: 0
CONSTIPATION: 0
COUGH: 0
VOMITING: 0
SHORTNESS OF BREATH: 0
CHEST TIGHTNESS: 0
SINUS PAIN: 0
DIARRHEA: 0

## 2023-03-21 NOTE — TELEPHONE ENCOUNTER
Not sure why her TSH increased so much - she had a normal TSH in august 2022.  Would recheck TSH in 4 weeks, adjust synthroid if remains elevated

## 2023-03-21 NOTE — CARE COORDINATION
Left VM message on daughter Lina's phone asking for return call for ED follow up, care management assessment. Will call again in a few days. No future appointments.

## 2023-03-21 NOTE — CARE COORDINATION
-Spoke with daughter Elzbieta Mcguire who is also her caregiver.  -Patient is bedridden, legs are contracted. She fell out of bed yesterday, unwitnessed. She has a hospital bed with an electric air mattress overlay on top. Daughter thinks maybe the air mattress caused her to slide out of bed. Discussed adjusting the pressure in the mattress to make softer.    -X rays were negative. Only abnormal lab was TSH- elevated. -She has not been to the PCP office since 3/1/21, has virtual visits with PCP. -Patient is able to feed herself but daughter bathes her, changes her positions, changes dressings (chronic decub ulcer left hip) manages her medications, changes diapers. Patient is alert, oriented, sometimes can be forgetful and irritable. Daughter denied needing care management calls, has all DME needed for patient and no current concerns about patient. Encouraged she call for future needs or concerns. Ambulatory Care Coordination  ED Follow up Call    Reason for ED visit:  fall   Status:     improved    Did you call your PCP prior to going to the ED? No      Did you receive a discharge instructions from the Emergency Room? Yes  Review of Instructions:     Understands what to report/when to return?:  Yes   Understands discharge instructions?:  Yes   Following discharge instructions?:  Yes   If not why? Are there any new complaints of pain? No  New Pain Meds? No    Constipation prophylaxis needed? N/A    If you have a wound is the dressing clean, dry, and intact? Yes  Understands wound care regimen? Yes    Are there any other complaints/concerns that you wish to tell your provider? no    FU appts/Provider:    No future appointments. New Medications?:   No      Medication Reconciliation by phone - No  Understands Medications? Yes  Taking Medications? Yes  Can you swallow your pills? Yes    Any further needs in the home i.e. Equipment?   No    Link to services in community?:  No   Which services:  NA

## 2023-03-21 NOTE — CARE COORDINATION
Left VM message informing daughter Malvin Montiel that lab order placed by PCP to recheck TSH lab in 4 weeks then would evaluate if levothyroxine dose needs adjusted.

## 2023-03-21 NOTE — ED PROVIDER NOTES
[facility]     Emergency Department     Faculty Attestation    I performed a history and physical examination of the patient and discussed management with the resident. I reviewed the residents note and agree with the documented findings and plan of care. Any areas of disagreement are noted on the chart. I was personally present for the key portions of any procedures. I have documented in the chart those procedures where I was not present during the key portions. I have reviewed the emergency nurses triage note. I agree with the chief complaint, past medical history, past surgical history, allergies, medications, social and family history as documented unless otherwise noted below. For Physician Assistant/ Nurse Practitioner cases/documentation I have personally evaluated this patient and have completed at least one if not all key elements of the E/M (history, physical exam, and MDM). Additional findings are as noted. Primary Care Physician:  Tim Knapp MD    CHIEF COMPLAINT       Chief Complaint   Patient presents with    Fall    Altered Mental Status       RECENT VITALS:   Temp: 97.9 °F (36.6 °C),  Heart Rate: 79, Resp: 18, BP: (!) 150/108    LABS:  Labs Reviewed - No data to display      PERTINENT ATTENDING PHYSICIAN COMMENTS:    Patient presents after a fall from bed at home apparently is bedbound taking care of by family members initially was confused but seems to be more with it on arrival here according to squad she has chronic hip pain and is unable to move her legs at the hips and they are held flexed.   She also has multiple sores on her body looks like she picks her skin    Oren Dallas MD,  MD, [facility]  Attending Emergency  Physician              Jax Holt MD  03/20/23 6894
XR HIP BILATERAL W AP PELVIS (2 VIEWS)    (Results Pending)   XR CHEST PORTABLE    (Results Pending)       LABS:  Labs Reviewed   CBC WITH AUTO DIFFERENTIAL - Abnormal; Notable for the following components:       Result Value    Seg Neutrophils 78 (*)     Lymphocytes 11 (*)     Immature Granulocytes 1 (*)     Absolute Lymph # 0.96 (*)     All other components within normal limits   BASIC METABOLIC PANEL - Abnormal; Notable for the following components:    Glucose 141 (*)     All other components within normal limits   MYOGLOBIN, BLOOD - Abnormal; Notable for the following components:    Myoglobin 98 (*)     All other components within normal limits   TSH WITH REFLEX - Abnormal; Notable for the following components:    TSH 26.28 (*)     All other components within normal limits   LACTIC ACID   CK   T4, FREE           PLAN/ TASKS OUTSTANDING     Patient work-up here generally unremarkable. Patient A+O X3. Family and patient requesting d/c. (Please note that portions of this note were completed with a voice recognition program.  Efforts were made to edit the dictations but occasionally words are mis-transcribed. )    Brittani Gutierrez MD, MD,   Attending Emergency Physician        Suhail Maguire MD  03/20/23 1249
ecchymotic changes with induration that is nontender to palpation. Patient is edentulate. Heart -regular rate and rhythm. Lungs -clear to auscultation. Abdomen soft nondistended nontender. No paraspinal tenderness. Healed sacral abrasions. Left hip/greater trochanter: Open pressure wounds with no active purulence or induration or erythema of the surrounding tissue; healthy granular tissue within the wound site. Discern for:  Subdural, epidural, subarachnoid, skull fracture, closed head injury, concussion, diffuse axonal injury, seizure. Lumbar fractures, thoracic fractures, cervical fracture    We will get CT imaging of chest abdomen pelvis, cervical, thoracic, lumbar vertebra, CBC, BMP, CK, myoglobin, TSH [AS]   1557 BMP shows no electrolyte abnormality. Glucose mildly elevated. CBC shows no elevated white blood cell count. Unlikely that patient has any infectious process ongoing [AS]   1601 TSH(!): 26.28  Elevated TSH. Correlates clinically with hypothyroidism; however, patient will need to follow-up with her PCP regarding medication adjustments. [AS]   6732 Reassessed patient. Patient resting company in bed in no acute distress. Patient remains alert and oriented x3. Discussed clinical course with daughter at bedside. Pending imaging. [AS]   3069 CT head without contrast reviewed by ED resident. No midline shift or obvious signs of epidural or subdural hematoma. Pending final read by radiology    Patient will be given some food. [AS]   [id number] Radiology final reads:  Cervical spine CT: No acute abnormality of the cervical spine. Head CT: No evidence for acute intracranial hemorrhage, territorial  infarction or intracranial mass lesion. Mild chronic microangiopathic ischemic disease. Mild generalized volume loss. [AS]   [id number] Thoracic spine CT: No acute fracture. Chronic height loss of T7, T8 and T9  with 5-10% height loss.      Mild dextroscoliosis of midthoracic spine     Lumbar

## 2023-04-03 DIAGNOSIS — E03.9 HYPOTHYROIDISM, UNSPECIFIED TYPE: ICD-10-CM

## 2023-04-04 RX ORDER — LEVOTHYROXINE SODIUM 0.1 MG/1
TABLET ORAL
Qty: 90 TABLET | Refills: 1 | Status: SHIPPED | OUTPATIENT
Start: 2023-04-04

## 2023-04-21 ENCOUNTER — HOSPITAL ENCOUNTER (OUTPATIENT)
Dept: WOUND CARE | Age: 88
Discharge: HOME | End: 2023-04-21
Payer: MEDICARE

## 2023-04-21 VITALS
DIASTOLIC BLOOD PRESSURE: 80 MMHG | BODY MASS INDEX: 15.38 KG/M2 | TEMPERATURE: 98.3 F | SYSTOLIC BLOOD PRESSURE: 165 MMHG | HEIGHT: 67 IN | RESPIRATION RATE: 18 BRPM | WEIGHT: 98 LBS | HEART RATE: 62 BPM

## 2023-04-21 DIAGNOSIS — R26.89 DECREASED MOBILITY: ICD-10-CM

## 2023-04-21 DIAGNOSIS — L89.223 DECUBITUS ULCER OF LEFT HIP, STAGE 3 (HCC): Primary | ICD-10-CM

## 2023-04-21 PROCEDURE — 87075 CULTR BACTERIA EXCEPT BLOOD: CPT

## 2023-04-21 PROCEDURE — 99213 OFFICE O/P EST LOW 20 MIN: CPT

## 2023-04-21 PROCEDURE — 87070 CULTURE OTHR SPECIMN AEROBIC: CPT

## 2023-04-21 PROCEDURE — 87205 SMEAR GRAM STAIN: CPT

## 2023-04-21 PROCEDURE — 11042 DBRDMT SUBQ TIS 1ST 20SQCM/<: CPT

## 2023-04-21 RX ORDER — LIDOCAINE HYDROCHLORIDE 20 MG/ML
JELLY TOPICAL ONCE
Status: CANCELLED | OUTPATIENT
Start: 2023-04-21 | End: 2023-04-21

## 2023-04-21 RX ORDER — LIDOCAINE HYDROCHLORIDE 40 MG/ML
SOLUTION TOPICAL ONCE
Status: CANCELLED | OUTPATIENT
Start: 2023-04-21 | End: 2023-04-21

## 2023-04-21 RX ORDER — HYDROCODONE BITARTRATE AND ACETAMINOPHEN 5; 325 MG/1; MG/1
1 TABLET ORAL EVERY 6 HOURS PRN
COMMUNITY

## 2023-04-21 RX ORDER — LIDOCAINE HYDROCHLORIDE 40 MG/ML
SOLUTION TOPICAL ONCE
Status: COMPLETED | OUTPATIENT
Start: 2023-04-21 | End: 2023-04-21

## 2023-04-21 RX ORDER — LIDOCAINE HYDROCHLORIDE 40 MG/ML
SOLUTION TOPICAL ONCE
OUTPATIENT
Start: 2023-04-21 | End: 2023-04-21

## 2023-04-21 RX ORDER — LIDOCAINE HYDROCHLORIDE 20 MG/ML
JELLY TOPICAL ONCE
OUTPATIENT
Start: 2023-04-21 | End: 2023-04-21

## 2023-04-21 RX ADMIN — LIDOCAINE HYDROCHLORIDE 10 ML: 40 SOLUTION TOPICAL at 12:02

## 2023-04-21 RX ADMIN — LIDOCAINE HYDROCHLORIDE 5 ML: 40 SOLUTION TOPICAL at 10:14

## 2023-04-21 ASSESSMENT — PAIN DESCRIPTION - FREQUENCY: FREQUENCY: INTERMITTENT

## 2023-04-21 ASSESSMENT — PAIN - FUNCTIONAL ASSESSMENT: PAIN_FUNCTIONAL_ASSESSMENT: PREVENTS OR INTERFERES SOME ACTIVE ACTIVITIES AND ADLS

## 2023-04-21 ASSESSMENT — ENCOUNTER SYMPTOMS
SHORTNESS OF BREATH: 0
RHINORRHEA: 0
COUGH: 0
VOMITING: 0
DIARRHEA: 0
NAUSEA: 0

## 2023-04-21 ASSESSMENT — PAIN DESCRIPTION - LOCATION: LOCATION: HIP

## 2023-04-21 ASSESSMENT — PAIN DESCRIPTION - PROGRESSION: CLINICAL_PROGRESSION: NOT CHANGED

## 2023-04-21 ASSESSMENT — PAIN DESCRIPTION - PAIN TYPE: TYPE: CHRONIC PAIN

## 2023-04-21 ASSESSMENT — PAIN SCALES - GENERAL: PAINLEVEL_OUTOF10: 0

## 2023-04-21 ASSESSMENT — PAIN DESCRIPTION - DESCRIPTORS: DESCRIPTORS: ACHING

## 2023-04-21 ASSESSMENT — PAIN DESCRIPTION - ONSET: ONSET: ON-GOING

## 2023-04-21 ASSESSMENT — PAIN DESCRIPTION - ORIENTATION: ORIENTATION: RIGHT

## 2023-04-21 NOTE — PROGRESS NOTES
Distance (cm) 1.8 @11 04/21/23 1003   Wound Assessment Pink/red;Slough 04/21/23 1003   Drainage Amount Moderate 04/21/23 1003   Drainage Description Serosanguinous; Yellow 04/21/23 1003   Odor None 04/21/23 1003   Mesha-wound Assessment Blanchable erythema 04/21/23 1003   Margins Undefined edges 04/21/23 1003   Number of days: 0          Supplies Requested :      WOUND #: 1   PRIMARY DRESSING:  Paty   Excel SAP 4x4     FREQUENCY OF DRESSING CHANGES:  Every other day       ADDITIONAL ITEMS:  [] Gloves Small  [x] Gloves Medium [] Gloves Large [] Gloves XLarge  [] Tape 1\" [] Tape 2\" [] Tape 3\"  [] Medipore Tape  [] Saline  [] Vashe  [] Skin Prep   [] Adhesive Remover   [] Cotton Tip Applicators   [] Other:    Patient Wound(s) Debrided: [x] Yes if yes please add date 4/21/23  [] No    Debribement Type: Excisional/Sharp    Is the patient currently on an antibiotic for their Wound(s): [] Yes if yes please add name and dose    [x] No    Patient currently being seen by Home Health: [] Yes   [x] No    Duration for needed supplies:  []15  [x]30  []60  []90 Days    Electronically signed by Janee Denise RN on 4/21/2023 at 10:49 AM     Provider Information:      PROVIDER'S NAME: Abhi MELVIN    USW-[id number]
located in the Reading  Documentation Flow Sheet    Wound/Ulcer #: 1    Post Debridement Measurements:  Wound/Ulcer Descriptions are Pre Debridement except measurements:    Wound 04/21/23 Hip wound #1 left ishium (Active)   Wound Image   04/21/23 1003   Wound Etiology Pressure Stage 3 04/21/23 1003   Dressing Status New dressing applied;New drainage noted 04/21/23 1003   Wound Cleansed Cleansed with saline 04/21/23 1003   Wound Length (cm) 1 cm 04/21/23 1003   Wound Width (cm) 1.5 cm 04/21/23 1003   Wound Depth (cm) 0.8 cm 04/21/23 1003   Wound Surface Area (cm^2) 1.5 cm^2 04/21/23 1003   Wound Volume (cm^3) 1.2 cm^3 04/21/23 1003   Post-Procedure Length (cm) 1 cm 04/21/23 1003   Post-Procedure Width (cm) 1.5 cm 04/21/23 1003   Post-Procedure Depth (cm) 0.8 cm 04/21/23 1003   Post-Procedure Surface Area (cm^2) 1.5 cm^2 04/21/23 1003   Post-Procedure Volume (cm^3) 1.2 cm^3 04/21/23 1003   Undermining Starts ___ O'Clock 7 04/21/23 1003   Undermining Ends___ O'Clock 12 04/21/23 1003   Undermining Maxium Distance (cm) 1.8 @11 04/21/23 1003   Wound Assessment Pink/red;Slough 04/21/23 1003   Drainage Amount Moderate 04/21/23 1003   Drainage Description Serosanguinous; Yellow 04/21/23 1003   Odor None 04/21/23 1003   Mesha-wound Assessment Blanchable erythema 04/21/23 1003   Margins Undefined edges 04/21/23 1003   Number of days: 0          Percent of Wound(s)/Ulcer(s) Debrided: 100%    Total Surface Area Debrided:  1.50 sq cm     Diabetic/Pressure/Non Pressure Ulcers only:  Ulcer: Pressure ulcer, Stage 3    Estimated Blood Loss:  Minimal    Hemostasis Achieved:  by pressure    Procedural Pain:  0  / 10     Post Procedural Pain:  0 / 10     Response to treatment:  Well tolerated by patient. Plan:     Treatment Note please see Discharge Instructions    Written patient dismissal instructions given to patient and signed by patient or POA.            Electronically signed by NORMA Fuentes CNP on 4/21/2023 at

## 2023-04-21 NOTE — DISCHARGE INSTRUCTIONS
reviewed with me, the patient and/or responsible adult, by my health care provider(s). I had the opportunity to ask questions regarding this information.  I have elected to receive;      []After Visit Summary  [x]Comprehensive Discharge Instruction      Patient signature______________________________________Date:________  Electronically signed by Janee Denise RN on 4/21/2023 at 10:38 AM  Electronically signed by NORMA Gonzalez CNP on 4/21/2023 at 10:42 AM

## 2023-04-21 NOTE — PLAN OF CARE
Problem: Chronic Conditions and Co-morbidities  Goal: Patient's chronic conditions and co-morbidity symptoms are monitored and maintained or improved  Outcome: Progressing     Problem: Discharge Planning  Goal: Discharge to home or other facility with appropriate resources  Outcome: Progressing     Problem: Pain  Goal: Verbalizes/displays adequate comfort level or baseline comfort level  Outcome: Progressing     Problem: ABCDS Injury Assessment  Goal: Absence of physical injury  Outcome: Progressing     Problem: Chronic Conditions and Co-morbidities  Goal: Patient's chronic conditions and co-morbidity symptoms are monitored and maintained or improved  Outcome: Progressing     Problem: Discharge Planning  Goal: Discharge to home or other facility with appropriate resources  Outcome: Progressing     Problem: Pain  Goal: Verbalizes/displays adequate comfort level or baseline comfort level  Outcome: Progressing     Problem: ABCDS Injury Assessment  Goal: Absence of physical injury  Outcome: Progressing

## 2023-04-23 LAB
MICROORGANISM SPEC CULT: ABNORMAL
MICROORGANISM/AGENT SPEC: ABNORMAL
MICROORGANISM/AGENT SPEC: ABNORMAL
SERVICE CMNT-IMP: ABNORMAL
SPECIMEN DESCRIPTION: ABNORMAL

## 2023-04-24 NOTE — DISCHARGE INSTRUCTIONS
[address] [facility]                            Visit  Discharge Instructions / Physician Orders  [address]     SUPPLIES ORDERED THRU:  [facility]                   DATE LAST SUPPLIED 4/21/23     Wound Location: Left Hip      Cleanse with: Liquid antibacterial soap and water, rinse well      Dressing Orders:  Primary dressing    Paty                   Secondary dressing     Silicone border dressing                      secure with           x 30days     Frequency:  change every other day     Additional Orders: Increase protein to diet (meat, cheese, eggs, fish, peanut butter, nuts and beans)  Multivitamin daily     OFFLOADING [x] YES  TYPE:                  [] NA     Weekly wound care visits until determined otherwise. Antibiotic therapy-wound care related YES [] NO [] NA[x]   Levaquin 500mg  1 every other day Total 4 tablets)[facility]  Doxycycline 100mg 2 x per day for 1 week -script [facility] to [facility] [place] 4/28/23  MY CHART []     Smart Device  []      HYPERBARIC TREATMENT-                TREATMENT #                          Your next appointment with the [address] [place] [address] is in 2 weeks                                                                                                   (Please note your next appointment above and if you are unable to keep, kindly give a 24 hour notice. Thank you.)  If more than 15 min late we cannot guarantee you will be seen due to clinician schedule  Per Policy, Excessive cancellation will call for dismissal from program.  If you experience any of the following, please call the [address] [place] [address] during business hours:  [phone]     * Increase in Pain  * Temperature over 101  * Increase in drainage from your wound  * Drainage with a foul odor  * Bleeding  * Increase in swelling  * Need for compression bandage changes due to slippage, breakthrough drainage.      If you

## 2023-04-26 ENCOUNTER — TELEPHONE (OUTPATIENT)
Dept: FAMILY MEDICINE CLINIC | Age: 88
End: 2023-04-26

## 2023-04-26 DIAGNOSIS — E03.9 HYPOTHYROIDISM, UNSPECIFIED TYPE: ICD-10-CM

## 2023-04-26 LAB
MICROORGANISM SPEC CULT: ABNORMAL
MICROORGANISM/AGENT SPEC: ABNORMAL
MICROORGANISM/AGENT SPEC: ABNORMAL
SERVICE CMNT-IMP: ABNORMAL
SPECIMEN DESCRIPTION: ABNORMAL
TSH SERPL DL<=0.05 MIU/L-ACNC: 16.14 UIU/ML

## 2023-04-26 NOTE — TELEPHONE ENCOUNTER
Noted, agree with Zaid River and Irasema Conner needing to identify one point person to communicate regarding Holly's care

## 2023-04-26 NOTE — TELEPHONE ENCOUNTER
Patients daughter Pat Bagley) called requesting a refill for [facility] [place]. It looks like has not been ordered in a while (4/26/22). I spoke with Emily Diaz and see states does not need at this time and not to fill. She is going to discuss with Greyson Romeo.   Emily Diaz is going to call to schedule VV once she checks her schedule

## 2023-04-27 DIAGNOSIS — E03.9 HYPOTHYROIDISM, UNSPECIFIED TYPE: ICD-10-CM

## 2023-04-27 RX ORDER — LEVOTHYROXINE SODIUM 0.12 MG/1
125 TABLET ORAL DAILY
Qty: 30 TABLET | Refills: 1 | Status: SHIPPED | OUTPATIENT
Start: 2023-04-27

## 2023-04-28 ENCOUNTER — HOSPITAL ENCOUNTER (OUTPATIENT)
Dept: WOUND CARE | Age: 88
Discharge: HOME | End: 2023-04-28
Payer: MEDICARE

## 2023-04-28 VITALS
HEIGHT: 67 IN | WEIGHT: 98 LBS | DIASTOLIC BLOOD PRESSURE: 76 MMHG | SYSTOLIC BLOOD PRESSURE: 159 MMHG | HEART RATE: 70 BPM | BODY MASS INDEX: 15.38 KG/M2 | RESPIRATION RATE: 19 BRPM | TEMPERATURE: 96.4 F

## 2023-04-28 DIAGNOSIS — L89.223 DECUBITUS ULCER OF LEFT HIP, STAGE 3 (HCC): Primary | ICD-10-CM

## 2023-04-28 DIAGNOSIS — R26.89 DECREASED MOBILITY: ICD-10-CM

## 2023-04-28 PROCEDURE — 11042 DBRDMT SUBQ TIS 1ST 20SQCM/<: CPT

## 2023-04-28 RX ORDER — LEVOFLOXACIN 500 MG/1
750 TABLET, FILM COATED ORAL
Qty: 3 TABLET | Refills: 0 | Status: SHIPPED | OUTPATIENT
Start: 2023-04-28 | End: 2023-05-05

## 2023-04-28 RX ORDER — LIDOCAINE HYDROCHLORIDE 40 MG/ML
SOLUTION TOPICAL ONCE
OUTPATIENT
Start: 2023-04-28 | End: 2023-04-28

## 2023-04-28 RX ORDER — LIDOCAINE HYDROCHLORIDE 20 MG/ML
JELLY TOPICAL ONCE
OUTPATIENT
Start: 2023-04-28 | End: 2023-04-28

## 2023-04-28 RX ORDER — DOXYCYCLINE HYCLATE 100 MG
100 TABLET ORAL 2 TIMES DAILY
Qty: 28 TABLET | Refills: 0 | Status: SHIPPED | OUTPATIENT
Start: 2023-04-28 | End: 2023-05-12

## 2023-04-28 RX ORDER — LIDOCAINE HYDROCHLORIDE 40 MG/ML
SOLUTION TOPICAL ONCE
Status: COMPLETED | OUTPATIENT
Start: 2023-04-28 | End: 2023-04-28

## 2023-04-28 RX ADMIN — LIDOCAINE HYDROCHLORIDE 5 ML: 40 SOLUTION TOPICAL at 10:52

## 2023-04-28 ASSESSMENT — ENCOUNTER SYMPTOMS
SHORTNESS OF BREATH: 0
NAUSEA: 0
VOMITING: 0
DIARRHEA: 0
COUGH: 0
RHINORRHEA: 0

## 2023-04-28 NOTE — PLAN OF CARE
Problem: Chronic Conditions and Co-morbidities  Goal: Patient's chronic conditions and co-morbidity symptoms are monitored and maintained or improved  4/28/2023 1108 by Miranda Schaefer RN  Outcome: Progressing  4/28/2023 1107 by Miranda Schaefer RN  Outcome: Progressing     Problem: Discharge Planning  Goal: Discharge to home or other facility with appropriate resources  4/28/2023 1108 by Miranda Schaefer RN  Outcome: Progressing  4/28/2023 1107 by Miranda Schaefer RN  Outcome: Progressing     Problem: Wound:  Goal: Will show signs of wound healing; wound closure and no evidence of infection  Description: Will show signs of wound healing; wound closure and no evidence of infection  Outcome: Progressing     Problem: Cognitive:  Goal: Knowledge of wound care  Description: Knowledge of wound care  Outcome: Progressing

## 2023-04-28 NOTE — PROGRESS NOTES
[address]   Progress Note and Procedure Note      7 [facility] RECORD NUMBER:  [id number]  AGE: 81 y.o. GENDER: female  : 1935  EPISODE DATE:  2023    Subjective:     Chief Complaint   Patient presents with    Wound Check     Left hip         HISTORY of PRESENT ILLNESS HPI     Nikita Yepez is a 81 y.o. female who presents today for wound/ulcer evaluation. History of Wound Context: Here for follow-up on left hip pressure ulceration with surrounding redness and purulent drainage.   Tissue culture on 2023 grew MRSA that was sensitive to tigecycline,PROTEUS VULGARIS sensitive to Levaquin subcu  She denied significant pain, no fever or chills, no other complaints  Wound/Ulcer Pain Timing/Severity: none  Quality of pain: N/A  Severity:  0 / 10   Modifying Factors: None  Associated Signs/Symptoms: none    Ulcer Identification:  Ulcer Type: pressure  Contributing Factors: chronic pressure, decreased mobility, shear force, malnutrition, and incontinence of urine         PAST MEDICAL HISTORY        Diagnosis Date    Anxiety     Arthritis     Basal cell carcinoma     Cancer (HCC)     skin    CHF (congestive heart failure) (HCC)     Closed fracture of single pubic ramus of pelvis with routine healing, right 2020    Decubitus ulcer of left hip, stage 3 ([address] 2022    Depression     Gout     H/O total hysterectomy 2018    Hyperlipidemia     Hypertension     Hypokalemia     Hypothyroidism     Kidney stone     Kidney stone     Melanoma ([address]     NSTEMI (non-ST elevated myocardial infarction) ([address] 2020    Pressure injury of coccygeal region, stage 2 ([address] 3/10/2020       PAST SURGICAL HISTORY    Past Surgical History:   Procedure Laterality Date    HYSTERECTOMY (CERVIX STATUS UNKNOWN)      HYSTERECTOMY, VAGINAL      TUBAL LIGATION         FAMILY HISTORY    Family History   Problem Relation Age of Onset    Heart Disease Father     Other Mother

## 2023-05-01 ENCOUNTER — TELEPHONE (OUTPATIENT)
Dept: WOUND CARE | Age: 88
End: 2023-05-01

## 2023-05-12 ENCOUNTER — HOSPITAL ENCOUNTER (OUTPATIENT)
Dept: WOUND CARE | Age: 88
Discharge: HOME | End: 2023-05-12
Payer: MEDICARE

## 2023-05-12 VITALS
RESPIRATION RATE: 18 BRPM | BODY MASS INDEX: 15.38 KG/M2 | HEART RATE: 54 BPM | WEIGHT: 98 LBS | SYSTOLIC BLOOD PRESSURE: 183 MMHG | HEIGHT: 67 IN | TEMPERATURE: 97.1 F | DIASTOLIC BLOOD PRESSURE: 75 MMHG

## 2023-05-12 DIAGNOSIS — R26.89 DECREASED MOBILITY: ICD-10-CM

## 2023-05-12 DIAGNOSIS — L89.223 DECUBITUS ULCER OF LEFT HIP, STAGE 3 (HCC): Primary | ICD-10-CM

## 2023-05-12 PROCEDURE — 11042 DBRDMT SUBQ TIS 1ST 20SQCM/<: CPT

## 2023-05-12 RX ORDER — LIDOCAINE HYDROCHLORIDE 20 MG/ML
JELLY TOPICAL ONCE
OUTPATIENT
Start: 2023-05-12 | End: 2023-05-12

## 2023-05-12 RX ORDER — LIDOCAINE HYDROCHLORIDE 40 MG/ML
SOLUTION TOPICAL ONCE
Status: COMPLETED | OUTPATIENT
Start: 2023-05-12 | End: 2023-05-12

## 2023-05-12 RX ORDER — LIDOCAINE HYDROCHLORIDE 40 MG/ML
SOLUTION TOPICAL ONCE
OUTPATIENT
Start: 2023-05-12 | End: 2023-05-12

## 2023-05-12 RX ADMIN — LIDOCAINE HYDROCHLORIDE 5 ML: 40 SOLUTION TOPICAL at 10:30

## 2023-05-12 ASSESSMENT — ENCOUNTER SYMPTOMS
SHORTNESS OF BREATH: 0
RHINORRHEA: 0
VOMITING: 0
COUGH: 0
NAUSEA: 0
DIARRHEA: 0

## 2023-05-12 ASSESSMENT — PAIN SCALES - GENERAL: PAINLEVEL_OUTOF10: 0

## 2023-05-12 NOTE — PROGRESS NOTES
Diabetic/Pressure/Non Pressure Ulcers only:  Ulcer: Pressure ulcer, Stage 3    Estimated Blood Loss:  Minimal    Hemostasis Achieved:  by pressure    Procedural Pain:  0  / 10     Post Procedural Pain:  0 / 10     Response to treatment:  Well tolerated by patient. Plan:   Monitor off antibiotics. Treatment Note please see Discharge Instructions    Written patient dismissal instructions given to patient and signed by patient or POA.            Electronically signed by Devendra Kendall MD on 5/12/2023 at 10:37 AM

## 2023-05-12 NOTE — DISCHARGE INSTRUCTIONS
[address] [facility]                            Visit  Discharge Instructions / Physician Orders  DATE:5/12/23     Home Care:NONE     SUPPLIES ORDERED THRU:  [facility]                   DATE LAST SUPPLIED 4/21/23     Wound Location: Left Hip      Cleanse with: Liquid antibacterial soap and water, rinse well      Dressing Orders:  Primary dressing    Paty                   Secondary dressing     Silicone border dressing                      secure with           x 30days     Frequency:  change every other day     Additional Orders: Increase protein to diet (meat, cheese, eggs, fish, peanut butter, nuts and beans)  Multivitamin daily     OFFLOADING [x] YES  TYPE:                  [] NA     Weekly wound care visits until determined otherwise. Antibiotic therapy-wound care related YES [] NO [x] NA[]     MY CHART []     Smart Device  []      HYPERBARIC TREATMENT-                TREATMENT #                          Your next appointment with the [address] [place] [address] is in 3 weeks                                                                                                   (Please note your next appointment above and if you are unable to keep, kindly give a 24 hour notice. Thank you.)  If more than 15 min late we cannot guarantee you will be seen due to clinician schedule  Per Policy, Excessive cancellation will call for dismissal from program.  If you experience any of the following, please call the [address] [place] [address] during business hours:  [phone]     * Increase in Pain  * Temperature over 101  * Increase in drainage from your wound  * Drainage with a foul odor  * Bleeding  * Increase in swelling  * Need for compression bandage changes due to slippage, breakthrough drainage. If you need medical attention outside of the business hours of the [address] [place] [address] please contact your PCP or go to the nearest emergency room.      The information contained in the

## 2023-06-02 ENCOUNTER — HOSPITAL ENCOUNTER (OUTPATIENT)
Dept: WOUND CARE | Age: 88
Discharge: HOME | End: 2023-06-02
Payer: MEDICARE

## 2023-06-02 VITALS
DIASTOLIC BLOOD PRESSURE: 61 MMHG | HEIGHT: 67 IN | RESPIRATION RATE: 17 BRPM | WEIGHT: 98 LBS | HEART RATE: 68 BPM | BODY MASS INDEX: 15.38 KG/M2 | SYSTOLIC BLOOD PRESSURE: 129 MMHG | TEMPERATURE: 97 F

## 2023-06-02 DIAGNOSIS — R26.89 DECREASED MOBILITY: ICD-10-CM

## 2023-06-02 DIAGNOSIS — L89.223 DECUBITUS ULCER OF LEFT HIP, STAGE 3 (HCC): Primary | ICD-10-CM

## 2023-06-02 PROCEDURE — 11042 DBRDMT SUBQ TIS 1ST 20SQCM/<: CPT

## 2023-06-02 RX ORDER — LIDOCAINE HYDROCHLORIDE 20 MG/ML
JELLY TOPICAL ONCE
OUTPATIENT
Start: 2023-06-02 | End: 2023-06-02

## 2023-06-02 RX ORDER — LIDOCAINE HYDROCHLORIDE 20 MG/ML
JELLY TOPICAL ONCE
Status: DISCONTINUED | OUTPATIENT
Start: 2023-06-02 | End: 2023-06-03 | Stop reason: HOSPADM

## 2023-06-02 RX ORDER — LIDOCAINE HYDROCHLORIDE 40 MG/ML
SOLUTION TOPICAL ONCE
Status: COMPLETED | OUTPATIENT
Start: 2023-06-02 | End: 2023-06-02

## 2023-06-02 RX ORDER — LIDOCAINE HYDROCHLORIDE 40 MG/ML
SOLUTION TOPICAL ONCE
OUTPATIENT
Start: 2023-06-02 | End: 2023-06-02

## 2023-06-02 RX ADMIN — LIDOCAINE HYDROCHLORIDE 5 ML: 40 SOLUTION TOPICAL at 09:59

## 2023-06-02 ASSESSMENT — ENCOUNTER SYMPTOMS
RHINORRHEA: 0
VOMITING: 0
COUGH: 0
NAUSEA: 0
SHORTNESS OF BREATH: 0
DIARRHEA: 0

## 2023-06-02 NOTE — PROGRESS NOTES
[address]   Progress Note and Procedure Note      7 [facility] RECORD NUMBER:  [id number]  AGE: 81 y.o. GENDER: female  : 1935  EPISODE DATE:  2023    Subjective:     Chief Complaint   Patient presents with    Wound Check     Left ischium         HISTORY of PRESENT ILLNESS HPI     Ivan Love is a 81 y.o. female who presents today for wound/ulcer evaluation. History of Wound Context: here with daughter to follow up on left hip pressure ulceration. Doing well off antibiotics, no signs or symptoms of infection.    Wound/Ulcer Pain Timing/Severity: none  Quality of pain: N/A  Severity:  0 / 10   Modifying Factors: None  Associated Signs/Symptoms: none    Ulcer Identification:  Ulcer Type: pressure  Contributing Factors: chronic pressure, decreased mobility, shear force, malnutrition, and incontinence of urine         PAST MEDICAL HISTORY        Diagnosis Date    Anxiety     Arthritis     Basal cell carcinoma     Cancer (HCC)     skin    CHF (congestive heart failure) (HCC)     Closed fracture of single pubic ramus of pelvis with routine healing, right 2020    Decubitus ulcer of left hip, stage 3 ([address] 2022    Depression     Gout     H/O total hysterectomy 2018    Hyperlipidemia     Hypertension     Hypokalemia     Hypothyroidism     Kidney stone     Kidney stone     Melanoma ([address]     NSTEMI (non-ST elevated myocardial infarction) ([address] 2020    Pressure injury of coccygeal region, stage 2 ([address] 3/10/2020       PAST SURGICAL HISTORY    Past Surgical History:   Procedure Laterality Date    HYSTERECTOMY (CERVIX STATUS UNKNOWN)      HYSTERECTOMY, VAGINAL      TUBAL LIGATION         FAMILY HISTORY    Family History   Problem Relation Age of Onset    Heart Disease Father     Other Mother          in her sleep unknown cause       SOCIAL HISTORY    Social History     Tobacco Use    Smoking status: Former     Types: Cigarettes     Quit date: 1960

## 2023-06-02 NOTE — PROGRESS NOTES
[address]:     [facility] [place]:     [facility]  [address] [address]  [phone]  [phone]     Patient Information:      Allyn Albert  [address]   [phone]   : 1935  AGE: 81 y.o. GENDER: female   EPISODE DATE: 2023    Insurance:      PRIMARY INSURANCE:  Plan: MEDICARE PART A AND B  Coverage: MEDICARE  Effective Date: 2001  Group Number: [unfilled]  Subscriber Number: [id number] - (Medicare)    Payer/Plan Subscr  Sex Relation Sub. Ins. ID Effective Group Num   1. NED Renner 1935 Female Self [id number]                                    [address]   2. Wilma 1205 M 1935 Female Self [id number]                                    [address]        Patient Wound Information:      Problem List Items Addressed This Visit          Other    Decubitus ulcer of left hip, stage 3 ([address] - Primary    Relevant Medications    lidocaine (XYLOCAINE) 2 % uro-jet (Start on 2023 10:15 AM)    Other Relevant Orders    Initiate Outpatient Wound Care Protocol    Decreased mobility    Relevant Medications    lidocaine (XYLOCAINE) 2 % uro-jet (Start on 2023 10:15 AM)    Other Relevant Orders    Initiate Outpatient Wound Care Protocol   L89.223    WOUNDS REQUIRING DRESSING SUPPLIES:     Wound 23 Hip wound #1 left ishium (Active)   Wound Image   23 1003   Wound Etiology Pressure Stage 3 23 0955   Dressing Status New drainage noted; Old drainage noted 23 0955   Wound Cleansed Cleansed with saline 23 0955   Dressing/Treatment Other (comment) 23 1046   Wound Length (cm) 1.6 cm 23 0955   Wound Width (cm) 1.4 cm 23 0955   Wound Depth (cm) 0.7 cm 23 0955   Wound Surface Area (cm^2) 2.24 cm^2 23 0955   Change in Wound Size % (l*w)

## 2023-06-02 NOTE — PLAN OF CARE
Problem: Chronic Conditions and Co-morbidities  Goal: Patient's chronic conditions and co-morbidity symptoms are monitored and maintained or improved  Outcome: Progressing     Problem: Discharge Planning  Goal: Discharge to home or other facility with appropriate resources  Outcome: Progressing     Problem: Safety - Adult  Goal: Free from fall injury  Outcome: Progressing     Problem: ABCDS Injury Assessment  Goal: Absence of physical injury  Outcome: Progressing  Flowsheets (Taken 6/2/2023 0949 by Dorinda Hightower RN)  Absence of Physical Injury: Implement safety measures based on patient assessment     Problem: Wound:  Goal: Will show signs of wound healing; wound closure and no evidence of infection  Description: Will show signs of wound healing; wound closure and no evidence of infection  Outcome: Progressing

## 2023-06-05 LAB — TSH SERPL DL<=0.05 MIU/L-ACNC: 0.56 UIU/ML

## 2023-06-06 DIAGNOSIS — E03.9 HYPOTHYROIDISM, UNSPECIFIED TYPE: ICD-10-CM

## 2023-06-21 DIAGNOSIS — E03.9 HYPOTHYROIDISM, UNSPECIFIED TYPE: ICD-10-CM

## 2023-06-21 NOTE — TELEPHONE ENCOUNTER
Last visit: 12/15/2022  Last Med refill: 05/23/2023  Does patient have enough medication for 72 hours: Yes    Next Visit Date:  Future Appointments   Date Time Provider Roberto Drummond   6/30/2023  9:45 AM Kayla Lofton MD [address] [place] [address] Joshua Ma 7056 Maintenance   Topic Date Due    DTaP/Tdap/Td vaccine (1 - Tdap) Never done    Shingles vaccine (1 of 2) Never done    Depression Monitoring  11/12/2022    COVID-19 Vaccine (3 - Booster) 08/11/2023 (Originally 6/14/2021)    Pneumococcal 65+ years Vaccine (1 - PCV) 11/11/2023 (Originally 7/3/1941)    Flu vaccine (Season Ended) 08/01/2023    Lipids  09/27/2023    Hepatitis A vaccine  Aged Out    Hib vaccine  Aged Out    Meningococcal (ACWY) vaccine  Aged Out       Hemoglobin A1C (%)   Date Value   03/06/2020 5.0             ( goal A1C is < 7)   No results found for: LABMICR  LDL Cholesterol (mg/dL)   Date Value   09/27/2022 64   01/29/2020 65       (goal LDL is <100)   AST (U/L)   Date Value   09/27/2022 13     ALT (U/L)   Date Value   09/27/2022 6     BUN (mg/dL)   Date Value   03/20/2023 16     BP Readings from Last 3 Encounters:   06/02/23 129/61   05/12/23 (!) 183/75   04/28/23 (!) 159/76          (goal 120/80)    All Future Testing planned in [facility]  Lab Frequency Next Occurrence   Comprehensive Metabolic Panel Once 40/66/1644   Lipid, Fasting Once 08/11/2022               Patient Active Problem List:     Essential hypertension     Cancer ([facility]     Hypothyroidism     Depression     Hyperlipidemia     Basal cell carcinoma     Melanoma ([facility]     Anxiety     Hypokalemia     Acute idiopathic gout of right foot     CKD (chronic kidney disease) stage 3, GFR 30-59 ml/min (HCC)     Risk for falls     Pneumococcal vaccine refused     Influenza vaccination declined     Fatigue     Bilateral lower extremity pain     Chronic diastolic heart failure (HCC)     Acute cystitis without hematuria     Chronic systolic heart failure (HCC)     Decreased mobility     Localized

## 2023-06-22 RX ORDER — LEVOTHYROXINE SODIUM 0.12 MG/1
TABLET ORAL
Qty: 30 TABLET | Refills: 1 | Status: SHIPPED | OUTPATIENT
Start: 2023-06-22

## 2023-07-10 DIAGNOSIS — M15.9 PRIMARY OSTEOARTHRITIS INVOLVING MULTIPLE JOINTS: Primary | ICD-10-CM

## 2023-07-10 NOTE — TELEPHONE ENCOUNTER
Last visit: 12/15/2022  Last Med refill: 04/2023  Does patient have enough medication for 72 hours: Yes    Next Visit Date:  LEFT MESSAGE TO SCHEDULE AN APPOINTMENT     Starla Acevedo IS ONLY TAKING 1/2 TAB DAILY AT THIS TIME    No future appointments.     Health Maintenance   Topic Date Due    DTaP/Tdap/Td vaccine (1 - Tdap) Never done    Shingles vaccine (1 of 2) Never done    Depression Monitoring  11/12/2022    COVID-19 Vaccine (3 - Booster) 08/11/2023 (Originally 6/14/2021)    Pneumococcal 65+ years Vaccine (1 - PCV) 11/11/2023 (Originally 7/3/1941)    Flu vaccine (1) 08/01/2023    Lipids  09/27/2023    Hepatitis A vaccine  Aged Out    Hib vaccine  Aged Out    Meningococcal (ACWY) vaccine  Aged Out       Hemoglobin A1C (%)   Date Value   03/06/2020 5.0             ( goal A1C is < 7)   No results found for: LABMICR  LDL Cholesterol (mg/dL)   Date Value   09/27/2022 64   01/29/2020 65       (goal LDL is <100)   AST (U/L)   Date Value   09/27/2022 13     ALT (U/L)   Date Value   09/27/2022 6     BUN (mg/dL)   Date Value   03/20/2023 16     BP Readings from Last 3 Encounters:   06/02/23 129/61   05/12/23 (!) 183/75   04/28/23 (!) 159/76          (goal 120/80)    All Future Testing planned in [facility]  Lab Frequency Next Occurrence   Comprehensive Metabolic Panel Once 76/76/8532   Lipid, Fasting Once 08/11/2022               Patient Active Problem List:     Essential hypertension     Cancer ([address])     Hypothyroidism     Depression     Hyperlipidemia     Basal cell carcinoma     Melanoma ([address])     Anxiety     Hypokalemia     Acute idiopathic gout of right foot     CKD (chronic kidney disease) stage 3, GFR 30-59 ml/min (HCC)     Risk for falls     Pneumococcal vaccine refused     Influenza vaccination declined     Fatigue     Bilateral lower extremity pain     Chronic diastolic heart failure (HCC)     Acute cystitis without hematuria     Chronic systolic heart failure (HCC)     Decreased mobility     Localized

## 2023-07-11 RX ORDER — HYDROCODONE BITARTRATE AND ACETAMINOPHEN 5; 325 MG/1; MG/1
1 TABLET ORAL EVERY 6 HOURS PRN
Qty: 30 TABLET | Refills: 0 | Status: SHIPPED | OUTPATIENT
Start: 2023-07-11 | End: 2023-08-10

## 2023-08-10 DIAGNOSIS — E78.5 HYPERLIPIDEMIA, UNSPECIFIED HYPERLIPIDEMIA TYPE: ICD-10-CM

## 2023-08-10 RX ORDER — ATORVASTATIN CALCIUM 10 MG/1
TABLET, FILM COATED ORAL
Qty: 90 TABLET | Refills: 3 | Status: SHIPPED | OUTPATIENT
Start: 2023-08-10

## 2023-08-10 NOTE — TELEPHONE ENCOUNTER
Last visit: 12/15/2022  Last Med refill: 05/2023  Does patient have enough medication for 72 hours: No:     Next Visit Date:  No future appointments.     Health Maintenance   Topic Date Due    DTaP/Tdap/Td vaccine (1 - Tdap) Never done    Shingles vaccine (1 of 2) Never done    Depression Monitoring  11/12/2022    Flu vaccine (1) Never done    COVID-19 Vaccine (3 - Booster) 08/11/2023 (Originally 6/14/2021)    Pneumococcal 65+ years Vaccine (1 - PCV) 11/11/2023 (Originally 7/3/1941)    Lipids  09/27/2023    Hepatitis A vaccine  Aged Out    Hib vaccine  Aged Out    Meningococcal (ACWY) vaccine  Aged Out       Hemoglobin A1C (%)   Date Value   03/06/2020 5.0             ( goal A1C is < 7)   No components found for: LABMICR  LDL Cholesterol (mg/dL)   Date Value   09/27/2022 64   01/29/2020 65       (goal LDL is <100)   AST (U/L)   Date Value   09/27/2022 13     ALT (U/L)   Date Value   09/27/2022 6     BUN (mg/dL)   Date Value   03/20/2023 16     BP Readings from Last 3 Encounters:   06/02/23 129/61   05/12/23 (!) 183/75   04/28/23 (!) 159/76          (goal 120/80)    All Future Testing planned in [facility]  Lab Frequency Next Occurrence   Comprehensive Metabolic Panel Once 25/40/7343   Lipid, Fasting Once 08/11/2022               Patient Active Problem List:     Essential hypertension     Cancer ([address])     Hypothyroidism     Depression     Hyperlipidemia     Basal cell carcinoma     Melanoma ([address])     Anxiety     Hypokalemia     Acute idiopathic gout of right foot     CKD (chronic kidney disease) stage 3, GFR 30-59 ml/min (HCC)     Risk for falls     Pneumococcal vaccine refused     Influenza vaccination declined     Fatigue     Bilateral lower extremity pain     Chronic diastolic heart failure (HCC)     Acute cystitis without hematuria     Chronic systolic heart failure (HCC)     Decreased mobility     Localized edema     Xerosis cutis     Hallux valgus of left foot     Severe malnutrition (HCC)     Moderate episode of

## 2023-08-17 DIAGNOSIS — E03.9 HYPOTHYROIDISM, UNSPECIFIED TYPE: ICD-10-CM

## 2023-08-18 RX ORDER — LEVOTHYROXINE SODIUM 0.12 MG/1
TABLET ORAL
Qty: 30 TABLET | Refills: 1 | Status: SHIPPED | OUTPATIENT
Start: 2023-08-18

## 2023-08-27 DIAGNOSIS — F32.A DEPRESSION, UNSPECIFIED DEPRESSION TYPE: ICD-10-CM

## 2023-08-28 NOTE — TELEPHONE ENCOUNTER
Daiana Musa is calling to request a refill on the following medication(s):    Medication Request:  Requested Prescriptions     Pending Prescriptions Disp Refills    busPIRone (BUSPAR) 15 MG tablet [Pharmacy Med Name: BUSPIRONE HCL 15 MG TABLET] 90 tablet 1     Sig: take 1 tablet by mouth once daily       Last Visit Date (If Applicable):  62/44/5503    Next Visit Date:    Visit date not found

## 2023-08-29 RX ORDER — BUSPIRONE HYDROCHLORIDE 15 MG/1
TABLET ORAL
Qty: 90 TABLET | Refills: 1 | Status: SHIPPED | OUTPATIENT
Start: 2023-08-29

## 2023-09-07 DIAGNOSIS — I10 ESSENTIAL HYPERTENSION: ICD-10-CM

## 2023-09-07 DIAGNOSIS — M15.9 PRIMARY OSTEOARTHRITIS INVOLVING MULTIPLE JOINTS: ICD-10-CM

## 2023-09-07 RX ORDER — PAROXETINE HYDROCHLORIDE 40 MG/1
40 TABLET, FILM COATED ORAL EVERY MORNING
COMMUNITY
Start: 2023-07-22 | End: 2023-09-07 | Stop reason: SDUPTHER

## 2023-09-07 RX ORDER — CARVEDILOL 12.5 MG/1
TABLET ORAL
Qty: 180 TABLET | Refills: 0 | Status: SHIPPED | OUTPATIENT
Start: 2023-09-07

## 2023-09-07 RX ORDER — LISINOPRIL 20 MG/1
TABLET ORAL
Qty: 90 TABLET | Refills: 0 | Status: SHIPPED | OUTPATIENT
Start: 2023-09-07

## 2023-09-07 RX ORDER — PAROXETINE HYDROCHLORIDE 40 MG/1
40 TABLET, FILM COATED ORAL EVERY MORNING
Qty: 90 TABLET | Refills: 0 | Status: SHIPPED | OUTPATIENT
Start: 2023-09-07

## 2023-09-07 RX ORDER — HYDROCODONE BITARTRATE AND ACETAMINOPHEN 5; 325 MG/1; MG/1
1 TABLET ORAL EVERY 6 HOURS PRN
Qty: 30 TABLET | Refills: 0 | Status: SHIPPED | OUTPATIENT
Start: 2023-09-07 | End: 2023-10-07

## 2023-09-07 NOTE — TELEPHONE ENCOUNTER
PATIENT IS BED RIDDEN. Last visit: 12/15/22  Last Med refill: 7/11/23, 06/23  Does patient have enough medication for 72 hours: No:     Next Visit Date:  patient is bed ridden unable to come for visit. I offered VV  I gave info for home physicians  No future appointments.     Health Maintenance   Topic Date Due    DTaP/Tdap/Td vaccine (1 - Tdap) Never done    Shingles vaccine (1 of 2) Never done    COVID-19 Vaccine (3 - Booster) 06/14/2021    Depression Monitoring  11/12/2022    Flu vaccine (1) Never done    Lipids  09/27/2023    Pneumococcal 65+ years Vaccine (1 - PCV) 11/11/2023 (Originally 7/3/1941)    Hepatitis A vaccine  Aged Out    Hib vaccine  Aged Out    Meningococcal (ACWY) vaccine  Aged Out       Hemoglobin A1C (%)   Date Value   03/06/2020 5.0             ( goal A1C is < 7)   No components found for: LABMICR  LDL Cholesterol (mg/dL)   Date Value   09/27/2022 64   01/29/2020 65       (goal LDL is <100)   AST (U/L)   Date Value   09/27/2022 13     ALT (U/L)   Date Value   09/27/2022 6     BUN (mg/dL)   Date Value   03/20/2023 16     BP Readings from Last 3 Encounters:   06/02/23 129/61   05/12/23 (!) 183/75   04/28/23 (!) 159/76          (goal 120/80)    All Future Testing planned in [facility]  Lab Frequency Next Occurrence               Patient Active Problem List:     Essential hypertension     Cancer (HCC)     Hypothyroidism     Depression     Hyperlipidemia     Basal cell carcinoma     Melanoma ([address])     Anxiety     Hypokalemia     Acute idiopathic gout of right foot     CKD (chronic kidney disease) stage 3, GFR 30-59 ml/min (HCC)     Risk for falls     Pneumococcal vaccine refused     Influenza vaccination declined     Fatigue     Bilateral lower extremity pain     Chronic diastolic heart failure (HCC)     Acute cystitis without hematuria     Chronic systolic heart failure (HCC)     Decreased mobility     Localized edema     Xerosis cutis     Hallux valgus of left foot     Severe malnutrition ([address])

## 2023-09-11 SDOH — ECONOMIC STABILITY: INCOME INSECURITY: HOW HARD IS IT FOR YOU TO PAY FOR THE VERY BASICS LIKE FOOD, HOUSING, MEDICAL CARE, AND HEATING?: NOT HARD AT ALL

## 2023-09-11 SDOH — ECONOMIC STABILITY: FOOD INSECURITY: WITHIN THE PAST 12 MONTHS, YOU WORRIED THAT YOUR FOOD WOULD RUN OUT BEFORE YOU GOT MONEY TO BUY MORE.: NEVER TRUE

## 2023-09-11 SDOH — ECONOMIC STABILITY: TRANSPORTATION INSECURITY
IN THE PAST 12 MONTHS, HAS LACK OF TRANSPORTATION KEPT YOU FROM MEETINGS, WORK, OR FROM GETTING THINGS NEEDED FOR DAILY LIVING?: PATIENT DECLINED

## 2023-09-11 SDOH — ECONOMIC STABILITY: HOUSING INSECURITY
IN THE LAST 12 MONTHS, WAS THERE A TIME WHEN YOU DID NOT HAVE A STEADY PLACE TO SLEEP OR SLEPT IN A SHELTER (INCLUDING NOW)?: NO

## 2023-09-11 SDOH — ECONOMIC STABILITY: FOOD INSECURITY: WITHIN THE PAST 12 MONTHS, THE FOOD YOU BOUGHT JUST DIDN'T LAST AND YOU DIDN'T HAVE MONEY TO GET MORE.: NEVER TRUE

## 2023-09-12 ENCOUNTER — TELEMEDICINE (OUTPATIENT)
Dept: FAMILY MEDICINE CLINIC | Age: 88
End: 2023-09-12

## 2023-09-12 DIAGNOSIS — F33.1 MODERATE EPISODE OF RECURRENT MAJOR DEPRESSIVE DISORDER (HCC): ICD-10-CM

## 2023-09-12 DIAGNOSIS — C43.9 MALIGNANT MELANOMA, UNSPECIFIED SITE (HCC): ICD-10-CM

## 2023-09-12 DIAGNOSIS — E43 SEVERE MALNUTRITION (HCC): ICD-10-CM

## 2023-09-12 DIAGNOSIS — L89.224 PRESSURE ULCER OF LEFT HIP, STAGE 4 (HCC): ICD-10-CM

## 2023-09-12 DIAGNOSIS — E78.5 HYPERLIPIDEMIA, UNSPECIFIED HYPERLIPIDEMIA TYPE: ICD-10-CM

## 2023-09-12 DIAGNOSIS — N18.30 STAGE 3 CHRONIC KIDNEY DISEASE, UNSPECIFIED WHETHER STAGE 3A OR 3B CKD (HCC): ICD-10-CM

## 2023-09-12 DIAGNOSIS — I10 ESSENTIAL HYPERTENSION: ICD-10-CM

## 2023-09-12 DIAGNOSIS — I50.32 CHRONIC DIASTOLIC HEART FAILURE (HCC): Primary | ICD-10-CM

## 2023-09-12 ASSESSMENT — PATIENT HEALTH QUESTIONNAIRE - PHQ9
6. FEELING BAD ABOUT YOURSELF - OR THAT YOU ARE A FAILURE OR HAVE LET YOURSELF OR YOUR FAMILY DOWN: 0
7. TROUBLE CONCENTRATING ON THINGS, SUCH AS READING THE NEWSPAPER OR WATCHING TELEVISION: 0
2. FEELING DOWN, DEPRESSED OR HOPELESS: 0
SUM OF ALL RESPONSES TO PHQ QUESTIONS 1-9: 0
9. THOUGHTS THAT YOU WOULD BE BETTER OFF DEAD, OR OF HURTING YOURSELF: 0
SUM OF ALL RESPONSES TO PHQ QUESTIONS 1-9: 0
SUM OF ALL RESPONSES TO PHQ QUESTIONS 1-9: 0
SUM OF ALL RESPONSES TO PHQ9 QUESTIONS 1 & 2: 0
8. MOVING OR SPEAKING SO SLOWLY THAT OTHER PEOPLE COULD HAVE NOTICED. OR THE OPPOSITE, BEING SO FIGETY OR RESTLESS THAT YOU HAVE BEEN MOVING AROUND A LOT MORE THAN USUAL: 0
10. IF YOU CHECKED OFF ANY PROBLEMS, HOW DIFFICULT HAVE THESE PROBLEMS MADE IT FOR YOU TO DO YOUR WORK, TAKE CARE OF THINGS AT HOME, OR GET ALONG WITH OTHER PEOPLE: 0
SUM OF ALL RESPONSES TO PHQ QUESTIONS 1-9: 0
4. FEELING TIRED OR HAVING LITTLE ENERGY: 0
3. TROUBLE FALLING OR STAYING ASLEEP: 0
5. POOR APPETITE OR OVEREATING: 0
1. LITTLE INTEREST OR PLEASURE IN DOING THINGS: 0

## 2023-09-12 ASSESSMENT — ENCOUNTER SYMPTOMS
BLOOD IN STOOL: 0
CHEST TIGHTNESS: 0
CONSTIPATION: 0
WHEEZING: 0
COUGH: 0
DIARRHEA: 0
CHOKING: 0
NAUSEA: 0
ABDOMINAL PAIN: 0
VOMITING: 0
SHORTNESS OF BREATH: 0
ANAL BLEEDING: 0

## 2023-09-12 NOTE — PROGRESS NOTES
Pt is being seen today via [address], pts daughter is doing the visit with pt today     No complaints at this time
paroxysmal nocturnal dyspnea. Hyperlipidemia-tolerating current regimen without myalgias, dyspepsia, jaundice. Mostly compliant with diet recommendations for low salt diet, tries to limit greasy/cheesy/fried foods, not very compliant with exercise recommendations. Cardiovascular risk factors: advanced age (older than 54 for men, 72 for women), dyslipidemia, hypertension, and sedentary lifestyle          Review of Systems   Constitutional:  Negative for fatigue, fever and unexpected weight change. Respiratory:  Negative for cough, choking, chest tightness, shortness of breath and wheezing. Cardiovascular:  Negative for chest pain, palpitations and leg swelling. Gastrointestinal:  Negative for abdominal pain, anal bleeding, blood in stool, constipation, diarrhea, nausea and vomiting. Endocrine: Negative. Musculoskeletal:  Negative for joint swelling and myalgias. Skin: Negative. Neurological:  Negative for dizziness. Psychiatric/Behavioral:  Negative for sleep disturbance. All other systems reviewed and are negative. Objective   Prior to Visit Medications    Medication Sig Taking? Authorizing Provider   PARoxetine (PAXIL) 40 MG tablet Take 1 tablet by mouth every morning Yes Cecille Thorpe MD   lisinopril (PRINIVIL;ZESTRIL) 20 MG tablet TAKE 1 TABLET DAILY Yes Cecille Thorpe MD   carvedilol (COREG) 12.5 MG tablet TAKE 1 TABLET TWICE A DAY WITH MEALS Yes Cecille Thorpe MD   HYDROcodone-acetaminophen (NORCO) 5-325 MG per tablet Take 1 tablet by mouth every 6 hours as needed for Pain for up to 30 days. Max Daily Amount: 4 tablets Yes Cecille Thorpe MD   busPIRone (BUSPAR) 15 MG tablet take 1 tablet by mouth once daily Yes Cecille Thorpe MD   levothyroxine (SYNTHROID) 125 MCG tablet take 1 tablet by mouth once daily Yes NORMA Del Rosario - CNP   atorvastatin (LIPITOR) 10 MG tablet take 1 tablet by mouth once daily Yes Melody Ramon MD   Misc.  Devices

## 2023-09-14 NOTE — TELEPHONE ENCOUNTER
Patient's daughter stopped in office requesting refill on:    -Hydrocodone-Aceta 5-325    [facility] on [address]. States she got this when she was [facility]. Daughter states patient only takes one in morning and one in PM. Daughter is giving her Tylenol Strength in between those times if she needs something for pain. Took last pill on 5/16/21.
Spoke with daughter and she states this is for the pain in her right hip. States it was for long term pain management. Also script did not transmit to pharmacy, please resend.
Will need med agreement and discussion at [address], for now 15 day supply called in, please notify
no

## 2023-10-10 DIAGNOSIS — E03.9 HYPOTHYROIDISM, UNSPECIFIED TYPE: ICD-10-CM

## 2023-10-10 RX ORDER — LEVOTHYROXINE SODIUM 0.12 MG/1
125 TABLET ORAL EVERY MORNING
Qty: 30 TABLET | Refills: 1 | Status: SHIPPED | OUTPATIENT
Start: 2023-10-10

## 2023-10-10 NOTE — TELEPHONE ENCOUNTER
Doug Holloway is calling to request a refill on the following medication(s):    Medication Request:  Requested Prescriptions     Pending Prescriptions Disp Refills    levothyroxine (SYNTHROID) 125 MCG tablet [Pharmacy Med Name: LEVOTHYROXINE 125 MCG TABLET] 30 tablet 1     Sig: take 1 tablet by mouth every morning ON AN EMPTY STOMACH       Last Visit Date (If Applicable):  29/76/3777    Next Visit Date:    Visit date not found

## 2023-11-16 ENCOUNTER — APPOINTMENT (OUTPATIENT)
Dept: GENERAL RADIOLOGY | Age: 88
DRG: 689 | End: 2023-11-16
Payer: MEDICARE

## 2023-11-16 ENCOUNTER — HOSPITAL ENCOUNTER (INPATIENT)
Age: 88
LOS: 2 days | Discharge: HOME | DRG: 689 | End: 2023-11-18
Attending: EMERGENCY MEDICINE | Admitting: INTERNAL MEDICINE
Payer: MEDICARE

## 2023-11-16 DIAGNOSIS — L89.224 PRESSURE ULCER OF LEFT HIP, STAGE 4 (HCC): ICD-10-CM

## 2023-11-16 DIAGNOSIS — R13.12 OROPHARYNGEAL DYSPHAGIA: ICD-10-CM

## 2023-11-16 DIAGNOSIS — N39.0 ACUTE URINARY TRACT INFECTION: Primary | ICD-10-CM

## 2023-11-16 LAB
ANION GAP SERPL CALCULATED.3IONS-SCNC: 10 MMOL/L (ref 9–17)
BACTERIA URNS QL MICRO: ABNORMAL
BASOPHILS # BLD: 0.06 K/UL (ref 0–0.2)
BASOPHILS NFR BLD: 1 % (ref 0–2)
BILIRUB UR QL STRIP: NEGATIVE
BUN SERPL-MCNC: 14 MG/DL (ref 8–23)
CALCIUM SERPL-MCNC: 9.2 MG/DL (ref 8.6–10.4)
CASTS #/AREA URNS LPF: ABNORMAL /LPF (ref 0–8)
CHLORIDE SERPL-SCNC: 103 MMOL/L (ref 98–107)
CLARITY UR: ABNORMAL
CO2 SERPL-SCNC: 29 MMOL/L (ref 20–31)
COLOR UR: YELLOW
CREAT SERPL-MCNC: 0.7 MG/DL (ref 0.5–0.9)
EOSINOPHIL # BLD: 0.24 K/UL (ref 0–0.44)
EOSINOPHILS RELATIVE PERCENT: 3 % (ref 1–4)
EPI CELLS #/AREA URNS HPF: ABNORMAL /HPF (ref 0–5)
ERYTHROCYTE [DISTWIDTH] IN BLOOD BY AUTOMATED COUNT: 13.2 % (ref 11.8–14.4)
GFR SERPL CREATININE-BSD FRML MDRD: >60 ML/MIN/1.73M2
GLUCOSE SERPL-MCNC: 136 MG/DL (ref 70–99)
GLUCOSE UR STRIP-MCNC: NEGATIVE MG/DL
HCT VFR BLD AUTO: 42.9 % (ref 36.3–47.1)
HGB BLD-MCNC: 14 G/DL (ref 11.9–15.1)
HGB UR QL STRIP.AUTO: ABNORMAL
IMM GRANULOCYTES # BLD AUTO: 0.04 K/UL (ref 0–0.3)
IMM GRANULOCYTES NFR BLD: 1 %
KETONES UR STRIP-MCNC: NEGATIVE MG/DL
LEUKOCYTE ESTERASE UR QL STRIP: ABNORMAL
LYMPHOCYTES NFR BLD: 1.17 K/UL (ref 1.1–3.7)
LYMPHOCYTES RELATIVE PERCENT: 16 % (ref 24–43)
MCH RBC QN AUTO: 30.1 PG (ref 25.2–33.5)
MCHC RBC AUTO-ENTMCNC: 32.6 G/DL (ref 28.4–34.8)
MCV RBC AUTO: 92.3 FL (ref 82.6–102.9)
MONOCYTES NFR BLD: 0.4 K/UL (ref 0.1–1.2)
MONOCYTES NFR BLD: 6 % (ref 3–12)
NEUTROPHILS NFR BLD: 73 % (ref 36–65)
NEUTS SEG NFR BLD: 5.41 K/UL (ref 1.5–8.1)
NITRITE UR QL STRIP: POSITIVE
NRBC BLD-RTO: 0 PER 100 WBC
PH UR STRIP: 7 [PH] (ref 5–8)
PLATELET # BLD AUTO: 225 K/UL (ref 138–453)
PMV BLD AUTO: 10.7 FL (ref 8.1–13.5)
POTASSIUM SERPL-SCNC: 3.9 MMOL/L (ref 3.7–5.3)
PROT UR STRIP-MCNC: ABNORMAL MG/DL
RBC # BLD AUTO: 4.65 M/UL (ref 3.95–5.11)
RBC #/AREA URNS HPF: ABNORMAL /HPF (ref 0–4)
SODIUM SERPL-SCNC: 142 MMOL/L (ref 135–144)
SP GR UR STRIP: 1.02 (ref 1–1.03)
TROPONIN I SERPL HS-MCNC: 38 NG/L (ref 0–14)
TROPONIN I SERPL HS-MCNC: 43 NG/L (ref 0–14)
UROBILINOGEN UR STRIP-ACNC: NORMAL EU/DL (ref 0–1)
WBC #/AREA URNS HPF: ABNORMAL /HPF (ref 0–5)
WBC OTHER # BLD: 7.3 K/UL (ref 3.5–11.3)

## 2023-11-16 PROCEDURE — 2580000003 HC RX 258

## 2023-11-16 PROCEDURE — 93005 ELECTROCARDIOGRAM TRACING: CPT

## 2023-11-16 PROCEDURE — 80048 BASIC METABOLIC PNL TOTAL CA: CPT

## 2023-11-16 PROCEDURE — 6370000000 HC RX 637 (ALT 250 FOR IP)

## 2023-11-16 PROCEDURE — 99223 1ST HOSP IP/OBS HIGH 75: CPT | Performed by: INTERNAL MEDICINE

## 2023-11-16 PROCEDURE — 81001 URINALYSIS AUTO W/SCOPE: CPT

## 2023-11-16 PROCEDURE — 2060000000 HC ICU INTERMEDIATE R&B

## 2023-11-16 PROCEDURE — 87186 SC STD MICRODIL/AGAR DIL: CPT

## 2023-11-16 PROCEDURE — 85025 COMPLETE CBC W/AUTO DIFF WBC: CPT

## 2023-11-16 PROCEDURE — 87086 URINE CULTURE/COLONY COUNT: CPT

## 2023-11-16 PROCEDURE — 86403 PARTICLE AGGLUT ANTBDY SCRN: CPT

## 2023-11-16 PROCEDURE — 94761 N-INVAS EAR/PLS OXIMETRY MLT: CPT

## 2023-11-16 PROCEDURE — 84484 ASSAY OF TROPONIN QUANT: CPT

## 2023-11-16 PROCEDURE — 6360000002 HC RX W HCPCS

## 2023-11-16 PROCEDURE — 71045 X-RAY EXAM CHEST 1 VIEW: CPT

## 2023-11-16 PROCEDURE — 99285 EMERGENCY DEPT VISIT HI MDM: CPT

## 2023-11-16 PROCEDURE — 96365 THER/PROPH/DIAG IV INF INIT: CPT

## 2023-11-16 PROCEDURE — 2700000000 HC OXYGEN THERAPY PER DAY

## 2023-11-16 RX ORDER — ENOXAPARIN SODIUM 100 MG/ML
40 INJECTION SUBCUTANEOUS DAILY
Status: DISCONTINUED | OUTPATIENT
Start: 2023-11-16 | End: 2023-11-19 | Stop reason: HOSPADM

## 2023-11-16 RX ORDER — SODIUM CHLORIDE 9 MG/ML
INJECTION, SOLUTION INTRAVENOUS CONTINUOUS
Status: DISCONTINUED | OUTPATIENT
Start: 2023-11-16 | End: 2023-11-19 | Stop reason: HOSPADM

## 2023-11-16 RX ORDER — ACETAMINOPHEN 650 MG/1
650 SUPPOSITORY RECTAL EVERY 6 HOURS PRN
Status: DISCONTINUED | OUTPATIENT
Start: 2023-11-16 | End: 2023-11-19 | Stop reason: HOSPADM

## 2023-11-16 RX ORDER — ACETAMINOPHEN 325 MG/1
650 TABLET ORAL EVERY 6 HOURS PRN
Status: DISCONTINUED | OUTPATIENT
Start: 2023-11-16 | End: 2023-11-19 | Stop reason: HOSPADM

## 2023-11-16 RX ORDER — SODIUM CHLORIDE 9 MG/ML
INJECTION, SOLUTION INTRAVENOUS PRN
Status: DISCONTINUED | OUTPATIENT
Start: 2023-11-16 | End: 2023-11-19 | Stop reason: HOSPADM

## 2023-11-16 RX ORDER — ONDANSETRON 2 MG/ML
4 INJECTION INTRAMUSCULAR; INTRAVENOUS EVERY 6 HOURS PRN
Status: DISCONTINUED | OUTPATIENT
Start: 2023-11-16 | End: 2023-11-19 | Stop reason: HOSPADM

## 2023-11-16 RX ORDER — POTASSIUM CHLORIDE 7.45 MG/ML
10 INJECTION INTRAVENOUS PRN
Status: DISCONTINUED | OUTPATIENT
Start: 2023-11-16 | End: 2023-11-19 | Stop reason: HOSPADM

## 2023-11-16 RX ORDER — MAGNESIUM SULFATE IN WATER 40 MG/ML
INJECTION, SOLUTION INTRAVENOUS
Status: COMPLETED
Start: 2023-11-16 | End: 2023-11-16

## 2023-11-16 RX ORDER — POLYETHYLENE GLYCOL 3350 17 G/17G
17 POWDER, FOR SOLUTION ORAL DAILY PRN
Status: DISCONTINUED | OUTPATIENT
Start: 2023-11-16 | End: 2023-11-19 | Stop reason: HOSPADM

## 2023-11-16 RX ORDER — POTASSIUM CHLORIDE 20 MEQ/1
40 TABLET, EXTENDED RELEASE ORAL PRN
Status: DISCONTINUED | OUTPATIENT
Start: 2023-11-16 | End: 2023-11-19 | Stop reason: HOSPADM

## 2023-11-16 RX ORDER — SODIUM CHLORIDE 0.9 % (FLUSH) 0.9 %
5-40 SYRINGE (ML) INJECTION EVERY 12 HOURS SCHEDULED
Status: DISCONTINUED | OUTPATIENT
Start: 2023-11-16 | End: 2023-11-19 | Stop reason: HOSPADM

## 2023-11-16 RX ORDER — SODIUM CHLORIDE 0.9 % (FLUSH) 0.9 %
5-40 SYRINGE (ML) INJECTION PRN
Status: DISCONTINUED | OUTPATIENT
Start: 2023-11-16 | End: 2023-11-19 | Stop reason: HOSPADM

## 2023-11-16 RX ORDER — POLYMYXIN B SULFATE AND TRIMETHOPRIM 1; 10000 MG/ML; [USP'U]/ML
1 SOLUTION OPHTHALMIC
Status: DISCONTINUED | OUTPATIENT
Start: 2023-11-16 | End: 2023-11-19 | Stop reason: HOSPADM

## 2023-11-16 RX ORDER — 0.9 % SODIUM CHLORIDE 0.9 %
1000 INTRAVENOUS SOLUTION INTRAVENOUS ONCE
Status: COMPLETED | OUTPATIENT
Start: 2023-11-16 | End: 2023-11-16

## 2023-11-16 RX ORDER — MAGNESIUM SULFATE IN WATER 40 MG/ML
2000 INJECTION, SOLUTION INTRAVENOUS PRN
Status: DISCONTINUED | OUTPATIENT
Start: 2023-11-16 | End: 2023-11-19 | Stop reason: HOSPADM

## 2023-11-16 RX ORDER — ONDANSETRON 4 MG/1
4 TABLET, ORALLY DISINTEGRATING ORAL EVERY 8 HOURS PRN
Status: DISCONTINUED | OUTPATIENT
Start: 2023-11-16 | End: 2023-11-19 | Stop reason: HOSPADM

## 2023-11-16 RX ORDER — MORPHINE SULFATE 2 MG/ML
1 INJECTION, SOLUTION INTRAMUSCULAR; INTRAVENOUS EVERY 4 HOURS PRN
Status: DISCONTINUED | OUTPATIENT
Start: 2023-11-16 | End: 2023-11-19 | Stop reason: HOSPADM

## 2023-11-16 RX ADMIN — SODIUM CHLORIDE, PRESERVATIVE FREE 10 ML: 5 INJECTION INTRAVENOUS at 21:15

## 2023-11-16 RX ADMIN — MAGNESIUM SULFATE HEPTAHYDRATE 2000 MG: 40 INJECTION, SOLUTION INTRAVENOUS at 15:30

## 2023-11-16 RX ADMIN — SODIUM CHLORIDE 50 ML/HR: 9 INJECTION, SOLUTION INTRAVENOUS at 22:28

## 2023-11-16 RX ADMIN — SODIUM CHLORIDE 1000 ML: 9 INJECTION, SOLUTION INTRAVENOUS at 16:41

## 2023-11-16 RX ADMIN — CEFTRIAXONE SODIUM 1000 MG: 1 INJECTION, POWDER, FOR SOLUTION INTRAMUSCULAR; INTRAVENOUS at 16:00

## 2023-11-16 RX ADMIN — POLYMYXIN B SULFATE AND TRIMETHOPRIM 1 DROP: 10000; 1 SOLUTION OPHTHALMIC at 16:36

## 2023-11-16 RX ADMIN — POLYMYXIN B SULFATE AND TRIMETHOPRIM 1 DROP: 10000; 1 SOLUTION OPHTHALMIC at 22:30

## 2023-11-16 RX ADMIN — ENOXAPARIN SODIUM 40 MG: 100 INJECTION SUBCUTANEOUS at 22:30

## 2023-11-16 ASSESSMENT — ENCOUNTER SYMPTOMS
ABDOMINAL PAIN: 0
COUGH: 0
NAUSEA: 0
SHORTNESS OF BREATH: 1
VOMITING: 0

## 2023-11-16 ASSESSMENT — LIFESTYLE VARIABLES: HOW OFTEN DO YOU HAVE A DRINK CONTAINING ALCOHOL: NEVER

## 2023-11-16 ASSESSMENT — PAIN DESCRIPTION - DESCRIPTORS: DESCRIPTORS: DISCOMFORT;ACHING

## 2023-11-16 ASSESSMENT — PAIN DESCRIPTION - LOCATION
LOCATION: JAW
LOCATION: HIP

## 2023-11-16 ASSESSMENT — PAIN - FUNCTIONAL ASSESSMENT: PAIN_FUNCTIONAL_ASSESSMENT: 0-10

## 2023-11-16 ASSESSMENT — PAIN DESCRIPTION - FREQUENCY: FREQUENCY: CONTINUOUS

## 2023-11-16 ASSESSMENT — PAIN SCALES - GENERAL
PAINLEVEL_OUTOF10: 6
PAINLEVEL_OUTOF10: 4

## 2023-11-16 ASSESSMENT — PAIN DESCRIPTION - ONSET: ONSET: GRADUAL

## 2023-11-16 ASSESSMENT — PAIN DESCRIPTION - ORIENTATION
ORIENTATION: RIGHT
ORIENTATION: RIGHT

## 2023-11-16 ASSESSMENT — PAIN DESCRIPTION - PAIN TYPE: TYPE: CHRONIC PAIN

## 2023-11-16 NOTE — ED TRIAGE NOTES
Pt arriving to ED 30 via EMS  Pt called out from home CO of R. Jaw pain lasting two months following swallowing  a pill. Per EMS family stated she became SOB with swallowing a pill today  PT showing extreme contracture in her lower and upper extremities, Pressure injury noticed on left hip that appears to be a stage 2, mepilex applied to area  Scabbing/excoriation noticed around the face  Pt incontinent of stool, pt clean, new brief applied and purewick placed  Pt no complaining of SOB, chest pain or anything else at this time  Pt placed on continuous cardiac monitoring, BP, Pulse ox. EKG obtained. IV established and labs drawn. Pt is resting on stretcher with call light within reach. Breathing is non labored and no acute distress is noted.    Will continue to follow plan of care

## 2023-11-16 NOTE — ED PROVIDER NOTES
[address] ED  eMERGENCY dEPARTMENT eNCOUnter   Attending Attestation     Pt Name: Gray Bonds  MRN: [id number]  [facility] 1935  Date of evaluation: 11/16/23       Gray Bonds is a 81 y.o. female who presents with Jaw Pain (R. Jaw pain, lasting 2 months/)      History: Patient presents with right-sided jaw pain. Patient said it started today when she swallowed a pill. Patient says more like her throat. Patient has no other complaints. Exam: Heart rate and rhythm are regular. Lungs are clear to station bilaterally. Abdomen is soft and nontender. Patient has a small ulceration in the right posterior throat. Has contracture of the lower extremities, patient has a small decubitus ulcer that looks well cared for and does not look infected. Plan for labs, if concerns will consider sepsis work-up, plan for discharge if negative work-up. Patient has UTI. Called to patient's room due to significant event. Patient took a sip of water and became short of breath, patient says she felt she was dying. Patient sounded like she was coughing up a lot of phlegm. Patient's heart rate was in the 160s 170s. Patient was given mag. EKG was nondiagnostic based on rate and movement. Patient was given some suction which did not improve her situation. Patient stated she want to be rolled onto her right side. Patient was rolled onto her right side where she started coughing and coughed up a pill desiccant canister. Patient's heart rate improved after this. We were able to sit her up in the bed. Patient's heart rate came down to normal, patient felt much better. Patient was comfortable. Critical care time was 35 minutes given significant event as per above, coordination and discussion about CODE STATUS, multiple reevaluations. EKG shows sinus tachycardia with left bundle branch block.     I performed a history and physical examination of the patient and discussed management with the Problem: Self Care Deficits Care Plan (Adult)  Goal: *Acute Goals and Plan of Care (Insert Text)  Occupational Therapy Goals  Initiated 3/10/2017    1. Patient will perform grooming tasks standing at sink for 5 minutes with no LOB or fatigue with Armand within 7 days. 2. Patient will perform UB/LB dressing with Armand within 7 days. 3. Patient will perform toileting/toilet transfer at [place] [address] within 7 days. 4. Patient will participate in UE therapeutic exercise/activities with Armand for 10 minutes within 7 days. 5. Patient will utilize energy conservation techniques during functional activities with verbal cues within 7 day(s). OCCUPATIONAL THERAPY EVALUATION  Patient: Jana Neville (43 y.o. female)  Date: 3/10/2017  Primary Diagnosis: Chest pain        Precautions:   Fall, DNR      ASSESSMENT :  Based on the objective data described below, the patient presents with left chest/quadrant pain (recent PEs - properly anticoagluated and cleared to work with therapy), impaired standing tolerance, generally decreased activity tolerance and functional strength, limiting her independence and safety with ADL routine. 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 Pt is overall supervision/set-up to [place] for self-care tasks; feel she is close to baseline with ADLs (supervision at facility with bathing). Educated pt and daughter on importance of OOB for all meals and using BSC with supervision. Recommend short term skilled nursing rehab stay at discharge with supervision/assistance for ADLs and functional mobility. Patient will benefit from skilled intervention to address the above impairments.   Patients rehabilitation potential is considered to be Good  Factors which may influence rehabilitation potential include:   [X]             None noted  [ ]             Mental ability/status  [ ]             Medical condition  [ ]             Home/family situation and support systems  [ ] Safety awareness  [ ]             Pain tolerance/management  [ ]             Other:        PLAN :  Recommendations and Planned Interventions:  [X]               Self Care Training                  [X]        Therapeutic Activities  [X]               Functional Mobility Training    [ ]        Cognitive Retraining  [X]               Therapeutic Exercises           [X]        Endurance Activities  [X]               Balance Training                   [ ]        Neuromuscular Re-Education  [ ]               Visual/Perceptual Training     [X]   Home Safety Training  [X]               Patient Education                 [X]        Family Training/Education  [ ]               Other (comment):     Frequency/Duration: Patient will be followed by occupational therapy 3 times a week to address goals. Discharge Recommendations: SNF  Further Equipment Recommendations for Discharge: none at this time       SUBJECTIVE:   Patient stated If this pain would go away I would love to walk more.       OBJECTIVE DATA SUMMARY:   HISTORY:   Past Medical History:   Diagnosis Date    HTN (hypertension)      Hypercholesteremia      Osteoporosis      Stroke ([facility] [address]       CVA, TIA    TIA (transient ischemic attack)      Vertebral fracture       Past Surgical History:   Procedure Laterality Date    HX HIP REPLACEMENT   2009     left    HX ORTHOPAEDIC            Prior Level of Function/Home Situation: Pt lives at [facility]; uses RW no falls. IND with ADLs with exception of bathing (supervision 3 x/week). Supportive daughter. Meals provided, pt IND with medication management.   Expanded or extensive additional review of patient history:      Home Situation  Home Environment: [place] [address] Name:  Juliohanane UmanzroFox)  # Steps to Enter: 0  One/Two Story Residence: One story  Living Alone: Yes  Support Systems: Child(sanya), Family member(s), Assisted living  Patient Expects to be Discharged to[de-identified] Assisted living  Current DME Used/Available at Home: [place] [employer], rolling, Shower chair, Grab bars  Tub or Shower Type: Shower  [X]  Right hand dominant             [ ]  Left hand dominant     EXAMINATION OF PERFORMANCE DEFICITS:  Cognitive/Behavioral Status:  Neurologic State: Alert; Appropriate for age  Orientation Level: Oriented X4  Cognition: Follows commands; Appropriate safety awareness; Appropriate for age attention/concentration; Appropriate decision making  Perception: Appears intact  Perseveration: No perseveration noted  Safety/Judgement: Awareness of environment; Fall prevention;Home safety  Skin: appears intact  Edema: none noted in BUEs  Hearing: Auditory  Auditory Impairment: None  Vision/Perceptual:    Tracking: Able to track stimulus in all quadrants w/o difficulty                 Diplopia: No            Range of Motion:     AROM: Within functional limits  PROM: Within functional limits                    Strength:     Strength: Generally decreased, functional              Coordination:  Coordination: Within functional limits  Fine Motor Skills-Upper: Left Intact; Right Intact    Gross Motor Skills-Upper: Left Intact; Right Intact  Tone & Sensation:     Tone: Normal  Sensation: Intact                       Balance:  Sitting: Intact  Standing: Intact; With support     Functional Mobility and Transfers for ADLs:  Bed Mobility:  Supine to Sit: Contact guard assistance  Sit to Supine: Minimum assistance;Assist x1     Transfers:  Sit to Stand: Contact guard assistance  Stand to Sit: Contact guard assistance  Toilet Transfer : Contact guard assistance; Adaptive equipment; Additional time     ADL Assessment:  Feeding: Independent     Oral Facial Hygiene/Grooming: Independent     Bathing: Minimum assistance; Adaptive equipment; Additional time;Assist x1     Upper Body Dressing: Setup; Additional time     Lower Body Dressing: Minimum assistance; Adaptive equipment;Assist x1;Additional time     Toileting: Contact guard assistance; Adaptive equipment; Additional time;Assist x1                 ADL Intervention and task modifications:                                            Cognitive Retraining  Safety/Judgement: Awareness of environment; Fall prevention;Home safety        Functional Measure:  Barthel Index:      Bathin  Bladder: 5  Bowels: 10  Groomin  Dressin  Feeding: 10  Mobility: 10  Stairs: 0  Toilet Use: 5  Transfer (Bed to Chair and Back): 10  Total: 60         Barthel and G-code impairment scale:  Percentage of impairment CH  0% CI  1-19% CJ  20-39% CK  40-59% CL  60-79% CM  80-99% CN  100%   Barthel Score 0-100 100 99-80 79-60 59-40 20-39 1-19    0   Barthel Score 0-20 20 17-19 13-16 9-12 5-8 1-4 0      The Barthel ADL Index: Guidelines  1. The index should be used as a record of what a patient does, not as a record of what a patient could do. 2. The main aim is to establish degree of independence from any help, physical or verbal, however minor and for whatever reason. 3. The need for supervision renders the patient not independent. 4. A patient's performance should be established using the best available evidence. Asking the patient, friends/relatives and nurses are the usual sources, but direct observation and common sense are also important. However direct testing is not needed. 5. Usually the patient's performance over the preceding 24-48 hours is important, but occasionally longer periods will be relevant. 6. Middle categories imply that the patient supplies over 50 per cent of the effort. 7. Use of aids to be independent is allowed. Mitch Fabian., Barthel, D.W. (7538). Functional evaluation: the Barthel Index. [facility] (14)2. Princess Hunter joon KAYLIN JuniorF, Vanessa Torres., Kayla Piña., Erna, [facility] (). Measuring the change indisability after inpatient rehabilitation; comparison of the responsiveness of the Barthel Index and Functional [place] Measure.  Journal of Neurology, Neurosurgery, and Psychiatry, 66(4), 128-176. TAMIKO Suggs, KAMERON Glavan, & Sony Santillan M.A. (2004.) Assessment of post-stroke quality of life in cost-effectiveness studies: The usefulness of the Barthel Index and the EuroQoL-5D. Quality of Life Research, 13, 047-39            G codes: In compliance with CMSs Claims Based Outcome Reporting, the following G-code set was chosen for this patient based on their primary functional limitation being treated: The outcome measure chosen to determine the severity of the functional limitation was the Barthel Index with a score of 60/100 which was correlated with the impairment scale. · Self Care:               - CURRENT STATUS:    CJ - 20%-39% impaired, limited or restricted               - GOAL STATUS:           CI - 1%-19% impaired, limited or restricted               - D/C STATUS:                       ---------------To be determined---------------      Occupational Therapy Evaluation Charge Determination   History Examination Decision-Making   LOW Complexity : Brief history review  LOW Complexity : 1-3 performance deficits relating to physical, cognitive , or psychosocial skils that result in activity limitations and / or participation restrictions  LOW Complexity : No comorbidities that affect functional and no verbal or physical assistance needed to complete eval tasks       Based on the above components, the patient evaluation is determined to be of the following complexity level: LOW   Pain:  Pain Scale 1: Numeric (0 - 10)  Pain Intensity 1: 8  Pain Location 1: Abdomen  Pain Orientation 1: Left  Pain Description 1: Aching; Sharp  Pain Intervention(s) 1: Medication (see MAR)  Activity Tolerance:   VSS, fair tolerance     Please refer to the flowsheet for vital signs taken during this treatment.   After treatment:   [ ] Patient left in no apparent distress sitting up in chair  [X] Patient left in no apparent distress in bed  [X] Call bell left within reach  [X] Nursing notified  [X] Caregiver present  [ ] Bed alarm activated      COMMUNICATION/EDUCATION:   The patients plan of care was discussed with: Physical Therapist, Registered Nurse and . [X] Home safety education was provided and the patient/caregiver indicated understanding. [X] Patient/family have participated as able in goal setting and plan of care. [X] Patient/family agree to work toward stated goals and plan of care. [ ] Patient understands intent and goals of therapy, but is neutral about his/her participation. [ ] Patient is unable to participate in goal setting and plan of care. This patients plan of care is appropriate for delegation to [facility].      Thank you for this referral.  Winston Kovacs OT  Time Calculation: 12 mins

## 2023-11-16 NOTE — ED NOTES
ED to inpatient nurses report    Chief Complaint   Patient presents with    Jaw Pain     R. Jaw pain, lasting 2 months        Present to ED from Home transported by EMS  LOC: alert and orientated to name, place, date  Vital signs   Vitals:    11/16/23 1433 11/16/23 1443 11/16/23 1503 11/16/23 1543   BP: (!) 178/102 (!) 166/91 (!) 177/107 (!) 142/95   Pulse: 88 88 90 96   Resp: 28 26 22 29   Temp:       TempSrc:       SpO2: 95% 95% 98% 96%   Weight:          Oxygen Baseline 95    Current needs required 97 % on 4 L NC   LDAs:   Peripheral IV 11/16/23 Right Antecubital (Active)   Site Assessment Clean, dry & intact 11/16/23 1251   Line Status Blood return noted 11/16/23 [facility] checked and tightened 11/16/23 1251   Phlebitis Assessment No symptoms 11/16/23 1251   Infiltration Assessment 0 11/16/23 1251   Dressing Status New dressing applied;Clean, dry & intact 11/16/23 1251   Dressing Type Transparent 11/16/23 1251   Dressing Intervention New 11/16/23 1251       Peripheral IV 11/16/23 Left Antecubital (Active)   Site Assessment Clean, dry & intact 11/16/23 1607     Mobility: Fully dependent  Pending ED orders: Admission to Medicine  Present condition: pt now stable, resting comfortably, see notes about earlier events with aspiration  Code Status: Full (until official paperwork is in)  Consults:  []  Hospitalist  Completed  [] yes [] no  [x]  Medicine  Completed  [x] yes [] No  []  Cardiology  Completed  [] yes [] No  []  GI   Completed  [] yes [] No  []  Neurology  Completed  [] yes [] No  []  Nephrology Completed  [] yes [] No  []  Vascular  Completed  [] yes [] No   []  Surgery  Completed  [] yes [] No   []  Urology  Completed  [] yes [] No   []  Plastics  Completed  [] yes [] No   []  ENT  Completed  [] yes [] No   []  Other   Completed  [] yes [] No  Pertinent event(s) see previous notes regarding aspiration, IV antibiotics for UTI, fluids, IV mag, eye drops, fluids, Pressure Injury of left

## 2023-11-16 NOTE — ED NOTES
Family called about about pt becoming SOB after taking a sip of water. Pt appeared to be in distress and having trouble catching their breath, had noticeable wheezing and and claimed to have some pain in the center of the chest  Pt HR began to show in the 160s and began to desat around 85%  Pt placed on NRB  Dr. Lenora Hutchison and Dr. Andrea Lamb at bedside  2G of mag ordered and given IV  Pt hooked up to [place]  Pt stated they wanted to be intubated, CPR and all interventions done to keep her alive  Pt turned to right side and coughed up a desiccant. Pt suctioned and condition began to improve.  O2 sat began to improve to 97 % and placed on 4 L NC, HR started to Come down and now at 87 BPM  Pt resting more comfortably and family informed of events       Myrna Acharya RN  11/16/23 1616

## 2023-11-17 ENCOUNTER — TELEPHONE (OUTPATIENT)
Dept: FAMILY MEDICINE CLINIC | Age: 88
End: 2023-11-17

## 2023-11-17 ENCOUNTER — APPOINTMENT (OUTPATIENT)
Dept: GENERAL RADIOLOGY | Age: 88
DRG: 689 | End: 2023-11-17
Payer: MEDICARE

## 2023-11-17 LAB
ANION GAP SERPL CALCULATED.3IONS-SCNC: 10 MMOL/L (ref 9–17)
BASOPHILS # BLD: 0.06 K/UL (ref 0–0.2)
BASOPHILS NFR BLD: 1 % (ref 0–2)
BUN SERPL-MCNC: 13 MG/DL (ref 8–23)
CALCIUM SERPL-MCNC: 8.3 MG/DL (ref 8.6–10.4)
CHLORIDE SERPL-SCNC: 108 MMOL/L (ref 98–107)
CO2 SERPL-SCNC: 24 MMOL/L (ref 20–31)
CREAT SERPL-MCNC: 0.6 MG/DL (ref 0.5–0.9)
EKG ATRIAL RATE: 105 BPM
EKG ATRIAL RATE: 178 BPM
EKG ATRIAL RATE: 82 BPM
EKG P AXIS: 36 DEGREES
EKG P-R INTERVAL: 208 MS
EKG P-R INTERVAL: 210 MS
EKG Q-T INTERVAL: 324 MS
EKG Q-T INTERVAL: 376 MS
EKG Q-T INTERVAL: 422 MS
EKG QRS DURATION: 146 MS
EKG QRS DURATION: 152 MS
EKG QRS DURATION: 166 MS
EKG QTC CALCULATION (BAZETT): 493 MS
EKG QTC CALCULATION (BAZETT): 493 MS
EKG QTC CALCULATION (BAZETT): 496 MS
EKG R AXIS: -44 DEGREES
EKG R AXIS: -45 DEGREES
EKG R AXIS: -60 DEGREES
EKG T AXIS: 112 DEGREES
EKG T AXIS: 117 DEGREES
EKG T AXIS: 120 DEGREES
EKG VENTRICULAR RATE: 105 BPM
EKG VENTRICULAR RATE: 139 BPM
EKG VENTRICULAR RATE: 82 BPM
EOSINOPHIL # BLD: 0.22 K/UL (ref 0–0.44)
EOSINOPHILS RELATIVE PERCENT: 4 % (ref 1–4)
ERYTHROCYTE [DISTWIDTH] IN BLOOD BY AUTOMATED COUNT: 13.2 % (ref 11.8–14.4)
GFR SERPL CREATININE-BSD FRML MDRD: >60 ML/MIN/1.73M2
GLUCOSE SERPL-MCNC: 84 MG/DL (ref 70–99)
HCT VFR BLD AUTO: 35 % (ref 36.3–47.1)
HGB BLD-MCNC: 11 G/DL (ref 11.9–15.1)
IMM GRANULOCYTES # BLD AUTO: 0.03 K/UL (ref 0–0.3)
IMM GRANULOCYTES NFR BLD: 1 %
LYMPHOCYTES NFR BLD: 1.25 K/UL (ref 1.1–3.7)
LYMPHOCYTES RELATIVE PERCENT: 20 % (ref 24–43)
MAGNESIUM SERPL-MCNC: 2.2 MG/DL (ref 1.6–2.6)
MCH RBC QN AUTO: 29.4 PG (ref 25.2–33.5)
MCHC RBC AUTO-ENTMCNC: 31.4 G/DL (ref 28.4–34.8)
MCV RBC AUTO: 93.6 FL (ref 82.6–102.9)
MONOCYTES NFR BLD: 0.52 K/UL (ref 0.1–1.2)
MONOCYTES NFR BLD: 8 % (ref 3–12)
NEUTROPHILS NFR BLD: 66 % (ref 36–65)
NEUTS SEG NFR BLD: 4.12 K/UL (ref 1.5–8.1)
NRBC BLD-RTO: 0 PER 100 WBC
PLATELET # BLD AUTO: 159 K/UL (ref 138–453)
PMV BLD AUTO: 10.8 FL (ref 8.1–13.5)
POTASSIUM SERPL-SCNC: 3.2 MMOL/L (ref 3.7–5.3)
RBC # BLD AUTO: 3.74 M/UL (ref 3.95–5.11)
SODIUM SERPL-SCNC: 142 MMOL/L (ref 135–144)
WBC OTHER # BLD: 6.2 K/UL (ref 3.5–11.3)

## 2023-11-17 PROCEDURE — 6360000002 HC RX W HCPCS

## 2023-11-17 PROCEDURE — 36415 COLL VENOUS BLD VENIPUNCTURE: CPT

## 2023-11-17 PROCEDURE — 6360000002 HC RX W HCPCS: Performed by: INTERNAL MEDICINE

## 2023-11-17 PROCEDURE — 97162 PT EVAL MOD COMPLEX 30 MIN: CPT

## 2023-11-17 PROCEDURE — 97530 THERAPEUTIC ACTIVITIES: CPT

## 2023-11-17 PROCEDURE — 80048 BASIC METABOLIC PNL TOTAL CA: CPT

## 2023-11-17 PROCEDURE — 74230 X-RAY XM SWLNG FUNCJ C+: CPT

## 2023-11-17 PROCEDURE — 2500000003 HC RX 250 WO HCPCS

## 2023-11-17 PROCEDURE — 97166 OT EVAL MOD COMPLEX 45 MIN: CPT

## 2023-11-17 PROCEDURE — 2060000000 HC ICU INTERMEDIATE R&B

## 2023-11-17 PROCEDURE — 2580000003 HC RX 258

## 2023-11-17 PROCEDURE — 6370000000 HC RX 637 (ALT 250 FOR IP)

## 2023-11-17 PROCEDURE — 85025 COMPLETE CBC W/AUTO DIFF WBC: CPT

## 2023-11-17 PROCEDURE — 99233 SBSQ HOSP IP/OBS HIGH 50: CPT | Performed by: INTERNAL MEDICINE

## 2023-11-17 PROCEDURE — 83735 ASSAY OF MAGNESIUM: CPT

## 2023-11-17 PROCEDURE — 92611 MOTION FLUOROSCOPY/SWALLOW: CPT

## 2023-11-17 PROCEDURE — 92610 EVALUATE SWALLOWING FUNCTION: CPT

## 2023-11-17 RX ORDER — POTASSIUM CHLORIDE 7.45 MG/ML
10 INJECTION INTRAVENOUS
Status: COMPLETED | OUTPATIENT
Start: 2023-11-17 | End: 2023-11-17

## 2023-11-17 RX ORDER — LABETALOL HYDROCHLORIDE 5 MG/ML
10 INJECTION, SOLUTION INTRAVENOUS EVERY 4 HOURS PRN
Status: DISCONTINUED | OUTPATIENT
Start: 2023-11-17 | End: 2023-11-19 | Stop reason: HOSPADM

## 2023-11-17 RX ORDER — HYDRALAZINE HYDROCHLORIDE 20 MG/ML
10 INJECTION INTRAMUSCULAR; INTRAVENOUS EVERY 6 HOURS PRN
Status: DISCONTINUED | OUTPATIENT
Start: 2023-11-17 | End: 2023-11-18

## 2023-11-17 RX ADMIN — ANTI-FUNGAL POWDER MICONAZOLE NITRATE TALC FREE: 1.42 POWDER TOPICAL at 00:45

## 2023-11-17 RX ADMIN — SODIUM CHLORIDE 50 ML/HR: 9 INJECTION, SOLUTION INTRAVENOUS at 20:30

## 2023-11-17 RX ADMIN — POTASSIUM BICARBONATE 40 MEQ: 782 TABLET, EFFERVESCENT ORAL at 20:22

## 2023-11-17 RX ADMIN — MORPHINE SULFATE 1 MG: 2 INJECTION, SOLUTION INTRAMUSCULAR; INTRAVENOUS at 20:23

## 2023-11-17 RX ADMIN — POTASSIUM CHLORIDE 10 MEQ: 10 INJECTION, SOLUTION INTRAVENOUS at 14:57

## 2023-11-17 RX ADMIN — POTASSIUM CHLORIDE 10 MEQ: 10 INJECTION, SOLUTION INTRAVENOUS at 11:20

## 2023-11-17 RX ADMIN — POLYMYXIN B SULFATE AND TRIMETHOPRIM 1 DROP: 10000; 1 SOLUTION OPHTHALMIC at 09:26

## 2023-11-17 RX ADMIN — POTASSIUM CHLORIDE 10 MEQ: 10 INJECTION, SOLUTION INTRAVENOUS at 12:28

## 2023-11-17 RX ADMIN — POLYMYXIN B SULFATE AND TRIMETHOPRIM 1 DROP: 10000; 1 SOLUTION OPHTHALMIC at 16:33

## 2023-11-17 RX ADMIN — Medication 10 MG: at 15:07

## 2023-11-17 RX ADMIN — ANTI-FUNGAL POWDER MICONAZOLE NITRATE TALC FREE: 1.42 POWDER TOPICAL at 09:26

## 2023-11-17 RX ADMIN — POTASSIUM CHLORIDE 10 MEQ: 10 INJECTION, SOLUTION INTRAVENOUS at 18:20

## 2023-11-17 RX ADMIN — Medication 1000 MG: at 02:38

## 2023-11-17 RX ADMIN — SODIUM CHLORIDE, PRESERVATIVE FREE 10 ML: 5 INJECTION INTRAVENOUS at 20:23

## 2023-11-17 RX ADMIN — POLYMYXIN B SULFATE AND TRIMETHOPRIM 1 DROP: 10000; 1 SOLUTION OPHTHALMIC at 12:16

## 2023-11-17 RX ADMIN — Medication 10 MG: at 20:35

## 2023-11-17 RX ADMIN — SODIUM CHLORIDE: 9 INJECTION, SOLUTION INTRAVENOUS at 11:19

## 2023-11-17 RX ADMIN — POLYMYXIN B SULFATE AND TRIMETHOPRIM 1 DROP: 10000; 1 SOLUTION OPHTHALMIC at 20:22

## 2023-11-17 RX ADMIN — ENOXAPARIN SODIUM 40 MG: 100 INJECTION SUBCUTANEOUS at 09:26

## 2023-11-17 RX ADMIN — POTASSIUM CHLORIDE 10 MEQ: 7.46 INJECTION, SOLUTION INTRAVENOUS at 17:49

## 2023-11-17 RX ADMIN — ANTI-FUNGAL POWDER MICONAZOLE NITRATE TALC FREE: 1.42 POWDER TOPICAL at 20:22

## 2023-11-17 RX ADMIN — POLYMYXIN B SULFATE AND TRIMETHOPRIM 1 DROP: 10000; 1 SOLUTION OPHTHALMIC at 04:26

## 2023-11-17 RX ADMIN — MORPHINE SULFATE 1 MG: 2 INJECTION, SOLUTION INTRAMUSCULAR; INTRAVENOUS at 10:12

## 2023-11-17 RX ADMIN — HYDRALAZINE HYDROCHLORIDE 10 MG: 20 INJECTION, SOLUTION INTRAMUSCULAR; INTRAVENOUS at 22:36

## 2023-11-17 ASSESSMENT — PAIN DESCRIPTION - ORIENTATION: ORIENTATION: RIGHT

## 2023-11-17 ASSESSMENT — PAIN DESCRIPTION - FREQUENCY: FREQUENCY: CONTINUOUS

## 2023-11-17 ASSESSMENT — PAIN SCALES - GENERAL
PAINLEVEL_OUTOF10: 0
PAINLEVEL_OUTOF10: 3

## 2023-11-17 ASSESSMENT — PAIN DESCRIPTION - ONSET: ONSET: ON-GOING

## 2023-11-17 ASSESSMENT — PAIN DESCRIPTION - LOCATION: LOCATION: HIP

## 2023-11-17 ASSESSMENT — ENCOUNTER SYMPTOMS
WHEEZING: 0
CHEST TIGHTNESS: 0
SINUS PAIN: 0
ABDOMINAL PAIN: 0
NAUSEA: 0
SHORTNESS OF BREATH: 0
COLOR CHANGE: 0
DIARRHEA: 0
SINUS PRESSURE: 0
VOMITING: 0

## 2023-11-17 ASSESSMENT — PAIN DESCRIPTION - PAIN TYPE: TYPE: CHRONIC PAIN

## 2023-11-17 ASSESSMENT — PAIN DESCRIPTION - DESCRIPTORS: DESCRIPTORS: DISCOMFORT

## 2023-11-17 NOTE — PROGRESS NOTES
Occupational Therapy  Facility/Department: [facility]  Occupational Therapy Initial Assessment    Name: Daiana Musa  : 1935  MRN: [id number]  Date of Service: 2023    Chief Complaint   Patient presents with    Jaw Pain     R. Jaw pain, lasting 2 months         Discharge Recommendations:   No therapy recommended at discharge. Patient Diagnosis(es): The encounter diagnosis was Acute urinary tract infection. Past Medical History:  has a past medical history of Anxiety, Arthritis, Basal cell carcinoma, Cancer ([address]), CHF (congestive heart failure) ([address]), Closed fracture of single pubic ramus of pelvis with routine healing, right, Decubitus ulcer of left hip, stage 3 ([address]), Depression, Gout, H/O total hysterectomy, Hyperlipidemia, Hypertension, Hypokalemia, Hypothyroidism, Kidney stone, Kidney stone, Melanoma ([address]), NSTEMI (non-ST elevated myocardial infarction) ([address]), and Pressure injury of coccygeal region, stage 2 ([address]). Past Surgical History:  has a past surgical history that includes Tubal ligation; Hysterectomy; and Hysterectomy, vaginal.           Assessment   Assessment: Pt agreed to session. Pt exhibits multiple chronic performance impairments and severe BLE flexion contractures. Pt completed rolling R<>L with Max A and was educated on importance and purpose and continuing to relieve pressure from bony prominences with fair return. Pt demonstrates baseline impairments and relies totally on family for majority of ADL completion. Pt does not transfer to seated position at baseline. Pt exhibits poor potential for rehabilitation services at this time and will be discharged from OT d/t being at baseline functional status.   Prognosis: Fair  Decision Making: Medium Complexity  No Skilled OT: No OT goals identified (Poor rehab potential)  REQUIRES OT FOLLOW-UP: No  Activity Tolerance  Activity Tolerance: Patient Tolerated treatment well to poor ability to assist w/ BLE d/t flexion contractures. Pts trunk exhibits a rotational contracture likely from continuously lying on L side. Skin breakdown noted to L hip, covered w/ dressing. Bed rail is utilized with assistance secondary to ROM deficits to B shoulders. Pt was left sidelying on R with pillows placed under bony prominences to offload and decrease risk for further skin breakdown)  Interventions: Safety awareness training;Verbal cues  Rolling: Maximum assistance  Supine to Sit:  (HUNTER d/t severe flexion contractures of BLE with inability to extend B knees/hips to 90 degrees, limiting safety if attempting sitting)  Balance  Sitting:  (HUNTER secondary to contractures of BLE, unsafe to attempt)  Transfer Training  Transfer Training: No    AROM: Grossly decreased, non-functional (B shoulder limitations are chronic secondary to arthritic changes 0-90 degrees, all other joints WFL)  Strength: Grossly decreased, non-functional (shoulders 3-/5, biceps 4/5, triceps 3+/5,  4-/5)  Coordination: Within functional limits  Tone: Normal  Sensation: Intact    ADL  Feeding: Minimal assistance;Setup; Increased time to complete  Grooming: Setup; Increased time to complete;Maximum assistance  UE Bathing: Maximum assistance;Setup; Increased time to complete  LE Bathing: Dependent/Total  UE Dressing: Maximum assistance;Setup; Increased time to complete  LE Dressing: Dependent/Total  Toileting: Dependent/Total    Vision  Vision: Impaired  Vision Exceptions: Wears glasses at all times  Hearing  Hearing: Within functional limits    Cognition  Overall Cognitive Status: Exceptions  Following Commands: Follows one step commands with increased time; Follows one step commands with repetition  Problem Solving: Assistance required to generate solutions;Assistance required to correct errors made;Assistance required to identify errors made  Insights: Decreased awareness of deficits  Initiation: Requires cues for some  Sequencing:

## 2023-11-17 NOTE — PROCEDURES
vomiting, abdominal pain, chest or any other symptoms at that time. Her daughters were at the bedside and states that she was having difficulty in breathing and gasping for air and became pale. It was a very short episode. Daughter also states that patient has been acting unusual and making odd statements the past several days. She believes that she may have some baseline dementia but it has not been diagnosed. In the ED, patient coughed up a big capsule of the desiccant which she took thinking of a it as her medicine. Her CMP showed sodium 142, potassium 3.9, chloride 103, bicarbonate 29, BUN 14 and creatinine 0.7. Her random blood glucose level was 136. Her CBC showed WBC 7.3, hemoglobin 14, hematocrit 42.9 and platelet count 084. Her chest x-ray was negative for any acute abnormality. Her urinalysis showed turbidity, 2+ proteinuria, positive nitrites and large leukocyte esterase. Urine microscopy showed moderate bacteriuria, large urine hemoglobin and large leukocyte esterase burden. Patient was admitted inpatient for further management of her UTI which could also be the possible cause of her confusion at the time of the presentation. Behavior/Cognition/Vision/Hearing:  Behavior/Cognition: Cooperative; Alert  Vision: Impaired  Vision Exceptions: Wears glasses at all times  Hearing: Within functional limits    Impressions:  Patient presents with safe swallow for Dysphagia soft and bite/sized (Dysphagia III) diet with Moderately Thick (Honey) liquids as evidenced by no aspiration with these consistencies when tested.  +gross aspiration of nectar and thin liquid with weak, delayed cough. +penetration of honey, no aspiration. +premature spill with liquids. Pt is edentulous. Recommend small sips and bites, only feed when alert and awake and upright at 90 degrees for all PO intake. Consider free water protocol following education for hydration.  Recommend close monitoring for liquids  Pt is edentulous, prolonged mastication with soft solid, functional      Pharyngeal Phase   Puree: No penetration, no aspiration, min vallec and pyriform residue  Soft solid: No pen, no asp  Nectar tsp: +pen, +gross aspiration before swallow from spill. Min vallec residue. +weak, delayed cough  Honey tsp: No penetration, no aspiration x1, +pen, no aspiration x1. Min-mod  vallec residue  Thin straw: +pen, +gross aspiration with weak, delayed cough.     Esophageal Phase  Esophageal Screen: WFL        Pain      Pain Level: 0  Pain Type: Chronic pain  Pain Location: Hip      Therapy Time:   Individual Concurrent Group Co-treatment   Time In  1415         [place] Out  [facility] [place]23, 2:46 PM

## 2023-11-17 NOTE — TELEPHONE ENCOUNTER
Tank Maurer patient daughter calling in to ask that since patient is admitted in the hospital at [facility] she is wondering if you can now prescribe her pain medication, she also would like to know if you want the hospital to do anything further for her while they have her out

## 2023-11-17 NOTE — CARE COORDINATION
Case Management Assessment  Initial Evaluation    Date/Time of Evaluation: 11/17/2023 4:30 PM  Assessment Completed by: Jennifer Thakur RN    If patient is discharged prior to next notation, then this note serves as note for discharge by case management. Patient Name: Sonja Lee                   YOB: 1935  Diagnosis: Acute urinary tract infection [R10.7]  Complicated UTI (urinary tract infection) [N39.0]                   Date / Time: 11/16/2023 12:17 PM    Patient Admission Status: Inpatient   Readmission Risk (Low < 19, Mod (19-27), High > 27): Readmission Risk Score: 13.8    Current PCP: Yoel Craft MD  PCP verified by CM? Yes    Chart Reviewed: Yes      History Provided by: Patient  Patient Orientation: Alert and Oriented    Patient Cognition: Alert    Hospitalization in the last 30 days (Readmission):  No    If yes, Readmission Assessment in CM Navigator will be completed. Advance Directives:      Code Status: Full Code   Patient's Primary Decision Maker is: Named in Scanned ACP Document      Discharge Planning:    Patient lives with: Children Type of Home: House  Primary Care Giver: Family  Patient Support Systems include: Children, Family Members   Current Financial resources:    Current community resources:    Current services prior to admission: Durable Medical Equipment            Current DME: Hospital Bed            Type of Home Care services:  None    ADLS  Prior functional level:    Current functional level:      PT AM-PAC: 6 /24  OT AM-PAC: 11 /24    Family can provide assistance at DC: Yes  Would you like Case Management to discuss the discharge plan with any other family members/significant others, and if so, who?     Plans to Return to Present Housing: Yes  Other Identified Issues/Barriers to RETURNING to current housing: transportation  Potential Assistance needed at discharge: Transportation            Potential DME:    Patient expects to discharge to: [facility] [place] for

## 2023-11-17 NOTE — PLAN OF CARE
Problem: Discharge Planning  Goal: Discharge to home or other facility with appropriate resources  11/17/2023 0937 by Roro Adrian RN  Outcome: Progressing  11/17/2023 0435 by Cuba Ware RN  Outcome: Progressing     Problem: Safety - Adult  Goal: Free from fall injury  11/17/2023 0937 by Roro Adrian RN  Outcome: Progressing  Flowsheets (Taken 11/17/2023 0800)  Free From Fall Injury: Instruct family/caregiver on patient safety  11/17/2023 0435 by Cuba Ware RN  Outcome: Progressing     Problem: Pain  Goal: Verbalizes/displays adequate comfort level or baseline comfort level  11/17/2023 0937 by Roro Adrian RN  Outcome: Progressing  11/17/2023 0435 by Cuba Ware RN  Outcome: Progressing  Flowsheets (Taken 11/16/2023 2157)  Verbalizes/displays adequate comfort level or baseline comfort level: Assess pain using appropriate pain scale     Problem: ABCDS Injury Assessment  Goal: Absence of physical injury  11/17/2023 0937 by Roro Adrian RN  Outcome: Progressing  Flowsheets (Taken 11/17/2023 0800)  Absence of Physical Injury: Implement safety measures based on patient assessment  11/17/2023 0435 by Cuba Ware RN  Outcome: Progressing     Problem: Skin/Tissue Integrity  Goal: Absence of new skin breakdown  Description: 1. Monitor for areas of redness and/or skin breakdown  2. Assess vascular access sites hourly  3. Every 4-6 hours minimum:  Change oxygen saturation probe site  4. Every 4-6 hours:  If on nasal continuous positive airway pressure, respiratory therapy assess nares and determine need for appliance change or resting period.   11/17/2023 0937 by Roro Adrian RN  Outcome: Progressing  11/17/2023 0435 by Cuba Ware RN  Outcome: Progressing     Problem: Respiratory - Adult  Goal: Achieves optimal ventilation and oxygenation  11/17/2023 0937 by Roro Adrian RN  Outcome: Progressing  11/17/2023 0435 by Cuba Ware RN  Outcome: Progressing

## 2023-11-17 NOTE — PROGRESS NOTES
[facility], [place]  Internal Medicine Teaching Residency Program  Inpatient Daily Progress Note  ______________________________________________________________________________    Patient: Zachary Estrada  YOB: 1935   :[id number]    Acct: [de-identified]     Room: [address] [place] [address]  Admit date: 11/16/2023  Today's date: 11/17/23  Number of days in the hospital: 1    SUBJECTIVE   Admitting Diagnosis: Complicated UTI (urinary tract infection)  CC: Right Jaw Pain    -Pt seen and examined at bedside. Chart & results reviewed. -Patient has been afebrile overnight and hemodynamically stable. -She denies headache, dizziness, chest pain/pressure, shortness of breath, abdominal pain, nausea, vomiting or diarrhea  -Plan for SLP evaluation today as patient presented initially with a chief complaint of choking on the desiccant. Then resume the diet as per SLP recommendation  -Discussed the patient with RN, no acute issues noted  -Continue antibiotics pending urine culture results        Review of Systems   Constitutional:  Positive for activity change, appetite change and fatigue. HENT:  Negative for sinus pressure and sinus pain. Respiratory:  Negative for chest tightness, shortness of breath and wheezing. Cardiovascular:  Negative for chest pain, palpitations and leg swelling. Gastrointestinal:  Negative for abdominal pain, diarrhea, nausea and vomiting. Genitourinary:  Positive for frequency. Negative for dysuria and urgency. Musculoskeletal:  Negative for arthralgias. Skin:  Negative for color change and pallor. Neurological:  Negative for syncope and light-headedness. Psychiatric/Behavioral:  Negative for agitation and behavioral problems. BRIEF HISTORY     The patient is a pleasant 81 y.o. female presents to the ED with a chief complaint of right jaw pain for past 2 months, choking spell and shortness of breath.   He has a past medical history significant mg, Q4H PRN        Diagnostic Labs:  CBC:   Recent Labs     11/16/23  1313 11/17/23  0603   WBC 7.3 6.2   RBC 4.65 3.74*   HGB 14.0 11.0*   HCT 42.9 35.0*   MCV 92.3 93.6   RDW 13.2 13.2    159     BMP:   Recent Labs     11/16/23  1313 11/17/23  0603    142   K 3.9 3.2*    108*   CO2 29 24   BUN 14 13   CREATININE 0.7 0.6     BNP: No results for input(s): \"BNP\" in the last 72 hours. PT/INR: No results for input(s): \"PROTIME\", \"INR\" in the last 72 hours. APTT: No results for input(s): \"APTT\" in the last 72 hours. CARDIAC ENZYMES: No results for input(s): \"CKMB\", \"CKMBINDEX\", \"TROPONINI\" in the last 72 hours. Invalid input(s): \"CKTOTAL;3\"  FASTING LIPID PANEL:  Lab Results   Component Value Date    CHOL 131 01/29/2020    HDL 42 09/27/2022    TRIG 106 01/29/2020     LIVER PROFILE: No results for input(s): \"AST\", \"ALT\", \"ALB\", \"BILIDIR\", \"BILITOT\", \"ALKPHOS\" in the last 72 hours. MICROBIOLOGY:   Lab Results   Component Value Date/Time    CULTURE (A) 04/21/2023 12:15 PM     METHICILLIN RESISTANT STAPHYLOCOCCUS AUREUS MODERATE GROWTH    CULTURE PROTEUS VULGARIS LIGHT GROWTH (A) 04/21/2023 12:15 PM    CULTURE No anaerobic organisms isolated at 5 days. 04/21/2023 12:15 PM       Imaging:    XR CHEST PORTABLE    Result Date: 11/16/2023  Lungs appear clear. Cardiomegaly. XR CHEST PORTABLE    Result Date: 11/16/2023  No new acute cardiopulmonary findings. ASSESSMENT & PLAN     ASSESSMENT / PLAN:     IMPRESSION  This is a 81 y.o. female who presented with jaw pain and confusion as per the daughters, found to have UTI on urinalysis. Patient admitted to inpatient status for further evaluation and management of UTI. Principal Problem:    Complicated UTI (urinary tract infection)  Resolved Problems:    * No resolved hospital problems.  *   -Urinalysis concerning for UTI  -Jaw pain at the time of presentation  -Essential hypertension  -Hyperlipidemia  -Multiple decubitus ulcers  -Bilateral

## 2023-11-17 NOTE — PROGRESS NOTES
SLP ALL NOTES  Facility/Department: [address] STEPDOWN   CLINICAL BEDSIDE SWALLOW EVALUATION    NAME: Zachary Estrada  : 1935  MRN: [id number]    ADMISSION DATE: 2023  ADMITTING DIAGNOSIS: has Essential hypertension; Cancer ([address]); Hypothyroidism; Depression; Hyperlipidemia; Basal cell carcinoma; Melanoma ([address]); Anxiety; Hypokalemia; Acute idiopathic gout of right foot; CKD (chronic kidney disease) stage 3, GFR 30-59 ml/min ([address]); Risk for falls; Pneumococcal vaccine refused; Influenza vaccination declined; Fatigue; Bilateral lower extremity pain; Chronic diastolic heart failure ([address]); Acute cystitis without hematuria; Chronic systolic heart failure ([address]); Decreased mobility; Localized edema; Xerosis cutis; Hallux valgus of left foot; Severe malnutrition ([address]); Moderate episode of recurrent major depressive disorder ([address]); Mild malnutrition ([address]); Decubitus ulcer of left hip, stage 3 ([address]); Pressure ulcer of left hip, stage 4 ([address]); and Complicated UTI (urinary tract infection) on their problem list.    Recent Chest Xray/CT of Chest: 23 IMPRESSION:  Lungs appear clear. Cardiomegaly. Date of Eval: 2023  Evaluating Therapist: MO Granda    Current Diet level:  Current Diet : NPO  Current Liquid Diet : NPO    Primary Complaint       The patient is a pleasant 81 y.o. female presents to the ED with a chief complaint of right jaw pain for past 2 months, choking spell and shortness of breath. He has a past medical history significant for anxiety, basal cell carcinoma, congestive heart failure, decubitus ulcer of the left hip stage III, depression, gout, HLD, hypokalemia, hypothyroidism on Synthroid 125 mcg, melanoma and NSTEMI. Her most pain began around 9 this morning. She was trying to swallow pneumonia and feels that one of them poked her cheek which was pain.   After that, she had a difficult time swallowing the pills and felt short of breath which resolved when she were able to

## 2023-11-17 NOTE — PROGRESS NOTES
[address]  PROGRESS NOTE  2  Shift date: 11/16/2023  Shift day: Thursday   Shift # 2    Room # 0553/4332-22   Name: Farzaneh Constantino                [facility]: [facility]   Place of Mu-ism:       Referral: Routine Visit    Admit Date & Time: 11/16/2023 12:17 PM    Assessment:  Farzaneh Constantino is a 81 y.o. female in the hospital due to Complicated UTI (urinary tract infection). Upon entering the room writer observes family in need of assistance in the clarification of the pt's code status and emotional support from       Intervention:  Writer introduced himself to the patient and her family and offered space to express feelings, needs, and concerns and provided a ministry presence. Outcome:  Upon concluding the visit family appeared comforted and expressed gratitude for the spiritual\emotional care provided. Plan:  Chaplains will remain available to offer spiritual and emotional support as needed       11/16/23 0029   Encounter Summary   Encounter Overview/Reason  Follow-up   Service Provided For: Patient; Family   Referral/Consult From: Other    Support System Children   Last Encounter  11/16/23   Complexity of Encounter Moderate   Begin Time 1600   End Time  1800   Total Time Calculated 120 min   Crisis   Type Family Care   Assessment/Intervention/Outcome   Assessment Anxious   Intervention Active listening;Explored/Affirmed feelings, thoughts, concerns   Outcome Expressed Gratitude;Comfort         Electronically signed by Stephanie Vásquez MDiv.on 11/16/2023 at 10:57 PM   [address] [place]  [phone]

## 2023-11-17 NOTE — PROGRESS NOTES
Physical Therapy  [facility] [address] [place] [address] [facility]  [facility]    Name: Zachary Estrada  : 1935  MRN: [id number]  Date of Service: 2023  Chief Complaint   Patient presents with    Jaw Pain     R. Jaw pain, lasting 2 months         Discharge Recommendations:    Further therapy recommended at discharge. PT Equipment Recommendations  Equipment Needed: No      Patient Diagnosis(es): The encounter diagnosis was Acute urinary tract infection. Past Medical History:  has a past medical history of Anxiety, Arthritis, Basal cell carcinoma, Cancer ([address]), CHF (congestive heart failure) ([address]), Closed fracture of single pubic ramus of pelvis with routine healing, right, Decubitus ulcer of left hip, stage 3 ([address]), Depression, Gout, H/O total hysterectomy, Hyperlipidemia, Hypertension, Hypokalemia, Hypothyroidism, Kidney stone, Kidney stone, Melanoma ([address]), NSTEMI (non-ST elevated myocardial infarction) ([address]), and Pressure injury of coccygeal region, stage 2 ([address]). Past Surgical History:  has a past surgical history that includes Tubal ligation; Hysterectomy; and Hysterectomy, vaginal.    Assessment   Assessment: Pt is modA in rolling L and R, unable to sit EOB d/t longstanding contractures of BLE. Pt reports not having sat on the edge of bed in about 1 year, does not transfer to wheelchair. Pt is currenlty at baseline mobility, PT to sign-off. Pt educated on presure reliefs and repositioned on R side to alleviate pressure on L hip. Bony prominences padded to reduce pressure injury.   Therapy Prognosis: Guarded  Decision Making: Medium Complexity  Requires PT Follow-Up: No  Activity Tolerance  Activity Tolerance: Patient tolerated treatment well     Plan   Physical Therapy Plan  General Plan: Discharge with evaluation only  Safety Devices  Type of Devices: Left in bed, Call light within reach, Nurse notified, Patient at risk for falls  Restraints  Restraints Initially in Place: No

## 2023-11-17 NOTE — PLAN OF CARE
Problem: Discharge Planning  Goal: Discharge to home or other facility with appropriate resources  Outcome: Progressing     Problem: Safety - Adult  Goal: Free from fall injury  Outcome: Progressing     Problem: Pain  Goal: Verbalizes/displays adequate comfort level or baseline comfort level  Outcome: Progressing  Flowsheets (Taken 11/16/2023 2157)  Verbalizes/displays adequate comfort level or baseline comfort level: Assess pain using appropriate pain scale     Problem: ABCDS Injury Assessment  Goal: Absence of physical injury  Outcome: Progressing     Problem: Skin/Tissue Integrity  Goal: Absence of new skin breakdown  Description: 1. Monitor for areas of redness and/or skin breakdown  2. Assess vascular access sites hourly  3. Every 4-6 hours minimum:  Change oxygen saturation probe site  4. Every 4-6 hours:  If on nasal continuous positive airway pressure, respiratory therapy assess nares and determine need for appliance change or resting period.   Outcome: Progressing     Problem: Respiratory - Adult  Goal: Achieves optimal ventilation and oxygenation  Outcome: Progressing     Problem: Infection - Adult  Goal: Absence of infection at discharge  Outcome: Progressing

## 2023-11-17 NOTE — H&P
[address]     Department of Internal Medicine - Staff Internal Medicine Teaching Service          ADMISSION NOTE/HISTORY AND PHYSICAL EXAMINATION   Date: 11/16/2023  Patient Name: Smooth Soriano  Date of admission: 11/16/2023 12:17 PM  YOB: 1935  PCP: Candace Alberts MD  History Obtained From:  patient, family member - daughters and grand kid, electronic medical record    CHIEF COMPLAINT     Chief complaint: Right Jaw Pain    HISTORY OF PRESENTING ILLNESS     The patient is a pleasant 81 y.o. female presents to the ED with a chief complaint of right jaw pain for past 2 months, choking spell and shortness of breath. He has a past medical history significant for anxiety, basal cell carcinoma, congestive heart failure, decubitus ulcer of the left hip stage III, depression, gout, HLD, hypokalemia, hypothyroidism on Synthroid 125 mcg, melanoma and NSTEMI. Her most pain began around 9 this morning. She was trying to swallow pneumonia and feels that one of them poked her cheek which was pain. After that, she had a difficult time swallowing the pills and felt short of breath which resolved when she were able to swallow them. She denied fever, nausea, vomiting, abdominal pain, chest or any other symptoms at that time. Her daughters were at the bedside and states that she was having difficulty in breathing and gasping for air and became pale. It was a very short episode. Daughter also states that patient has been acting unusual and making odd statements the past several days. She believes that she may have some baseline dementia but it has not been diagnosed. In the ED, patient coughed up a big capsule of the desiccant which she took thinking of a it as her medicine. Her CMP showed sodium 142, potassium 3.9, chloride 103, bicarbonate 29, BUN 14 and creatinine 0.7. Her random blood glucose level was 136.   Her CBC showed WBC 7.3, hemoglobin 14, hematocrit 42.9 and PORTABLE    Result Date: 11/16/2023  Lungs appear clear. Cardiomegaly. XR CHEST PORTABLE    Result Date: 11/16/2023  No new acute cardiopulmonary findings. ASSESSMENT & PLAN     ASSESSMENT / PLAN:     IMPRESSION  This is a 81 y.o. female who presented with jaw pain and confusion as per the daughters, found to have UTI on urinalysis. Patient admitted to inpatient status for further evaluation and management of UTI. Principal Problem:    Complicated UTI (urinary tract infection)  Resolved Problems:    * No resolved hospital problems. *   -Urinalysis concerning for UTI  -Jaw pain at the time of presentation  -Essential hypertension  -Hyperlipidemia  -Multiple decubitus ulcers  -Bilateral lower extremities spastic paraplegia  -Hypothyroidism  -Hypokalemia    Plan:  -Follow-up on urine cultures  -Empiric treatment Rocephin with pending urine culture results and sensitivity  -Patient seems to be on the drier side, started on IV Normal Saline at 50 mL/hr and encourage per oral fluid intake once cleared for oral intake  -Levo Synthroid 125 mcg once daily in the morning on empty stomach for hypothyroidism  -Morphine 1 mg every 4 hours for the jaw pain  -Monitor electrolytes and replace as needed  -Keep NPO until patient passes the bedside swallow test or MBSS  -Adequate orientation to the day light. DVT ppx: Lovenox  GI ppx: Not indicated  Diet: Diet NPO        PT/OT/SW: Consulted  Discharge Planning: As per case    Tuan Chirinos MD  Internal Medicine Resident, PGY-1  [address];  [place], [place]  11/16/2023, 10:33 PM

## 2023-11-17 NOTE — H&P
Attending Physician Statement  I have discussed the case of Gray Bonds, including pertinent history and exam findings with the resident/fellow/medical student/NP/PA. I have seen and examined the patient and the key elements of the encounter have been performed by me. I agree with the assessment, plan and orders as documented by the resident/fellow/medical student/NP/PA  With changes made to the note as needed. Pt was seen during rounds. Review of Systems:   In addition to the pertinent positives and negatives as stated within HPI and the review of systems as documented in their notes, all other systems were reviewed when able to and are reported negative. Patient admitted with shortness of breath. While in the emergency room patient coughed up desiccant container that is usually seen in medication containers with improvement in the shortness of breath. Patient likely had foreign body induced choking causing her symptoms at presentation. Patient found to have UTI and was also dehydrated-continue ceftriaxone, follow-up on the cultures and adjust antibiotics accordingly, rehydration    Acute toxic metabolic encephalopathy secondary to UTI and dehydration, and aspiration precautions, treat primary conditions    Decubitus ulcer of the left hip. Patient with contractures and difficulty position. Patient being cared for by the family at home-patient to observe nursing care provider in the hospital and triplicate such care at home to decrease the ongoing ulceration or new ulcers.   Wound care to see the patient    History of gout-avoid dehydration and high-protein diet    Hypertension, essential-resume home medications    Hypothyroidism-continue thyroid supplementation    Unspecified hyperlipidemia-resume lipid-lowering agents    Chronic diastolic congestive heart failure-treat with diuresis as needed    Depression-resume home medications    Previous history of cigarette smoking-continue cessation    Troponin

## 2023-11-17 NOTE — PROGRESS NOTES
Writer called to update Daughter [facility] at [phone]. No answer. Will try again at later time.     Jose Shen RN

## 2023-11-18 VITALS
WEIGHT: 101.63 LBS | TEMPERATURE: 98.1 F | DIASTOLIC BLOOD PRESSURE: 87 MMHG | HEIGHT: 61 IN | RESPIRATION RATE: 18 BRPM | SYSTOLIC BLOOD PRESSURE: 159 MMHG | BODY MASS INDEX: 19.19 KG/M2 | OXYGEN SATURATION: 95 % | HEART RATE: 85 BPM

## 2023-11-18 PROBLEM — R13.10 DYSPHAGIA: Status: ACTIVE | Noted: 2023-11-18

## 2023-11-18 LAB
ANION GAP SERPL CALCULATED.3IONS-SCNC: 10 MMOL/L (ref 9–17)
BASOPHILS # BLD: 0.05 K/UL (ref 0–0.2)
BASOPHILS NFR BLD: 1 % (ref 0–2)
BUN SERPL-MCNC: 12 MG/DL (ref 8–23)
CALCIUM SERPL-MCNC: 8.6 MG/DL (ref 8.6–10.4)
CHLORIDE SERPL-SCNC: 107 MMOL/L (ref 98–107)
CO2 SERPL-SCNC: 24 MMOL/L (ref 20–31)
CREAT SERPL-MCNC: 0.6 MG/DL (ref 0.5–0.9)
EOSINOPHIL # BLD: 0.32 K/UL (ref 0–0.44)
EOSINOPHILS RELATIVE PERCENT: 5 % (ref 1–4)
ERYTHROCYTE [DISTWIDTH] IN BLOOD BY AUTOMATED COUNT: 13.3 % (ref 11.8–14.4)
GFR SERPL CREATININE-BSD FRML MDRD: >60 ML/MIN/1.73M2
GLUCOSE SERPL-MCNC: 87 MG/DL (ref 70–99)
HCT VFR BLD AUTO: 34.7 % (ref 36.3–47.1)
HGB BLD-MCNC: 11 G/DL (ref 11.9–15.1)
IMM GRANULOCYTES # BLD AUTO: 0.03 K/UL (ref 0–0.3)
IMM GRANULOCYTES NFR BLD: 0 %
LYMPHOCYTES NFR BLD: 1.15 K/UL (ref 1.1–3.7)
LYMPHOCYTES RELATIVE PERCENT: 17 % (ref 24–43)
MCH RBC QN AUTO: 30.2 PG (ref 25.2–33.5)
MCHC RBC AUTO-ENTMCNC: 31.7 G/DL (ref 28.4–34.8)
MCV RBC AUTO: 95.3 FL (ref 82.6–102.9)
MICROORGANISM SPEC CULT: ABNORMAL
MONOCYTES NFR BLD: 0.5 K/UL (ref 0.1–1.2)
MONOCYTES NFR BLD: 7 % (ref 3–12)
NEUTROPHILS NFR BLD: 70 % (ref 36–65)
NEUTS SEG NFR BLD: 4.81 K/UL (ref 1.5–8.1)
NRBC BLD-RTO: 0 PER 100 WBC
PLATELET # BLD AUTO: 152 K/UL (ref 138–453)
PMV BLD AUTO: 10.3 FL (ref 8.1–13.5)
POTASSIUM SERPL-SCNC: 4.2 MMOL/L (ref 3.7–5.3)
RBC # BLD AUTO: 3.64 M/UL (ref 3.95–5.11)
SODIUM SERPL-SCNC: 141 MMOL/L (ref 135–144)
SPECIMEN DESCRIPTION: ABNORMAL
WBC OTHER # BLD: 6.9 K/UL (ref 3.5–11.3)

## 2023-11-18 PROCEDURE — 2580000003 HC RX 258

## 2023-11-18 PROCEDURE — 92610 EVALUATE SWALLOWING FUNCTION: CPT

## 2023-11-18 PROCEDURE — 99232 SBSQ HOSP IP/OBS MODERATE 35: CPT | Performed by: INTERNAL MEDICINE

## 2023-11-18 PROCEDURE — 6360000002 HC RX W HCPCS

## 2023-11-18 PROCEDURE — 36415 COLL VENOUS BLD VENIPUNCTURE: CPT

## 2023-11-18 PROCEDURE — 6370000000 HC RX 637 (ALT 250 FOR IP)

## 2023-11-18 PROCEDURE — 2700000000 HC OXYGEN THERAPY PER DAY

## 2023-11-18 PROCEDURE — 85025 COMPLETE CBC W/AUTO DIFF WBC: CPT

## 2023-11-18 PROCEDURE — 80048 BASIC METABOLIC PNL TOTAL CA: CPT

## 2023-11-18 PROCEDURE — 94761 N-INVAS EAR/PLS OXIMETRY MLT: CPT

## 2023-11-18 RX ORDER — OXYCODONE HYDROCHLORIDE 5 MG/1
5 TABLET ORAL EVERY 8 HOURS PRN
Qty: 21 TABLET | Refills: 0 | Status: SHIPPED | OUTPATIENT
Start: 2023-11-18 | End: 2023-11-25

## 2023-11-18 RX ORDER — NITROFURANTOIN 25; 75 MG/1; MG/1
100 CAPSULE ORAL 2 TIMES DAILY
Qty: 6 CAPSULE | Refills: 0 | Status: SHIPPED | OUTPATIENT
Start: 2023-11-18 | End: 2023-11-18 | Stop reason: SDUPTHER

## 2023-11-18 RX ORDER — ATORVASTATIN CALCIUM 10 MG/1
10 TABLET, FILM COATED ORAL NIGHTLY
Status: DISCONTINUED | OUTPATIENT
Start: 2023-11-18 | End: 2023-11-19 | Stop reason: HOSPADM

## 2023-11-18 RX ORDER — LEVOTHYROXINE SODIUM 0.12 MG/1
125 TABLET ORAL EVERY MORNING
Status: DISCONTINUED | OUTPATIENT
Start: 2023-11-18 | End: 2023-11-19 | Stop reason: HOSPADM

## 2023-11-18 RX ORDER — NITROFURANTOIN 25; 75 MG/1; MG/1
100 CAPSULE ORAL 2 TIMES DAILY
Qty: 6 CAPSULE | Refills: 0 | Status: SHIPPED | OUTPATIENT
Start: 2023-11-18 | End: 2023-11-21

## 2023-11-18 RX ORDER — LISINOPRIL 20 MG/1
20 TABLET ORAL DAILY
Status: DISCONTINUED | OUTPATIENT
Start: 2023-11-18 | End: 2023-11-19 | Stop reason: HOSPADM

## 2023-11-18 RX ADMIN — MORPHINE SULFATE 1 MG: 2 INJECTION, SOLUTION INTRAMUSCULAR; INTRAVENOUS at 10:31

## 2023-11-18 RX ADMIN — ANTI-FUNGAL POWDER MICONAZOLE NITRATE TALC FREE: 1.42 POWDER TOPICAL at 19:38

## 2023-11-18 RX ADMIN — Medication 10 MG: at 02:29

## 2023-11-18 RX ADMIN — POLYMYXIN B SULFATE AND TRIMETHOPRIM 1 DROP: 10000; 1 SOLUTION OPHTHALMIC at 09:46

## 2023-11-18 RX ADMIN — MORPHINE SULFATE 1 MG: 2 INJECTION, SOLUTION INTRAMUSCULAR; INTRAVENOUS at 02:33

## 2023-11-18 RX ADMIN — MORPHINE SULFATE 1 MG: 2 INJECTION, SOLUTION INTRAMUSCULAR; INTRAVENOUS at 14:37

## 2023-11-18 RX ADMIN — POLYMYXIN B SULFATE AND TRIMETHOPRIM 1 DROP: 10000; 1 SOLUTION OPHTHALMIC at 19:40

## 2023-11-18 RX ADMIN — Medication 1000 MG: at 02:35

## 2023-11-18 RX ADMIN — LISINOPRIL 20 MG: 20 TABLET ORAL at 09:45

## 2023-11-18 RX ADMIN — POLYMYXIN B SULFATE AND TRIMETHOPRIM 1 DROP: 10000; 1 SOLUTION OPHTHALMIC at 04:14

## 2023-11-18 RX ADMIN — ATORVASTATIN CALCIUM 10 MG: 10 TABLET, FILM COATED ORAL at 19:38

## 2023-11-18 RX ADMIN — SODIUM CHLORIDE, PRESERVATIVE FREE 10 ML: 5 INJECTION INTRAVENOUS at 19:39

## 2023-11-18 RX ADMIN — POLYMYXIN B SULFATE AND TRIMETHOPRIM 1 DROP: 10000; 1 SOLUTION OPHTHALMIC at 19:00

## 2023-11-18 RX ADMIN — SODIUM CHLORIDE, PRESERVATIVE FREE 10 ML: 5 INJECTION INTRAVENOUS at 09:46

## 2023-11-18 RX ADMIN — LEVOTHYROXINE SODIUM 125 MCG: 125 TABLET ORAL at 09:45

## 2023-11-18 RX ADMIN — POLYMYXIN B SULFATE AND TRIMETHOPRIM 1 DROP: 10000; 1 SOLUTION OPHTHALMIC at 11:40

## 2023-11-18 RX ADMIN — ANTI-FUNGAL POWDER MICONAZOLE NITRATE TALC FREE: 1.42 POWDER TOPICAL at 09:46

## 2023-11-18 RX ADMIN — MORPHINE SULFATE 1 MG: 2 INJECTION, SOLUTION INTRAMUSCULAR; INTRAVENOUS at 22:50

## 2023-11-18 RX ADMIN — ENOXAPARIN SODIUM 40 MG: 100 INJECTION SUBCUTANEOUS at 09:45

## 2023-11-18 ASSESSMENT — PAIN DESCRIPTION - DESCRIPTORS: DESCRIPTORS: ACHING

## 2023-11-18 ASSESSMENT — PAIN DESCRIPTION - ORIENTATION: ORIENTATION: RIGHT

## 2023-11-18 ASSESSMENT — PAIN SCALES - GENERAL
PAINLEVEL_OUTOF10: 5
PAINLEVEL_OUTOF10: 7
PAINLEVEL_OUTOF10: 8
PAINLEVEL_OUTOF10: 3

## 2023-11-18 ASSESSMENT — PAIN DESCRIPTION - LOCATION
LOCATION: LEG
LOCATION: HIP

## 2023-11-18 NOTE — PLAN OF CARE
Problem: Discharge Planning  Goal: Discharge to home or other facility with appropriate resources  Outcome: Progressing     Problem: Safety - Adult  Goal: Free from fall injury  Outcome: Progressing     Problem: Pain  Goal: Verbalizes/displays adequate comfort level or baseline comfort level  Outcome: Progressing     Problem: ABCDS Injury Assessment  Goal: Absence of physical injury  Outcome: Progressing     Problem: Skin/Tissue Integrity  Goal: Absence of new skin breakdown  Description: 1. Monitor for areas of redness and/or skin breakdown  2. Assess vascular access sites hourly  3. Every 4-6 hours minimum:  Change oxygen saturation probe site  4. Every 4-6 hours:  If on nasal continuous positive airway pressure, respiratory therapy assess nares and determine need for appliance change or resting period.   Outcome: Progressing     Problem: Respiratory - Adult  Goal: Achieves optimal ventilation and oxygenation  Outcome: Progressing     Problem: Infection - Adult  Goal: Absence of infection at discharge  Outcome: Progressing     Problem: Chronic Conditions and Co-morbidities  Goal: Patient's chronic conditions and co-morbidity symptoms are monitored and maintained or improved  Outcome: Progressing

## 2023-11-18 NOTE — CARE COORDINATION
Transitional planning-patient needs ride home. Called [facility] and talked with Delfino Favre, set up transportation for 930PM. 1610 called daughter Main Martinez to inform her of time-she is OK with the time.

## 2023-11-18 NOTE — DISCHARGE INSTRUCTIONS
You were admitted for dificulties of swallowing and you ingested foreign body   You were found to have UTI and IV antibiotic was given to you  Please take your medications as prescribed  Follow up with your PCP  In case of emergency, call 911 or go to the nearest ER

## 2023-11-18 NOTE — PROGRESS NOTES
(Dysphagia III) diet with Moderately Thick (Honey) liquids       SKILLED OBSERVATIONS  Trials of Mildly Thick/Nectar liquids  After a few sips, she evidence delayed coughing which persisted        IMPRESSIONS  Pt exhibits clinical s/s of aspiration on Mildly Thick/Neches liquids    This appears c/w the results of the 11/17/2023 MBSS          RECOMMENDATION  Continue current PO diet  Moderately Thick (Honey) liquids   Dysphagia soft and bite/sized (Dysphagia III)     Pt is being discharged to home today; would advise home ST services to address swallowing for least restrictive diet              Pain:  Pain Assessment  Pain Assessment: None - Denies Pain  Pain Level: 8  Patient's Stated Pain Goal: 0 - No pain  Pain Location: Leg  Pain Orientation: Right  Pain Descriptors: Discomfort  Pain Type: Chronic pain  Pain Frequency: Continuous  Pain Onset: On-going  Response to Pain Intervention: None (pain unchanged or no improvement)  Side Effects: No reported side effects  Multiple Pain Sites: No    Reason for Referral  Efraín aRmirez was referred for a bedside swallow evaluation to assess the efficiency of her swallow function, identify signs and symptoms of aspiration and make recommendations regarding safe dietary consistencies, effective compensatory strategies, and safe eating environment. Impression  Dysphagia Diagnosis: Mild to moderate pharyngeal stage dysphagia        Treatment Plan  Requires SLP Intervention: Yes     D/C Recommendations: Ongoing speech therapy is recommended at next level of care       Recommended Diet and Intervention  Diet Solids Recommendation: Soft & Bite Sized  Liquid Consistency Recommendation: Moderately Thick (Honey)     Recommendations: Dysphagia treatment  Therapeutic Interventions: Diet tolerance monitoring;Pharyngeal exercises; Therapeutic PO trials with SLP    Compensatory Swallowing Strategies       Treatment/Goals  Dysphagia Goals:  The patient will tolerate recommended diet without observed clinical signs of aspiration; The patient will tolerate mildly thick liquids without signs and symptoms of aspiration 10/10 via straw. Prognosis  Prognosis: Fair  Prognosis Considerations: Age; Family/Community Support;Severity of Impairments;Medical Diagnosis; Medical Prognosis;Participation Level; Potential;Co-Morbidities;Previous Level of Function  Consulted and agree with results and recommendations: Patient; Family member  Family member consulted: daughter    Education  Patient Education Response: Demonstrated understanding             Therapy Time   [address]  11/18/2023 2:00 PM

## 2023-11-18 NOTE — PLAN OF CARE
Problem: Discharge Planning  Goal: Discharge to home or other facility with appropriate resources  Outcome: Adequate for Discharge  Flowsheets (Taken 11/18/2023 1828)  Discharge to home or other facility with appropriate resources:   Identify barriers to discharge with patient and caregiver   Identify discharge learning needs (meds, wound care, etc)   Refer to discharge planning if patient needs post-hospital services based on physician order or complex needs related to functional status, cognitive ability or social support system   Arrange for needed discharge resources and transportation as appropriate     Problem: Safety - Adult  Goal: Free from fall injury  Outcome: Adequate for Discharge  Flowsheets (Taken 11/17/2023 0800 by Lee Bernstein, RN)  Free From Fall Injury: Instruct family/caregiver on patient safety     Problem: Pain  Goal: Verbalizes/displays adequate comfort level or baseline comfort level  Outcome: Adequate for Discharge  Flowsheets (Taken 11/18/2023 1828)  Verbalizes/displays adequate comfort level or baseline comfort level:   Encourage patient to monitor pain and request assistance   Administer analgesics based on type and severity of pain and evaluate response   Assess pain using appropriate pain scale   Implement non-pharmacological measures as appropriate and evaluate response     Problem: ABCDS Injury Assessment  Goal: Absence of physical injury  Outcome: Adequate for Discharge  Flowsheets (Taken 11/17/2023 0800 by Lee Bernstein RN)  Absence of Physical Injury: Implement safety measures based on patient assessment     Problem: Skin/Tissue Integrity  Goal: Absence of new skin breakdown  Description: 1. Monitor for areas of redness and/or skin breakdown  2. Assess vascular access sites hourly  3. Every 4-6 hours minimum:  Change oxygen saturation probe site  4.   Every 4-6 hours:  If on nasal continuous positive airway pressure, respiratory therapy assess nares and determine need for

## 2023-11-18 NOTE — PROGRESS NOTES
[facility]  Internal Medicine Teaching Residency Program  Inpatient Daily Progress Note  ______________________________________________________________________________    Patient: Efraín Ramirez  YOB: 1935   YVT:[id number]    Acct: [de-identified]     Room: [address] [place] [address]  Admit date: 11/16/2023  Today's date: 11/18/23  Number of days in the hospital: 2    SUBJECTIVE   Admitting Diagnosis: Complicated UTI (urinary tract infection)  CC: Right Jaw Pain    -Pt seen and examined at bedside. Chart & results reviewed. -Patient has been afebrile overnight and hemodynamically stable. -She denies headache, dizziness, chest pain/pressure, shortness of breath, abdominal pain, nausea, vomiting or diarrhea  -Discussed the patient with RN, no acute issues noted  -Patient can be discharged today, pending SLP to evaluate for advancing diet        Review of Systems   Constitutional:  Positive for activity change, appetite change and fatigue. HENT:  Negative for sinus pressure and sinus pain. Respiratory:  Negative for chest tightness, shortness of breath and wheezing. Cardiovascular:  Negative for chest pain, palpitations and leg swelling. Gastrointestinal:  Negative for abdominal pain, diarrhea, nausea and vomiting. Genitourinary:  Positive for frequency. Negative for dysuria and urgency. Musculoskeletal:  Negative for arthralgias. Skin:  Negative for color change and pallor. Neurological:  Negative for syncope and light-headedness. Psychiatric/Behavioral:  Negative for agitation and behavioral problems. BRIEF HISTORY     The patient is a pleasant 81 y.o. female presents to the ED with a chief complaint of right jaw pain for past 2 months, choking spell and shortness of breath.   He has a past medical history significant for anxiety, basal cell carcinoma, congestive heart failure, decubitus ulcer of the left hip stage III, depression, gout, HLD, hypokalemia, mg, Q4H PRN        Diagnostic Labs:  CBC:   Recent Labs     11/16/23  1313 11/17/23  0603 11/18/23  0537   WBC 7.3 6.2 6.9   RBC 4.65 3.74* 3.64*   HGB 14.0 11.0* 11.0*   HCT 42.9 35.0* 34.7*   MCV 92.3 93.6 95.3   RDW 13.2 13.2 13.3    159 152       BMP:   Recent Labs     11/16/23  1313 11/17/23  0603 11/18/23  0537    142 141   K 3.9 3.2* 4.2    108* 107   CO2 29 24 24   BUN 14 13 12   CREATININE 0.7 0.6 0.6       BNP: No results for input(s): \"BNP\" in the last 72 hours. PT/INR: No results for input(s): \"PROTIME\", \"INR\" in the last 72 hours. APTT: No results for input(s): \"APTT\" in the last 72 hours. CARDIAC ENZYMES: No results for input(s): \"CKMB\", \"CKMBINDEX\", \"TROPONINI\" in the last 72 hours. Invalid input(s): \"CKTOTAL;3\"  FASTING LIPID PANEL:  Lab Results   Component Value Date    CHOL 131 01/29/2020    HDL 42 09/27/2022    TRIG 106 01/29/2020     LIVER PROFILE: No results for input(s): \"AST\", \"ALT\", \"ALB\", \"BILIDIR\", \"BILITOT\", \"ALKPHOS\" in the last 72 hours. MICROBIOLOGY:   Lab Results   Component Value Date/Time    CULTURE (A) 11/16/2023 01:00 PM     STAPHYLOCOCCUS SPECIES, COAGULASE NEGATIVE >100,000 CFU/ML       Imaging:    XR CHEST PORTABLE    Result Date: 11/16/2023  Lungs appear clear. Cardiomegaly. XR CHEST PORTABLE    Result Date: 11/16/2023  No new acute cardiopulmonary findings. ASSESSMENT & PLAN     ASSESSMENT / PLAN:     IMPRESSION  This is a 81 y.o. female who presented with jaw pain and confusion as per the daughters, found to have UTI on urinalysis. Patient admitted to inpatient status for further evaluation and management of UTI. Principal Problem:    Complicated UTI (urinary tract infection)  Resolved Problems:    * No resolved hospital problems.  *   -Urinalysis concerning for UTI  -Jaw pain at the time of presentation  -Essential hypertension  -Hyperlipidemia  -Multiple decubitus ulcers  -Bilateral lower extremities spastic

## 2023-11-19 NOTE — PROGRESS NOTES
Adry Sims from [address] [employer] called, they have questions about patient's Eulis Cotton. Page sent to Dr. Wayne Hsieh, the ordering provider, requesting they call [phone]. Electronically signed by Kulwant Mayfield RN on 11/19/2023 at 1:17 PM    Adry Sims from [address] [facility] [employer] called, they have not heard from the ordering provider. Page sent to senior resident. Electronically signed by Kulwant Mayfield RN on 11/19/2023 at 2:26 PM    Call placed to [facility] to see if doctor called. Cristiana said they spoke with the doctor and were able to update the the prescription.   Electronically signed by Kulwant Mayfield RN on 11/19/2023 at 3:04 PM

## 2023-11-19 NOTE — PROGRESS NOTES
Patient discharge to home safely and vitally stable, transported via stretcher by ambulance. Hand off report given. Received 1mg of morphine IV. Given paper works for discharge instruction to daughter Angie Barnes - verbalized understanding. As per day shift nurse and confirmed with Angie Barnes, script for Roxicodone was received with sister. Given script for Macrobid to Angie Barnes.

## 2023-11-19 NOTE — PLAN OF CARE
Problem: Discharge Planning  Goal: Discharge to home or other facility with appropriate resources  11/18/2023 2304 by Preston Brown RN  Outcome: Adequate for Discharge  11/18/2023 1828 by Amina Zimmer RN  Outcome: Adequate for Discharge  Flowsheets (Taken 11/18/2023 1828)  Discharge to home or other facility with appropriate resources:   Identify barriers to discharge with patient and caregiver   Identify discharge learning needs (meds, wound care, etc)   Refer to discharge planning if patient needs post-hospital services based on physician order or complex needs related to functional status, cognitive ability or social support system   Arrange for needed discharge resources and transportation as appropriate     Problem: Safety - Adult  Goal: Free from fall injury  11/18/2023 2304 by Preston Brown RN  Outcome: Adequate for Discharge  11/18/2023 1828 by Amina Zimmer RN  Outcome: Adequate for Discharge  Flowsheets (Taken 11/17/2023 0800 by Noé Blanco RN)  Free From Fall Injury: Instruct family/caregiver on patient safety     Problem: Pain  Goal: Verbalizes/displays adequate comfort level or baseline comfort level  11/18/2023 2304 by Preston Brown RN  Outcome: Adequate for Discharge  11/18/2023 1828 by Amina Zimmer RN  Outcome: Adequate for Discharge  Flowsheets (Taken 11/18/2023 1828)  Verbalizes/displays adequate comfort level or baseline comfort level:   Encourage patient to monitor pain and request assistance   Administer analgesics based on type and severity of pain and evaluate response   Assess pain using appropriate pain scale   Implement non-pharmacological measures as appropriate and evaluate response     Problem: ABCDS Injury Assessment  Goal: Absence of physical injury  11/18/2023 2304 by Preston Brown RN  Outcome: Adequate for Discharge  11/18/2023 1828 by Amina Zimmer RN  Outcome: Adequate for Discharge  Flowsheets (Taken 11/17/2023 0800 by Noé Blanco

## 2023-11-20 ENCOUNTER — CARE COORDINATION (OUTPATIENT)
Dept: CASE MANAGEMENT | Age: 88
End: 2023-11-20

## 2023-11-20 LAB
EKG ATRIAL RATE: 105 BPM
EKG ATRIAL RATE: 178 BPM
EKG ATRIAL RATE: 82 BPM
EKG P AXIS: 36 DEGREES
EKG P-R INTERVAL: 208 MS
EKG P-R INTERVAL: 210 MS
EKG Q-T INTERVAL: 324 MS
EKG Q-T INTERVAL: 376 MS
EKG Q-T INTERVAL: 422 MS
EKG QRS DURATION: 146 MS
EKG QRS DURATION: 152 MS
EKG QRS DURATION: 166 MS
EKG QTC CALCULATION (BAZETT): 493 MS
EKG QTC CALCULATION (BAZETT): 493 MS
EKG QTC CALCULATION (BAZETT): 496 MS
EKG R AXIS: -44 DEGREES
EKG R AXIS: -45 DEGREES
EKG R AXIS: -60 DEGREES
EKG T AXIS: 112 DEGREES
EKG T AXIS: 117 DEGREES
EKG T AXIS: 120 DEGREES
EKG VENTRICULAR RATE: 105 BPM
EKG VENTRICULAR RATE: 139 BPM
EKG VENTRICULAR RATE: 82 BPM

## 2023-11-20 NOTE — TELEPHONE ENCOUNTER
She is admitted to hospital - they will take care of current needs - will review when she is discharged .   She needs to be seen in person at least once a year for pain meds since they are controlled substances, or we can initiate a palliative care referral so she can be seen at home by pall care staff

## 2023-11-20 NOTE — CARE COORDINATION
Care Transitions Outreach Attempt #1  Initial 24 hour call. Call within 2 business days of discharge: Yes   Attempted to reach patient for transitions of care follow up. Unable to reach patient. HIPAA compliant message left on VM of daughter Cristopher Zazueta requesting a return call. Patient: Smooth Soriano Patient : 1935 MRN: [id number]    Last Discharge Facility       Date Complaint Diagnosis Description Type Department Provider    23 Jaw Pain Acute urinary tract infection . .. ED to Hosp-Admission (Discharged) (ADMITTED) [facility] Estephania Vásquez MD; Robby Luna... Was this an external facility discharge? No Discharge Facility: [facility]    Noted following upcoming appointments from discharge chart review:   [id number] [address] follow up appointment(s): No future appointments.      Mykel Marquez LPN  Care Transition Nurse

## 2023-11-21 ENCOUNTER — CARE COORDINATION (OUTPATIENT)
Dept: CASE MANAGEMENT | Age: 88
End: 2023-11-21

## 2023-11-21 DIAGNOSIS — N39.0 ACUTE URINARY TRACT INFECTION: Primary | ICD-10-CM

## 2023-11-21 PROCEDURE — 1111F DSCHRG MED/CURRENT MED MERGE: CPT | Performed by: INTERNAL MEDICINE

## 2023-11-21 NOTE — CARE COORDINATION
Care Transitions Initial Follow Up Call    Call within 2 business days of discharge: Yes    Patient Current Location:  Home: [address] [place] [address] [place] [id number]    LPN Care Coordinator contacted the  Monroe Ramirez  daughter   by telephone to perform post hospital discharge assessment. Verified name and  with  Monroe Ramirez daughter   as identifiers. Provided introduction to self, and explanation of the LPN Care Coordinator role. Patient: Daiana Musa Patient : 1935   MRN: [id number]  Reason for Admission: Acute urinary tract infection   Discharge Date: 23 RARS: Readmission Risk Score: 13.8      Last Discharge Facility       Date Complaint Diagnosis Description Type Department Provider    23 Jaw Pain Acute urinary tract infection . .. ED to Hosp-Admission (Discharged) (ADMITTED) [facility] Megan Lei MD; Sabine Shape... Was this an external facility discharge? No Discharge Facility: [facility]    Challenges to be reviewed by the provider   Additional needs identified to be addressed with provider: No  none               Method of communication with provider: none. Writer spoke to patient's daughter Monroe Ramirez to today for [facility] call. She reports that her mom is doing pretty good , she is back to baseline. She did have diarrhea last night but that has resolved. She denies she has HA dizziness sob abdominal pain or nausea. She is eating a soft diet . She is on Macrobid 100 mg bid for 3 days. She denies fever or chills. Patient wears a brief she denies she has dysuria or hematuria. Daughter reports that her mom had [facility] in the past. [facility] informed her if they would want in the future she would need to see pcp and get order for [facility].. She verbalized understanding. Monroe Ramirez declined future [facility] calls states they have everything they need and plenty of family to assist with care.      Med rec completed [id number]   Discussed HFU she reports sister is working with office to get patient a VV  Monroe Ramirez will schedule

## 2023-11-24 NOTE — DISCHARGE SUMMARY
[address]     Department of Internal Medicine - Staff Internal Medicine Teaching Service    INPATIENT DISCHARGE SUMMARY      Patient Identification:  Doug Holloway is a 81 y.o. female. :  1935  MRN: [id number]     Acct: [de-identified]   PCP: Olivia Martin MD  Admit Date:  2023  Discharge date and time: 2023 11:25 PM   Attending Provider: No att. providers found                                      [facility], [address] Problem Lists:  Principal Problem:    Complicated UTI (urinary tract infection)  Active Problems:    Dysphagia  Resolved Problems:    * No resolved hospital problems. *      HOSPITAL STAY     Brief Inpatient course:   Doug Holloway is a 81 y.o. female who was admitted for the management of Complicated UTI (urinary tract infection), presented to the emergency department with the chief complaint of right jaw pain for past 2 months, 2 weeks well and shortness of breath. She has a past medical history significant for anxiety, basal cell carcinoma, congestive heart failure, decubitus ulcer of the left hip stage III, depression, gout, HLD, hypokalemia, hypothyroidism on Synthroid 125 mcg, melanoma and NSTEMI. Before coming to the ED, patient was complaining of pain in the mouth. She was unable to take her medications because of the pain. Whenever she tried to swallow the pill, she will get short of breath which resolved when she was able to swallow them. In the ED, patient coughed up a big episode of the desiccant which was ingested by her by mistake. In the ED, her CMP was unremarkable. Her CBC was unremarkable as well. Imaging was negative for any acute abnormality. However, her urinalysis was significant for turbidity, 2+ proteinuria, positive nitrites and large leukocyte esterase burden. Urine microscopy showed moderate bacteriuria, large urine hemoglobin and large leukocyte esterase burden concerning for UTI.   Patient was

## 2023-12-02 DIAGNOSIS — I10 ESSENTIAL HYPERTENSION: ICD-10-CM

## 2023-12-02 DIAGNOSIS — E03.9 HYPOTHYROIDISM, UNSPECIFIED TYPE: ICD-10-CM

## 2023-12-04 RX ORDER — LISINOPRIL 20 MG/1
TABLET ORAL
Qty: 90 TABLET | Refills: 0 | Status: SHIPPED | OUTPATIENT
Start: 2023-12-04

## 2023-12-04 RX ORDER — CARVEDILOL 12.5 MG/1
TABLET ORAL
Qty: 180 TABLET | Refills: 0 | Status: SHIPPED | OUTPATIENT
Start: 2023-12-04

## 2023-12-04 RX ORDER — LEVOTHYROXINE SODIUM 0.12 MG/1
125 TABLET ORAL EVERY MORNING
Qty: 30 TABLET | Refills: 1 | Status: SHIPPED | OUTPATIENT
Start: 2023-12-04

## 2023-12-04 NOTE — TELEPHONE ENCOUNTER
([address])     Mild malnutrition ([address])     Decubitus ulcer of left hip, stage 3 (HCC)     Pressure ulcer of left hip, stage 4 (HCC)     Complicated UTI (urinary tract infection)     Dysphagia

## 2024-01-10 RX ORDER — PAROXETINE HYDROCHLORIDE 40 MG/1
40 TABLET, FILM COATED ORAL EVERY MORNING
Qty: 90 TABLET | Refills: 0 | Status: SHIPPED | OUTPATIENT
Start: 2024-01-10

## 2024-01-10 NOTE — TELEPHONE ENCOUNTER
Holly Gong is calling to request a refill on the following medication(s):    Last Visit Date (If Applicable):  9/12/2023    Next Visit Date:    Visit date not found    Medication Request:  Requested Prescriptions     Pending Prescriptions Disp Refills    PARoxetine (PAXIL) 40 MG tablet [Pharmacy Med Name: PAROXETINE HCL 40 MG TABLET] 90 tablet 0     Sig: take 1 tablet by mouth every morning                Yes

## 2024-01-16 DIAGNOSIS — I10 ESSENTIAL HYPERTENSION: ICD-10-CM

## 2024-01-17 RX ORDER — LISINOPRIL 20 MG/1
TABLET ORAL
Qty: 90 TABLET | Refills: 0 | Status: SHIPPED | OUTPATIENT
Start: 2024-01-17

## 2024-01-17 NOTE — TELEPHONE ENCOUNTER
Last visit: 09/12/2023  Last Med refill: 12/04/2023  Does patient have enough medication for 72 hours: No:     Next Visit Date:  No future appointments.    Health Maintenance   Topic Date Due    Pneumococcal 65+ years Vaccine (1 - PCV) Never done    DTaP/Tdap/Td vaccine (1 - Tdap) Never done    Shingles vaccine (1 of 2) Never done    Respiratory Syncytial Virus (RSV) Pregnant or age 60 yrs+ (1 - 1-dose 60+ series) Never done    Flu vaccine (1) Never done    COVID-19 Vaccine (3 - 2023-24 season) 09/01/2023    Lipids  09/27/2023    Annual Wellness Visit (Medicare)  11/27/2023    Depression Monitoring  03/12/2024    Hepatitis A vaccine  Aged Out    Hepatitis B vaccine  Aged Out    Hib vaccine  Aged Out    Polio vaccine  Aged Out    Meningococcal (ACWY) vaccine  Aged Out       Hemoglobin A1C (%)   Date Value   03/06/2020 5.0             ( goal A1C is < 7)   No components found for: \"LABMICR\"  LDL Cholesterol (mg/dL)   Date Value   09/27/2022 64   01/29/2020 65       (goal LDL is <100)   AST (U/L)   Date Value   09/27/2022 13     ALT (U/L)   Date Value   09/27/2022 6     BUN (mg/dL)   Date Value   11/18/2023 12     BP Readings from Last 3 Encounters:   11/18/23 (!) 159/87   06/02/23 129/61   05/12/23 (!) 183/75          (goal 120/80)    All Future Testing planned in [facility]  Lab Frequency Next Occurrence               Patient Active Problem List:     Essential hypertension     Cancer (HCC)     Hypothyroidism     Depression     Hyperlipidemia     Basal cell carcinoma     Melanoma (HCC)     Anxiety     Hypokalemia     Acute idiopathic gout of right foot     CKD (chronic kidney disease) stage 3, GFR 30-59 ml/min (HCC)     Risk for falls     Pneumococcal vaccine refused     Influenza vaccination declined     Fatigue     Bilateral lower extremity pain     Chronic diastolic heart failure (HCC)     Acute cystitis without hematuria     Chronic systolic heart failure (HCC)     Decreased mobility     Localized edema     Xerosis

## 2024-01-24 DIAGNOSIS — E03.9 HYPOTHYROIDISM, UNSPECIFIED TYPE: ICD-10-CM

## 2024-01-24 RX ORDER — LEVOTHYROXINE SODIUM 0.12 MG/1
125 TABLET ORAL EVERY MORNING
Qty: 30 TABLET | Refills: 1 | Status: SHIPPED | OUTPATIENT
Start: 2024-01-24

## 2024-01-24 NOTE — TELEPHONE ENCOUNTER
cutis     Hallux valgus of left foot     Severe malnutrition (HCC)     Moderate episode of recurrent major depressive disorder (HCC)     Mild malnutrition (HCC)     Decubitus ulcer of left hip, stage 3 (HCC)     Pressure ulcer of left hip, stage 4 (HCC)     Complicated UTI (urinary tract infection)     Dysphagia

## 2024-02-12 DIAGNOSIS — F32.A DEPRESSION, UNSPECIFIED DEPRESSION TYPE: ICD-10-CM

## 2024-02-12 RX ORDER — BUSPIRONE HYDROCHLORIDE 15 MG/1
TABLET ORAL
Qty: 90 TABLET | Refills: 1 | Status: SHIPPED | OUTPATIENT
Start: 2024-02-12

## 2024-02-12 NOTE — TELEPHONE ENCOUNTER
Last visit: 09/12/2023  Last Med refill: 11/22/2023  Does patient have enough medication for 72 hours: No:     Next Visit Date:  No future appointments.    Health Maintenance   Topic Date Due    Pneumococcal 65+ years Vaccine (1 - PCV) Never done    DTaP/Tdap/Td vaccine (1 - Tdap) Never done    Shingles vaccine (1 of 2) Never done    Respiratory Syncytial Virus (RSV) Pregnant or age 60 yrs+ (1 - 1-dose 60+ series) Never done    Flu vaccine (1) Never done    COVID-19 Vaccine (3 - 2023-24 season) 09/01/2023    Lipids  09/27/2023    Annual Wellness Visit (Medicare)  11/27/2023    Depression Monitoring  03/12/2024    Hepatitis A vaccine  Aged Out    Hepatitis B vaccine  Aged Out    Hib vaccine  Aged Out    Polio vaccine  Aged Out    Meningococcal (ACWY) vaccine  Aged Out       Hemoglobin A1C (%)   Date Value   03/06/2020 5.0             ( goal A1C is < 7)   No components found for: \"LABMICR\"  LDL Cholesterol (mg/dL)   Date Value   09/27/2022 64   01/29/2020 65       (goal LDL is <100)   AST (U/L)   Date Value   09/27/2022 13     ALT (U/L)   Date Value   09/27/2022 6     BUN (mg/dL)   Date Value   11/18/2023 12     BP Readings from Last 3 Encounters:   11/18/23 (!) 159/87   06/02/23 129/61   05/12/23 (!) 183/75          (goal 120/80)    All Future Testing planned in [facility]  Lab Frequency Next Occurrence               Patient Active Problem List:     Essential hypertension     Cancer (HCC)     Hypothyroidism     Depression     Hyperlipidemia     Basal cell carcinoma     Melanoma (HCC)     Anxiety     Hypokalemia     Acute idiopathic gout of right foot     CKD (chronic kidney disease) stage 3, GFR 30-59 ml/min (HCC)     Risk for falls     Pneumococcal vaccine refused     Influenza vaccination declined     Fatigue     Bilateral lower extremity pain     Chronic diastolic heart failure (HCC)     Acute cystitis without hematuria     Chronic systolic heart failure (HCC)     Decreased mobility     Localized edema     Xerosis

## 2024-02-25 DIAGNOSIS — I10 ESSENTIAL HYPERTENSION: ICD-10-CM

## 2024-02-26 RX ORDER — CARVEDILOL 12.5 MG/1
TABLET ORAL
Qty: 180 TABLET | Refills: 0 | Status: SHIPPED | OUTPATIENT
Start: 2024-02-26

## 2024-03-16 ENCOUNTER — APPOINTMENT (OUTPATIENT)
Dept: GENERAL RADIOLOGY | Age: 89
End: 2024-03-16
Payer: MEDICARE

## 2024-03-16 ENCOUNTER — HOSPITAL ENCOUNTER (EMERGENCY)
Age: 89
Discharge: HOME | End: 2024-03-16
Attending: EMERGENCY MEDICINE
Payer: MEDICARE

## 2024-03-16 ENCOUNTER — APPOINTMENT (OUTPATIENT)
Dept: CT IMAGING | Age: 89
End: 2024-03-16
Payer: MEDICARE

## 2024-03-16 VITALS
TEMPERATURE: 98.4 F | HEART RATE: 82 BPM | OXYGEN SATURATION: 96 % | BODY MASS INDEX: 20.77 KG/M2 | DIASTOLIC BLOOD PRESSURE: 91 MMHG | SYSTOLIC BLOOD PRESSURE: 199 MMHG | WEIGHT: 110 LBS | HEIGHT: 61 IN | RESPIRATION RATE: 28 BRPM

## 2024-03-16 DIAGNOSIS — R41.0 DISORIENTATION: ICD-10-CM

## 2024-03-16 DIAGNOSIS — E03.9 HYPOTHYROIDISM, UNSPECIFIED TYPE: ICD-10-CM

## 2024-03-16 DIAGNOSIS — N30.00 ACUTE CYSTITIS WITHOUT HEMATURIA: ICD-10-CM

## 2024-03-16 DIAGNOSIS — R40.4 TRANSIENT ALTERATION OF AWARENESS: Primary | ICD-10-CM

## 2024-03-16 LAB
ALBUMIN SERPL-MCNC: 3.5 G/DL (ref 3.5–5.2)
ALBUMIN/GLOB SERPL: 1 {RATIO} (ref 1–2.5)
ALP SERPL-CCNC: 88 U/L (ref 35–104)
ALT SERPL-CCNC: <5 U/L (ref 10–35)
ANION GAP SERPL CALCULATED.3IONS-SCNC: 10 MMOL/L (ref 9–16)
AST SERPL-CCNC: 14 U/L (ref 10–35)
BACTERIA URNS QL MICRO: ABNORMAL
BILIRUB SERPL-MCNC: 0.4 MG/DL (ref 0–1.2)
BILIRUB UR QL STRIP: NEGATIVE
BUN SERPL-MCNC: 19 MG/DL (ref 8–23)
CALCIUM SERPL-MCNC: 8.7 MG/DL (ref 8.6–10.4)
CASTS #/AREA URNS LPF: ABNORMAL /LPF (ref 0–2)
CASTS #/AREA URNS LPF: ABNORMAL /LPF (ref 0–2)
CHLORIDE SERPL-SCNC: 101 MMOL/L (ref 98–107)
CLARITY UR: ABNORMAL
CO2 SERPL-SCNC: 28 MMOL/L (ref 20–31)
COLOR UR: YELLOW
CREAT SERPL-MCNC: 0.7 MG/DL (ref 0.5–0.9)
EPI CELLS #/AREA URNS HPF: ABNORMAL /HPF (ref 0–5)
ERYTHROCYTE [DISTWIDTH] IN BLOOD BY AUTOMATED COUNT: 13.3 % (ref 11.8–14.4)
GFR SERPL CREATININE-BSD FRML MDRD: >60 ML/MIN/1.73M2
GLUCOSE SERPL-MCNC: 132 MG/DL (ref 74–99)
GLUCOSE UR STRIP-MCNC: NEGATIVE MG/DL
HCT VFR BLD AUTO: 33.5 % (ref 36.3–47.1)
HGB BLD-MCNC: 10.9 G/DL (ref 11.9–15.1)
HGB UR QL STRIP.AUTO: ABNORMAL
KETONES UR STRIP-MCNC: NEGATIVE MG/DL
LEUKOCYTE ESTERASE UR QL STRIP: ABNORMAL
MAGNESIUM SERPL-MCNC: 1.8 MG/DL (ref 1.6–2.4)
MCH RBC QN AUTO: 29.8 PG (ref 25.2–33.5)
MCHC RBC AUTO-ENTMCNC: 32.5 G/DL (ref 28.4–34.8)
MCV RBC AUTO: 91.5 FL (ref 82.6–102.9)
NITRITE UR QL STRIP: POSITIVE
NRBC BLD-RTO: 0 PER 100 WBC
PH UR STRIP: 6.5 [PH] (ref 5–8)
PLATELET # BLD AUTO: 167 K/UL (ref 138–453)
PMV BLD AUTO: 10.8 FL (ref 8.1–13.5)
POTASSIUM SERPL-SCNC: 3.5 MMOL/L (ref 3.7–5.3)
PROT SERPL-MCNC: 6.4 G/DL (ref 6.6–8.7)
PROT UR STRIP-MCNC: ABNORMAL MG/DL
RBC # BLD AUTO: 3.66 M/UL (ref 3.95–5.11)
RBC #/AREA URNS HPF: ABNORMAL /HPF (ref 0–2)
SODIUM SERPL-SCNC: 139 MMOL/L (ref 136–145)
SP GR UR STRIP: 1.02 (ref 1–1.03)
TSH SERPL DL<=0.05 MIU/L-ACNC: 0.09 UIU/ML (ref 0.27–4.2)
UROBILINOGEN UR STRIP-ACNC: NORMAL EU/DL (ref 0–1)
WBC #/AREA URNS HPF: ABNORMAL /HPF (ref 0–5)
WBC OTHER # BLD: 5.6 K/UL (ref 3.5–11.3)

## 2024-03-16 PROCEDURE — 70450 CT HEAD/BRAIN W/O DYE: CPT

## 2024-03-16 PROCEDURE — 87077 CULTURE AEROBIC IDENTIFY: CPT

## 2024-03-16 PROCEDURE — 81001 URINALYSIS AUTO W/SCOPE: CPT

## 2024-03-16 PROCEDURE — 93005 ELECTROCARDIOGRAM TRACING: CPT | Performed by: HEALTH CARE PROVIDER

## 2024-03-16 PROCEDURE — 87086 URINE CULTURE/COLONY COUNT: CPT

## 2024-03-16 PROCEDURE — 6370000000 HC RX 637 (ALT 250 FOR IP): Performed by: HEALTH CARE PROVIDER

## 2024-03-16 PROCEDURE — 99285 EMERGENCY DEPT VISIT HI MDM: CPT

## 2024-03-16 PROCEDURE — 71045 X-RAY EXAM CHEST 1 VIEW: CPT

## 2024-03-16 PROCEDURE — 80053 COMPREHEN METABOLIC PANEL: CPT

## 2024-03-16 PROCEDURE — 85027 COMPLETE CBC AUTOMATED: CPT

## 2024-03-16 PROCEDURE — 87186 SC STD MICRODIL/AGAR DIL: CPT

## 2024-03-16 PROCEDURE — 84443 ASSAY THYROID STIM HORMONE: CPT

## 2024-03-16 PROCEDURE — 83735 ASSAY OF MAGNESIUM: CPT

## 2024-03-16 RX ORDER — IBUPROFEN 400 MG/1
600 TABLET ORAL ONCE
Status: COMPLETED | OUTPATIENT
Start: 2024-03-16 | End: 2024-03-16

## 2024-03-16 RX ORDER — CIPROFLOXACIN HYDROCHLORIDE 3.5 MG/ML
2 SOLUTION/ DROPS TOPICAL EVERY 4 HOURS
Qty: 2.5 ML | Refills: 0 | Status: SHIPPED | OUTPATIENT
Start: 2024-03-16 | End: 2024-03-20

## 2024-03-16 RX ORDER — CEPHALEXIN 500 MG/1
500 CAPSULE ORAL 2 TIMES DAILY
Qty: 14 CAPSULE | Refills: 0 | Status: ON HOLD | OUTPATIENT
Start: 2024-03-16 | End: 2024-03-22 | Stop reason: HOSPADM

## 2024-03-16 RX ORDER — CEPHALEXIN 500 MG/1
500 CAPSULE ORAL ONCE
Status: COMPLETED | OUTPATIENT
Start: 2024-03-16 | End: 2024-03-16

## 2024-03-16 RX ORDER — ACETAMINOPHEN 500 MG
1000 TABLET ORAL ONCE
Status: COMPLETED | OUTPATIENT
Start: 2024-03-16 | End: 2024-03-16

## 2024-03-16 RX ADMIN — ACETAMINOPHEN 1000 MG: 500 TABLET ORAL at 17:14

## 2024-03-16 RX ADMIN — IBUPROFEN 600 MG: 400 TABLET, FILM COATED ORAL at 17:14

## 2024-03-16 RX ADMIN — CEPHALEXIN 500 MG: 500 CAPSULE ORAL at 17:14

## 2024-03-16 ASSESSMENT — LIFESTYLE VARIABLES: HOW OFTEN DO YOU HAVE A DRINK CONTAINING ALCOHOL: NEVER

## 2024-03-16 ASSESSMENT — ENCOUNTER SYMPTOMS
VOMITING: 0
SHORTNESS OF BREATH: 0
CONSTIPATION: 0
SORE THROAT: 0
NAUSEA: 0
DIARRHEA: 0
ABDOMINAL PAIN: 0

## 2024-03-16 ASSESSMENT — PAIN - FUNCTIONAL ASSESSMENT: PAIN_FUNCTIONAL_ASSESSMENT: NONE - DENIES PAIN

## 2024-03-16 NOTE — ED NOTES
Patient presents to ED via EMS from home for evaluation of confusion, called by daughter. Patient resides at home with one of her daughters. Patient has hx UTI in the past and daughters are concerned for the same today.

## 2024-03-16 NOTE — DISCHARGE INSTRUCTIONS
Call today or tomorrow to follow up with Cecille Ash MD  in 2-3 days.    Take your medication as indicated, if you are given an antibiotic then make sure you get the prescription filled and take the antibiotics until finished.  Drink plenty of water while taking the antibiotics.  Avoid drinking alcohol while taking antibiotics.     Return to the Emergency Department for worsening of cough, fever > 101.5 and not controlled with Tylenol or Ibuprofen, excessive nausea or vomiting, inability to urinate, any other care or concern.

## 2024-03-16 NOTE — ED NOTES
[facility] ambulance arranged for transportation home.  Daughter currently at bedside but is leaving to be at home and waiting for transport.  [facility] aware that ramp is located in the back of the residence and family will be home.

## 2024-03-16 NOTE — PROGRESS NOTES
Routing to PCP as FYI    Received perfect serve message from Moraima Ptcosme has been taken to the hospita. Need to talk to .\"    Spoke with Moraima  She would like to discuss Holly's over all care, feels she would benefit from a general physical however concerned about patients inability to travel.    Discussed she should follow up with office on Monday to schedule either ER follow up or Hospital follow up to address concerns. Also reviewed possibly looking into home physician care services.     All questions answered.

## 2024-03-16 NOTE — ED PROVIDER NOTES
[facility] ED  Emergency Department Encounter  Emergency Medicine Resident     Pt Name:Holly Gong  MRN: [id number]  Birthdate 1935  Date of evaluation: 3/16/24  PCP:  Cecille Ash MD  Note Started: 4:27 PM EDT      CHIEF COMPLAINT       Chief Complaint   Patient presents with    Altered Mental Status       HISTORY OF PRESENT ILLNESS  (Location/Symptom, Timing/Onset, Context/Setting, Quality, Duration, Modifying Factors, Severity.)      Holly Gong is a 88 y.o. female who presents with concerns for AMS. Brought in via EMS for concerns of family member at home. Patient reportedly had a 5-6 min period of altered mentation, stating she couldn't see or hear anything.     Patient back to baseline on arrival.  The patient is currently without any complaints.  Patient is some baseline pain secondary to significant arthritis but otherwise no concerns for any injuries.    Was able to speak with patient's daughters who state the patient is otherwise been in normal health.  Does have some underlying dementia.    PAST MEDICAL / SURGICAL / SOCIAL / FAMILY HISTORY      has a past medical history of Anxiety, Arthritis, Basal cell carcinoma, Cancer ([facility]), CHF (congestive heart failure) ([facility]), Closed fracture of single pubic ramus of pelvis with routine healing, right, Decubitus ulcer of left hip, stage 3 ([facility]), Depression, Gout, H/O total hysterectomy, Hyperlipidemia, Hypertension, Hypokalemia, Hypothyroidism, Kidney stone, Kidney stone, Melanoma (HCC), NSTEMI (non-ST elevated myocardial infarction) ([facility]), and Pressure injury of coccygeal region, stage 2 ([facility]).     has a past surgical history that includes Tubal ligation; Hysterectomy; and Hysterectomy, vaginal.    Social History     Socioeconomic History    Marital status:      Spouse name: Rajesh    Number of children: 8    Years of education: Not on file    Highest education level: Not on file   Occupational History    Not on file   Tobacco  acute intracranial abnormality.   [JS]   1841 Follow up plans and ER return precautions discussed. Patient verbalized understanding and had no further questions at time of discharge. [JS]      ED Course User Index  [JS] Stanford Kaba DO       PROCEDURES:  N/A    CONSULTS:  None    CRITICAL CARE:  There was significant risk of life threatening deterioration of patient's condition requiring my direct management. Critical care time 0 minutes, excluding any documented procedures.    FINAL IMPRESSION      1. Transient alteration of awareness    2. Disorientation    3. Acute cystitis without hematuria          DISPOSITION / PLAN     DISPOSITION Decision To Discharge 03/16/2024 06:41:47 PM      PATIENT REFERRED TO:  Cecille Ash MD  [address] [facility] [id number] [phone]    Call in 1 day  To discuss this ER visit and further follow up needed    [facility] ED  [address] [facility] [address]  [phone]  Go to   As needed, If symptoms worsen      DISCHARGE MEDICATIONS:  Discharge Medication List as of 3/16/2024  6:53 PM        START taking these medications    Details   cephALEXin (KEFLEX) 500 MG capsule Take 1 capsule by mouth 2 times daily for 7 days, Disp-14 capsule, R-0Normal      ciprofloxacin (CILOXAN) 0.3 % ophthalmic solution Place 2 drops into the left eye every 4 hours for 4 days, Disp-2.5 mL, R-0Normal             Stanford Kaba DO  Emergency Medicine Resident    (Please note that portions of this note were completed with a voice recognition program.  Efforts were made to edit the dictations but occasionally words are mis-transcribed.)

## 2024-03-16 NOTE — ED NOTES
The following labs were labeled with appropriate pt sticker and tubed to lab:     [x] Blue     [x] Lavender   [] on ice  [x] Green/yellow  [x] Green/black [] on ice  [] Grey  [] on ice  [] Yellow  [] Red  [] Pink  [] Type/ Screen  [] ABG  [] VBG    [] COVID-19 swab    [] Rapid  [] PCR  [] Flu swab  [] Peds Viral Panel     [x] Urine Sample  [] Fecal Sample  [] Pelvic Cultures  [] Blood Cultures  [] X 2  [] STREP Cultures  [] Wound Cultures

## 2024-03-16 NOTE — ED PROVIDER NOTES
[facility]     Emergency Department     Faculty Attestation    I performed a history and physical examination of the patient and discussed management with the resident. I reviewed the resident’s note and agree with the documented findings and plan of care. Any areas of disagreement are noted on the chart. I was personally present for the key portions of any procedures. I have documented in the chart those procedures where I was not present during the key portions. I have reviewed the emergency nurses triage note. I agree with the chief complaint, past medical history, past surgical history, allergies, medications, social and family history as documented unless otherwise noted below. Documentation of the HPI, Physical Exam and Medical Decision Making performed by medical students or scribes is based on my personal performance of the HPI, PE and MDM. For Physician Assistant/ Nurse Practitioner cases/documentation I have personally evaluated this patient and have completed at least one if not all key elements of the E/M (history, physical exam, and MDM). Additional findings are as noted.    Vital signs:   Vitals:    03/16/24 1610   BP:    Pulse: 79   Resp: 19   SpO2: 96%      88-year-old female here due to family calling EMS for altered mental status. Per the patient's family, she told them she could not see or hear and she was dropping things. Currently, the patient reports that she can see and hear. She denies pain. No history of recent trauma. Plan for labs, UA.     EKG Interpretation    Interpreted by emergency department physician    Rhythm: normal sinus  with 1st degree AV block  Rate: normal  Axis: left  Ectopy: none  Conduction: left bundle branch block (complete)  ST Segments: nonspecific changes and depression in  v6  T Waves: non specific changes  Q Waves: none    Clinical Impression: non-specific EKG, left bundle branch block, and 1st degree AV block    Lilly Reed,  MD Lilly Reed M.D,  Attending Emergency  Physician            Lilly Reed MD  03/16/24 1713       Lilly Reed MD  03/16/24 1723

## 2024-03-17 LAB
EKG ATRIAL RATE: 78 BPM
EKG P AXIS: 59 DEGREES
EKG P-R INTERVAL: 256 MS
EKG Q-T INTERVAL: 436 MS
EKG QRS DURATION: 152 MS
EKG QTC CALCULATION (BAZETT): 497 MS
EKG R AXIS: -62 DEGREES
EKG T AXIS: 111 DEGREES
EKG VENTRICULAR RATE: 78 BPM
MICROORGANISM SPEC CULT: NORMAL
SPECIMEN DESCRIPTION: NORMAL

## 2024-03-17 PROCEDURE — 93010 ELECTROCARDIOGRAM REPORT: CPT | Performed by: INTERNAL MEDICINE

## 2024-03-18 RX ORDER — LEVOTHYROXINE SODIUM 0.12 MG/1
125 TABLET ORAL EVERY MORNING
Qty: 30 TABLET | Refills: 1 | Status: ON HOLD | OUTPATIENT
Start: 2024-03-18 | End: 2024-03-24

## 2024-03-18 NOTE — PROGRESS NOTES
They have been given information about visiting physician services due to the difficulty Holly has with traveling to come here to be seen - can send them list again if needed

## 2024-03-18 NOTE — TELEPHONE ENCOUNTER
Last visit: 09/12/2023  Last Med refill: 01/24/2024  Does patient have enough medication for 72 hours: No:     Next Visit Date:  No future appointments.    Health Maintenance   Topic Date Due    Pneumococcal 65+ years Vaccine (1 of 2 - PCV) Never done    DTaP/Tdap/Td vaccine (1 - Tdap) Never done    Shingles vaccine (1 of 2) Never done    Respiratory Syncytial Virus (RSV) Pregnant or age 60 yrs+ (1 - 1-dose 60+ series) Never done    Flu vaccine (1) Never done    COVID-19 Vaccine (3 - 2023-24 season) 09/01/2023    Lipids  09/27/2023    Annual Wellness Visit (Medicare)  11/27/2023    Depression Monitoring  03/12/2024    Hepatitis A vaccine  Aged Out    Hepatitis B vaccine  Aged Out    Hib vaccine  Aged Out    Polio vaccine  Aged Out    Meningococcal (ACWY) vaccine  Aged Out       Hemoglobin A1C (%)   Date Value   03/06/2020 5.0             ( goal A1C is < 7)   No components found for: \"LABMICR\"  LDL Cholesterol (mg/dL)   Date Value   09/27/2022 64   01/29/2020 65       (goal LDL is <100)   AST (U/L)   Date Value   03/16/2024 14     ALT (U/L)   Date Value   03/16/2024 <5 (L)     BUN (mg/dL)   Date Value   03/16/2024 19     BP Readings from Last 3 Encounters:   03/16/24 (!) 199/91   11/18/23 (!) 159/87   06/02/23 129/61          (goal 120/80)    All Future Testing planned in [facility]  Lab Frequency Next Occurrence               Patient Active Problem List:     Essential hypertension     Cancer (HCC)     Hypothyroidism     Depression     Hyperlipidemia     Basal cell carcinoma     Melanoma (HCC)     Anxiety     Hypokalemia     Acute idiopathic gout of right foot     CKD (chronic kidney disease) stage 3, GFR 30-59 ml/min (HCC)     Risk for falls     Pneumococcal vaccine refused     Influenza vaccination declined     Fatigue     Bilateral lower extremity pain     Chronic diastolic heart failure (HCC)     Acute cystitis without hematuria     Chronic systolic heart failure (HCC)     Decreased mobility     Localized edema

## 2024-03-20 LAB
MICROORGANISM SPEC CULT: ABNORMAL
SPECIMEN DESCRIPTION: ABNORMAL

## 2024-03-21 ENCOUNTER — APPOINTMENT (OUTPATIENT)
Dept: GENERAL RADIOLOGY | Age: 89
DRG: 689 | End: 2024-03-21
Payer: MEDICARE

## 2024-03-21 ENCOUNTER — HOSPITAL ENCOUNTER (INPATIENT)
Age: 89
LOS: 4 days | Discharge: NURSING, REHAB OR LONG-TERM CARE | DRG: 689 | End: 2024-03-25
Attending: EMERGENCY MEDICINE | Admitting: INTERNAL MEDICINE
Payer: MEDICARE

## 2024-03-21 DIAGNOSIS — N30.00 ACUTE CYSTITIS WITHOUT HEMATURIA: Primary | ICD-10-CM

## 2024-03-21 DIAGNOSIS — E03.9 HYPOTHYROIDISM, UNSPECIFIED TYPE: ICD-10-CM

## 2024-03-21 DIAGNOSIS — R60.9 DEPENDENT EDEMA: ICD-10-CM

## 2024-03-21 PROBLEM — N39.0 UTI (URINARY TRACT INFECTION): Status: RESOLVED | Noted: 2024-03-21 | Resolved: 2024-03-21

## 2024-03-21 PROBLEM — N39.0 UTI (URINARY TRACT INFECTION): Status: ACTIVE | Noted: 2024-03-21

## 2024-03-21 PROBLEM — M17.11 PRIMARY OSTEOARTHRITIS OF RIGHT KNEE: Status: ACTIVE | Noted: 2024-03-21

## 2024-03-21 LAB
ALBUMIN SERPL-MCNC: 3.6 G/DL (ref 3.5–5.2)
ALBUMIN/GLOB SERPL: 1 {RATIO} (ref 1–2.5)
ALP SERPL-CCNC: 87 U/L (ref 35–104)
ALT SERPL-CCNC: <5 U/L (ref 10–35)
ANION GAP SERPL CALCULATED.3IONS-SCNC: 10 MMOL/L (ref 9–16)
AST SERPL-CCNC: 15 U/L (ref 10–35)
BACTERIA URNS QL MICRO: NORMAL
BASOPHILS # BLD: 0.06 K/UL (ref 0–0.2)
BASOPHILS NFR BLD: 1 % (ref 0–2)
BILIRUB SERPL-MCNC: 0.4 MG/DL (ref 0–1.2)
BILIRUB UR QL STRIP: NEGATIVE
BUN SERPL-MCNC: 14 MG/DL (ref 8–23)
CALCIUM SERPL-MCNC: 8.7 MG/DL (ref 8.6–10.4)
CASTS #/AREA URNS LPF: NORMAL /LPF (ref 0–8)
CHLORIDE SERPL-SCNC: 104 MMOL/L (ref 98–107)
CLARITY UR: ABNORMAL
CO2 SERPL-SCNC: 27 MMOL/L (ref 20–31)
COLOR UR: YELLOW
CREAT SERPL-MCNC: 0.8 MG/DL (ref 0.5–0.9)
EOSINOPHIL # BLD: 0.26 K/UL (ref 0–0.44)
EOSINOPHILS RELATIVE PERCENT: 5 % (ref 1–4)
EPI CELLS #/AREA URNS HPF: NORMAL /HPF (ref 0–5)
ERYTHROCYTE [DISTWIDTH] IN BLOOD BY AUTOMATED COUNT: 13.4 % (ref 11.8–14.4)
GFR SERPL CREATININE-BSD FRML MDRD: >60 ML/MIN/1.73M2
GLUCOSE SERPL-MCNC: 122 MG/DL (ref 74–99)
GLUCOSE UR STRIP-MCNC: NEGATIVE MG/DL
HCT VFR BLD AUTO: 35.6 % (ref 36.3–47.1)
HGB BLD-MCNC: 11.4 G/DL (ref 11.9–15.1)
HGB UR QL STRIP.AUTO: ABNORMAL
IMM GRANULOCYTES # BLD AUTO: <0.03 K/UL (ref 0–0.3)
IMM GRANULOCYTES NFR BLD: 0 %
KETONES UR STRIP-MCNC: NEGATIVE MG/DL
LACTIC ACID, SEPSIS WHOLE BLOOD: 0.7 MMOL/L (ref 0.5–1.9)
LACTIC ACID, SEPSIS WHOLE BLOOD: 1.3 MMOL/L (ref 0.5–1.9)
LEUKOCYTE ESTERASE UR QL STRIP: ABNORMAL
LYMPHOCYTES NFR BLD: 1.15 K/UL (ref 1.1–3.7)
LYMPHOCYTES RELATIVE PERCENT: 21 % (ref 24–43)
MAGNESIUM SERPL-MCNC: 1.9 MG/DL (ref 1.6–2.4)
MCH RBC QN AUTO: 29.7 PG (ref 25.2–33.5)
MCHC RBC AUTO-ENTMCNC: 32 G/DL (ref 28.4–34.8)
MCV RBC AUTO: 92.7 FL (ref 82.6–102.9)
MONOCYTES NFR BLD: 0.41 K/UL (ref 0.1–1.2)
MONOCYTES NFR BLD: 8 % (ref 3–12)
NEUTROPHILS NFR BLD: 65 % (ref 36–65)
NEUTS SEG NFR BLD: 3.53 K/UL (ref 1.5–8.1)
NITRITE UR QL STRIP: NEGATIVE
NRBC BLD-RTO: 0 PER 100 WBC
PH UR STRIP: 6.5 [PH] (ref 5–8)
PLATELET # BLD AUTO: 172 K/UL (ref 138–453)
PMV BLD AUTO: 10.4 FL (ref 8.1–13.5)
POTASSIUM SERPL-SCNC: 3.3 MMOL/L (ref 3.7–5.3)
PROT SERPL-MCNC: 6.4 G/DL (ref 6.6–8.7)
PROT UR STRIP-MCNC: ABNORMAL MG/DL
RBC # BLD AUTO: 3.84 M/UL (ref 3.95–5.11)
RBC #/AREA URNS HPF: NORMAL /HPF (ref 0–4)
SODIUM SERPL-SCNC: 141 MMOL/L (ref 136–145)
SP GR UR STRIP: 1.02 (ref 1–1.03)
T4 FREE SERPL-MCNC: 1.9 NG/DL (ref 0.93–1.7)
TSH SERPL DL<=0.05 MIU/L-ACNC: 0.11 UIU/ML (ref 0.27–4.2)
UROBILINOGEN UR STRIP-ACNC: NORMAL EU/DL (ref 0–1)
WBC #/AREA URNS HPF: NORMAL /HPF (ref 0–5)
WBC OTHER # BLD: 5.4 K/UL (ref 3.5–11.3)

## 2024-03-21 PROCEDURE — 80053 COMPREHEN METABOLIC PANEL: CPT

## 2024-03-21 PROCEDURE — 96374 THER/PROPH/DIAG INJ IV PUSH: CPT

## 2024-03-21 PROCEDURE — 99285 EMERGENCY DEPT VISIT HI MDM: CPT

## 2024-03-21 PROCEDURE — 81001 URINALYSIS AUTO W/SCOPE: CPT

## 2024-03-21 PROCEDURE — 99223 1ST HOSP IP/OBS HIGH 75: CPT | Performed by: INTERNAL MEDICINE

## 2024-03-21 PROCEDURE — 6360000002 HC RX W HCPCS

## 2024-03-21 PROCEDURE — 94761 N-INVAS EAR/PLS OXIMETRY MLT: CPT

## 2024-03-21 PROCEDURE — 1200000000 HC SEMI PRIVATE

## 2024-03-21 PROCEDURE — 87186 SC STD MICRODIL/AGAR DIL: CPT

## 2024-03-21 PROCEDURE — 84443 ASSAY THYROID STIM HORMONE: CPT

## 2024-03-21 PROCEDURE — 86403 PARTICLE AGGLUT ANTBDY SCRN: CPT

## 2024-03-21 PROCEDURE — 6360000002 HC RX W HCPCS: Performed by: INTERNAL MEDICINE

## 2024-03-21 PROCEDURE — 87086 URINE CULTURE/COLONY COUNT: CPT

## 2024-03-21 PROCEDURE — 6370000000 HC RX 637 (ALT 250 FOR IP)

## 2024-03-21 PROCEDURE — 87040 BLOOD CULTURE FOR BACTERIA: CPT

## 2024-03-21 PROCEDURE — 93005 ELECTROCARDIOGRAM TRACING: CPT | Performed by: INTERNAL MEDICINE

## 2024-03-21 PROCEDURE — 51798 US URINE CAPACITY MEASURE: CPT

## 2024-03-21 PROCEDURE — 83735 ASSAY OF MAGNESIUM: CPT

## 2024-03-21 PROCEDURE — 85025 COMPLETE CBC W/AUTO DIFF WBC: CPT

## 2024-03-21 PROCEDURE — 2580000003 HC RX 258

## 2024-03-21 PROCEDURE — 84439 ASSAY OF FREE THYROXINE: CPT

## 2024-03-21 PROCEDURE — 87077 CULTURE AEROBIC IDENTIFY: CPT

## 2024-03-21 PROCEDURE — 71045 X-RAY EXAM CHEST 1 VIEW: CPT

## 2024-03-21 PROCEDURE — 83605 ASSAY OF LACTIC ACID: CPT

## 2024-03-21 RX ORDER — ATORVASTATIN CALCIUM 40 MG/1
40 TABLET, FILM COATED ORAL DAILY
Status: DISCONTINUED | OUTPATIENT
Start: 2024-03-21 | End: 2024-03-25 | Stop reason: HOSPADM

## 2024-03-21 RX ORDER — ONDANSETRON 4 MG/1
4 TABLET, ORALLY DISINTEGRATING ORAL EVERY 8 HOURS PRN
Status: DISCONTINUED | OUTPATIENT
Start: 2024-03-21 | End: 2024-03-25 | Stop reason: HOSPADM

## 2024-03-21 RX ORDER — HYDROXYZINE HYDROCHLORIDE 10 MG/1
10 TABLET, FILM COATED ORAL 3 TIMES DAILY PRN
Status: DISCONTINUED | OUTPATIENT
Start: 2024-03-21 | End: 2024-03-25 | Stop reason: HOSPADM

## 2024-03-21 RX ORDER — MAGNESIUM SULFATE IN WATER 40 MG/ML
2000 INJECTION, SOLUTION INTRAVENOUS PRN
Status: DISCONTINUED | OUTPATIENT
Start: 2024-03-21 | End: 2024-03-25 | Stop reason: HOSPADM

## 2024-03-21 RX ORDER — LEVOTHYROXINE SODIUM 0.12 MG/1
125 TABLET ORAL EVERY MORNING
Status: DISCONTINUED | OUTPATIENT
Start: 2024-03-22 | End: 2024-03-22

## 2024-03-21 RX ORDER — ONDANSETRON 2 MG/ML
4 INJECTION INTRAMUSCULAR; INTRAVENOUS EVERY 6 HOURS PRN
Status: DISCONTINUED | OUTPATIENT
Start: 2024-03-21 | End: 2024-03-25 | Stop reason: HOSPADM

## 2024-03-21 RX ORDER — LISINOPRIL 20 MG/1
20 TABLET ORAL DAILY
Status: DISCONTINUED | OUTPATIENT
Start: 2024-03-21 | End: 2024-03-25 | Stop reason: HOSPADM

## 2024-03-21 RX ORDER — SODIUM CHLORIDE 0.9 % (FLUSH) 0.9 %
5-40 SYRINGE (ML) INJECTION PRN
Status: DISCONTINUED | OUTPATIENT
Start: 2024-03-21 | End: 2024-03-25 | Stop reason: HOSPADM

## 2024-03-21 RX ORDER — SODIUM CHLORIDE 0.9 % (FLUSH) 0.9 %
5-40 SYRINGE (ML) INJECTION EVERY 12 HOURS SCHEDULED
Status: DISCONTINUED | OUTPATIENT
Start: 2024-03-21 | End: 2024-03-25 | Stop reason: HOSPADM

## 2024-03-21 RX ORDER — POTASSIUM CHLORIDE 20 MEQ/1
40 TABLET, EXTENDED RELEASE ORAL ONCE
Status: COMPLETED | OUTPATIENT
Start: 2024-03-21 | End: 2024-03-21

## 2024-03-21 RX ORDER — SODIUM CHLORIDE 9 MG/ML
INJECTION, SOLUTION INTRAVENOUS PRN
Status: DISCONTINUED | OUTPATIENT
Start: 2024-03-21 | End: 2024-03-25 | Stop reason: HOSPADM

## 2024-03-21 RX ORDER — 0.9 % SODIUM CHLORIDE 0.9 %
1000 INTRAVENOUS SOLUTION INTRAVENOUS ONCE
Status: COMPLETED | OUTPATIENT
Start: 2024-03-21 | End: 2024-03-21

## 2024-03-21 RX ORDER — OXYCODONE HYDROCHLORIDE 5 MG/1
5 TABLET ORAL EVERY 6 HOURS PRN
Status: DISCONTINUED | OUTPATIENT
Start: 2024-03-21 | End: 2024-03-23

## 2024-03-21 RX ORDER — ENOXAPARIN SODIUM 100 MG/ML
40 INJECTION SUBCUTANEOUS DAILY
Status: DISCONTINUED | OUTPATIENT
Start: 2024-03-21 | End: 2024-03-25 | Stop reason: HOSPADM

## 2024-03-21 RX ORDER — CARVEDILOL 12.5 MG/1
12.5 TABLET ORAL 2 TIMES DAILY WITH MEALS
Status: DISCONTINUED | OUTPATIENT
Start: 2024-03-21 | End: 2024-03-25 | Stop reason: HOSPADM

## 2024-03-21 RX ORDER — POTASSIUM CHLORIDE 7.45 MG/ML
10 INJECTION INTRAVENOUS PRN
Status: DISCONTINUED | OUTPATIENT
Start: 2024-03-21 | End: 2024-03-25 | Stop reason: HOSPADM

## 2024-03-21 RX ORDER — POTASSIUM CHLORIDE 20 MEQ/1
40 TABLET, EXTENDED RELEASE ORAL PRN
Status: DISCONTINUED | OUTPATIENT
Start: 2024-03-21 | End: 2024-03-25 | Stop reason: HOSPADM

## 2024-03-21 RX ORDER — CHOLECALCIFEROL (VITAMIN D3) 125 MCG
5 CAPSULE ORAL NIGHTLY PRN
Status: DISCONTINUED | OUTPATIENT
Start: 2024-03-21 | End: 2024-03-25 | Stop reason: HOSPADM

## 2024-03-21 RX ORDER — ASPIRIN 81 MG/1
81 TABLET, CHEWABLE ORAL DAILY
Status: DISCONTINUED | OUTPATIENT
Start: 2024-03-21 | End: 2024-03-25 | Stop reason: HOSPADM

## 2024-03-21 RX ORDER — POTASSIUM CHLORIDE 20 MEQ/1
40 TABLET, EXTENDED RELEASE ORAL ONCE
Status: DISCONTINUED | OUTPATIENT
Start: 2024-03-21 | End: 2024-03-22

## 2024-03-21 RX ORDER — ACETAMINOPHEN 650 MG/1
650 SUPPOSITORY RECTAL EVERY 6 HOURS PRN
Status: DISCONTINUED | OUTPATIENT
Start: 2024-03-21 | End: 2024-03-25 | Stop reason: HOSPADM

## 2024-03-21 RX ORDER — HYDRALAZINE HYDROCHLORIDE 20 MG/ML
5 INJECTION INTRAMUSCULAR; INTRAVENOUS ONCE
Status: COMPLETED | OUTPATIENT
Start: 2024-03-21 | End: 2024-03-21

## 2024-03-21 RX ORDER — ACETAMINOPHEN 325 MG/1
650 TABLET ORAL EVERY 6 HOURS PRN
Status: DISCONTINUED | OUTPATIENT
Start: 2024-03-21 | End: 2024-03-25 | Stop reason: HOSPADM

## 2024-03-21 RX ORDER — POLYETHYLENE GLYCOL 3350 17 G/17G
17 POWDER, FOR SOLUTION ORAL DAILY PRN
Status: DISCONTINUED | OUTPATIENT
Start: 2024-03-21 | End: 2024-03-25 | Stop reason: HOSPADM

## 2024-03-21 RX ORDER — LINEZOLID 2 MG/ML
600 INJECTION, SOLUTION INTRAVENOUS EVERY 12 HOURS
Status: DISCONTINUED | OUTPATIENT
Start: 2024-03-21 | End: 2024-03-25 | Stop reason: HOSPADM

## 2024-03-21 RX ORDER — MENTHOL, METHYL SALICYLATE 63; 210 MG/1; MG/1
1 PATCH PERCUTANEOUS; TOPICAL; TRANSDERMAL DAILY PRN
COMMUNITY

## 2024-03-21 RX ORDER — FUROSEMIDE 20 MG/1
20 TABLET ORAL 2 TIMES DAILY
Status: DISCONTINUED | OUTPATIENT
Start: 2024-03-21 | End: 2024-03-22

## 2024-03-21 RX ORDER — HYDRALAZINE HYDROCHLORIDE 20 MG/ML
20 INJECTION INTRAMUSCULAR; INTRAVENOUS ONCE
Status: COMPLETED | OUTPATIENT
Start: 2024-03-21 | End: 2024-03-21

## 2024-03-21 RX ADMIN — SODIUM CHLORIDE 1000 ML: 9 INJECTION, SOLUTION INTRAVENOUS at 12:17

## 2024-03-21 RX ADMIN — ACETAMINOPHEN 325MG 650 MG: 325 TABLET ORAL at 16:16

## 2024-03-21 RX ADMIN — HYDRALAZINE HYDROCHLORIDE 20 MG: 20 INJECTION, SOLUTION INTRAMUSCULAR; INTRAVENOUS at 15:06

## 2024-03-21 RX ADMIN — LINEZOLID 600 MG: 600 INJECTION, SOLUTION INTRAVENOUS at 21:57

## 2024-03-21 RX ADMIN — VANCOMYCIN HYDROCHLORIDE 750 MG: 750 INJECTION, POWDER, LYOPHILIZED, FOR SOLUTION INTRAVENOUS at 13:13

## 2024-03-21 RX ADMIN — OXYCODONE 5 MG: 5 TABLET ORAL at 20:55

## 2024-03-21 RX ADMIN — HYDRALAZINE HYDROCHLORIDE 5 MG: 20 INJECTION INTRAMUSCULAR; INTRAVENOUS at 14:17

## 2024-03-21 RX ADMIN — FUROSEMIDE 20 MG: 20 TABLET ORAL at 20:55

## 2024-03-21 RX ADMIN — POTASSIUM CHLORIDE 40 MEQ: 1500 TABLET, EXTENDED RELEASE ORAL at 13:11

## 2024-03-21 RX ADMIN — HYDROXYZINE HYDROCHLORIDE 10 MG: 10 TABLET, FILM COATED ORAL at 16:27

## 2024-03-21 RX ADMIN — Medication 5 MG: at 20:55

## 2024-03-21 RX ADMIN — LISINOPRIL 20 MG: 20 TABLET ORAL at 14:17

## 2024-03-21 ASSESSMENT — PAIN DESCRIPTION - PAIN TYPE: TYPE: CHRONIC PAIN

## 2024-03-21 ASSESSMENT — PAIN SCALES - GENERAL
PAINLEVEL_OUTOF10: 6
PAINLEVEL_OUTOF10: 7

## 2024-03-21 ASSESSMENT — PAIN DESCRIPTION - LOCATION: LOCATION: HIP

## 2024-03-21 ASSESSMENT — PAIN DESCRIPTION - DESCRIPTORS: DESCRIPTORS: ACHING

## 2024-03-21 ASSESSMENT — PAIN DESCRIPTION - ORIENTATION: ORIENTATION: RIGHT;LEFT

## 2024-03-21 ASSESSMENT — PAIN SCALES - WONG BAKER: WONGBAKER_NUMERICALRESPONSE: HURTS LITTLE MORE

## 2024-03-21 ASSESSMENT — PAIN - FUNCTIONAL ASSESSMENT: PAIN_FUNCTIONAL_ASSESSMENT: PREVENTS OR INTERFERES WITH ALL ACTIVE AND SOME PASSIVE ACTIVITIES

## 2024-03-21 NOTE — ED PROVIDER NOTES
[facility] ED     Emergency Department     Faculty Attestation    I performed a history and physical examination of the patient and discussed management with the resident. I reviewed the resident’s note and agree with the documented findings and plan of care. Any areas of disagreement are noted on the chart. I was personally present for the key portions of any procedures. I have documented in the chart those procedures where I was not present during the key portions. I have reviewed the emergency nurses triage note. I agree with the chief complaint, past medical history, past surgical history, allergies, medications, social and family history as documented unless otherwise noted below. For Physician Assistant/ Nurse Practitioner cases/documentation I have personally evaluated this patient and have completed at least one if not all key elements of the E/M (history, physical exam, and MDM). Additional findings are as noted.    Note Started: 12:21 PM EDT    Patient called by pharmacist to return to the ED for MRSA positive urine culture given allergy list no additional oral antibiotics for home.  Patient states she otherwise feels well would not be had not been called.  Independent history from daughter patient still having some intermittent mental status changes.  Well-appearing however on exam chatting and joking with the nurses.  Will review recent cultures full labs plan to start vancomycin admit    EKG interpretation: Sinus rhythm 74.  Wide QRS at 150 prolonged WV at 302 there is a complete left bundle branch block with a left axis.  No acute ST or T changes given block negative Sgar[place]ossa.  No acute findings    Critical Care     none    Mao Davalos MD, FACEP, FAAEM  Attending Emergency  Physician           Mao Davalos MD  03/21/24 8830

## 2024-03-21 NOTE — ED PROVIDER NOTES
[facility] ED  Emergency Department Encounter  Emergency Medicine Resident     Pt Name:Holly Gong  MRN: [id number]  Birthdate 1935  Date of evaluation: 3/21/24  PCP:  Cecille Ash MD  Note Started: 11:57 AM EDT      CHIEF COMPLAINT       Chief Complaint   Patient presents with    Urinary Tract Infection       HISTORY OF PRESENT ILLNESS  (Location/Symptom, Timing/Onset, Context/Setting, Quality, Duration, Modifying Factors, Severity.)      Holly Gong is a 88 y.o. female with history of CHF who was brought in by EMS for intermittent episodes of confusion.  Patient was seen in the ER last week and was found to have a UTI.  She was discharged on Keflex.  Urine culture came back positive for MRSA.  ER pharmacist did call patient's daughter who takes care of her and informed her that she will need to come to the emergency department and likely be admitted for IV antibiotics as she is resistant to a lot of over-the-counter medications.  Patient denies any pain at this time.  She denies any fevers or chills.  Denies any abdominal pain, nausea, or vomiting.  Denies any urinary symptoms.    PAST MEDICAL / SURGICAL / SOCIAL / FAMILY HISTORY      has a past medical history of Anxiety, Arthritis, Basal cell carcinoma, Cancer (HCC), CHF (congestive heart failure) (HCC), Closed fracture of single pubic ramus of pelvis with routine healing, right, Decubitus ulcer of left hip, stage 3 (HCC), Depression, Gout, H/O total hysterectomy, Hyperlipidemia, Hypertension, Hypokalemia, Hypothyroidism, Kidney stone, Kidney stone, Melanoma (HCC), NSTEMI (non-ST elevated myocardial infarction) (HCC), and Pressure injury of coccygeal region, stage 2 (HCC).     has a past surgical history that includes Tubal ligation; Hysterectomy; and Hysterectomy, vaginal.    Social History     Socioeconomic History    Marital status:      Spouse name: Rajesh    Number of children: 8    Years of education: Not on file

## 2024-03-21 NOTE — CARE COORDINATION
Case Management Assessment  Initial Evaluation    Date/Time of Evaluation: 3/21/2024 5:53 PM  Assessment Completed by: Ramona Barney RN    If patient is discharged prior to next notation, then this note serves as note for discharge by case management.    Patient Name: Holly Gong                   YOB: 1935  Diagnosis: UTI (urinary tract infection) [N39.0]                   Date / Time: 3/21/2024 11:55 AM    Patient Admission Status: Inpatient   Readmission Risk (Low < 19, Mod (19-27), High > 27): Readmission Risk Score: 13    Current PCP: Cecille Ash MD  PCP verified by CM? (P) Yes    Chart Reviewed: Yes      History Provided by: (P) Patient  Patient Orientation: (P) Alert and Oriented    Patient Cognition: (P) Alert    Hospitalization in the last 30 days (Readmission):  No    If yes, Readmission Assessment in CM Navigator will be completed.    Advance Directives:      Code Status: Full Code   Patient's Primary Decision Maker is: (P) Named in Scanned ACP Document      Discharge Planning:    Patient lives with: (P) Children Type of Home:    Primary Care Giver: (P) Self  Patient Support Systems include: (P) Family Members, Children   Current Financial resources: (P) [place], [place]  Current community resources:    Current services prior to admission: (P) None            Current DME:              Type of Home Care services:  (P) None    ADLS  Prior functional level:    Current functional level:      PT AM-PAC:   /24  OT AM-PAC:   /24    Family can provide assistance at DC: (P) Yes  Would you like Case Management to discuss the discharge plan with any other family members/significant others, and if so, who? (P) Yes  Plans to Return to Present Housing: (P) Unknown at present  Other Identified Issues/Barriers to RETURNING to current housing: safety  Potential Assistance needed at discharge:              Potential DME:    Patient expects to discharge to: (P) House  Plan for transportation

## 2024-03-21 NOTE — H&P
No respiratory distress.      Breath sounds: Normal breath sounds. No wheezing or rales.   Abdominal:      Palpations: Abdomen is soft.      Tenderness: There is no abdominal tenderness. There is no right CVA tenderness, left CVA tenderness, guarding or rebound.   Musculoskeletal:      Cervical back: Normal range of motion.      Comments: Decreased range of motion b/l le, prefers lying in fetal position, can straighten legs occasionally    Skin:     General: Skin is dry.      Coloration: Skin is not jaundiced.      Findings: Bruising present. No rash.   Neurological:      Mental Status: She is alert and oriented to person, place, and time. Mental status is at baseline.               INVESTIGATIONS     Diagnostic Labs:  CBC:   Recent Labs     03/21/24  1217   WBC 5.4   RBC 3.84*   HGB 11.4*   HCT 35.6*   MCV 92.7   RDW 13.4        BMP:   Recent Labs     03/21/24  1217      K 3.3*      CO2 27   BUN 14   CREATININE 0.8     BNP: No results for input(s): \"BNP\" in the last 72 hours.  PT/INR: No results for input(s): \"PROTIME\", \"INR\" in the last 72 hours.  APTT: No results for input(s): \"APTT\" in the last 72 hours.  CARDIAC ENZYMES: No results for input(s): \"CKMB\", \"CKMBINDEX\", \"TROPONINI\" in the last 72 hours.    Invalid input(s): \"CKTOTAL;3\"  FASTING LIPID PANEL:  Lab Results   Component Value Date    CHOL 131 01/29/2020    HDL 42 09/27/2022    TRIG 106 01/29/2020     LIVER PROFILE:   Recent Labs     03/21/24  1217   AST 15   ALT <5*   BILITOT 0.4   ALKPHOS 87      MICROBIOLOGY:   Lab Results   Component Value Date/Time    CULTURE NO GROWTH <24 HRS 03/21/2024 12:10 PM    CULTURE NO GROWTH <24 HRS 03/21/2024 12:10 PM       Imaging:   CT HEAD WO CONTRAST    Result Date: 3/16/2024  No acute intracranial abnormality.     XR CHEST PORTABLE    Result Date: 3/16/2024  No acute cardiopulmonary process.  Stable cardiac silhouette enlargement.       ASSESSMENT & PLAN     ASSESSMENT / PLAN:     IMPRESSION  This

## 2024-03-21 NOTE — ED NOTES
ED to inpatient nurses report      Chief Complaint:  Chief Complaint   Patient presents with    Urinary Tract Infection     Present to ED from: home    MOA:     LOC: alert and orientated to name, place, date  Mobility: Fully dependent  Oxygen Baseline: room air    Current needs required: n/a  Pending ED orders:   Present condition: stable resting on cot    Why did the patient come to the ED? Pt to ed via ems to room with complaints of recent uti diagnosis but still having symptoms and antibiotics not working even tho she has been compliant   Pt states dysuria   Pt states she sometimes can feel when she is going   Pt states her daughter gives her frequent brief changes  Purewik placed in pts new clean brief  Pt has no other complaints  Pt placed on cont cardiac monitor, ekg completed, iv established, labs drawn, labeled and will send to lab via orders  Pt alert and oriented x4, talking in complete sentences, respirations even and unlabored. Pt acting age appropriate. White board updated, will continue to plan of care   What is the plan? Treat uti  Any procedures or intervention occur? Labs, blood cultures, antibiotics   Any safety concerns??    Mental Status:  Level of Consciousness: Alert (0)    Psych Assessment:   Psychosocial  Psychosocial (WDL): Within Defined Limits  Vital signs   Vitals:    03/21/24 1645 03/21/24 1700 03/21/24 1730 03/21/24 1845   BP: (!) 156/56 (!) 150/81 (!) 160/82 (!) 155/88   Pulse: 98 85 (!) 101 99   Resp: 22 17 20 21   Temp:       TempSrc:       SpO2: 98% 98% 96% 94%   Weight:            Vitals:  Patient Vitals for the past 24 hrs:   BP Temp Temp src Pulse Resp SpO2 Weight   03/21/24 1845 (!) 155/88 -- -- 99 21 94 % --   03/21/24 1730 (!) 160/82 -- -- (!) 101 20 96 % --   03/21/24 1700 (!) 150/81 -- -- 85 17 98 % --   03/21/24 1645 (!) 156/56 -- -- 98 22 98 % --   03/21/24 1630 (!) 157/93 -- -- 90 17 98 % --   03/21/24 1629 -- -- -- 97 22 97 % --   03/21/24 1547 -- -- -- 91 21 100 % -- 
Pt awake in bed. Daughter at bedside. No distress noted. RR even and unlabored.   Call light within reach.   
Pt provided warm meal tray  Daughter at bedside  
Pt to ed via ems to room with complaints of recent uti diagnosis but still having symptoms and antibiotics not working even tho she has been compliant   Pt states dysuria   Pt states she sometimes can feel when she is going   Pt states her daughter gives her frequent brief changes  Purewik placed in pts new clean brief  Pt has no other complaints  Pt placed on cont cardiac monitor, ekg completed, iv established, labs drawn, labeled and will send to lab via orders  Pt alert and oriented x4, talking in complete sentences, respirations even and unlabored. Pt acting age appropriate. White board updated, will continue to plan of care   
Report given to timothy valle   
Transportation     Lack of Transportation (Non-Medical): Patient declined   Housing Stability: Unknown (2023)    Housing Stability Vital Sign     Unstable Housing in the Last Year: No       FAMILY HISTORY       Family History   Problem Relation Age of Onset    Heart Disease Father     Other Mother          in her sleep unknown cause       ALLERGIES     Bactrim [sulfamethoxazole-trimethoprim]    CURRENT MEDICATIONS       Previous Medications    ASPIRIN 81 MG TABLET    Take 1 tablet by mouth daily    ATORVASTATIN (LIPITOR) 10 MG TABLET    take 1 tablet by mouth once daily    BUSPIRONE (BUSPAR) 15 MG TABLET    take 1 tablet by mouth once daily    CARVEDILOL (COREG) 12.5 MG TABLET    take 1 tablet by mouth twice a day with meals    CEPHALEXIN (KEFLEX) 500 MG CAPSULE    Take 1 capsule by mouth 2 times daily for 7 days    DICLOFENAC SODIUM (VOLTAREN) 1 % GEL        FUROSEMIDE (LASIX) 20 MG TABLET    Take 1 tablet by mouth 2 times daily    LEVOTHYROXINE (SYNTHROID) 125 MCG TABLET    take 1 tablet by mouth every morning ON AN EMPTY STOMACH    LISINOPRIL (PRINIVIL;ZESTRIL) 20 MG TABLET    take 1 tablet by mouth once daily    MISC. DEVICES (MATTRESS PAD) MISC    Gel overlay to be used on bed at all times    NUTRITIONAL SUPPLEMENTS (ENSURE MAX PROTEIN) LIQD    Take 330 mLs by mouth 2 times daily    NYSTATIN (MYCOSTATIN) 617932 UNIT/GM POWDER    Apply 3 times daily.    PAROXETINE (PAXIL) 40 MG TABLET    take 1 tablet by mouth every morning     Orders Placed This Encounter   Medications    sodium chloride 0.9 % bolus 1,000 mL    vancomycin (VANCOCIN) 750 mg in sodium chloride 0.9 % 250 mL IVPB ([id number])     Order Specific Question:   Antimicrobial Indications     Answer:   Urinary Tract Infection    vancomycin (VANCOCIN) intermittent dosing (placeholder)     Order Specific Question:   Antimicrobial Indications     Answer:   Urinary Tract Infection    potassium chloride (KLOR-CON M) extended release tablet 40 mEq

## 2024-03-21 NOTE — CARE COORDINATION
Case Management Assessment  Initial Evaluation    Date/Time of Evaluation: 3/21/2024 6:11 PM  Assessment Completed by: Vida Rod    If patient is discharged prior to next notation, then this note serves as note for discharge by case management.    Patient Name: Holly Gong                   YOB: 1935  Diagnosis: UTI (urinary tract infection) [N39.0]                   Date / Time: 3/21/2024 11:55 AM    Patient Admission Status: Inpatient   Readmission Risk (Low < 19, Mod (19-27), High > 27): Readmission Risk Score: 13    Current PCP: Cecille Ash MD  PCP verified by CM? Yes    Chart Reviewed: Yes      History Provided by: Patient  Patient Orientation: Alert and Oriented    Patient Cognition: Alert    Hospitalization in the last 30 days (Readmission):  No    If yes, Readmission Assessment in CM Navigator will be completed.    Advance Directives:      Code Status: Full Code   Patient's Primary Decision Maker is: Named in Scanned ACP Document      Discharge Planning:    Patient lives with: Children Type of Home: (P) House  Primary Care Giver: Self  Patient Support Systems include: Family Members, Children   Current Financial resources: Medicare, [place]  Current community resources:    Current services prior to admission: None            Current DME:              Type of Home Care services:  None    ADLS  Prior functional level: (P) Assistance with the following:, Bathing, Dressing, Toileting, Cooking, Housework, Shopping, Mobility  Current functional level: (P) Assistance with the following:, Bathing, Dressing, Toileting, Cooking, Housework, Shopping, Mobility    PT AM-PAC:   /24  OT AM-PAC:   /24    Family can provide assistance at DC: Yes  Would you like Case Management to discuss the discharge plan with any other family members/significant others, and if so, who? Yes  Plans to Return to Present Housing: Unknown at present  Other Identified Issues/Barriers to RETURNING to current housing:

## 2024-03-22 PROBLEM — M24.562 CONTRACTURES INVOLVING BOTH KNEES: Status: ACTIVE | Noted: 2024-03-22

## 2024-03-22 PROBLEM — M24.561 CONTRACTURES INVOLVING BOTH KNEES: Status: ACTIVE | Noted: 2024-03-22

## 2024-03-22 LAB
25(OH)D3 SERPL-MCNC: 8.1 NG/ML (ref 30–100)
ALBUMIN SERPL-MCNC: 3.4 G/DL (ref 3.5–5.2)
ALBUMIN/GLOB SERPL: 1 {RATIO} (ref 1–2.5)
ALP SERPL-CCNC: 79 U/L (ref 35–104)
ALT SERPL-CCNC: <5 U/L (ref 10–35)
ANION GAP SERPL CALCULATED.3IONS-SCNC: 11 MMOL/L (ref 9–16)
AST SERPL-CCNC: 15 U/L (ref 10–35)
BASOPHILS # BLD: 0.07 K/UL (ref 0–0.2)
BASOPHILS NFR BLD: 1 % (ref 0–2)
BILIRUB SERPL-MCNC: 0.5 MG/DL (ref 0–1.2)
BUN SERPL-MCNC: 10 MG/DL (ref 8–23)
CALCIUM SERPL-MCNC: 8.5 MG/DL (ref 8.6–10.4)
CHLORIDE SERPL-SCNC: 107 MMOL/L (ref 98–107)
CO2 SERPL-SCNC: 23 MMOL/L (ref 20–31)
CREAT SERPL-MCNC: 0.8 MG/DL (ref 0.5–0.9)
EOSINOPHIL # BLD: 0.21 K/UL (ref 0–0.44)
EOSINOPHILS RELATIVE PERCENT: 4 % (ref 1–4)
ERYTHROCYTE [DISTWIDTH] IN BLOOD BY AUTOMATED COUNT: 13.7 % (ref 11.8–14.4)
GFR SERPL CREATININE-BSD FRML MDRD: >60 ML/MIN/1.73M2
GLUCOSE SERPL-MCNC: 117 MG/DL (ref 74–99)
HCT VFR BLD AUTO: 32.9 % (ref 36.3–47.1)
HGB BLD-MCNC: 10.3 G/DL (ref 11.9–15.1)
IMM GRANULOCYTES # BLD AUTO: 0.03 K/UL (ref 0–0.3)
IMM GRANULOCYTES NFR BLD: 1 %
LACTIC ACID, WHOLE BLOOD: 1 MMOL/L (ref 0.7–2.1)
LYMPHOCYTES NFR BLD: 1.22 K/UL (ref 1.1–3.7)
LYMPHOCYTES RELATIVE PERCENT: 21 % (ref 24–43)
MAGNESIUM SERPL-MCNC: 1.6 MG/DL (ref 1.6–2.4)
MCH RBC QN AUTO: 29.7 PG (ref 25.2–33.5)
MCHC RBC AUTO-ENTMCNC: 31.3 G/DL (ref 28.4–34.8)
MCV RBC AUTO: 94.8 FL (ref 82.6–102.9)
MONOCYTES NFR BLD: 0.36 K/UL (ref 0.1–1.2)
MONOCYTES NFR BLD: 6 % (ref 3–12)
NEUTROPHILS NFR BLD: 67 % (ref 36–65)
NEUTS SEG NFR BLD: 3.91 K/UL (ref 1.5–8.1)
NRBC BLD-RTO: 0 PER 100 WBC
PLATELET # BLD AUTO: 165 K/UL (ref 138–453)
PMV BLD AUTO: 10.6 FL (ref 8.1–13.5)
POTASSIUM SERPL-SCNC: 3.5 MMOL/L (ref 3.7–5.3)
PROT SERPL-MCNC: 5.9 G/DL (ref 6.6–8.7)
RBC # BLD AUTO: 3.47 M/UL (ref 3.95–5.11)
SODIUM SERPL-SCNC: 141 MMOL/L (ref 136–145)
WBC OTHER # BLD: 5.8 K/UL (ref 3.5–11.3)

## 2024-03-22 PROCEDURE — 80053 COMPREHEN METABOLIC PANEL: CPT

## 2024-03-22 PROCEDURE — 99222 1ST HOSP IP/OBS MODERATE 55: CPT | Performed by: INTERNAL MEDICINE

## 2024-03-22 PROCEDURE — 6370000000 HC RX 637 (ALT 250 FOR IP)

## 2024-03-22 PROCEDURE — 6360000002 HC RX W HCPCS: Performed by: INTERNAL MEDICINE

## 2024-03-22 PROCEDURE — 97167 OT EVAL HIGH COMPLEX 60 MIN: CPT

## 2024-03-22 PROCEDURE — 1200000000 HC SEMI PRIVATE

## 2024-03-22 PROCEDURE — 85025 COMPLETE CBC W/AUTO DIFF WBC: CPT

## 2024-03-22 PROCEDURE — 97530 THERAPEUTIC ACTIVITIES: CPT

## 2024-03-22 PROCEDURE — 97162 PT EVAL MOD COMPLEX 30 MIN: CPT

## 2024-03-22 PROCEDURE — 2580000003 HC RX 258

## 2024-03-22 PROCEDURE — 6360000002 HC RX W HCPCS

## 2024-03-22 PROCEDURE — 82306 VITAMIN D 25 HYDROXY: CPT

## 2024-03-22 PROCEDURE — 36415 COLL VENOUS BLD VENIPUNCTURE: CPT

## 2024-03-22 PROCEDURE — 83735 ASSAY OF MAGNESIUM: CPT

## 2024-03-22 PROCEDURE — 83605 ASSAY OF LACTIC ACID: CPT

## 2024-03-22 PROCEDURE — 51798 US URINE CAPACITY MEASURE: CPT

## 2024-03-22 PROCEDURE — 97535 SELF CARE MNGMENT TRAINING: CPT

## 2024-03-22 RX ORDER — POTASSIUM CHLORIDE 20 MEQ/1
40 TABLET, EXTENDED RELEASE ORAL ONCE
Status: DISCONTINUED | OUTPATIENT
Start: 2024-03-22 | End: 2024-03-22

## 2024-03-22 RX ORDER — LINEZOLID 600 MG/1
600 TABLET, FILM COATED ORAL 2 TIMES DAILY
Qty: 10 TABLET | Refills: 0 | Status: SHIPPED | OUTPATIENT
Start: 2024-03-22 | End: 2024-03-27

## 2024-03-22 RX ORDER — LEVOTHYROXINE SODIUM 0.1 MG/1
100 TABLET ORAL EVERY MORNING
Status: DISCONTINUED | OUTPATIENT
Start: 2024-03-22 | End: 2024-03-25 | Stop reason: HOSPADM

## 2024-03-22 RX ORDER — MAGNESIUM SULFATE 1 G/100ML
1000 INJECTION INTRAVENOUS ONCE
Status: COMPLETED | OUTPATIENT
Start: 2024-03-22 | End: 2024-03-22

## 2024-03-22 RX ADMIN — SODIUM CHLORIDE, PRESERVATIVE FREE 10 ML: 5 INJECTION INTRAVENOUS at 20:16

## 2024-03-22 RX ADMIN — LEVOTHYROXINE SODIUM 100 MCG: 100 TABLET ORAL at 09:32

## 2024-03-22 RX ADMIN — ENOXAPARIN SODIUM 40 MG: 100 INJECTION SUBCUTANEOUS at 09:03

## 2024-03-22 RX ADMIN — CARVEDILOL 12.5 MG: 12.5 TABLET, FILM COATED ORAL at 18:31

## 2024-03-22 RX ADMIN — LISINOPRIL 20 MG: 20 TABLET ORAL at 09:03

## 2024-03-22 RX ADMIN — ASPIRIN 81 MG: 81 TABLET, CHEWABLE ORAL at 09:02

## 2024-03-22 RX ADMIN — MAGNESIUM SULFATE HEPTAHYDRATE 1000 MG: 1 INJECTION, SOLUTION INTRAVENOUS at 09:34

## 2024-03-22 RX ADMIN — CARVEDILOL 12.5 MG: 12.5 TABLET, FILM COATED ORAL at 09:02

## 2024-03-22 RX ADMIN — POTASSIUM BICARBONATE 20 MEQ: 782 TABLET, EFFERVESCENT ORAL at 09:32

## 2024-03-22 RX ADMIN — ATORVASTATIN CALCIUM 40 MG: 40 TABLET, FILM COATED ORAL at 09:02

## 2024-03-22 RX ADMIN — LINEZOLID 600 MG: 600 INJECTION, SOLUTION INTRAVENOUS at 11:10

## 2024-03-22 RX ADMIN — LINEZOLID 600 MG: 600 INJECTION, SOLUTION INTRAVENOUS at 22:53

## 2024-03-22 RX ADMIN — FUROSEMIDE 20 MG: 20 TABLET ORAL at 09:01

## 2024-03-22 ASSESSMENT — PAIN SCALES - WONG BAKER

## 2024-03-22 ASSESSMENT — PAIN SCALES - GENERAL: PAINLEVEL_OUTOF10: 0

## 2024-03-22 NOTE — CARE COORDINATION
Spoke with Moraima (JONH) would like skilled stay. Sister is at [facility] and that is Moraima's first choice facility for Holly. Referral sent. Await further choices.

## 2024-03-22 NOTE — PLAN OF CARE
Principal Problem:    Complicated UTI (urinary tract infection)  Active Problems:    Essential hypertension    Hypothyroidism    Depression    Anxiety    Hypokalemia    Risk for falls    Decreased mobility    Severe malnutrition (HCC)    Moderate episode of recurrent major depressive disorder (HCC)    Primary osteoarthritis of right knee  Resolved Problems:    UTI (urinary tract infection)   Patient seen. Cachectic. Bed bound. She has not been seen in office by PCP in years. Contracted extremities from sever OA knee and hip on right.  H/o MRSA in decubitus ulcers in past  Now with MRSA urine.  Plan  IV Vancomycin for now  Zyvox on discharge  blood c/s  Bladder scans  ID consulted  PT    BP control  Get med list from daughter  Anxiety   Lovenox sq  Replace potassium  Attending Physician Statement  I have discussed the care of Holly Gong, including pertinent history and exam findings,  with the resident. I have seen and examined the patient and the key elements of all parts of the encounter have been performed by me.  I agree with the assessment, plan and orders as documented by the resident.  (GC Modifier)

## 2024-03-22 NOTE — CARE COORDINATION
[facility] unsure if pt will be skillable, awaiting PT/OT notes. Notes in, called and left VM with admissions notifying. Await call back.     [facility] [phone]

## 2024-03-23 PROBLEM — A49.02 MRSA INFECTION: Status: ACTIVE | Noted: 2024-03-23

## 2024-03-23 LAB
ALBUMIN SERPL-MCNC: 3.3 G/DL (ref 3.5–5.2)
ALBUMIN/GLOB SERPL: 1 {RATIO} (ref 1–2.5)
ALP SERPL-CCNC: 76 U/L (ref 35–104)
ALT SERPL-CCNC: <5 U/L (ref 10–35)
ANION GAP SERPL CALCULATED.3IONS-SCNC: 10 MMOL/L (ref 9–16)
AST SERPL-CCNC: 15 U/L (ref 10–35)
BASOPHILS # BLD: 0.04 K/UL (ref 0–0.2)
BASOPHILS NFR BLD: 1 % (ref 0–2)
BILIRUB SERPL-MCNC: 0.5 MG/DL (ref 0–1.2)
BUN SERPL-MCNC: 11 MG/DL (ref 8–23)
CALCIUM SERPL-MCNC: 8.6 MG/DL (ref 8.6–10.4)
CHLORIDE SERPL-SCNC: 102 MMOL/L (ref 98–107)
CO2 SERPL-SCNC: 24 MMOL/L (ref 20–31)
CREAT SERPL-MCNC: 0.9 MG/DL (ref 0.5–0.9)
EOSINOPHIL # BLD: 0.21 K/UL (ref 0–0.44)
EOSINOPHILS RELATIVE PERCENT: 2 % (ref 1–4)
ERYTHROCYTE [DISTWIDTH] IN BLOOD BY AUTOMATED COUNT: 13.5 % (ref 11.8–14.4)
GFR SERPL CREATININE-BSD FRML MDRD: >60 ML/MIN/1.73M2
GLUCOSE SERPL-MCNC: 101 MG/DL (ref 74–99)
HCT VFR BLD AUTO: 31.5 % (ref 36.3–47.1)
HGB BLD-MCNC: 10 G/DL (ref 11.9–15.1)
IMM GRANULOCYTES # BLD AUTO: 0.03 K/UL (ref 0–0.3)
IMM GRANULOCYTES NFR BLD: 0 %
LYMPHOCYTES NFR BLD: 1.13 K/UL (ref 1.1–3.7)
LYMPHOCYTES RELATIVE PERCENT: 13 % (ref 24–43)
MAGNESIUM SERPL-MCNC: 1.9 MG/DL (ref 1.6–2.4)
MCH RBC QN AUTO: 30.1 PG (ref 25.2–33.5)
MCHC RBC AUTO-ENTMCNC: 31.7 G/DL (ref 28.4–34.8)
MCV RBC AUTO: 94.9 FL (ref 82.6–102.9)
MONOCYTES NFR BLD: 0.52 K/UL (ref 0.1–1.2)
MONOCYTES NFR BLD: 6 % (ref 3–12)
NEUTROPHILS NFR BLD: 78 % (ref 36–65)
NEUTS SEG NFR BLD: 6.84 K/UL (ref 1.5–8.1)
NRBC BLD-RTO: 0 PER 100 WBC
PLATELET # BLD AUTO: ABNORMAL K/UL (ref 138–453)
PLATELET, FLUORESCENCE: 144 K/UL (ref 138–453)
PLATELETS.RETICULATED NFR BLD AUTO: 4.5 % (ref 1.1–10.3)
POTASSIUM SERPL-SCNC: 3.4 MMOL/L (ref 3.7–5.3)
PROT SERPL-MCNC: 5.6 G/DL (ref 6.6–8.7)
RBC # BLD AUTO: 3.32 M/UL (ref 3.95–5.11)
SODIUM SERPL-SCNC: 136 MMOL/L (ref 136–145)
WBC OTHER # BLD: 8.8 K/UL (ref 3.5–11.3)

## 2024-03-23 PROCEDURE — 36415 COLL VENOUS BLD VENIPUNCTURE: CPT

## 2024-03-23 PROCEDURE — 6360000002 HC RX W HCPCS: Performed by: INTERNAL MEDICINE

## 2024-03-23 PROCEDURE — 80053 COMPREHEN METABOLIC PANEL: CPT

## 2024-03-23 PROCEDURE — 1200000000 HC SEMI PRIVATE

## 2024-03-23 PROCEDURE — 83735 ASSAY OF MAGNESIUM: CPT

## 2024-03-23 PROCEDURE — 85055 RETICULATED PLATELET ASSAY: CPT

## 2024-03-23 PROCEDURE — 85025 COMPLETE CBC W/AUTO DIFF WBC: CPT

## 2024-03-23 PROCEDURE — 6370000000 HC RX 637 (ALT 250 FOR IP)

## 2024-03-23 PROCEDURE — 99232 SBSQ HOSP IP/OBS MODERATE 35: CPT | Performed by: INTERNAL MEDICINE

## 2024-03-23 PROCEDURE — 6360000002 HC RX W HCPCS

## 2024-03-23 PROCEDURE — 2580000003 HC RX 258

## 2024-03-23 RX ORDER — OXYCODONE HYDROCHLORIDE 5 MG/1
2.5 TABLET ORAL EVERY 6 HOURS PRN
Status: DISCONTINUED | OUTPATIENT
Start: 2024-03-23 | End: 2024-03-24

## 2024-03-23 RX ORDER — VITAMIN B COMPLEX
5000 TABLET ORAL WEEKLY
Status: DISCONTINUED | OUTPATIENT
Start: 2024-03-23 | End: 2024-03-25 | Stop reason: HOSPADM

## 2024-03-23 RX ADMIN — OXYCODONE 2.5 MG: 5 TABLET ORAL at 12:08

## 2024-03-23 RX ADMIN — CARVEDILOL 12.5 MG: 12.5 TABLET, FILM COATED ORAL at 08:36

## 2024-03-23 RX ADMIN — CHOLECALCIFEROL TAB 25 MCG (1000 UNIT) 5000 UNITS: 25 TAB at 08:36

## 2024-03-23 RX ADMIN — LISINOPRIL 20 MG: 20 TABLET ORAL at 08:36

## 2024-03-23 RX ADMIN — HYDROXYZINE HYDROCHLORIDE 10 MG: 10 TABLET, FILM COATED ORAL at 12:09

## 2024-03-23 RX ADMIN — SODIUM CHLORIDE, PRESERVATIVE FREE 10 ML: 5 INJECTION INTRAVENOUS at 22:41

## 2024-03-23 RX ADMIN — POTASSIUM BICARBONATE 20 MEQ: 782 TABLET, EFFERVESCENT ORAL at 08:35

## 2024-03-23 RX ADMIN — LINEZOLID 600 MG: 600 INJECTION, SOLUTION INTRAVENOUS at 22:39

## 2024-03-23 RX ADMIN — ASPIRIN 81 MG: 81 TABLET, CHEWABLE ORAL at 08:36

## 2024-03-23 RX ADMIN — LINEZOLID 600 MG: 600 INJECTION, SOLUTION INTRAVENOUS at 10:06

## 2024-03-23 RX ADMIN — CARVEDILOL 12.5 MG: 12.5 TABLET, FILM COATED ORAL at 18:07

## 2024-03-23 RX ADMIN — ACETAMINOPHEN 325MG 650 MG: 325 TABLET ORAL at 10:05

## 2024-03-23 RX ADMIN — LEVOTHYROXINE SODIUM 100 MCG: 100 TABLET ORAL at 08:36

## 2024-03-23 RX ADMIN — ENOXAPARIN SODIUM 40 MG: 100 INJECTION SUBCUTANEOUS at 08:36

## 2024-03-23 RX ADMIN — ATORVASTATIN CALCIUM 40 MG: 40 TABLET, FILM COATED ORAL at 08:36

## 2024-03-23 ASSESSMENT — PAIN SCALES - WONG BAKER

## 2024-03-23 ASSESSMENT — PAIN - FUNCTIONAL ASSESSMENT
PAIN_FUNCTIONAL_ASSESSMENT: ACTIVITIES ARE NOT PREVENTED
PAIN_FUNCTIONAL_ASSESSMENT: ACTIVITIES ARE NOT PREVENTED

## 2024-03-23 ASSESSMENT — PAIN SCALES - GENERAL
PAINLEVEL_OUTOF10: 0
PAINLEVEL_OUTOF10: 3
PAINLEVEL_OUTOF10: 9
PAINLEVEL_OUTOF10: 0
PAINLEVEL_OUTOF10: 0

## 2024-03-23 ASSESSMENT — PAIN DESCRIPTION - DESCRIPTORS
DESCRIPTORS: ACHING
DESCRIPTORS: ACHING

## 2024-03-23 ASSESSMENT — PAIN DESCRIPTION - LOCATION
LOCATION: BACK;FOOT
LOCATION: LEG;BACK

## 2024-03-23 ASSESSMENT — PAIN DESCRIPTION - ORIENTATION
ORIENTATION: RIGHT;LEFT
ORIENTATION: LOWER

## 2024-03-23 NOTE — CARE COORDINATION
PT/OT notes complete, ML for [facility] to review notes to see if they can accept, CB number provided, they are closed on the weekend    Call from Pts kelly Valera regarding plan of care, return call placed, went to voice mail and message left along with CB number

## 2024-03-23 NOTE — PLAN OF CARE
Problem: Skin/Tissue Integrity  Goal: Absence of new skin breakdown  Description: 1.  Monitor for areas of redness and/or skin breakdown  2.  Assess vascular access sites hourly  3.  Every 4-6 hours minimum:  Change oxygen saturation probe site  4.  Every 4-6 hours:  If on nasal continuous positive airway pressure, respiratory therapy assess nares and determine need for appliance change or resting period.  3/23/2024 0257 by Tianna Kunz RN  Outcome: Progressing  3/22/2024 1727 by Linda Forte RN  Outcome: Progressing     Problem: Safety - Adult  Goal: Free from fall injury  Outcome: Progressing     Problem: Chronic Conditions and Co-morbidities  Goal: Patient's chronic conditions and co-morbidity symptoms are monitored and maintained or improved  Outcome: Progressing     Problem: Discharge Planning  Goal: Discharge to home or other facility with appropriate resources  3/23/2024 0257 by Tianna Kunz RN  Outcome: Progressing  3/22/2024 1727 by Linda Forte RN  Outcome: Progressing

## 2024-03-23 NOTE — PLAN OF CARE
Problem: Discharge Planning  Goal: Discharge to home or other facility with appropriate resources  3/23/2024 1628 by Linda Forte RN  Outcome: Progressing     Problem: Pain  Goal: Verbalizes/displays adequate comfort level or baseline comfort level  3/23/2024 1628 by Linda Forte RN  Outcome: Progressing     Problem: Skin/Tissue Integrity  Goal: Absence of new skin breakdown  Description: 1.  Monitor for areas of redness and/or skin breakdown  2.  Assess vascular access sites hourly  3.  Every 4-6 hours minimum:  Change oxygen saturation probe site  4.  Every 4-6 hours:  If on nasal continuous positive airway pressure, respiratory therapy assess nares and determine need for appliance change or resting period.  3/23/2024 1628 by Linda Forte RN  Outcome: Progressing     Problem: Safety - Adult  Goal: Free from fall injury  3/23/2024 1628 by Linda Forte RN  Outcome: Progressing

## 2024-03-24 LAB
ALBUMIN SERPL-MCNC: 3.3 G/DL (ref 3.5–5.2)
ALBUMIN/GLOB SERPL: 1 {RATIO} (ref 1–2.5)
ALP SERPL-CCNC: 72 U/L (ref 35–104)
ALT SERPL-CCNC: <5 U/L (ref 10–35)
ANION GAP SERPL CALCULATED.3IONS-SCNC: 9 MMOL/L (ref 9–16)
AST SERPL-CCNC: 16 U/L (ref 10–35)
BASOPHILS # BLD: 0.06 K/UL (ref 0–0.2)
BASOPHILS NFR BLD: 1 % (ref 0–2)
BILIRUB SERPL-MCNC: 0.3 MG/DL (ref 0–1.2)
BUN SERPL-MCNC: 16 MG/DL (ref 8–23)
CALCIUM SERPL-MCNC: 8.7 MG/DL (ref 8.6–10.4)
CHLORIDE SERPL-SCNC: 102 MMOL/L (ref 98–107)
CO2 SERPL-SCNC: 26 MMOL/L (ref 20–31)
CREAT SERPL-MCNC: 0.9 MG/DL (ref 0.5–0.9)
EOSINOPHIL # BLD: 0.28 K/UL (ref 0–0.44)
EOSINOPHILS RELATIVE PERCENT: 5 % (ref 1–4)
ERYTHROCYTE [DISTWIDTH] IN BLOOD BY AUTOMATED COUNT: 13.5 % (ref 11.8–14.4)
GFR SERPL CREATININE-BSD FRML MDRD: >60 ML/MIN/1.73M2
GLUCOSE SERPL-MCNC: 118 MG/DL (ref 74–99)
HCT VFR BLD AUTO: 31 % (ref 36.3–47.1)
HGB BLD-MCNC: 10 G/DL (ref 11.9–15.1)
IMM GRANULOCYTES # BLD AUTO: <0.03 K/UL (ref 0–0.3)
IMM GRANULOCYTES NFR BLD: 0 %
LYMPHOCYTES NFR BLD: 1.1 K/UL (ref 1.1–3.7)
LYMPHOCYTES RELATIVE PERCENT: 20 % (ref 24–43)
MCH RBC QN AUTO: 30.2 PG (ref 25.2–33.5)
MCHC RBC AUTO-ENTMCNC: 32.3 G/DL (ref 28.4–34.8)
MCV RBC AUTO: 93.7 FL (ref 82.6–102.9)
MICROORGANISM SPEC CULT: ABNORMAL
MONOCYTES NFR BLD: 0.53 K/UL (ref 0.1–1.2)
MONOCYTES NFR BLD: 10 % (ref 3–12)
NEUTROPHILS NFR BLD: 64 % (ref 36–65)
NEUTS SEG NFR BLD: 3.49 K/UL (ref 1.5–8.1)
NRBC BLD-RTO: 0 PER 100 WBC
PLATELET # BLD AUTO: ABNORMAL K/UL (ref 138–453)
PLATELET, FLUORESCENCE: 142 K/UL (ref 138–453)
PLATELETS.RETICULATED NFR BLD AUTO: 5.1 % (ref 1.1–10.3)
POTASSIUM SERPL-SCNC: 3.8 MMOL/L (ref 3.7–5.3)
PROT SERPL-MCNC: 5.6 G/DL (ref 6.6–8.7)
RBC # BLD AUTO: 3.31 M/UL (ref 3.95–5.11)
SODIUM SERPL-SCNC: 137 MMOL/L (ref 136–145)
SPECIMEN DESCRIPTION: ABNORMAL
WBC OTHER # BLD: 5.5 K/UL (ref 3.5–11.3)

## 2024-03-24 PROCEDURE — 80053 COMPREHEN METABOLIC PANEL: CPT

## 2024-03-24 PROCEDURE — 6360000002 HC RX W HCPCS

## 2024-03-24 PROCEDURE — 85055 RETICULATED PLATELET ASSAY: CPT

## 2024-03-24 PROCEDURE — 6370000000 HC RX 637 (ALT 250 FOR IP)

## 2024-03-24 PROCEDURE — 85025 COMPLETE CBC W/AUTO DIFF WBC: CPT

## 2024-03-24 PROCEDURE — 6360000002 HC RX W HCPCS: Performed by: INTERNAL MEDICINE

## 2024-03-24 PROCEDURE — 1200000000 HC SEMI PRIVATE

## 2024-03-24 PROCEDURE — 36415 COLL VENOUS BLD VENIPUNCTURE: CPT

## 2024-03-24 PROCEDURE — 99232 SBSQ HOSP IP/OBS MODERATE 35: CPT | Performed by: INTERNAL MEDICINE

## 2024-03-24 PROCEDURE — 2580000003 HC RX 258

## 2024-03-24 RX ORDER — LEVOTHYROXINE SODIUM 0.1 MG/1
100 TABLET ORAL EVERY MORNING
Qty: 60 TABLET | Refills: 2 | DISCHARGE
Start: 2024-03-24

## 2024-03-24 RX ORDER — VITAMIN B COMPLEX
5000 TABLET ORAL WEEKLY
Qty: 25 TABLET | Refills: 0 | DISCHARGE
Start: 2024-03-30 | End: 2024-04-28

## 2024-03-24 RX ORDER — DULOXETIN HYDROCHLORIDE 30 MG/1
30 CAPSULE, DELAYED RELEASE ORAL DAILY
Qty: 90 CAPSULE | Refills: 1 | DISCHARGE
Start: 2024-03-24

## 2024-03-24 RX ORDER — OXYCODONE HYDROCHLORIDE 5 MG/1
2.5 TABLET ORAL EVERY 4 HOURS PRN
Status: DISCONTINUED | OUTPATIENT
Start: 2024-03-24 | End: 2024-03-25 | Stop reason: HOSPADM

## 2024-03-24 RX ADMIN — OXYCODONE 2.5 MG: 5 TABLET ORAL at 17:40

## 2024-03-24 RX ADMIN — LISINOPRIL 20 MG: 20 TABLET ORAL at 08:48

## 2024-03-24 RX ADMIN — LEVOTHYROXINE SODIUM 100 MCG: 100 TABLET ORAL at 08:48

## 2024-03-24 RX ADMIN — ATORVASTATIN CALCIUM 40 MG: 40 TABLET, FILM COATED ORAL at 08:48

## 2024-03-24 RX ADMIN — CARVEDILOL 12.5 MG: 12.5 TABLET, FILM COATED ORAL at 08:48

## 2024-03-24 RX ADMIN — ASPIRIN 81 MG: 81 TABLET, CHEWABLE ORAL at 08:48

## 2024-03-24 RX ADMIN — ENOXAPARIN SODIUM 40 MG: 100 INJECTION SUBCUTANEOUS at 08:48

## 2024-03-24 RX ADMIN — LINEZOLID 600 MG: 600 INJECTION, SOLUTION INTRAVENOUS at 12:03

## 2024-03-24 RX ADMIN — ACETAMINOPHEN 325MG 650 MG: 325 TABLET ORAL at 05:07

## 2024-03-24 RX ADMIN — SODIUM CHLORIDE, PRESERVATIVE FREE 10 ML: 5 INJECTION INTRAVENOUS at 08:48

## 2024-03-24 RX ADMIN — OXYCODONE 2.5 MG: 5 TABLET ORAL at 04:21

## 2024-03-24 RX ADMIN — CARVEDILOL 12.5 MG: 12.5 TABLET, FILM COATED ORAL at 17:41

## 2024-03-24 ASSESSMENT — PAIN SCALES - WONG BAKER

## 2024-03-24 ASSESSMENT — PAIN SCALES - GENERAL
PAINLEVEL_OUTOF10: 10
PAINLEVEL_OUTOF10: 8
PAINLEVEL_OUTOF10: 8
PAINLEVEL_OUTOF10: 0

## 2024-03-24 ASSESSMENT — PAIN DESCRIPTION - ORIENTATION
ORIENTATION: LOWER
ORIENTATION: RIGHT

## 2024-03-24 ASSESSMENT — PAIN DESCRIPTION - LOCATION
LOCATION: LEG
LOCATION: BACK;LEG
LOCATION: LEG

## 2024-03-24 ASSESSMENT — PAIN DESCRIPTION - DESCRIPTORS: DESCRIPTORS: ACHING

## 2024-03-24 ASSESSMENT — PAIN - FUNCTIONAL ASSESSMENT: PAIN_FUNCTIONAL_ASSESSMENT: ACTIVITIES ARE NOT PREVENTED

## 2024-03-24 NOTE — DISCHARGE INSTRUCTIONS
You were seen here for UTI.  Your urine cultures grew MRSA.    -You have been started on antibiotic.  Take Zyvox as prescribed.  -Your antidepressants have been changed.  Start taking Cymbalta 30 mg daily.  -Continue to take rest of the medications as previously prescribed.  -Follow-up with your PCP.  -Return to ER/call 911 if you begin to feel worsening symptoms.

## 2024-03-24 NOTE — PLAN OF CARE
Problem: Discharge Planning  Goal: Discharge to home or other facility with appropriate resources  3/24/2024 0613 by Anthony Chandra RN  Outcome: Progressing  3/23/2024 1628 by Linda Forte RN  Outcome: Progressing     Problem: Pain  Goal: Verbalizes/displays adequate comfort level or baseline comfort level  3/24/2024 0613 by Anthony Chandra RN  Outcome: Progressing  3/23/2024 1628 by Linda Forte RN  Outcome: Progressing     Problem: Skin/Tissue Integrity  Goal: Absence of new skin breakdown  Description: 1.  Monitor for areas of redness and/or skin breakdown  2.  Assess vascular access sites hourly  3.  Every 4-6 hours minimum:  Change oxygen saturation probe site  4.  Every 4-6 hours:  If on nasal continuous positive airway pressure, respiratory therapy assess nares and determine need for appliance change or resting period.  3/24/2024 0613 by Anthony Chandra RN  Outcome: Progressing  3/23/2024 1628 by Linda Forte RN  Outcome: Progressing     Problem: Safety - Adult  Goal: Free from fall injury  3/24/2024 0613 by Anthony Chandra RN  Outcome: Progressing  3/23/2024 1628 by Linda Forte RN  Outcome: Progressing     Problem: ABCDS Injury Assessment  Goal: Absence of physical injury  Outcome: Progressing     Problem: Chronic Conditions and Co-morbidities  Goal: Patient's chronic conditions and co-morbidity symptoms are monitored and maintained or improved  Outcome: Progressing

## 2024-03-24 NOTE — DISCHARGE INSTR - COC
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    MRSA infection A49.02       Isolation/Infection:   Isolation            Contact          Patient Infection Status       Infection Onset Added Last Indicated Last Indicated By Review Planned Expiration Resolved Resolved By    MRSA 03/11/21 03/13/21 03/21/24 Culture, Urine        Leg 03/2021    Urine 3/2024                       Nurse Assessment:  Last Vital Signs: BP (!) 101/52   Pulse 60   Temp 97 °F (36.1 °C) (Oral)   Resp 16   Wt 52.2 kg (115 lb)   SpO2 93%   BMI 21.73 kg/m²     Last documented pain score (0-10 scale): Pain Level: 8  Last Weight:   Wt Readings from Last 1 Encounters:   03/21/24 52.2 kg (115 lb)     Mental Status:  disoriented and alert    IV Access:  - None    Nursing Mobility/ADLs:  Walking   Dependent  Transfer  Dependent  Bathing  Dependent  Dressing  Dependent  Toileting  Dependent  Feeding  Assisted  Med Admin  Dependent  Med Delivery   whole    Wound Care Documentation and Therapy:  Wound 11/16/23 Hip Left;Anterior (Active)   Number of days: 128        Elimination:  Continence:   Bowel: No  Bladder: No  Urinary Catheter: None   Colostomy/Ileostomy/Ileal Conduit: No       Date of Last BM: ***    Intake/Output Summary (Last 24 hours) at 3/24/2024 1217  Last data filed at 3/24/2024 0536  Gross per 24 hour   Intake --   Output 1600 ml   Net -1600 ml     I/O last 3 completed shifts:  In: -   Out: 2050 [Urine:2050]    Safety Concerns:     At Risk for Falls    Impairments/Disabilities:      None    Nutrition Therapy:  Current Nutrition Therapy:   - Oral Diet:  General  - Oral Nutrition Supplement:  Standard  three times a day    Routes of Feeding: Oral  Liquids: No Restrictions  Daily Fluid

## 2024-03-25 VITALS
SYSTOLIC BLOOD PRESSURE: 133 MMHG | TEMPERATURE: 97.7 F | HEART RATE: 90 BPM | BODY MASS INDEX: 21.73 KG/M2 | WEIGHT: 115 LBS | OXYGEN SATURATION: 97 % | RESPIRATION RATE: 18 BRPM | DIASTOLIC BLOOD PRESSURE: 81 MMHG

## 2024-03-25 PROBLEM — Z51.5 ENCOUNTER FOR PALLIATIVE CARE: Status: ACTIVE | Noted: 2024-03-25

## 2024-03-25 PROBLEM — R60.9 DEPENDENT EDEMA: Status: ACTIVE | Noted: 2024-03-25

## 2024-03-25 PROBLEM — Z71.89 GOALS OF CARE, COUNSELING/DISCUSSION: Status: ACTIVE | Noted: 2024-03-25

## 2024-03-25 LAB
BASOPHILS # BLD: 0.04 K/UL (ref 0–0.2)
BASOPHILS NFR BLD: 0 % (ref 0–2)
EKG ATRIAL RATE: 74 BPM
EKG P AXIS: 76 DEGREES
EKG P-R INTERVAL: 302 MS
EKG Q-T INTERVAL: 448 MS
EKG QRS DURATION: 150 MS
EKG QTC CALCULATION (BAZETT): 497 MS
EKG R AXIS: -62 DEGREES
EKG T AXIS: 112 DEGREES
EKG VENTRICULAR RATE: 74 BPM
EOSINOPHIL # BLD: 0.13 K/UL (ref 0–0.44)
EOSINOPHILS RELATIVE PERCENT: 1 % (ref 1–4)
ERYTHROCYTE [DISTWIDTH] IN BLOOD BY AUTOMATED COUNT: 13.4 % (ref 11.8–14.4)
HCT VFR BLD AUTO: 33.7 % (ref 36.3–47.1)
HGB BLD-MCNC: 11 G/DL (ref 11.9–15.1)
IMM GRANULOCYTES # BLD AUTO: 0.04 K/UL (ref 0–0.3)
IMM GRANULOCYTES NFR BLD: 0 %
LYMPHOCYTES NFR BLD: 1.13 K/UL (ref 1.1–3.7)
LYMPHOCYTES RELATIVE PERCENT: 12 % (ref 24–43)
MCH RBC QN AUTO: 29.9 PG (ref 25.2–33.5)
MCHC RBC AUTO-ENTMCNC: 32.6 G/DL (ref 28.4–34.8)
MCV RBC AUTO: 91.6 FL (ref 82.6–102.9)
MONOCYTES NFR BLD: 0.58 K/UL (ref 0.1–1.2)
MONOCYTES NFR BLD: 6 % (ref 3–12)
NEUTROPHILS NFR BLD: 80 % (ref 36–65)
NEUTS SEG NFR BLD: 7.81 K/UL (ref 1.5–8.1)
NRBC BLD-RTO: 0 PER 100 WBC
PLATELET # BLD AUTO: 157 K/UL (ref 138–453)
PMV BLD AUTO: 11.1 FL (ref 8.1–13.5)
RBC # BLD AUTO: 3.68 M/UL (ref 3.95–5.11)
WBC OTHER # BLD: 9.7 K/UL (ref 3.5–11.3)

## 2024-03-25 PROCEDURE — 99222 1ST HOSP IP/OBS MODERATE 55: CPT

## 2024-03-25 PROCEDURE — 93010 ELECTROCARDIOGRAM REPORT: CPT | Performed by: INTERNAL MEDICINE

## 2024-03-25 PROCEDURE — 6370000000 HC RX 637 (ALT 250 FOR IP)

## 2024-03-25 PROCEDURE — 6360000002 HC RX W HCPCS: Performed by: INTERNAL MEDICINE

## 2024-03-25 PROCEDURE — 36415 COLL VENOUS BLD VENIPUNCTURE: CPT

## 2024-03-25 PROCEDURE — 2580000003 HC RX 258

## 2024-03-25 PROCEDURE — 6360000002 HC RX W HCPCS

## 2024-03-25 PROCEDURE — 85025 COMPLETE CBC W/AUTO DIFF WBC: CPT

## 2024-03-25 PROCEDURE — 97530 THERAPEUTIC ACTIVITIES: CPT

## 2024-03-25 PROCEDURE — 99239 HOSP IP/OBS DSCHRG MGMT >30: CPT | Performed by: HOSPITALIST

## 2024-03-25 RX ADMIN — ASPIRIN 81 MG: 81 TABLET, CHEWABLE ORAL at 09:24

## 2024-03-25 RX ADMIN — LISINOPRIL 20 MG: 20 TABLET ORAL at 09:24

## 2024-03-25 RX ADMIN — CARVEDILOL 12.5 MG: 12.5 TABLET, FILM COATED ORAL at 09:24

## 2024-03-25 RX ADMIN — ENOXAPARIN SODIUM 40 MG: 100 INJECTION SUBCUTANEOUS at 09:24

## 2024-03-25 RX ADMIN — LEVOTHYROXINE SODIUM 100 MCG: 100 TABLET ORAL at 09:24

## 2024-03-25 RX ADMIN — LINEZOLID 600 MG: 600 INJECTION, SOLUTION INTRAVENOUS at 11:31

## 2024-03-25 RX ADMIN — LINEZOLID 600 MG: 600 INJECTION, SOLUTION INTRAVENOUS at 00:10

## 2024-03-25 RX ADMIN — SODIUM CHLORIDE, PRESERVATIVE FREE 10 ML: 5 INJECTION INTRAVENOUS at 09:24

## 2024-03-25 RX ADMIN — ATORVASTATIN CALCIUM 40 MG: 40 TABLET, FILM COATED ORAL at 09:24

## 2024-03-25 ASSESSMENT — PAIN SCALES - WONG BAKER
WONGBAKER_NUMERICALRESPONSE: NO HURT

## 2024-03-25 ASSESSMENT — PAIN SCALES - GENERAL: PAINLEVEL_OUTOF10: 5

## 2024-03-25 NOTE — CASE COMMUNICATION
Called [facility] to inquire about referral.  Spoke to Rudi.  He states that [facility] has this patient and she is out of the office right now.  He tells me that he will look into it and get back to me.     1117 [facility] calls and tells me that they are able to accept the patient .   Called POA.  Left voicemail.  Awaiting return call    1130 Patient's daughter JONH Valera and is agreeable with the plan to admit to [facility].    Called Rudi at [facility] left voicemail with  time of 4.  Rudi returned call and is agreeable to a 4 .

## 2024-03-25 NOTE — PROGRESS NOTES
[facility]  Internal Medicine Teaching Residency Program  Inpatient Daily Progress Note  ______________________________________________________________________________    Patient: Holly Gong  YOB: 1935   MRN:[id number]    Acct: [id number]     Room: 0325/0325-02  Admit date: 3/21/2024  Today's date: 03/25/24  Number of days in the hospital: 4    SUBJECTIVE   Admitting Diagnosis: Complicated UTI (urinary tract infection)  CC: dysuria   Pt examined at bedside. Chart & results reviewed.   Overnight afebrile, hemodynamically stable, remained on room air, no acute events   -denies any complaints today , plt - 157   -am labs reviewed   -repeat blood cx negative, ID recommending zyvox till 3/27       Dc plan to [place]       ROS:  Constitutional:  negative for chills, fevers, sweats  Respiratory:  negative for cough, dyspnea on exertion, hemoptysis, shortness of breath, wheezing  Cardiovascular:  negative for chest pain, chest pressure/discomfort, lower extremity edema, palpitations  Gastrointestinal:  negative for abdominal pain, constipation, diarrhea, nausea, vomiting  Neurological:  negative for dizziness, headache    BRIEF HISTORY     The patient is a pleasant 88 y.o. female with a PMHx significant for hypertension, hypothyroidism, HFrEF, basal cell carcinoma, depression     Who recently presented to the ED on 3/16, with altered mental status was found to have a UTI, and was discharged with Keflex, apparently culture results from that visit came up to be MRSA in urine, and she was brought in for inpatient antibiotics, on evaluation she is cachectic, alert and oriented x 3, reports that she lives with her daughter who takes care of her, did not have any urinary catheter, no pressure wounds on dependent parts, but she did have ulcerated lesions on her forehead and cheeks     In the ED, she was hypertensive, afebrile, saturating appropriately on room 
    [facility]  Internal Medicine Teaching Residency Program  Inpatient Daily Progress Note  ______________________________________________________________________________    Patient: Holly Gong  YOB: 1935   MRN:[id number]    Acct: [id number]     Room: 0325/0325-02  Admit date: 3/21/2024  Today's date: 03/22/24  Number of days in the hospital: 1    SUBJECTIVE   Admitting Diagnosis: Complicated UTI (urinary tract infection)  CC: dysuria   Pt examined at bedside. Chart & results reviewed.   Overnight afebrile, hemodynamically stable, remained on room air, no acute events   -sleeping this am, was arousable , alert and oriented   -denied any abdominal pain   -bladder scan pvr - 120ml   Am labs reviewed k - 3.5, plt -165       ROS:  Constitutional:  negative for chills, fevers, sweats  Respiratory:  negative for cough, dyspnea on exertion, hemoptysis, shortness of breath, wheezing  Cardiovascular:  negative for chest pain, chest pressure/discomfort, lower extremity edema, palpitations  Gastrointestinal:  negative for abdominal pain, constipation, diarrhea, nausea, vomiting  Neurological:  negative for dizziness, headache    BRIEF HISTORY     The patient is a pleasant 88 y.o. female with a PMHx significant for hypertension, hypothyroidism, HFrEF, basal cell carcinoma, depression     Who recently presented to the ED on 3/16, with altered mental status was found to have a UTI, and was discharged with Keflex, apparently culture results from that visit came up to be MRSA in urine, and she was brought in for inpatient antibiotics, on evaluation she is cachectic, alert and oriented x 3, reports that she lives with her daughter who takes care of her, did not have any urinary catheter, no pressure wounds on dependent parts, but she did have ulcerated lesions on her forehead and cheeks     In the ED, she was hypertensive, afebrile, saturating appropriately on room air  - 
    [facility]  Internal Medicine Teaching Residency Program  Inpatient Daily Progress Note  ______________________________________________________________________________    Patient: Holly Gong  YOB: 1935   MRN:[id number]    Acct: [id number]     Room: 0325/0325-02  Admit date: 3/21/2024  Today's date: 03/23/24  Number of days in the hospital: 2    SUBJECTIVE   Admitting Diagnosis: Complicated UTI (urinary tract infection)  CC: dysuria   Pt examined at bedside. Chart & results reviewed.   Overnight afebrile, hemodynamically stable, remained on room air, no acute events   -denies any complaints except for persistent hip pain which is chronic   -blood pressure running on higher side   -am labs reviewed k- 3.4, cr - 0.9 , plt - 144>165  Hgb - 10   -vitamin d supplementation         ROS:  Constitutional:  negative for chills, fevers, sweats  Respiratory:  negative for cough, dyspnea on exertion, hemoptysis, shortness of breath, wheezing  Cardiovascular:  negative for chest pain, chest pressure/discomfort, lower extremity edema, palpitations  Gastrointestinal:  negative for abdominal pain, constipation, diarrhea, nausea, vomiting  Neurological:  negative for dizziness, headache    BRIEF HISTORY     The patient is a pleasant 88 y.o. female with a PMHx significant for hypertension, hypothyroidism, HFrEF, basal cell carcinoma, depression     Who recently presented to the ED on 3/16, with altered mental status was found to have a UTI, and was discharged with Keflex, apparently culture results from that visit came up to be MRSA in urine, and she was brought in for inpatient antibiotics, on evaluation she is cachectic, alert and oriented x 3, reports that she lives with her daughter who takes care of her, did not have any urinary catheter, no pressure wounds on dependent parts, but she did have ulcerated lesions on her forehead and cheeks     In the ED, she was 
[facility]    Admission Medication Reconciliation       The patient's list of current home medications has been reviewed.     Source(s) of information: Dispense Report, Spoke to patient    Based on information provided by the above source(s), I have updated the patient's home med list as described below.     Current Home Medication List:  Medication Sig   Menthol-Methyl Salicylate (SALONPAS PAIN RELIEF PATCH) PTCH Apply 1 patch topically daily as needed (pain)   levothyroxine (SYNTHROID) 125 MCG tablet take 1 tablet by mouth every morning ON AN EMPTY STOMACH   cephALEXin (KEFLEX) 500 MG capsule Take 1 capsule by mouth 2 times daily for 7 days   carvedilol (COREG) 12.5 MG tablet take 1 tablet by mouth twice a day with meals   busPIRone (BUSPAR) 15 MG tablet take 1 tablet by mouth once daily   lisinopril (PRINIVIL;ZESTRIL) 20 MG tablet take 1 tablet by mouth once daily   atorvastatin (LIPITOR) 10 MG tablet take 1 tablet by mouth once daily   Misc. Devices (MATTRESS PAD) MISC Gel overlay to be used on bed at all times   Nutritional Supplements (ENSURE MAX PROTEIN) LIQD Take 330 mLs by mouth 2 times daily       PROVIDER ACTION REQUESTED  Medications that need to be addressed by a physician/nurse practitioner:    Medication Action Requested   Added:  Salonpas Patch   Removed:  Aspirin 81 mg Tablet  Diclofenac 1% Gel  Furosemide 20 mg Tablet  Nystatin 100,000 unit/gram Powder  Paroxetine 40 mg Tablet  Other:  Patient's family member states she takes Hydrocodone-Acetaminophen 5-325 mg every 6 hours as needed for pain; did not add to medication list since last fill per dispense report was 9/7/23 for 8 day supply; not on PCP medication list.  Please review the following changes made and update the patients inpatient medication list as necessary.        Please feel free to call me with any questions about this encounter. Thank you.    Electronically signed by Debra Knowles on 3/21/2024 at 3:52 PM    
Occupational Therapy  Facility/Department: [facility] RENAL//MED SURG  Occupational Therapy Initial Assessment    Name: Holly Gong  : 1935  MRN: [id number]  Date of Service: 3/22/2024    Chief Complaint   Patient presents with    Urinary Tract Infection         Discharge Recommendations:   No therapy recommended at discharge.          Patient Diagnosis(es): The encounter diagnosis was Acute cystitis without hematuria.  Past Medical History:  has a past medical history of Anxiety, Arthritis, Basal cell carcinoma, Cancer (HCC), CHF (congestive heart failure) (HCC), Closed fracture of single pubic ramus of pelvis with routine healing, right, Decubitus ulcer of left hip, stage 3 (HCC), Depression, Gout, H/O total hysterectomy, Hyperlipidemia, Hypertension, Hypokalemia, Hypothyroidism, Kidney stone, Kidney stone, Melanoma ([facility]), NSTEMI (non-ST elevated myocardial infarction) ([facility]), and Pressure injury of coccygeal region, stage 2 ([facility]).  Past Surgical History:  has a past surgical history that includes Tubal ligation; Hysterectomy; and Hysterectomy, vaginal.           Assessment   Performance deficits / Impairments: Decreased functional mobility ;Decreased ADL status;Decreased safe awareness;Decreased cognition;Decreased endurance;Decreased balance;Decreased high-level IADLs;Decreased ROM;Decreased strength;Decreased coordination;Decreased posture;Decreased fine motor control  Assessment: Pt agreed to OT this date. Pt completed bed mobility with Max A to roll R<>L for offloading bony prominences. Pt was educated on importance and purpose of continuing to relieve pressure from bony prominences with fair return. Pt is severely limited in ability to perform daily activities d/t bedbound status and chronic flexion contractures of B hips and B knees as well as a rotational contracture of pt's trunk. Pt does not transfer to seated position at baseline. Per report, pt relies totally on family for majority of ADL completion 
Physical Therapy  Facility/Department: [facility] RENAL//MED SURG  Physical Therapy daily treatment note     Name: Holly Gong  : 1935  MRN: [id number]  Date of Service: 3/25/2024    Chief Complaint   Patient presents with    Urinary Tract Infection        Discharge Recommendations:  Patient would benefit from continued therapy after discharge   PT Equipment Recommendations  Equipment Needed: No      Patient Diagnosis(es): The primary encounter diagnosis was Acute cystitis without hematuria. Diagnoses of Dependent edema and Hypothyroidism, unspecified type were also pertinent to this visit.  Past Medical History:  has a past medical history of Anxiety, Arthritis, Basal cell carcinoma, Cancer (HCC), CHF (congestive heart failure) (HCC), Closed fracture of single pubic ramus of pelvis with routine healing, right, Decubitus ulcer of left hip, stage 3 ([facility]), Depression, Gout, H/O total hysterectomy, Hyperlipidemia, Hypertension, Hypokalemia, Hypothyroidism, Kidney stone, Kidney stone, Melanoma ([facility]), NSTEMI (non-ST elevated myocardial infarction) ([facility]), and Pressure injury of coccygeal region, stage 2 ([facility]).  Past Surgical History:  has a past surgical history that includes Tubal ligation; Hysterectomy; and Hysterectomy, vaginal.    Assessment   Body Structures, Functions, Activity Limitations Requiring Skilled Therapeutic Intervention: Decreased functional mobility ;Decreased tolerance to work activity;Decreased strength;Decreased endurance;Decreased balance;Increased pain  Assessment: Pt maxA+1 in rolling L and R. Pt is unable to  sitting EOB d/t severe BLE contractures of hips and knees, pain and profound weakness, pt report bed ridden at home . Pt has L side-lying preference increasing risk of ulcer development,Positioined and propped to semi side lying left . Pt  educated on importance of pressure relief activities. Pt is bedbound at baseline, family provides total support. Pt is currently unsafe to return to 
Physical Therapy  Facility/Department: [facility] RENAL//MED SURG  Physical Therapy Initial Assessment    Name: Holly Gong  : 1935  MRN: [id number]  Date of Service: 3/22/2024  Chief Complaint   Patient presents with    Urinary Tract Infection       Discharge Recommendations:   Further therapy recommended at discharge.     PT Equipment Recommendations  Equipment Needed: No      Patient Diagnosis(es): The encounter diagnosis was Acute cystitis without hematuria.  Past Medical History:  has a past medical history of Anxiety, Arthritis, Basal cell carcinoma, Cancer ([facility]), CHF (congestive heart failure) (HCC), Closed fracture of single pubic ramus of pelvis with routine healing, right, Decubitus ulcer of left hip, stage 3 ([facility]), Depression, Gout, H/O total hysterectomy, Hyperlipidemia, Hypertension, Hypokalemia, Hypothyroidism, Kidney stone, Kidney stone, Melanoma ([facility]), NSTEMI (non-ST elevated myocardial infarction) ([facility]), and Pressure injury of coccygeal region, stage 2 ([facility]).  Past Surgical History:  has a past surgical history that includes Tubal ligation; Hysterectomy; and Hysterectomy, vaginal.    Assessment   Body Structures, Functions, Activity Limitations Requiring Skilled Therapeutic Intervention: Decreased functional mobility ;Decreased strength;Decreased endurance;Decreased balance  Assessment: Pt maxA+1 in rolling L and R. Pt is unable to attempt sitting EOB d/t severe BLE contractures of hips and knees. Pt has L side-lying preference increasing risk of ulcer development, educated on importance of pressure relief activities. Pt is bedbound at baseline, family provides total support. Pt is currently unsafe to return to prior living arraingements. Pt would benefit from continued acute PT to address deficits.  Therapy Prognosis: Good  Decision Making: Medium Complexity  Requires PT Follow-Up: Yes  Activity Tolerance  Activity Tolerance: Patient limited by fatigue;Patient limited by endurance     Plan 
[facility]   Pharmacy Pharmacokinetic Monitoring Service - Vancomycin     Holly Gong is a 88 y.o. female starting on vancomycin therapy for UTI. Pharmacy consulted by Dr Yeboah for monitoring and adjustment.    Target Concentration: Goal AUC/REBECCA 400-600 mg*hr/L    Additional Antimicrobials: na    Pertinent Laboratory Values:   Wt Readings from Last 1 Encounters:   03/21/24 52.2 kg (115 lb)     Temp Readings from Last 1 Encounters:   03/21/24 98.2 °F (36.8 °C) (Oral)     Estimated Creatinine Clearance: 37 mL/min (based on SCr of 0.8 mg/dL).  Recent Labs     03/21/24  1217   CREATININE 0.8   BUN 14   WBC 5.4     Procalcitonin: na    Pertinent Cultures:  Culture Date Source Results   3/16 Urine MRSA   MRSA Nasal Swab: N/A. Non-respiratory infection.    Plan:  Dosing recommendations based on Bayesian software  Start vancomycin 750 mg IVPB q24h  Anticipated AUC of 420 and trough concentration of 11 at steady state  Renal labs as indicated   Vancomycin concentration not ordered yet  Pharmacy will continue to monitor patient and adjust therapy as indicated    Thank you for the consult,  Daniela Becerril RPH  3/21/2024 12:55 PM    
STAPHYLOCOCCUS AUREUS 50 ,000 CFU/ML (A) 03/21/2024 03:45 PM       Imaging:    XR CHEST PORTABLE    Result Date: 3/21/2024  No acute cardiopulmonary abnormality is identified.     CT HEAD WO CONTRAST    Result Date: 3/16/2024  No acute intracranial abnormality.     XR CHEST PORTABLE    Result Date: 3/16/2024  No acute cardiopulmonary process.  Stable cardiac silhouette enlargement.       ASSESSMENT & PLAN     Assessment and Plan:    Principal Problem:    Complicated UTI (urinary tract infection)  Active Problems:    Essential hypertension    Hypothyroidism    Depression    Anxiety    Hypokalemia    Risk for falls    Decreased mobility    Severe malnutrition (HCC)    Moderate episode of recurrent major depressive disorder (HCC)    Primary osteoarthritis of right knee    Contractures involving both knees    MRSA infection  Resolved Problems:    UTI (urinary tract infection)    MRSA UTI  - Patient came in last week with altered mental status, was found to have infection in UA, and discharged on Keflex, cultures done showed MRSA in urine and she was brought in for IV antibiotics  - Was started on vancomycin in the ER  -ID consulted  - Recommending Zyvox end date 3/27   - Continue to monitor platelets  -repeat blood cultures negative      Heart failure with reduced EF  Hypertension  -Does not seem to be in fluid overload  - Echo in 2020 showing EF 48%, thickening of mitral valve leaflets without stenosis, mild MR, mild TR, RVSP 39 mmHg  - resume coreg,lisinopril   -Continue ASA, low-dose statin     MDD  Resume home medications buspirone      Hypothyroidism  Tsh - 0.11, t4 free 1.9   Reduced synthroid to 100 from 125 mcg 3/22      Severe malnutrition   ADULT DIET; Regular  ADULT ORAL NUTRITION SUPPLEMENT; Breakfast, Lunch, Dinner; Standard High Calorie/High Protein Oral Supplement  With oral nutrition supplements     Vitamin d deficiency   Vitamin d levels 8.1   - cholecalciferol 5000U once weekly for 6 weeks 
3.4* 3.3*   BILITOT 0.5 0.5   ALKPHOS 79 76   ALT <5* <5*   AST 15 15       No results for input(s): \"RPR\" in the last 72 hours.  No results for input(s): \"HIV\" in the last 72 hours.  No results for input(s): \"BC\" in the last 72 hours.  Lab Results   Component Value Date/Time    CREATININE 0.9 03/23/2024 05:53 AM    GLUCOSE 101 03/23/2024 05:53 AM       Detailed results:        Thank you for allowing us to participate in the care of this patient.Please call with questions.    This note is created with the assistance of a speech recognition program.  While intending to generate adocument that actually reflects the content of the visit, the document can still have some errors including those of syntax and sound a like substitutions which may escape proof reading.  It such instances, actual meaningcan be extrapolated by contextual diversion.    Elidia Flowers MD  Office: [phone]  Perfect serve / office [phone]

## 2024-03-25 NOTE — ACP (ADVANCE CARE PLANNING)
Advance Care Planning     Advance Care Planning (ACP) Physician/NP/PA Conversation    Date of Conversation: 3/21/2024  Conducted with:  Healthcare Decision Maker: Named in Advance Directive or Healthcare Power of  (name) Patients kelly Rinaldi.     Healthcare Decision Maker:  Patients kelly Rinaldi. HCPOA paperwork in chart.         Care Preferences:    Hospitalization:  \"If your health worsens and it becomes clear that your chance of recovery is unlikely, what would be your preference regarding hospitalization?\"  The patient would prefer hospitalization.    Ventilation:  \"If you were unable to breath on your own and your chance of recovery was unlikely, what would be your preference about the use of a ventilator (breathing machine) if it was available to you?\"  The patient would desire the use of a ventilator.    Resuscitation:  \"In the event your heart stopped as a result of an underlying serious health condition, would you want attempts made to restart your heart, or would you prefer a natural death?\"  Yes, attempt to resuscitate.    treatment goals    Conversation Outcomes / Follow-Up Plan:  ACP in process - information provided, considering goals and options  Reviewed DNR/DNI and patient elects Full Code (Attempt Resuscitation)    Length of Voluntary ACP Conversation in minutes:  <16 minutes (Non-Billable)    Karina Paul, APRN - CNP

## 2024-03-25 NOTE — CONSULTS
tobacco: Never   Vaping Use    Vaping Use: Never used   Substance and Sexual Activity    Alcohol use: No     Alcohol/week: 0.0 standard drinks of alcohol    Drug use: No    Sexual activity: Never   Other Topics Concern    Not on file   Social History Narrative    Not on file     Social Determinants of Health     Financial Resource Strain: Low Risk  (2023)    Overall Financial Resource Strain (CARDIA)     Difficulty of Paying Living Expenses: Not hard at all   Food Insecurity: Not on file (2023)   Transportation Needs: Unknown (2023)    PRAPARE - Transportation     Lack of Transportation (Medical): Not on file     Lack of Transportation (Non-Medical): Patient declined   Physical Activity: Not on file   Stress: Not on file   Social Connections: Not on file   Intimate Partner Violence: Not on file   Housing Stability: Unknown (2023)    Housing Stability Vital Sign     Unable to Pay for Housing in the Last Year: Not on file     Number of Places Lived in the Last Year: Not on file     Unstable Housing in the Last Year: No       Family History:     Family History   Problem Relation Age of Onset    Heart Disease Father     Other Mother          in her sleep unknown cause      Medical Decision Making:   I have independently reviewed/ordered the following labs:    CBC with Differential:   Recent Labs     24  1217 24  0339   WBC 5.4 5.8   HGB 11.4* 10.3*   HCT 35.6* 32.9*    165   LYMPHOPCT 21* 21*   MONOPCT 8 6     BMP:  Recent Labs     24  1217 24  0339    141   K 3.3* 3.5*    107   CO2 27 23   BUN 14 10   CREATININE 0.8 0.8   MG 1.9 1.6     Hepatic Function Panel:   Recent Labs     24  1217 24  0339   PROT 6.4* 5.9*   LABALBU 3.6 3.4*   BILITOT 0.4 0.5   ALKPHOS 87 79   ALT <5* <5*   AST 15 15     No results for input(s): \"RPR\" in the last 72 hours.  No results for input(s): \"HIV\" in the last 72 hours.  No results for input(s): \"BC\" in the last 
recent):  CT HEAD WO CONTRAST 03/16/2024    Narrative  EXAMINATION:  CT OF THE HEAD WITHOUT CONTRAST  3/16/2024 5:31 pm    TECHNIQUE:  CT of the head was performed without the administration of intravenous  contrast. Automated exposure control, iterative reconstruction, and/or weight  based adjustment of the mA/kV was utilized to reduce the radiation dose to as  low as reasonably achievable.    COMPARISON:  None.    HISTORY:  ORDERING SYSTEM PROVIDED HISTORY: AMS  TECHNOLOGIST PROVIDED HISTORY:    AMS  Decision Support Exception - unselect if not a suspected or confirmed  emergency medical condition->Emergency Medical Condition (MA)  Reason for Exam: AMS    FINDINGS:  BRAIN/VENTRICLES: There is no acute intracranial hemorrhage, mass effect or  midline shift.  No abnormal extra-axial fluid collection.  The gray-white  differentiation is maintained without evidence of an acute infarct.  There is  no evidence of hydrocephalus.    ORBITS: The visualized portion of the orbits demonstrate no acute abnormality.    SINUSES: The visualized paranasal sinuses and mastoid air cells demonstrate  no acute abnormality.    SOFT TISSUES/SKULL:  No acute abnormality of the visualized skull or soft  tissues.    Impression  No acute intracranial abnormality.       Xray Result (most recent):  XR CHEST PORTABLE 03/21/2024    Narrative  EXAMINATION:  ONE XRAY VIEW OF THE CHEST    3/21/2024 3:38 pm    COMPARISON:  03/16/2024    HISTORY:  ORDERING SYSTEM PROVIDED HISTORY: infiltrate  TECHNOLOGIST PROVIDED HISTORY:  infiltrate    FINDINGS:  The heart is enlarged, with obscuration of the left lung base.  There is no  focal pulmonary consolidation.  No pleural effusion or pneumothorax is  identified.  No evidence of acute osseous abnormality.    Impression  No acute cardiopulmonary abnormality is identified.       MRI Result (most recent):  No results found for this or any previous visit from the past 3650 days.      No results found for this

## 2024-03-26 ENCOUNTER — CARE COORDINATION (OUTPATIENT)
Dept: CARE COORDINATION | Age: 89
End: 2024-03-26

## 2024-03-26 LAB
MICROORGANISM SPEC CULT: NORMAL
MICROORGANISM SPEC CULT: NORMAL
SERVICE CMNT-IMP: NORMAL
SERVICE CMNT-IMP: NORMAL
SPECIMEN DESCRIPTION: NORMAL
SPECIMEN DESCRIPTION: NORMAL

## 2024-03-26 NOTE — CARE COORDINATION
SECURE email notification sent to identified IDT members at [facility].

## 2024-04-03 ENCOUNTER — CARE COORDINATION (OUTPATIENT)
Dept: CARE COORDINATION | Age: 89
End: 2024-04-03

## 2024-04-03 NOTE — CARE COORDINATION
Care Transitions Post-Acute Facility Update Call    4/3/2024    Patient: Holly Gong Patient : 1935   MRN: [id number]  Reason for Admission:  UTI  Discharge Date: 3/25/24 RARS: Readmission Risk Score: 16.6     Email sent to William Quintero requesting updates on patient.    Care Transitions Post Acute Facility Update    Care Transitions Interventions      Post Acute Facility Update   Post Acute Facility: [facility]          Nursing       Rehab/Functional       SW/Discharge Planning

## 2024-04-05 NOTE — CARE COORDINATION
Attempt to contact patient today regarding care coordination, unable to reach for CHUCK call         Holly Gong discharged home with her daughter on 4/1/24.  Transportation was provided by [facility].  She has C services through [facility] and no DME was needed as they already had everything needed at home.  She has a PCP appointment on 4/9 at 3pm.  If you need anything else, let me know.      Recent encounters and notes reviewed.    No future appointments.      Keren Kerr  [facility] RN Post Acute Care Manager  [phone]

## 2024-04-08 ENCOUNTER — CARE COORDINATION (OUTPATIENT)
Dept: CARE COORDINATION | Age: 89
End: 2024-04-08

## 2024-04-08 NOTE — CARE COORDINATION
Directive: reviewed and current.    Medication reconciliation was performed with patient, who verbalizes understanding of administration of home medications. Medications reviewed, 1111F entered: no    Was patient discharged with a pulse oximeter? no    Non-face-to-face services provided:  Obtained and reviewed discharge summary and/or continuity of care documents  Education of patient/family/caregiver/guardian to support self-management-completed    Offered patient enrollment in the Remote Patient Monitoring (RPM) program for in-home monitoring:  no .    Care Transitions 24 Hour Call    Do you have all of your prescriptions and are they filled?: Yes  Care Transitions Interventions         Discussed follow-up appointments. If no appointment was previously scheduled, appointment scheduling offered: Yes.   Is follow up appointment scheduled within 7 days of discharge? No.    Follow Up  No future appointments.    Post Acute Care Manager provided contact information.  Plan for follow-up call in 5-7 days based on severity of symptoms and risk factors.  Plan for next call: symptom management-new or worsening sx  self management-how is she doing at home  follow-up appointment-did she get PCP follow up?  community resources-Home care [facility]  referral to ambulatory care manager-today 4/8/2024    TIFFANIE MAYEN RN

## 2024-04-10 ENCOUNTER — CARE COORDINATION (OUTPATIENT)
Dept: CARE COORDINATION | Age: 89
End: 2024-04-10

## 2024-04-10 NOTE — CARE COORDINATION
CTN referral for care management. Spoke with daughter Dennise who is staying with patient while caregiver Lina (another daughter who lives with patient) is having a medical procedure, she asked writer to call other sister Moraima who is POA.    Spoke with Moraima, patient established last week with [facility] provider who visited her last week. That provider will be prescribing all medications and taking over all care. She is currently getting [facility] visits after admission for complicated UTI and rehab facility stay for IV antibiotics. She is bedbound, non-ambulatory, and cost of transportation is $450 plus mileage to [facility], is not covered by insurance unless going to wound care appts.    Did not enroll in care management as not following with a [facility] PCP.

## 2024-05-14 DIAGNOSIS — E03.9 HYPOTHYROIDISM, UNSPECIFIED TYPE: ICD-10-CM

## 2024-05-14 RX ORDER — LEVOTHYROXINE SODIUM 0.12 MG/1
125 TABLET ORAL EVERY MORNING
Qty: 30 TABLET | Refills: 1 | OUTPATIENT
Start: 2024-05-14

## 2024-06-21 ENCOUNTER — TELEPHONE (OUTPATIENT)
Dept: DERMATOLOGY | Age: 89
End: 2024-06-21

## 2024-06-21 NOTE — TELEPHONE ENCOUNTER
A Dermatology Referral was received from Provider Roopa Fernandes. Patient says that she is bed ridden and can not walk.  I spoke with the patient's daughter Melissa and she will have her sister contact the Dermatology Department.

## 2024-08-19 ENCOUNTER — HOSPITAL ENCOUNTER (OUTPATIENT)
Age: 89
Setting detail: SPECIMEN
Discharge: HOME | End: 2024-08-19

## 2024-08-20 LAB
C DIFF GDH + TOXINS A+B STL QL IA.RAPID: ABNORMAL
SPECIMEN DESCRIPTION: ABNORMAL

## 2024-09-05 ENCOUNTER — HOSPITAL ENCOUNTER (EMERGENCY)
Age: 89
Discharge: HOME | End: 2024-09-06
Attending: EMERGENCY MEDICINE
Payer: MEDICARE

## 2024-09-05 VITALS
SYSTOLIC BLOOD PRESSURE: 147 MMHG | DIASTOLIC BLOOD PRESSURE: 79 MMHG | HEART RATE: 60 BPM | OXYGEN SATURATION: 96 % | RESPIRATION RATE: 17 BRPM | TEMPERATURE: 97.7 F

## 2024-09-05 DIAGNOSIS — K62.89 PERIRECTAL SKIN IRRITATION: Primary | ICD-10-CM

## 2024-09-05 LAB
ANION GAP SERPL CALCULATED.3IONS-SCNC: 9 MMOL/L (ref 9–16)
BASOPHILS # BLD: 0.05 K/UL (ref 0–0.2)
BASOPHILS NFR BLD: 1 % (ref 0–2)
BUN SERPL-MCNC: 16 MG/DL (ref 8–23)
CALCIUM SERPL-MCNC: 8.3 MG/DL (ref 8.6–10.4)
CHLORIDE SERPL-SCNC: 103 MMOL/L (ref 98–107)
CO2 SERPL-SCNC: 28 MMOL/L (ref 20–31)
CREAT SERPL-MCNC: 0.9 MG/DL (ref 0.5–0.9)
EOSINOPHIL # BLD: 0.21 K/UL (ref 0–0.44)
EOSINOPHILS RELATIVE PERCENT: 3 % (ref 1–4)
ERYTHROCYTE [DISTWIDTH] IN BLOOD BY AUTOMATED COUNT: 15.8 % (ref 11.8–14.4)
GFR, ESTIMATED: 66 ML/MIN/1.73M2
GLUCOSE SERPL-MCNC: 126 MG/DL (ref 74–99)
HCT VFR BLD AUTO: 33 % (ref 36.3–47.1)
HGB BLD-MCNC: 10.6 G/DL (ref 11.9–15.1)
IMM GRANULOCYTES # BLD AUTO: 0.03 K/UL (ref 0–0.3)
IMM GRANULOCYTES NFR BLD: 0 %
LYMPHOCYTES NFR BLD: 1.17 K/UL (ref 1.1–3.7)
LYMPHOCYTES RELATIVE PERCENT: 15 % (ref 24–43)
MCH RBC QN AUTO: 30.6 PG (ref 25.2–33.5)
MCHC RBC AUTO-ENTMCNC: 32.1 G/DL (ref 28.4–34.8)
MCV RBC AUTO: 95.4 FL (ref 82.6–102.9)
MONOCYTES NFR BLD: 0.86 K/UL (ref 0.1–1.2)
MONOCYTES NFR BLD: 11 % (ref 3–12)
NEUTROPHILS NFR BLD: 70 % (ref 36–65)
NEUTS SEG NFR BLD: 5.38 K/UL (ref 1.5–8.1)
NRBC BLD-RTO: 0 PER 100 WBC
PLATELET # BLD AUTO: 206 K/UL (ref 138–453)
PMV BLD AUTO: 10.7 FL (ref 8.1–13.5)
POTASSIUM SERPL-SCNC: 3.8 MMOL/L (ref 3.7–5.3)
RBC # BLD AUTO: 3.46 M/UL (ref 3.95–5.11)
RBC # BLD: ABNORMAL 10*6/UL
SODIUM SERPL-SCNC: 140 MMOL/L (ref 136–145)
WBC OTHER # BLD: 7.7 K/UL (ref 3.5–11.3)

## 2024-09-05 PROCEDURE — 80048 BASIC METABOLIC PNL TOTAL CA: CPT

## 2024-09-05 PROCEDURE — 6370000000 HC RX 637 (ALT 250 FOR IP)

## 2024-09-05 PROCEDURE — 99283 EMERGENCY DEPT VISIT LOW MDM: CPT

## 2024-09-05 PROCEDURE — 85025 COMPLETE CBC W/AUTO DIFF WBC: CPT

## 2024-09-05 RX ORDER — BUSPIRONE HYDROCHLORIDE 15 MG/1
15 TABLET ORAL ONCE
Status: COMPLETED | OUTPATIENT
Start: 2024-09-05 | End: 2024-09-05

## 2024-09-05 RX ORDER — CARVEDILOL 12.5 MG/1
12.5 TABLET ORAL ONCE
Status: COMPLETED | OUTPATIENT
Start: 2024-09-05 | End: 2024-09-05

## 2024-09-05 RX ORDER — ACETAMINOPHEN 325 MG/1
650 TABLET ORAL ONCE
Status: COMPLETED | OUTPATIENT
Start: 2024-09-05 | End: 2024-09-05

## 2024-09-05 RX ORDER — ATORVASTATIN CALCIUM 20 MG/1
10 TABLET, FILM COATED ORAL ONCE
Status: COMPLETED | OUTPATIENT
Start: 2024-09-05 | End: 2024-09-05

## 2024-09-05 RX ORDER — ZINC OXIDE 20 %
OINTMENT (GRAM) TOPICAL 4 TIMES DAILY PRN
Status: DISCONTINUED | OUTPATIENT
Start: 2024-09-05 | End: 2024-09-06 | Stop reason: HOSPADM

## 2024-09-05 RX ADMIN — Medication 5 MG: at 21:00

## 2024-09-05 RX ADMIN — CARVEDILOL 12.5 MG: 12.5 TABLET, FILM COATED ORAL at 20:57

## 2024-09-05 RX ADMIN — RUGBY ZINC OXIDE 20%: 20 OINTMENT TOPICAL at 18:47

## 2024-09-05 RX ADMIN — ATORVASTATIN CALCIUM 10 MG: 20 TABLET, FILM COATED ORAL at 20:57

## 2024-09-05 RX ADMIN — ACETAMINOPHEN 650 MG: 325 TABLET ORAL at 23:41

## 2024-09-05 RX ADMIN — BUSPIRONE HYDROCHLORIDE 15 MG: 15 TABLET ORAL at 20:59

## 2024-09-05 ASSESSMENT — PAIN SCALES - GENERAL: PAINLEVEL_OUTOF10: 0

## 2024-09-05 ASSESSMENT — ENCOUNTER SYMPTOMS
BLOOD IN STOOL: 1
DIARRHEA: 1
ABDOMINAL PAIN: 0
SHORTNESS OF BREATH: 0

## 2024-09-05 NOTE — DISCHARGE INSTRUCTIONS
Thank you for visiting [facility] Emergency Department.    You need to call Roopa Fernandes APRN - CNP to make an appointment as directed for follow up.    Should you have any questions regarding your care or further treatment, please call [facility] Emergency Department at [phone].

## 2024-09-05 NOTE — ED PROVIDER NOTES
[facility]     Emergency Department     Faculty Attestation    I performed a history and physical examination of the patient and discussed management with the resident. I reviewed the resident's note and agree with the documented findings and plan of care. Any areas of disagreement are noted on the chart. I was personally present for the key portions of any procedures. I have documented in the chart those procedures where I was not present during the key portions. I have reviewed the emergency nurses triage note. I agree with the chief complaint, past medical history, past surgical history, allergies, medications, social and family history as documented unless otherwise noted below. For Physician Assistant/ Nurse Practitioner cases/documentation I have personally evaluated this patient and have completed at least one if not all key elements of the E/M (history, physical exam, and MDM). Additional findings are as noted.    Abdomen soft and nontender, clinically dehydrated.  Stools were guaiac positive.     Randal Ryan MD  09/05/24 1640

## 2024-09-05 NOTE — ED NOTES
Writer [facility].  [facility] will [facility] with [facility]

## 2024-09-05 NOTE — ED NOTES
Pt presents to Ed pt has rectal bleeding, per ems caregiver of pt showed \"spotting\" Pt has home health care.  Pt lives with daughter. Pt states no pain when has bm.   Resident at bedside.  Dr Ramos did hemo cult test/positive. PT put on purewick, brief changed.        Pt is A&OX4, placed on full monitor, IV established, changed into gown and given warm blankets. Labs drawn, labeled, and sent to lab. White board updated, and patient is updated on plan of care.

## 2024-09-05 NOTE — ED NOTES
Writer spoke with  to set up a medical cab ride home  Pt needs a stretcher to get home  Will continue plan of care

## 2024-09-06 NOTE — ED PROVIDER NOTES
[facility] ED  Emergency Department  Emergency Medicine Resident Sign-out     Care of Holly Gong was assumed from Dr. Ramos and is being seen for No chief complaint on file.   .  The patient's initial evaluation and plan have been discussed with the prior provider who initially evaluated the patient.     EMERGENCY DEPARTMENT COURSE / MEDICAL DECISION MAKING:       MEDICATIONS GIVEN:  Orders Placed This Encounter   Medications    zinc oxide 20 % ointment    busPIRone (BUSPAR) tablet 15 mg    carvedilol (COREG) tablet 12.5 mg    atorvastatin (LIPITOR) tablet 10 mg    melatonin tablet 5 mg       LABS / RADIOLOGY:     Labs Reviewed   CBC WITH AUTO DIFFERENTIAL - Abnormal; Notable for the following components:       Result Value    RBC 3.46 (*)     Hemoglobin 10.6 (*)     Hematocrit 33.0 (*)     RDW 15.8 (*)     Neutrophils % 70 (*)     Lymphocytes % 15 (*)     All other components within normal limits   BASIC METABOLIC PANEL - Abnormal; Notable for the following components:    Glucose 126 (*)     Calcium 8.3 (*)     All other components within normal limits   CULTURE, URINE   URINALYSIS WITH MICROSCOPIC       No orders to display       RECENT VITALS:     Temp: 97.7 °F (36.5 °C),  Pulse: 65, Respirations: 19, BP: (!) 147/79, SpO2: 96 %    This patient is a 89 y.o. Female with chronic diarrhea, concern of rectal bleeding.  Guaiac positive rectal exam, hemoglobin stable.  Plan for discharge home, follow-up with GI as an outpatient.  Pending transfer.      OUTSTANDING TASKS / RECOMMENDATIONS:      Await transport home.     FINAL IMPRESSION:     1. Perirectal skin irritation        DISPOSITION:       DISPOSITION:  [x]  Discharge   []  Transfer -    []  Admission -     []  Against Medical Advice   []  Eloped   FOLLOW-UP: Roopa Fernandes, APRN - CNP  [address]  [facility] [address] [phone]           DISCHARGE MEDICATIONS: New Prescriptions    No medications on file

## 2024-09-06 NOTE — ED PROVIDER NOTES
includes Tubal ligation; Hysterectomy; and Hysterectomy, vaginal.      Social History     Socioeconomic History    Marital status:      Spouse name: Rajesh    Number of children: 8    Years of education: Not on file    Highest education level: Not on file   Occupational History    Not on file   Tobacco Use    Smoking status: Former     Current packs/day: 0.00     Types: Cigarettes     Quit date: 1960     Years since quittin.3    Smokeless tobacco: Never   Vaping Use    Vaping status: Never Used   Substance and Sexual Activity    Alcohol use: No     Alcohol/week: 0.0 standard drinks of alcohol    Drug use: No    Sexual activity: Never   Other Topics Concern    Not on file   Social History Narrative    Not on file     Social Determinants of Health     Financial Resource Strain: Low Risk  (2023)    Overall Financial Resource Strain (CARDIA)     Difficulty of Paying Living Expenses: Not hard at all   Food Insecurity: Not on file (2023)   Transportation Needs: Unknown (2023)    PRAPARE - Transportation     Lack of Transportation (Medical): Not on file     Lack of Transportation (Non-Medical): Patient declined   Physical Activity: Not on file   Stress: Not on file   Social Connections: Not on file   Intimate Partner Violence: Not on file   Housing Stability: Unknown (2023)    Housing Stability Vital Sign     Unable to Pay for Housing in the Last Year: Not on file     Number of Places Lived in the Last Year: Not on file     Unstable Housing in the Last Year: No       Family History   Problem Relation Age of Onset    Heart Disease Father     Other Mother          in her sleep unknown cause       Allergies:  Bactrim [sulfamethoxazole-trimethoprim]    Home Medications:  Prior to Admission medications    Medication Sig Start Date End Date Taking? Authorizing Provider   aspirin 81 MG tablet Take 1 tablet by mouth daily 3/24/24   Pito Ji MD   levothyroxine (SYNTHROID) 100 MCG  chronic diarrhea for 3 to 4 months and is following with her home health care visiting physician.  States that she was treated for C. difficile and they are planning to start her on a probiotic for management.  It was discussed that there are no additional recommendations from the emergency department at this time.  Patient is well-hydrated.  Potassium within normal limits.  No elevated white blood cell count.  Encouraged to continue to follow with her provider. [JR]      ED Course User Index  [JR] Kirstin Ramos MD       PROCEDURES:  None    CONSULTS:  None    CRITICAL CARE:  There was significant risk of life threatening deterioration of patient's condition requiring my direct management. Critical care time 0 minutes, excluding any documented procedures.    FINAL IMPRESSION      1. Perirectal skin irritation          DISPOSITION / PLAN     DISPOSITION Decision To Discharge 09/05/2024 06:49:07 PM  Condition at Disposition: Stable      PATIENT REFERRED TO:  Roopa Fernandes, APRN - CNP  [address]  [facility] [place] [address] [phone]            DISCHARGE MEDICATIONS:  New Prescriptions    No medications on file       Kirstin Ramos MD  Emergency Medicine Resident    (Please note that portions of thisnote were completed with a voice recognition program.  Efforts were made to edit the dictations but occasionally words are mis-transcribed.)